# Patient Record
Sex: MALE | Race: WHITE | NOT HISPANIC OR LATINO | Employment: OTHER | URBAN - NONMETROPOLITAN AREA
[De-identification: names, ages, dates, MRNs, and addresses within clinical notes are randomized per-mention and may not be internally consistent; named-entity substitution may affect disease eponyms.]

---

## 2017-02-09 ENCOUNTER — OFFICE VISIT (OUTPATIENT)
Dept: MEDICAL GROUP | Facility: CLINIC | Age: 72
End: 2017-02-09
Payer: MEDICARE

## 2017-02-09 VITALS
WEIGHT: 232 LBS | TEMPERATURE: 98.1 F | RESPIRATION RATE: 16 BRPM | OXYGEN SATURATION: 95 % | SYSTOLIC BLOOD PRESSURE: 110 MMHG | HEIGHT: 72 IN | HEART RATE: 85 BPM | DIASTOLIC BLOOD PRESSURE: 70 MMHG | BODY MASS INDEX: 31.42 KG/M2

## 2017-02-09 DIAGNOSIS — G89.29 CHRONIC LOW BACK PAIN WITHOUT SCIATICA, UNSPECIFIED BACK PAIN LATERALITY: ICD-10-CM

## 2017-02-09 DIAGNOSIS — M54.50 CHRONIC LOW BACK PAIN WITHOUT SCIATICA, UNSPECIFIED BACK PAIN LATERALITY: ICD-10-CM

## 2017-02-09 DIAGNOSIS — G47.00 INSOMNIA, UNSPECIFIED TYPE: ICD-10-CM

## 2017-02-09 DIAGNOSIS — I10 ESSENTIAL HYPERTENSION: ICD-10-CM

## 2017-02-09 PROCEDURE — 1036F TOBACCO NON-USER: CPT | Performed by: NURSE PRACTITIONER

## 2017-02-09 PROCEDURE — 3017F COLORECTAL CA SCREEN DOC REV: CPT | Performed by: NURSE PRACTITIONER

## 2017-02-09 PROCEDURE — G8419 CALC BMI OUT NRM PARAM NOF/U: HCPCS | Performed by: NURSE PRACTITIONER

## 2017-02-09 PROCEDURE — 99214 OFFICE O/P EST MOD 30 MIN: CPT | Performed by: NURSE PRACTITIONER

## 2017-02-09 PROCEDURE — G8432 DEP SCR NOT DOC, RNG: HCPCS | Performed by: NURSE PRACTITIONER

## 2017-02-09 PROCEDURE — G8484 FLU IMMUNIZE NO ADMIN: HCPCS | Performed by: NURSE PRACTITIONER

## 2017-02-09 PROCEDURE — 4040F PNEUMOC VAC/ADMIN/RCVD: CPT | Mod: 8P | Performed by: NURSE PRACTITIONER

## 2017-02-09 PROCEDURE — 1101F PT FALLS ASSESS-DOCD LE1/YR: CPT | Performed by: NURSE PRACTITIONER

## 2017-02-09 RX ORDER — HYDROCODONE BITARTRATE AND ACETAMINOPHEN 7.5; 325 MG/1; MG/1
1 TABLET ORAL EVERY 8 HOURS PRN
Qty: 90 TAB | Refills: 0 | Status: SHIPPED | OUTPATIENT
Start: 2017-02-09 | End: 2017-03-31

## 2017-02-09 RX ORDER — LISINOPRIL 5 MG/1
5 TABLET ORAL
Qty: 30 TAB | Refills: 3 | Status: SHIPPED | OUTPATIENT
Start: 2017-02-09 | End: 2017-03-31 | Stop reason: SDUPTHER

## 2017-02-09 NOTE — ASSESSMENT & PLAN NOTE
This is a chronic problem that has been controlled with Xanax. Patient ran out of his medication a few weeks ago and started using trazodone. He states that does help alleviate his symptoms. He will continue using trazodone as needed.

## 2017-02-09 NOTE — ASSESSMENT & PLAN NOTE
Controlled with occasional use of hydrocodone. Patient's last fill was November, 2016 for 90 pills. He states he uses this medication as needed for his chronic back pain. He understands he cannot take benzodiazepines with this medication. He was using Xanax occasionally to help him sleep but has been out of that medication for the last few weeks. I will refill his hydrocodone and patient will not seek refill of his Xanax.

## 2017-02-09 NOTE — ASSESSMENT & PLAN NOTE
This is a chronic problem that is well controlled with lisinopril. Patient states he does not take his hydrochlorothiazide. His blood pressure today is 110/70. He denies any headache, dizziness, chest pain, shortness of breath, or lower extremity edema. He is due for labs which have been ordered. He will get those completed and follow-up for results.

## 2017-02-09 NOTE — MR AVS SNAPSHOT
Chidi ALAN Tatum   2017 1:20 PM   Office Visit   MRN: [id number]    Department:  [facility]   Dept Phone:  [phone]    Description:  Male : 1945   Provider:  SAM Ballard           Reason for Visit     Medication Refill           Allergies as of 2017     Allergen Noted Reactions    Celebrex [Celecoxib] 2011         You were diagnosed with     Insomnia, unspecified type   [7117978]       Other chronic pain   [338.29.ICD-9-CM]       Benign prostatic hyperplasia, presence of lower urinary tract symptoms unspecified, unspecified morphology   [7379204]   Uncontrolled. Continue current medications. Referral initiated to urology. Follow-up in 3 months.    Essential hypertension   [8583423]   Controlled. Continue current medication which was refilled today. Follow-up in 6 months.      Vital Signs     Blood Pressure Pulse Temperature Respirations Height Weight    110/70 mmHg 85 36.7 °C (98.1 °F) 16 1.829 m (6') 105.235 kg (232 lb)    Body Mass Index Oxygen Saturation Smoking Status             31.46 kg/m2 95% Never Smoker          Basic Information     Date Of Birth Sex Race Ethnicity Preferred Language    1945 Male White Non- English      Your appointments     May 04, 2017  9:20 AM   NEW TO YOU with SAM Moya   [facility] (--)    [address] [place] [id number] [phone]              Problem List              ICD-10-CM Priority Class Noted - Resolved    HTN (hypertension) I10   Unknown - Present    GERD (gastroesophageal reflux disease) K21.9   Unknown - Present    Hematuria R31.9   6/3/2014 - Present    BPH (benign prostatic hyperplasia) N40.0   2015 - Present    Back pain M54.9   2015 - Present    Insomnia G47.00   2016 - Present    Fatigue R53.83   2016 - Present    Myalgia M79.1   2016 - Present    Left foot pain M79.672   2016 - Present    Gastroparesis K31.84    9/6/2016 - Present      Health Maintenance        Date Due Completion Dates    IMM DTaP/Tdap/Td Vaccine (1 - Tdap) 10/19/1966 ---    IMM ZOSTER VACCINE 10/19/2007 ---    IMM PNEUMOCOCCAL 65+ (ADULT) LOW/MEDIUM RISK SERIES (1 of 2 - PCV13) 10/19/2012 ---    IMM INFLUENZA (1) 9/1/2016 ---    COLONOSCOPY 7/23/2018 7/23/2008            Current Immunizations     No immunizations on file.      Below and/or attached are the medications your provider expects you to take. Review all of your home medications and newly ordered medications with your provider and/or pharmacist. Follow medication instructions as directed by your provider and/or pharmacist. Please keep your medication list with you and share with your provider. Update the information when medications are discontinued, doses are changed, or new medications (including over-the-counter products) are added; and carry medication information at all times in the event of emergency situations     Allergies:  CELEBREX - (reactions not documented)               Medications  Valid as of: February 09, 2017 -  2:17 PM    Generic Name Brand Name Tablet Size Instructions for use    Finasteride (Tab) PROSCAR 5 MG Take 1 Tab by mouth every day.        HydroCHLOROthiazide (Tab) HYDRODIURIL 50 MG Take 1 Tab by mouth every day.        Hydrocodone-Acetaminophen (Tab) NORCO 7.5-325 MG Take 1 Tab by mouth every 8 hours as needed.        Lansoprazole (CAPSULE DELAYED RELEASE) PREVACID 30 MG Take 1 Cap by mouth every day.        Lisinopril (Tab) PRINIVIL 5 MG Take 1 Tab by mouth every day.        Tamsulosin HCl (Cap) FLOMAX 0.4 MG Take 1 Cap by mouth ONE-HALF HOUR AFTER BREAKFAST.        TraZODone HCl (Tab) DESYREL 50 MG Take 1-2 Tabs by mouth at bedtime as needed for Sleep.        .                 Medicines prescribed today were sent to:     [facility]  OPAL LAINEZ - [address]    [address] [place] [address]    Phone: [phone] Fax:  [phone]    Open 24 Hours?: No      Medication refill instructions:       If your prescription bottle indicates you have medication refills left, it is not necessary to call your provider’s office. Please contact your pharmacy and they will refill your medication.    If your prescription bottle indicates you do not have any refills left, you may request refills at any time through one of the following ways: The online [employer] system (except Urgent Care), by calling your provider’s office, or by asking your pharmacy to contact your provider’s office with a refill request. Medication refills are processed only during regular business hours and may not be available until the next business day. Your provider may request additional information or to have a follow-up visit with you prior to refilling your medication.   *Please Note: Medication refills are assigned a new Rx number when refilled electronically. Your pharmacy may indicate that no refills were authorized even though a new prescription for the same medication is available at the pharmacy. Please request the medicine by name with the pharmacy before contacting your provider for a refill.           [employer] Access Code: [email or web address]  Expires: 2/22/2017 10:59 AM    Your email address is not on file at [facility].  Email Addresses are required for you to sign up for [employer], please contact [phone] to verify your personal information and to provide your email address prior to attempting to register for [employer].    Chidi Tatum  [address] [place] [address]    [employer]  A secure, online tool to manage your health information     [facility]’s [employer]® is a secure, online tool that connects you to your personalized health information from the privacy of your home -- day or night - making it very easy for you to manage your healthcare. Once the activation process is completed, you can even access your medical information using the  Paperfold caro, which is available for free in the Apple Caro store or Google Play store.     To learn more about Paperfold, visit [email or web address]    There are two levels of access available (as shown below):   My Chart Features  [facility] Doctor [facility]  Specialists [facility]  Urgent  Care Non-[facility] Primary Care Doctor   Email your healthcare team securely and privately 24/7 X X X    Manage appointments: schedule your next appointment; view details of past/upcoming appointments X      Request prescription refills. X      View recent personal medical records, including lab and immunizations X X X X   View health record, including health history, allergies, medications X X X X   Read reports about your outpatient visits, procedures, consult and ER notes X X X X   See your discharge summary, which is a recap of your hospital and/or ER visit that includes your diagnosis, lab results, and care plan X X  X     How to register for Paperfold:  Once your e-mail address has been verified, follow the following steps to sign up for Paperfold.     1. Go to  [email or web address]  2. Click on the Sign Up Now box, which takes you to the New Member Sign Up page. You will need to provide the following information:  a. Enter your Paperfold Access Code exactly as it appears at the top of this page. (You will not need to use this code after you’ve completed the sign-up process. If you do not sign up before the expiration date, you must request a new code.)   b. Enter your date of birth.   c. Enter your home email address.   d. Click Submit, and follow the next screen’s instructions.  3. Create a Paperfold ID. This will be your Paperfold login ID and cannot be changed, so think of one that is secure and easy to remember.  4. Create a Paperfold password. You can change your password at any time.  5. Enter your Password Reset Question and Answer. This can be used at a later time if you forget your password.   6. Enter your e-mail  address. This allows you to receive e-mail notifications when new information is available in [employer].  7. Click Sign Up. You can now view your health information.    For assistance activating your [employer] account, call [phone]

## 2017-03-31 ENCOUNTER — OFFICE VISIT (OUTPATIENT)
Dept: MEDICAL GROUP | Facility: CLINIC | Age: 72
End: 2017-03-31
Payer: MEDICARE

## 2017-03-31 VITALS
TEMPERATURE: 98.1 F | HEART RATE: 86 BPM | RESPIRATION RATE: 16 BRPM | BODY MASS INDEX: 33.05 KG/M2 | OXYGEN SATURATION: 96 % | WEIGHT: 244 LBS | SYSTOLIC BLOOD PRESSURE: 140 MMHG | DIASTOLIC BLOOD PRESSURE: 80 MMHG | HEIGHT: 72 IN

## 2017-03-31 DIAGNOSIS — I10 ESSENTIAL HYPERTENSION: ICD-10-CM

## 2017-03-31 DIAGNOSIS — F43.21 GRIEF: ICD-10-CM

## 2017-03-31 DIAGNOSIS — M54.50 CHRONIC LEFT-SIDED LOW BACK PAIN WITHOUT SCIATICA: ICD-10-CM

## 2017-03-31 DIAGNOSIS — F51.01 PRIMARY INSOMNIA: ICD-10-CM

## 2017-03-31 DIAGNOSIS — N40.0 BENIGN PROSTATIC HYPERPLASIA WITHOUT LOWER URINARY TRACT SYMPTOMS, UNSPECIFIED MORPHOLOGY: ICD-10-CM

## 2017-03-31 DIAGNOSIS — K21.9 GASTROESOPHAGEAL REFLUX DISEASE, ESOPHAGITIS PRESENCE NOT SPECIFIED: ICD-10-CM

## 2017-03-31 DIAGNOSIS — G89.29 CHRONIC LEFT-SIDED LOW BACK PAIN WITHOUT SCIATICA: ICD-10-CM

## 2017-03-31 DIAGNOSIS — E66.9 OBESITY (BMI 30-39.9): ICD-10-CM

## 2017-03-31 DIAGNOSIS — Z76.89 ESTABLISHING CARE WITH NEW DOCTOR, ENCOUNTER FOR: ICD-10-CM

## 2017-03-31 PROBLEM — F43.9 STRESS AT HOME: Status: ACTIVE | Noted: 2017-03-31

## 2017-03-31 PROCEDURE — G8484 FLU IMMUNIZE NO ADMIN: HCPCS | Performed by: NURSE PRACTITIONER

## 2017-03-31 PROCEDURE — 99214 OFFICE O/P EST MOD 30 MIN: CPT | Performed by: NURSE PRACTITIONER

## 2017-03-31 PROCEDURE — G8432 DEP SCR NOT DOC, RNG: HCPCS | Performed by: NURSE PRACTITIONER

## 2017-03-31 PROCEDURE — G8419 CALC BMI OUT NRM PARAM NOF/U: HCPCS | Performed by: NURSE PRACTITIONER

## 2017-03-31 PROCEDURE — 1036F TOBACCO NON-USER: CPT | Performed by: NURSE PRACTITIONER

## 2017-03-31 PROCEDURE — 4040F PNEUMOC VAC/ADMIN/RCVD: CPT | Mod: 8P | Performed by: NURSE PRACTITIONER

## 2017-03-31 PROCEDURE — 1101F PT FALLS ASSESS-DOCD LE1/YR: CPT | Performed by: NURSE PRACTITIONER

## 2017-03-31 PROCEDURE — 3017F COLORECTAL CA SCREEN DOC REV: CPT | Performed by: NURSE PRACTITIONER

## 2017-03-31 RX ORDER — TAMSULOSIN HYDROCHLORIDE 0.4 MG/1
0.4 CAPSULE ORAL
Qty: 90 CAP | Refills: 2 | Status: SHIPPED | OUTPATIENT
Start: 2017-03-31 | End: 2018-02-07 | Stop reason: SDUPTHER

## 2017-03-31 RX ORDER — ESOMEPRAZOLE MAGNESIUM 40 MG/1
40 CAPSULE, DELAYED RELEASE ORAL
Qty: 90 CAP | Refills: 2 | Status: SHIPPED | OUTPATIENT
Start: 2017-03-31 | End: 2017-09-01 | Stop reason: SDUPTHER

## 2017-03-31 RX ORDER — TRAMADOL HYDROCHLORIDE 50 MG/1
50 TABLET ORAL EVERY 4 HOURS PRN
Qty: 30 TAB | Refills: 0 | Status: SHIPPED | OUTPATIENT
Start: 2017-03-31 | End: 2017-05-19

## 2017-03-31 RX ORDER — HYDROCHLOROTHIAZIDE 50 MG/1
50 TABLET ORAL DAILY
Qty: 90 TAB | Refills: 2 | Status: ON HOLD | OUTPATIENT
Start: 2017-03-31 | End: 2018-02-09

## 2017-03-31 RX ORDER — ZOLPIDEM TARTRATE 5 MG/1
5 TABLET ORAL NIGHTLY PRN
Qty: 30 TAB | Refills: 2 | Status: SHIPPED | OUTPATIENT
Start: 2017-03-31 | End: 2017-05-19

## 2017-03-31 RX ORDER — FINASTERIDE 5 MG/1
5 TABLET, FILM COATED ORAL DAILY
Qty: 90 TAB | Refills: 2 | Status: SHIPPED | OUTPATIENT
Start: 2017-03-31 | End: 2017-12-28 | Stop reason: SDUPTHER

## 2017-03-31 RX ORDER — LISINOPRIL 5 MG/1
5 TABLET ORAL
Qty: 90 TAB | Refills: 2 | Status: SHIPPED | OUTPATIENT
Start: 2017-03-31 | End: 2017-05-19 | Stop reason: SDUPTHER

## 2017-03-31 ASSESSMENT — PAIN SCALES - GENERAL: PAINLEVEL: NO PAIN

## 2017-03-31 NOTE — ASSESSMENT & PLAN NOTE
Patient is currently stable taking lisinopril 5 mg daily, hydrochlorothiazide 50 mg daily. Blood pressure today is 140/80. Patient denies any chest pain, shortness of breath, palpitations.

## 2017-03-31 NOTE — ASSESSMENT & PLAN NOTE
Patient recently lost his wife, she passed away in the hospital. She was in the hospital for lengthy periods of time and he is doing his best to cope. He has been taking Xanax to sleep. His children are living with him and his grandchild, who is 4. Reports they are keeping him happy.

## 2017-03-31 NOTE — ASSESSMENT & PLAN NOTE
Patient has been using xanax to sleep more often since his wife passed away. He did trial Trazodone, reports this did not make him feel good.

## 2017-03-31 NOTE — ASSESSMENT & PLAN NOTE
Patient reports good symptom control with Prevacid, however cost is an issue. He recently was signed up with Medicaid, has used Nexium in the past with good results.

## 2017-03-31 NOTE — ASSESSMENT & PLAN NOTE
Patient reports good symptom control with tamsulosin 0.4 mg daily and finasteride 5 mg daily. No active symptoms.

## 2017-03-31 NOTE — ASSESSMENT & PLAN NOTE
Patient has not filled a script for Norco since Nov 2016. Reports only using medication 1-3 times per month.   Never had back surgery.   Reports he had had some imaging, this is not on file.   Reports he occasionally 'throws my back out.'

## 2017-03-31 NOTE — MR AVS SNAPSHOT
"Chidi Tatum   3/31/2017 11:40 AM   Office Visit   MRN: [id number]    Department:  [facility] Phone:  [phone]    Description:  Male : 1945   Provider:  SAM Moya           Reason for Visit     Establish Care     Medication Refill finasteride / tamsulosin / nexium / norco    Medication Refill wants to talk abou thctz-lisin combined possibly as well as xanax      Allergies as of 3/31/2017     Allergen Noted Reactions    Celebrex [Celecoxib] 2011         You were diagnosed with     Establishing care with new doctor, encounter for   [823073]       Obesity (BMI 30-39.9)   [118733]       Chronic left-sided low back pain without sciatica   [4021536]       Benign prostatic hyperplasia without lower urinary tract symptoms, unspecified morphology   [7836686]       Gastroesophageal reflux disease, esophagitis presence not specified   [4411136]       Grief   [186322]       Primary insomnia   [708204]         Vital Signs     Blood Pressure Pulse Temperature Respirations Height Weight    140/80 mmHg 86 36.7 °C (98.1 °F) 16 1.829 m (6' 0.01\") 110.678 kg (244 lb)    Body Mass Index Oxygen Saturation Smoking Status             33.09 kg/m2 96% Never Smoker          Basic Information     Date Of Birth Sex Race Ethnicity Preferred Language    1945 Male White Non- English      Problem List              ICD-10-CM Priority Class Noted - Resolved    Essential hypertension I10   Unknown - Present    GERD (gastroesophageal reflux disease) K21.9   Unknown - Present    BPH (benign prostatic hyperplasia) N40.0   2015 - Present    Chronic left-sided low back pain without sciatica M54.5, G89.29   2015 - Present    Primary insomnia F51.01   2016 - Present    Gastroparesis K31.84   2016 - Present    Obesity (BMI 30-39.9) E66.9   3/31/2017 - Present    Grief F43.20   3/31/2017 - Present      Health Maintenance        Date Due Completion Dates    IMM " DTaP/Tdap/Td Vaccine (1 - Tdap) 10/19/1966 ---    IMM ZOSTER VACCINE 10/19/2007 ---    IMM PNEUMOCOCCAL 65+ (ADULT) LOW/MEDIUM RISK SERIES (1 of 2 - PCV13) 10/19/2012 ---    IMM INFLUENZA (1) 9/1/2016 ---    COLONOSCOPY 7/23/2018 7/23/2008            Current Immunizations     No immunizations on file.      Below and/or attached are the medications your provider expects you to take. Review all of your home medications and newly ordered medications with your provider and/or pharmacist. Follow medication instructions as directed by your provider and/or pharmacist. Please keep your medication list with you and share with your provider. Update the information when medications are discontinued, doses are changed, or new medications (including over-the-counter products) are added; and carry medication information at all times in the event of emergency situations     Allergies:  CELEBREX - (reactions not documented)               Medications  Valid as of: March 31, 2017 - 11:49 AM    Generic Name Brand Name Tablet Size Instructions for use    Esomeprazole Magnesium (CAPSULE DELAYED RELEASE) NEXIUM 40 MG Take 1 Cap by mouth every morning before breakfast.        Finasteride (Tab) PROSCAR 5 MG Take 1 Tab by mouth every day.        HydroCHLOROthiazide (Tab) HYDRODIURIL 50 MG Take 1 Tab by mouth every day.        Lisinopril (Tab) PRINIVIL 5 MG Take 1 Tab by mouth every day.        Tamsulosin HCl (Cap) FLOMAX 0.4 MG Take 1 Cap by mouth ONE-HALF HOUR AFTER BREAKFAST.        TraMADol HCl (Tab) ULTRAM 50 MG Take 1 Tab by mouth every four hours as needed.        Zolpidem Tartrate (Tab) AMBIEN 5 MG Take 1 Tab by mouth at bedtime as needed for Sleep.        .                 Medicines prescribed today were sent to:     [facility]  [place] - [address]    [address] [place] [address]    Phone: [phone] Fax: [phone]    Open 24 Hours?: No      Medication refill instructions:       If  your prescription bottle indicates you have medication refills left, it is not necessary to call your provider’s office. Please contact your pharmacy and they will refill your medication.    If your prescription bottle indicates you do not have any refills left, you may request refills at any time through one of the following ways: The online [employer] system (except Urgent Care), by calling your provider’s office, or by asking your pharmacy to contact your provider’s office with a refill request. Medication refills are processed only during regular business hours and may not be available until the next business day. Your provider may request additional information or to have a follow-up visit with you prior to refilling your medication.   *Please Note: Medication refills are assigned a new Rx number when refilled electronically. Your pharmacy may indicate that no refills were authorized even though a new prescription for the same medication is available at the pharmacy. Please request the medicine by name with the pharmacy before contacting your provider for a refill.        Instructions    Your medical care was provided today by: REBECCA Pike    Thank You for the opportunity to serve you.    You may receive a brief survey in the mail shortly regarding your visit today. Please take a few moments to complete the survey and return it; no postage is necessary. We are working to serve our patient population better, improve customer service and our patients overall experience and your input can help us to accomplish this. We thank you for your help and for the opportunity to serve you today and in the future.     Special Instructions:  Always call 9-1-1 immediately if you develop a life threatening emergency.    Unless told otherwise please take all medications as directed and complete prescription therapies.     Watch for the following signs that require additional evaluation: progressive lethargy or  unresponsiveness, localized pain (chest, abdomen), shortness of breath, painful breathing, progressive vomiting with weakness, bloody stools, or new rash.     If you are prescribed pain medication or any other medication that is sedating, do not take medication before or while operating a vehicle or heavy machinery or equipment due to potential side effects such as drowsiness and/or dizziness.           [email or web address] Access Code: [email or web address]  Expires: 4/21/2017 11:47 AM    Your email address is not on file at [facility].  Email Addresses are required for you to sign up for [email or web address], please contact [phone] to verify your personal information and to provide your email address prior to attempting to register for [email or web address].    Chidi Tatum  [address] [place] [address]    [email or web address]  A secure, online tool to manage your health information     [facility]’s Locondo.jp® is a secure, online tool that connects you to your personalized health information from the privacy of your home -- day or night - making it very easy for you to manage your healthcare. Once the activation process is completed, you can even access your medical information using the [email or web address] caro, which is available for free in the Apple Caro store or Google Play store.     To learn more about [email or web address], visit [email or web address]    There are two levels of access available (as shown below):   My Chart Features  [facility] Primary Care Doctor [facility]  Specialists [facility]  Urgent  Care Non-[facility] Primary Care Doctor   Email your healthcare team securely and privately 24/7 X X X    Manage appointments: schedule your next appointment; view details of past/upcoming appointments X      Request prescription refills. X      View recent personal medical records, including lab and immunizations X X X X   View health record, including health history, allergies, medications X X X X   Read reports about your outpatient visits, procedures, consult and ER notes  X X X X   See your discharge summary, which is a recap of your hospital and/or ER visit that includes your diagnosis, lab results, and care plan X X  X     How to register for [employer]:  Once your e-mail address has been verified, follow the following steps to sign up for [employer].     1. Go to  [email or web address]  2. Click on the Sign Up Now box, which takes you to the New Member Sign Up page. You will need to provide the following information:  a. Enter your [employer] Access Code exactly as it appears at the top of this page. (You will not need to use this code after you’ve completed the sign-up process. If you do not sign up before the expiration date, you must request a new code.)   b. Enter your date of birth.   c. Enter your home email address.   d. Click Submit, and follow the next screen’s instructions.  3. Create a [employer] ID. This will be your [employer] login ID and cannot be changed, so think of one that is secure and easy to remember.  4. Create a [employer] password. You can change your password at any time.  5. Enter your Password Reset Question and Answer. This can be used at a later time if you forget your password.   6. Enter your e-mail address. This allows you to receive e-mail notifications when new information is available in [employer].  7. Click Sign Up. You can now view your health information.    For assistance activating your [employer] account, call [phone]

## 2017-03-31 NOTE — PATIENT INSTRUCTIONS
Your medical care was provided today by: REBECCA Pike    Thank You for the opportunity to serve you.    You may receive a brief survey in the mail shortly regarding your visit today. Please take a few moments to complete the survey and return it; no postage is necessary. We are working to serve our patient population better, improve customer service and our patients overall experience and your input can help us to accomplish this. We thank you for your help and for the opportunity to serve you today and in the future.     Special Instructions:  Always call 9-1-1 immediately if you develop a life threatening emergency.    Unless told otherwise please take all medications as directed and complete prescription therapies.     Watch for the following signs that require additional evaluation: progressive lethargy or unresponsiveness, localized pain (chest, abdomen), shortness of breath, painful breathing, progressive vomiting with weakness, bloody stools, or new rash.     If you are prescribed pain medication or any other medication that is sedating, do not take medication before or while operating a vehicle or heavy machinery or equipment due to potential side effects such as drowsiness and/or dizziness.

## 2017-03-31 NOTE — PROGRESS NOTES
Chief Complaint   Patient presents with   • Establish Care   • Medication Refill     finasteride / tamsulosin / nexium / norco   • Medication Refill     wants to talk abou thctz-lisin combined possibly as well as xanax        Chidi Tatum is a 71 y.o. male here today to establish care and to discuss the evaluation and management of:    HPI:      BPH (benign prostatic hyperplasia)  Patient reports good symptom control with tamsulosin 0.4 mg daily and finasteride 5 mg daily. No active symptoms.    Grief  Patient recently lost his wife, she passed away in the hospital. She was in the hospital for lengthy periods of time and he is doing his best to cope. He has been taking Xanax to sleep. His children are living with him and his grandchild, who is 4. Reports they are keeping him happy.    GERD (gastroesophageal reflux disease)  Patient reports good symptom control with Prevacid, however cost is an issue. He recently was signed up with Medicaid, has used Nexium in the past with good results.    Essential hypertension  Patient is currently stable taking lisinopril 5 mg daily, hydrochlorothiazide 50 mg daily. Blood pressure today is 140/80. Patient denies any chest pain, shortness of breath, palpitations.    Primary insomnia  Patient has been using xanax to sleep more often since his wife passed away. He did trial Trazodone, reports this did not make him feel good.     Chronic left-sided low back pain without sciatica  Patient has not filled a script for Norco since Nov 2016. Reports only using medication 1-3 times per month.   Never had back surgery.   Reports he had had some imaging, this is not on file.   Reports he occasionally 'throws my back out.'       Current medicines (including changes today)  Current Outpatient Prescriptions   Medication Sig Dispense Refill   • esomeprazole (NEXIUM) 40 MG delayed-release capsule Take 1 Cap by mouth every morning before breakfast. 90 Cap 2   • hydrochlorothiazide (HYDRODIURIL)  50 MG Tab Take 1 Tab by mouth every day. 90 Tab 2   • lisinopril (PRINIVIL) 5 MG Tab Take 1 Tab by mouth every day. 90 Tab 2   • tamsulosin (FLOMAX) 0.4 MG capsule Take 1 Cap by mouth ONE-HALF HOUR AFTER BREAKFAST. 90 Cap 2   • finasteride (PROSCAR) 5 MG Tab Take 1 Tab by mouth every day. 90 Tab 2   • tramadol (ULTRAM) 50 MG Tab Take 1 Tab by mouth every four hours as needed. 30 Tab 0   • zolpidem (AMBIEN) 5 MG Tab Take 1 Tab by mouth at bedtime as needed for Sleep. 30 Tab 2     No current facility-administered medications for this visit.       He  has a past medical history of HTN (hypertension); GERD (gastroesophageal reflux disease); Insomnia; and BPH (benign prostatic hyperplasia).    He  has past surgical history that includes hernia repair.     Social History   Substance Use Topics   • Smoking status: Never Smoker    • Smokeless tobacco: Never Used   • Alcohol Use: No       Social History     Social History Narrative       Family History   Problem Relation Age of Onset   • Lung Disease Father    • Alcohol/Drug Brother    • Lung Disease Brother        Family Status   Relation Status Death Age   • Mother     • Father     • Sister Alive    • Brother     • Sister Alive    • Brother     • Brother     • Brother         ROS   Constitutional: Denies fever, chills, or sweats  Eyes: negative for visual blurring, double vision, eye pain, floaters and discharge from eyes  ENT: negative for tinnitus, vertigo, frequent URI's, sinus trouble, persistent sore throat  Respiratory: negative for persistent cough, hemoptysis, dyspnea, wheezing  Cardiovascular: negative for palpitations, chest pain, or peripheral edema.  Gastrointestinal: negative for poor appetite, dysphagia, nausea, abdominal pain, hemorrhoids, constipation or diarrhea. Positive for heartburn.   Genitourinary: negative for dysuria.   Musculoskeletal: negative for joint swelling. Positive for low back pain,  "intermittent. Denies fall or acute injury.   Skin: negative for rash, scaling, hair or nail changes.  Neurologic: negative for migraine headaches, involuntary movements or tremor  Psychiatric: negative for excessive alcohol consumption or illegal drug usage, positive for insomnia, grief. Denies SI/HI.   Hematologic/Lymphatic/Immunologic: negative for unusual bruising, swollen glands  Endocrine: negative for temperature intolerance, polydipsia, polyuria, unintentional weight changes.        Objective:     Blood pressure 140/80, pulse 86, temperature 36.7 °C (98.1 °F), resp. rate 16, height 1.829 m (6' 0.01\"), weight 110.678 kg (244 lb), SpO2 96 %. Body mass index is 33.09 kg/(m^2).    Physical Exam:   Constitutional: Alert, no distress. He does at times appear   Eye: Equal, round and reactive, conjunctiva clear, lids normal.  ENMT: Lips without lesions, good dentition, oropharynx clear.   Neck: Trachea midline, no masses, no thyromegaly. No cervical or supraclavicular lymphadenopathy.   Respiratory: Unlabored respiratory effort, lungs clear to auscultation, no wheezes, no ronchi.  Cardiovascular: Normal S1, S2, no murmur, no edema.   Abdomen: Soft, non-tender, no masses, no hepatosplenomegaly. Normal bowel sounds.   Skin: Warm, dry, good turgor, no rashes in visible areas.  Neuro:  normal sensation in extremities.   Psych: Alert and oriented x3, normal affect and mood.      Assessment and Plan:   The following treatment plan was discussed    1. Establishing care with new doctor, encounter for  Encouraged to make appointment for Medicare Annual Wellness.     2. Obesity (BMI 30-39.9)  - Patient identified as having weight management issue.  Appropriate orders and counseling given.    3. Chronic left-sided low back pain without sciatica  Given risks of and don't use with opiates, and patient using so infrequently, prescribed tramadol 50 mg for as needed use. Discussed possible side effects with patient, and he is " willing to take medication. Patient did complete a controlled substance agreement and UDS today considering his history of medications. He has not taken Norco in over 2 weeks. He did recently take Xanax.    4. Benign prostatic hyperplasia without lower urinary tract symptoms, unspecified morphology  Patient is stable on current medication regime.    5. Gastroesophageal reflux disease, esophagitis presence not specified  Started patient on Nexium 40 mg daily.    6. Grief  Provided reassurance. Patient is not interested in counseling. He appears to be coping well.    7. Primary insomnia  Discussed risks of long-term Xanax use. He is open to using Ambien 5 mg as needed at bedtime for sleep. Advised to return to clinic in 3 months, may consider weaning off medication once he is through his grief episode.  - Controlled Substance Treatment Agreement    8. Essential hypertension  Patient is stable on current medication regime.      Reviewed indication, dosage, usage and potential adverse effects of prescribed medications. Patient appears to understand, verbalizes understanding and is willing to try medications as prescribed.      Discussed at length with patient use of non-pharmacological treatments as adjuncts for current pain medicine. Advised prescription is a controlled substance which is potentially habit-forming. Its use is regulated by the [place]. It must be submitted to the pharmacy within 5 days of the date written and can not be called in or faxed to the pharmacy. Any refill requires a new prescription that must be obtained from this office during regular office hours. This medicine can cause nausea, significant constipation, sedation, confusion. I have advised patient to keep medication in a safe place and to not drive with medication.    I reviewed risks, side effects, and interactions of medications, including over-the-counter medications. I recommend avoid use of benzodiazepines due to risk of interaction with  opioid analgesics. I advise the patient to avoid alcohol and marijuana use or any illicits with prescribed medication. I reviewed the controlled substance prescribing program, including the risks of operating any vehicle or machinery with use of the prescribed medications, and adverse affects such as dependence, tolerance, addiction, and withdrawal with use of opioid analgesics and/or benzodiazepines.    I have reviewed the medical records, the [place] Prescription Monitoring Program and I have determined that controlled substance treatment is medically indicated.    Reviewed risks and benefits of treatment plan. Patient verbally agrees to plan of care.     Records requested.    Followup: Return in about 3 months (around 6/30/2017) for jared.    FRANCISCO Moya.     PLEASE NOTE: This dictation was created using voice recognition software. I have made every reasonable attempt to correct obvious errors, but I expect that there may be errors of grammar and possibly content that I did not discover prior finalizing this note.

## 2017-04-08 RX ORDER — ZOLPIDEM TARTRATE 5 MG/1
5 TABLET ORAL NIGHTLY PRN
Qty: 30 TAB | Refills: 2 | Status: CANCELLED | OUTPATIENT
Start: 2017-04-08

## 2017-04-11 NOTE — TELEPHONE ENCOUNTER
Looks like it was printed on 3/31/17?   It does not appear he filled it for .   Can you check with the pharmacy.   REBECCA Pike

## 2017-04-19 ENCOUNTER — TELEPHONE (OUTPATIENT)
Dept: MEDICAL GROUP | Facility: CLINIC | Age: 72
End: 2017-04-19

## 2017-04-19 NOTE — TELEPHONE ENCOUNTER
UDS 3/31/17  Patient takes Tramadol very infrequenrtly, OK that it was not detected in system.   UDS normal otherwise.   Ok to continue meds.   Bernardo Dasilva, APRN

## 2017-05-19 ENCOUNTER — OFFICE VISIT (OUTPATIENT)
Dept: MEDICAL GROUP | Facility: CLINIC | Age: 72
End: 2017-05-19
Payer: MEDICARE

## 2017-05-19 VITALS
TEMPERATURE: 98.8 F | BODY MASS INDEX: 33.21 KG/M2 | OXYGEN SATURATION: 92 % | WEIGHT: 232 LBS | HEART RATE: 86 BPM | SYSTOLIC BLOOD PRESSURE: 126 MMHG | HEIGHT: 70 IN | DIASTOLIC BLOOD PRESSURE: 70 MMHG | RESPIRATION RATE: 16 BRPM

## 2017-05-19 DIAGNOSIS — F33.1 MODERATE EPISODE OF RECURRENT MAJOR DEPRESSIVE DISORDER (HCC): ICD-10-CM

## 2017-05-19 DIAGNOSIS — I10 ESSENTIAL HYPERTENSION: ICD-10-CM

## 2017-05-19 DIAGNOSIS — Z28.20 VACCINE REFUSED BY PATIENT: ICD-10-CM

## 2017-05-19 DIAGNOSIS — M54.50 CHRONIC LEFT-SIDED LOW BACK PAIN WITHOUT SCIATICA: ICD-10-CM

## 2017-05-19 DIAGNOSIS — F43.21 GRIEF: ICD-10-CM

## 2017-05-19 DIAGNOSIS — F51.01 PRIMARY INSOMNIA: ICD-10-CM

## 2017-05-19 DIAGNOSIS — L40.9 PSORIASIS: ICD-10-CM

## 2017-05-19 DIAGNOSIS — G89.29 CHRONIC LEFT-SIDED LOW BACK PAIN WITHOUT SCIATICA: ICD-10-CM

## 2017-05-19 PROCEDURE — 1036F TOBACCO NON-USER: CPT | Performed by: NURSE PRACTITIONER

## 2017-05-19 PROCEDURE — 3017F COLORECTAL CA SCREEN DOC REV: CPT | Performed by: NURSE PRACTITIONER

## 2017-05-19 PROCEDURE — G8419 CALC BMI OUT NRM PARAM NOF/U: HCPCS | Performed by: NURSE PRACTITIONER

## 2017-05-19 PROCEDURE — G8432 DEP SCR NOT DOC, RNG: HCPCS | Performed by: NURSE PRACTITIONER

## 2017-05-19 PROCEDURE — 4040F PNEUMOC VAC/ADMIN/RCVD: CPT | Mod: 8P | Performed by: NURSE PRACTITIONER

## 2017-05-19 PROCEDURE — 1101F PT FALLS ASSESS-DOCD LE1/YR: CPT | Performed by: NURSE PRACTITIONER

## 2017-05-19 PROCEDURE — 99214 OFFICE O/P EST MOD 30 MIN: CPT | Performed by: NURSE PRACTITIONER

## 2017-05-19 RX ORDER — BENZOCAINE/MENTHOL 6 MG-10 MG
LOZENGE MUCOUS MEMBRANE
Qty: 1 TUBE | Refills: 0 | Status: ON HOLD | OUTPATIENT
Start: 2017-05-19 | End: 2018-02-09

## 2017-05-19 RX ORDER — SERTRALINE HYDROCHLORIDE 25 MG/1
25 TABLET, FILM COATED ORAL DAILY
Qty: 30 TAB | Refills: 2 | Status: SHIPPED | OUTPATIENT
Start: 2017-05-19 | End: 2018-02-02

## 2017-05-19 RX ORDER — HYDROCODONE BITARTRATE AND ACETAMINOPHEN 5; 325 MG/1; MG/1
1-2 TABLET ORAL EVERY 4 HOURS PRN
Qty: 30 TAB | Refills: 0 | Status: SHIPPED | OUTPATIENT
Start: 2017-05-19 | End: 2018-04-12

## 2017-05-19 RX ORDER — LISINOPRIL 10 MG/1
10 TABLET ORAL
Qty: 90 TAB | Refills: 1 | Status: SHIPPED | OUTPATIENT
Start: 2017-05-19 | End: 2017-09-14 | Stop reason: SDUPTHER

## 2017-05-19 NOTE — ASSESSMENT & PLAN NOTE
This is a chronic problem that has been well controlled in the past. He is now taking Hydrochlorothiazide, he also has been measuring his BP at home and reports it was elevated (150/90 consistently), so he increased his lisinopril to 5 mg two tablets based on his daughters recommendation. Denies dizziness. Reports his BP is now much improved.

## 2017-05-19 NOTE — PROGRESS NOTES
Subjective:     Chidi Tatum is a 71 y.o. male here today for evaluation and management of the following:     Chronic left-sided low back pain without sciatica  Patient has not filled a script for Norco since Nov 2016. Reports only using medication 1-3 times per month.   Never had back surgery.   Reports he had had some imaging, this is not on file.   Reports he occasionally 'throws my back out.'   He did trial Tramadol which did not seem to work and made him drowsy. He requests to have the Norco on hand instead.        Essential hypertension  This is a chronic problem that has been well controlled in the past. He is now taking Hydrochlorothiazide, he also has been measuring his BP at home and reports it was elevated (150/90 consistently), so he increased his lisinopril to 5 mg two tablets based on his daughters recommendation. Denies dizziness. Reports his BP is now much improved.       Grief  Patient recently lost his wife, she passed away in the hospital. She was in the hospital for lengthy periods of time and he is doing his best to cope.His children are living with him and his grandchild, who is 4. Reports they are keeping him happy. Reports Ambien makes him too tired the following day. Reports his grief appears to be worsening. Denies SI.         Moderate episode of recurrent major depressive disorder (CMS-HCC)  This is a new problem. Please see grief note.    Primary insomnia  Patient reports he often sleeps during they daytime. Ambien has been making him too drowsy following rest.         Psoriasis  This is a chronic problem. He has been using clobetasol cream as needed, requests a refill.        Current medicines (including changes today)  Current Outpatient Prescriptions   Medication Sig Dispense Refill   • lisinopril (PRINIVIL) 10 MG Tab Take 1 Tab by mouth every day. 90 Tab 1   • sertraline (ZOLOFT) 25 MG tablet Take 1 Tab by mouth every day. 30 Tab 2   • hydrocodone-acetaminophen (NORCO) 5-325 MG Tab  "per tablet Take 1-2 Tabs by mouth every four hours as needed. 30 Tab 0   • hydrocortisone 1 % Cream Apply to affected area twice daily for 10 days 1 Tube 0   • esomeprazole (NEXIUM) 40 MG delayed-release capsule Take 1 Cap by mouth every morning before breakfast. 90 Cap 2   • hydrochlorothiazide (HYDRODIURIL) 50 MG Tab Take 1 Tab by mouth every day. 90 Tab 2   • tamsulosin (FLOMAX) 0.4 MG capsule Take 1 Cap by mouth ONE-HALF HOUR AFTER BREAKFAST. 90 Cap 2   • finasteride (PROSCAR) 5 MG Tab Take 1 Tab by mouth every day. 90 Tab 2     No current facility-administered medications for this visit.       He  has a past medical history of HTN (hypertension); GERD (gastroesophageal reflux disease); Insomnia; BPH (benign prostatic hyperplasia); and Hypertension.    He  has past surgical history that includes hernia repair.     Social History     Social History   • Marital Status:      Spouse Name: N/A   • Number of Children: N/A   • Years of Education: N/A     Social History Main Topics   • Smoking status: Never Smoker    • Smokeless tobacco: Never Used   • Alcohol Use: No   • Drug Use: No   • Sexual Activity: Not Asked     Other Topics Concern   • None     Social History Narrative       Family History   Problem Relation Age of Onset   • Lung Disease Father    • Alcohol/Drug Brother    • Lung Disease Brother          ROS  Positive for depression, insomnia, grief. No SI/HI. Positive for intermittent skin itching and rash. Positive for intermittent low back pain.   No fever, no chest pain, no shortness of breath, no abdominal pain.     All other systems reviewed and are negative.        Objective:     Blood pressure 126/70, pulse 86, temperature 37.1 °C (98.8 °F), resp. rate 16, height 1.784 m (5' 10.24\"), weight 105.235 kg (232 lb), SpO2 92 %. Body mass index is 33.07 kg/(m^2).    Physical Exam:   Constitutional: Alert, no distress. He is tearful at times.   Eye: Equal, round and reactive, conjunctiva clear, lids " normal.   ENMT: Lips without lesions, good dentition, oropharynx clear.   Neck: Trachea midline, no masses, no thyromegaly. No cervical or supraclavicular lymphadenopathy  Respiratory: Unlabored respiratory effort, lungs clear to auscultation, no wheezes, no ronchi.  Cardiovascular: Normal S1, S2, no murmur, no edema.   Abdomen: Soft, non-tender, no masses, no hepatosplenomegaly. Normal bowel sounds.   Skin: Warm, dry, good turgor, no rashes in visible areas. Very dry in UE.   Psych: Alert and oriented x3, normal affect and mood.        Assessment and Plan:   The following treatment plan was discussed    1. Essential hypertension  Discussed with patient any concern that his blood pressure may be too low with increased medication. At this time he reports he feels better and has better blood pressure readings when his lisinopril is at 10 mg. Based on this information I will increase his medication, discussed at length signs and symptoms to seek emergent care. Monitor blood pressure regularly. We discussed any concern for dizziness or for low blood pressure reading to please contact clinic. Advised to obtain fasting labs. Next appointment.  - LIPID PROFILE; Future  - TSH WITH REFLEX TO FT4; Future  - COMP METABOLIC PANEL; Future    2. Chronic left-sided low back pain without sciatica  At this time tramadol did not appear to help. He uses medication so infrequently, we will change back to Norco 5/325 mg. Patient to use medication no more than 1-3 times monthly. He is amenable to this plan. We did discuss risks of oversedation. Patient verbalizes understanding.    3. Primary insomnia  Discussed with patient if he sleeps during the day he will have a hard time sleeping at night. At this time based on our discussion we will not have any medication for sleep.    4. Grief    5. Moderate episode of recurrent major depressive disorder (CMS-HCC)  Discussed with patient possible counseling, he is not interested. He has agreed to  trial Zoloft 25 mg daily. Discussed signs and symptoms seek emergent care. He does have good family support. Patient to return to clinic in one month to assess how he is doing.    6. Psoriasis  Hydrocortisone cream as needed. Advised to use sparingly.    7. Vaccine refused by patient    Reviewed indication, dosage, usage and potential adverse effects of prescribed medications. Patient appears to understand, verbalizes understanding and is willing to try medications as prescribed.      Reviewed risks and benefits of treatment plan. Patient verbally agrees to plan of care.       Followup: Return in about 2 months (around 7/19/2017) for keep upcoming appt already scheduled for depression follow up .    FRANCISCO Moya.     PLEASE NOTE: This dictation was created using voice recognition software. I have made every reasonable attempt to correct obvious errors, but I expect that there may be errors of grammar and possibly content that I did not discover prior finalizing this note.

## 2017-05-19 NOTE — MR AVS SNAPSHOT
"        Chidi Nortonfield   2017 3:40 PM   Office Visit   MRN: [id number]    Department:  [facility] Phone:  [phone]    Description:  Male : 1945   Provider:  SAM Moya           Reason for Visit     Establish Care     Medication Refill tramadol, change lisinopril pt felt like weren't working so started taking double, change sleeping meds.       Allergies as of 2017     Allergen Noted Reactions    Celebrex [Celecoxib] 2011         You were diagnosed with     Essential hypertension   [0651633]       Chronic left-sided low back pain without sciatica   [2101837]       Primary insomnia   [917951]       Grief   [867806]       Moderate episode of recurrent major depressive disorder (CMS-HCC)   [3454982]       Psoriasis   [980574]       Vaccine refused by patient   [484571]         Vital Signs     Blood Pressure Pulse Temperature Respirations Height Weight    126/70 mmHg 86 37.1 °C (98.8 °F) 16 1.784 m (5' 10.24\") 105.235 kg (232 lb)    Body Mass Index Oxygen Saturation Smoking Status             33.07 kg/m2 92% Never Smoker          Basic Information     Date Of Birth Sex Race Ethnicity Preferred Language    1945 Male White Non- English      Your appointments     2017 10:20 AM   Established Patient with SAM Moya   [facility] (--)    [address] [place] [id number]   [phone]           You will be receiving a confirmation call a few days before your appointment from our automated call confirmation system.              Problem List              ICD-10-CM Priority Class Noted - Resolved    Essential hypertension I10   Unknown - Present    GERD (gastroesophageal reflux disease) K21.9   Unknown - Present    BPH (benign prostatic hyperplasia) N40.0   2015 - Present    Chronic left-sided low back pain without sciatica M54.5, G89.29   2015 - Present    Primary insomnia F51.01   " 2/19/2016 - Present    Gastroparesis K31.84   9/6/2016 - Present    Obesity (BMI 30-39.9) E66.9   3/31/2017 - Present    Grief F43.20   3/31/2017 - Present    Moderate episode of recurrent major depressive disorder (CMS-HCC) F33.1   5/19/2017 - Present    Psoriasis L40.9   5/19/2017 - Present      Health Maintenance        Date Due Completion Dates    IMM DTaP/Tdap/Td Vaccine (1 - Tdap) 10/19/1966 ---    IMM ZOSTER VACCINE 10/19/2007 ---    IMM PNEUMOCOCCAL 65+ (ADULT) LOW/MEDIUM RISK SERIES (1 of 2 - PCV13) 10/19/2012 ---    COLONOSCOPY 7/23/2018 7/23/2008            Current Immunizations     No immunizations on file.      Below and/or attached are the medications your provider expects you to take. Review all of your home medications and newly ordered medications with your provider and/or pharmacist. Follow medication instructions as directed by your provider and/or pharmacist. Please keep your medication list with you and share with your provider. Update the information when medications are discontinued, doses are changed, or new medications (including over-the-counter products) are added; and carry medication information at all times in the event of emergency situations     Allergies:  CELEBREX - (reactions not documented)               Medications  Valid as of: May 19, 2017 -  4:17 PM    Generic Name Brand Name Tablet Size Instructions for use    Esomeprazole Magnesium (CAPSULE DELAYED RELEASE) NEXIUM 40 MG Take 1 Cap by mouth every morning before breakfast.        Finasteride (Tab) PROSCAR 5 MG Take 1 Tab by mouth every day.        HydroCHLOROthiazide (Tab) HYDRODIURIL 50 MG Take 1 Tab by mouth every day.        Hydrocodone-Acetaminophen (Tab) NORCO 5-325 MG Take 1-2 Tabs by mouth every four hours as needed.        Hydrocortisone (Cream) hydrocortisone 1 % Apply to affected area twice daily for 10 days        Lisinopril (Tab) PRINIVIL 10 MG Take 1 Tab by mouth every day.        Sertraline HCl (Tab) ZOLOFT 25 MG  Take 1 Tab by mouth every day.        Tamsulosin HCl (Cap) FLOMAX 0.4 MG Take 1 Cap by mouth ONE-HALF HOUR AFTER BREAKFAST.        .                 Medicines prescribed today were sent to:     [facility] [place], [place] - [address]    [address] [place] [address]    Phone: [phone] Fax: [phone]    Open 24 Hours?: No      Medication refill instructions:       If your prescription bottle indicates you have medication refills left, it is not necessary to call your provider’s office. Please contact your pharmacy and they will refill your medication.    If your prescription bottle indicates you do not have any refills left, you may request refills at any time through one of the following ways: The online Trusper system (except Urgent Care), by calling your provider’s office, or by asking your pharmacy to contact your provider’s office with a refill request. Medication refills are processed only during regular business hours and may not be available until the next business day. Your provider may request additional information or to have a follow-up visit with you prior to refilling your medication.   *Please Note: Medication refills are assigned a new Rx number when refilled electronically. Your pharmacy may indicate that no refills were authorized even though a new prescription for the same medication is available at the pharmacy. Please request the medicine by name with the pharmacy before contacting your provider for a refill.        Your To Do List     Future Labs/Procedures Complete By Expires    COMP METABOLIC PANEL  As directed 5/20/2018    LIPID PROFILE  As directed 5/20/2018    TSH WITH REFLEX TO FT4  As directed 5/19/2018      Instructions    Please keep monitoring your BP  If you get any low readings, lower than 110/70 and/or feel dizzy, please call our office     Please obtain fasting labs  prior to your next appointment. You may report to our clinic here in  [place] Monday-Friday from 7:00am - 9:00am.     Please arrive fasting. Please do not eat or drink anything other than water for 8 hours prior to your arrival for labs.      Hypertension  Hypertension, commonly called high blood pressure, is when the force of blood pumping through your arteries is too strong. Your arteries are the blood vessels that carry blood from your heart throughout your body. A blood pressure reading consists of a higher number over a lower number, such as 110/72. The higher number (systolic) is the pressure inside your arteries when your heart pumps. The lower number (diastolic) is the pressure inside your arteries when your heart relaxes. Ideally you want your blood pressure below 120/80.  Hypertension forces your heart to work harder to pump blood. Your arteries may become narrow or stiff. Having untreated or uncontrolled hypertension can cause heart attack, stroke, kidney disease, and other problems.  RISK FACTORS  Some risk factors for high blood pressure are controllable. Others are not.   Risk factors you cannot control include:   · Race. You may be at higher risk if you are .  · Age. Risk increases with age.  · Gender. Men are at higher risk than women before age 45 years. After age 65, women are at higher risk than men.  Risk factors you can control include:  · Not getting enough exercise or physical activity.  · Being overweight.  · Getting too much fat, sugar, calories, or salt in your diet.  · Drinking too much alcohol.  SIGNS AND SYMPTOMS  Hypertension does not usually cause signs or symptoms. Extremely high blood pressure (hypertensive crisis) may cause headache, anxiety, shortness of breath, and nosebleed.  DIAGNOSIS  To check if you have hypertension, your health care provider will measure your blood pressure while you are seated, with your arm held at the level of your heart. It should be measured at least twice using the same arm. Certain conditions can  cause a difference in blood pressure between your right and left arms. A blood pressure reading that is higher than normal on one occasion does not mean that you need treatment. If it is not clear whether you have high blood pressure, you may be asked to return on a different day to have your blood pressure checked again. Or, you may be asked to monitor your blood pressure at home for 1 or more weeks.  TREATMENT  Treating high blood pressure includes making lifestyle changes and possibly taking medicine. Living a healthy lifestyle can help lower high blood pressure. You may need to change some of your habits.  Lifestyle changes may include:  · Following the DASH diet. This diet is high in fruits, vegetables, and whole grains. It is low in salt, red meat, and added sugars.  · Keep your sodium intake below 2,300 mg per day.  · Getting at least 30-45 minutes of aerobic exercise at least 4 times per week.  · Losing weight if necessary.  · Not smoking.  · Limiting alcoholic beverages.  · Learning ways to reduce stress.  Your health care provider may prescribe medicine if lifestyle changes are not enough to get your blood pressure under control, and if one of the following is true:  · You are 18-59 years of age and your systolic blood pressure is above 140.  · You are 60 years of age or older, and your systolic blood pressure is above 150.  · Your diastolic blood pressure is above 90.  · You have diabetes, and your systolic blood pressure is over 140 or your diastolic blood pressure is over 90.  · You have kidney disease and your blood pressure is above 140/90.  · You have heart disease and your blood pressure is above 140/90.  Your personal target blood pressure may vary depending on your medical conditions, your age, and other factors.  HOME CARE INSTRUCTIONS  · Have your blood pressure rechecked as directed by your health care provider.    · Take medicines only as directed by your health care provider. Follow the  directions carefully. Blood pressure medicines must be taken as prescribed. The medicine does not work as well when you skip doses. Skipping doses also puts you at risk for problems.  · Do not smoke.    · Monitor your blood pressure at home as directed by your health care provider.   SEEK MEDICAL CARE IF:   · You think you are having a reaction to medicines taken.  · You have recurrent headaches or feel dizzy.  · You have swelling in your ankles.  · You have trouble with your vision.  SEEK IMMEDIATE MEDICAL CARE IF:  · You develop a severe headache or confusion.  · You have unusual weakness, numbness, or feel faint.  · You have severe chest or abdominal pain.  · You vomit repeatedly.  · You have trouble breathing.  MAKE SURE YOU:   · Understand these instructions.  · Will watch your condition.  · Will get help right away if you are not doing well or get worse.     This information is not intended to replace advice given to you by your health care provider. Make sure you discuss any questions you have with your health care provider.     Document Released: 12/18/2006 Document Revised: 05/03/2016 Document Reviewed: 10/10/2014  [employer] Interactive Patient Education ©2016 [employer]    Your medical care was provided today by: REBECCA Pike    Thank You for the opportunity to serve you.    You may receive a brief survey in the mail shortly regarding your visit today. Please take a few moments to complete the survey and return it; no postage is necessary. We are working to serve our patient population better, improve customer service and our patients overall experience and your input can help us to accomplish this. We thank you for your help and for the opportunity to serve you today and in the future.     Special Instructions:  Always call 9-1-1 immediately if you develop a life threatening emergency.    Unless told otherwise please take all medications as directed and complete prescription therapies.          Watch for the following signs that require additional evaluation: progressive lethargy or unresponsiveness, localized pain (chest, abdomen), shortness of breath, painful breathing, progressive vomiting with weakness, bloody stools, or new rash.     If you are prescribed pain medication or any other medication that is sedating, do not take medication before or while operating a vehicle or heavy machinery or equipment due to potential side effects such as drowsiness and/or dizziness.             Olocode Access Code: [email or web address]  Expires: 6/18/2017  4:16 AM    Your email address is not on file at [facility].  Email Addresses are required for you to sign up for Olocode, please contact [phone] to verify your personal information and to provide your email address prior to attempting to register for Olocode.    Chidi Tatum  [address] [place] [address]    Olocode  A secure, online tool to manage your health information     [facility]’s Olocode® is a secure, online tool that connects you to your personalized health information from the privacy of your home -- day or night - making it very easy for you to manage your healthcare. Once the activation process is completed, you can even access your medical information using the Olocode caro, which is available for free in the Apple Caro store or Google Play store.     To learn more about Olocode, visit [email or web address]    There are two levels of access available (as shown below):   My Chart Features  [facility] Primary Care Doctor [facility]  Specialists [facility]  Urgent  Care Non-[facility] Primary Care Doctor   Email your healthcare team securely and privately 24/7 X X X    Manage appointments: schedule your next appointment; view details of past/upcoming appointments X      Request prescription refills. X      View recent personal medical records, including lab and immunizations X X X X   View health record, including health history, allergies,  medications X X X X   Read reports about your outpatient visits, procedures, consult and ER notes X X X X   See your discharge summary, which is a recap of your hospital and/or ER visit that includes your diagnosis, lab results, and care plan X X  X     How to register for Pounce:  Once your e-mail address has been verified, follow the following steps to sign up for Pounce.     1. Go to  [email or web address]  2. Click on the Sign Up Now box, which takes you to the New Member Sign Up page. You will need to provide the following information:  a. Enter your Pounce Access Code exactly as it appears at the top of this page. (You will not need to use this code after you’ve completed the sign-up process. If you do not sign up before the expiration date, you must request a new code.)   b. Enter your date of birth.   c. Enter your home email address.   d. Click Submit, and follow the next screen’s instructions.  3. Create a ClaimReturnt ID. This will be your Pounce login ID and cannot be changed, so think of one that is secure and easy to remember.  4. Create a Pounce password. You can change your password at any time.  5. Enter your Password Reset Question and Answer. This can be used at a later time if you forget your password.   6. Enter your e-mail address. This allows you to receive e-mail notifications when new information is available in Pounce.  7. Click Sign Up. You can now view your health information.    For assistance activating your Pounce account, call [phone]

## 2017-05-19 NOTE — ASSESSMENT & PLAN NOTE
Patient reports he often sleeps during they daytime. Ambien has been making him too drowsy following rest.

## 2017-05-19 NOTE — PATIENT INSTRUCTIONS
Please keep monitoring your BP  If you get any low readings, lower than 110/70 and/or feel dizzy, please call our office     Please obtain fasting labs  prior to your next appointment. You may report to our clinic here in [place] Monday-Friday from 7:00am - 9:00am.     Please arrive fasting. Please do not eat or drink anything other than water for 8 hours prior to your arrival for labs.      Hypertension  Hypertension, commonly called high blood pressure, is when the force of blood pumping through your arteries is too strong. Your arteries are the blood vessels that carry blood from your heart throughout your body. A blood pressure reading consists of a higher number over a lower number, such as 110/72. The higher number (systolic) is the pressure inside your arteries when your heart pumps. The lower number (diastolic) is the pressure inside your arteries when your heart relaxes. Ideally you want your blood pressure below 120/80.  Hypertension forces your heart to work harder to pump blood. Your arteries may become narrow or stiff. Having untreated or uncontrolled hypertension can cause heart attack, stroke, kidney disease, and other problems.  RISK FACTORS  Some risk factors for high blood pressure are controllable. Others are not.   Risk factors you cannot control include:   · Race. You may be at higher risk if you are .  · Age. Risk increases with age.  · Gender. Men are at higher risk than women before age 45 years. After age 65, women are at higher risk than men.  Risk factors you can control include:  · Not getting enough exercise or physical activity.  · Being overweight.  · Getting too much fat, sugar, calories, or salt in your diet.  · Drinking too much alcohol.  SIGNS AND SYMPTOMS  Hypertension does not usually cause signs or symptoms. Extremely high blood pressure (hypertensive crisis) may cause headache, anxiety, shortness of breath, and nosebleed.  DIAGNOSIS  To check if you have  hypertension, your health care provider will measure your blood pressure while you are seated, with your arm held at the level of your heart. It should be measured at least twice using the same arm. Certain conditions can cause a difference in blood pressure between your right and left arms. A blood pressure reading that is higher than normal on one occasion does not mean that you need treatment. If it is not clear whether you have high blood pressure, you may be asked to return on a different day to have your blood pressure checked again. Or, you may be asked to monitor your blood pressure at home for 1 or more weeks.  TREATMENT  Treating high blood pressure includes making lifestyle changes and possibly taking medicine. Living a healthy lifestyle can help lower high blood pressure. You may need to change some of your habits.  Lifestyle changes may include:  · Following the DASH diet. This diet is high in fruits, vegetables, and whole grains. It is low in salt, red meat, and added sugars.  · Keep your sodium intake below 2,300 mg per day.  · Getting at least 30-45 minutes of aerobic exercise at least 4 times per week.  · Losing weight if necessary.  · Not smoking.  · Limiting alcoholic beverages.  · Learning ways to reduce stress.  Your health care provider may prescribe medicine if lifestyle changes are not enough to get your blood pressure under control, and if one of the following is true:  · You are 18-59 years of age and your systolic blood pressure is above 140.  · You are 60 years of age or older, and your systolic blood pressure is above 150.  · Your diastolic blood pressure is above 90.  · You have diabetes, and your systolic blood pressure is over 140 or your diastolic blood pressure is over 90.  · You have kidney disease and your blood pressure is above 140/90.  · You have heart disease and your blood pressure is above 140/90.  Your personal target blood pressure may vary depending on your medical  conditions, your age, and other factors.  HOME CARE INSTRUCTIONS  · Have your blood pressure rechecked as directed by your health care provider.    · Take medicines only as directed by your health care provider. Follow the directions carefully. Blood pressure medicines must be taken as prescribed. The medicine does not work as well when you skip doses. Skipping doses also puts you at risk for problems.  · Do not smoke.    · Monitor your blood pressure at home as directed by your health care provider.   SEEK MEDICAL CARE IF:   · You think you are having a reaction to medicines taken.  · You have recurrent headaches or feel dizzy.  · You have swelling in your ankles.  · You have trouble with your vision.  SEEK IMMEDIATE MEDICAL CARE IF:  · You develop a severe headache or confusion.  · You have unusual weakness, numbness, or feel faint.  · You have severe chest or abdominal pain.  · You vomit repeatedly.  · You have trouble breathing.  MAKE SURE YOU:   · Understand these instructions.  · Will watch your condition.  · Will get help right away if you are not doing well or get worse.     This information is not intended to replace advice given to you by your health care provider. Make sure you discuss any questions you have with your health care provider.     Document Released: 12/18/2006 Document Revised: 05/03/2016 Document Reviewed: 10/10/2014  [employer] Interactive Patient Education ©2016 [employer]    Your medical care was provided today by: REBECCA Pike    Thank You for the opportunity to serve you.    You may receive a brief survey in the mail shortly regarding your visit today. Please take a few moments to complete the survey and return it; no postage is necessary. We are working to serve our patient population better, improve customer service and our patients overall experience and your input can help us to accomplish this. We thank you for your help and for the opportunity to serve you today and in  the future.     Special Instructions:  Always call 9-1-1 immediately if you develop a life threatening emergency.    Unless told otherwise please take all medications as directed and complete prescription therapies.     Watch for the following signs that require additional evaluation: progressive lethargy or unresponsiveness, localized pain (chest, abdomen), shortness of breath, painful breathing, progressive vomiting with weakness, bloody stools, or new rash.     If you are prescribed pain medication or any other medication that is sedating, do not take medication before or while operating a vehicle or heavy machinery or equipment due to potential side effects such as drowsiness and/or dizziness.

## 2017-05-19 NOTE — ASSESSMENT & PLAN NOTE
Patient has not filled a script for Norco since Nov 2016. Reports only using medication 1-3 times per month.   Never had back surgery.   Reports he had had some imaging, this is not on file.   Reports he occasionally 'throws my back out.'   He did trial Tramadol which did not seem to work and made him drowsy. He requests to have the Norco on hand instead.

## 2017-05-19 NOTE — ASSESSMENT & PLAN NOTE
Patient recently lost his wife, she passed away in the hospital. She was in the hospital for lengthy periods of time and he is doing his best to cope.His children are living with him and his grandchild, who is 4. Reports they are keeping him happy. Reports Ambien makes him too tired the following day. Reports his grief appears to be worsening. Denies SI.

## 2017-08-30 ENCOUNTER — NON-PROVIDER VISIT (OUTPATIENT)
Dept: MEDICAL GROUP | Facility: CLINIC | Age: 72
End: 2017-08-30
Payer: MEDICARE

## 2017-08-30 ENCOUNTER — HOSPITAL ENCOUNTER (OUTPATIENT)
Facility: MEDICAL CENTER | Age: 72
End: 2017-08-30
Attending: NURSE PRACTITIONER
Payer: MEDICARE

## 2017-08-30 DIAGNOSIS — Z01.89 ROUTINE LAB DRAW: ICD-10-CM

## 2017-08-30 DIAGNOSIS — I10 ESSENTIAL HYPERTENSION: ICD-10-CM

## 2017-08-30 LAB
ALBUMIN SERPL BCP-MCNC: 4 G/DL (ref 3.2–4.9)
ALBUMIN/GLOB SERPL: 1.1 G/DL
ALP SERPL-CCNC: 87 U/L (ref 30–99)
ALT SERPL-CCNC: 11 U/L (ref 2–50)
ANION GAP SERPL CALC-SCNC: 9 MMOL/L (ref 0–11.9)
AST SERPL-CCNC: 15 U/L (ref 12–45)
BILIRUB SERPL-MCNC: 0.3 MG/DL (ref 0.1–1.5)
BUN SERPL-MCNC: 23 MG/DL (ref 8–22)
CALCIUM SERPL-MCNC: 9.7 MG/DL (ref 8.5–10.5)
CHLORIDE SERPL-SCNC: 108 MMOL/L (ref 96–112)
CHOLEST SERPL-MCNC: 129 MG/DL (ref 100–199)
CO2 SERPL-SCNC: 21 MMOL/L (ref 20–33)
CREAT SERPL-MCNC: 1.49 MG/DL (ref 0.5–1.4)
GFR SERPL CREATININE-BSD FRML MDRD: 47 ML/MIN/1.73 M 2
GLOBULIN SER CALC-MCNC: 3.8 G/DL (ref 1.9–3.5)
GLUCOSE SERPL-MCNC: 97 MG/DL (ref 65–99)
HDLC SERPL-MCNC: 26 MG/DL
LDLC SERPL CALC-MCNC: 67 MG/DL
POTASSIUM SERPL-SCNC: 4 MMOL/L (ref 3.6–5.5)
PROT SERPL-MCNC: 7.8 G/DL (ref 6–8.2)
SODIUM SERPL-SCNC: 138 MMOL/L (ref 135–145)
TRIGL SERPL-MCNC: 178 MG/DL (ref 0–149)
TSH SERPL DL<=0.005 MIU/L-ACNC: 2.58 UIU/ML (ref 0.3–3.7)

## 2017-08-30 PROCEDURE — 36415 COLL VENOUS BLD VENIPUNCTURE: CPT | Performed by: PHYSICIAN ASSISTANT

## 2017-08-30 PROCEDURE — 99000 SPECIMEN HANDLING OFFICE-LAB: CPT | Performed by: PHYSICIAN ASSISTANT

## 2017-08-30 PROCEDURE — 80061 LIPID PANEL: CPT

## 2017-08-30 PROCEDURE — 84443 ASSAY THYROID STIM HORMONE: CPT

## 2017-08-30 PROCEDURE — 80053 COMPREHEN METABOLIC PANEL: CPT

## 2017-09-01 ENCOUNTER — OFFICE VISIT (OUTPATIENT)
Dept: MEDICAL GROUP | Facility: CLINIC | Age: 72
End: 2017-09-01
Payer: MEDICARE

## 2017-09-01 VITALS
DIASTOLIC BLOOD PRESSURE: 80 MMHG | RESPIRATION RATE: 18 BRPM | OXYGEN SATURATION: 94 % | TEMPERATURE: 98 F | WEIGHT: 232 LBS | BODY MASS INDEX: 32.48 KG/M2 | HEIGHT: 71 IN | HEART RATE: 80 BPM | SYSTOLIC BLOOD PRESSURE: 136 MMHG

## 2017-09-01 DIAGNOSIS — E66.9 OBESITY (BMI 30-39.9): ICD-10-CM

## 2017-09-01 DIAGNOSIS — F33.1 MODERATE EPISODE OF RECURRENT MAJOR DEPRESSIVE DISORDER (HCC): ICD-10-CM

## 2017-09-01 DIAGNOSIS — N40.0 BENIGN PROSTATIC HYPERPLASIA WITHOUT LOWER URINARY TRACT SYMPTOMS, UNSPECIFIED MORPHOLOGY: ICD-10-CM

## 2017-09-01 DIAGNOSIS — F43.21 GRIEF: ICD-10-CM

## 2017-09-01 DIAGNOSIS — N18.30 STAGE 3 CHRONIC KIDNEY DISEASE (HCC): ICD-10-CM

## 2017-09-01 DIAGNOSIS — I10 ESSENTIAL HYPERTENSION: ICD-10-CM

## 2017-09-01 DIAGNOSIS — F51.01 PRIMARY INSOMNIA: ICD-10-CM

## 2017-09-01 DIAGNOSIS — Z28.20 VACCINE REFUSED BY PATIENT: ICD-10-CM

## 2017-09-01 DIAGNOSIS — R09.81 NASAL CONGESTION: ICD-10-CM

## 2017-09-01 PROCEDURE — 99214 OFFICE O/P EST MOD 30 MIN: CPT | Performed by: NURSE PRACTITIONER

## 2017-09-01 RX ORDER — ZOLPIDEM TARTRATE 5 MG/1
5 TABLET ORAL NIGHTLY PRN
Qty: 30 TAB | Refills: 2 | Status: SHIPPED | OUTPATIENT
Start: 2017-09-01 | End: 2018-02-02 | Stop reason: SDUPTHER

## 2017-09-01 RX ORDER — FLUTICASONE PROPIONATE 50 MCG
1 SPRAY, SUSPENSION (ML) NASAL DAILY
Qty: 16 G | Refills: 1 | Status: ON HOLD | OUTPATIENT
Start: 2017-09-01 | End: 2018-02-09

## 2017-09-01 RX ORDER — ESOMEPRAZOLE MAGNESIUM 40 MG/1
40 CAPSULE, DELAYED RELEASE ORAL
Qty: 90 CAP | Refills: 2 | Status: SHIPPED | OUTPATIENT
Start: 2017-09-01 | End: 2017-09-29 | Stop reason: SDUPTHER

## 2017-09-01 NOTE — ASSESSMENT & PLAN NOTE
This is a new problem. Per patient, he has been mildly congested the last few days, poor air quality right now. Denies cough, sore throat, ear pain, SOB.

## 2017-09-01 NOTE — PATIENT INSTRUCTIONS
[facility]    Your medical care was provided today by: REBECCA Pike    Thank You for the opportunity to serve you.    You may receive a brief survey in the mail shortly regarding your visit today. Please take a few moments to complete the survey and return it; no postage is necessary. We are working to serve our patient population better, improve customer service and our patients overall experience and your input can help us to accomplish this. We thank you for your help and for the opportunity to serve you today and in the future.     Special Instructions:  Always call 9-1-1 immediately if you develop a life threatening emergency.    Unless told otherwise please take all medications as directed and complete prescription therapies.     Watch for the following signs that require additional evaluation: progressive lethargy or unresponsiveness, localized pain (chest, abdomen), shortness of breath, painful breathing, progressive vomiting with weakness, bloody stools, or new rash.     If you are prescribed pain medication or any other medication that is sedating, do not take medication before or while operating a vehicle or heavy machinery or equipment due to potential side effects such as drowsiness and/or dizziness.

## 2017-09-01 NOTE — ASSESSMENT & PLAN NOTE
Patient recently lost his wife, she passed away in the hospital. She was in the hospital for lengthy periods of time and he is doing his best to cope.His children are living with him and his grandchild, who is 4. Reports they are keeping him happy.  Reports his grief appears to be getting better, however, he is not taking Zoloft consistently, reports he did not think it was helping much. Denies SI/HI. Reports his best medicine has been his grandson Barbra.

## 2017-09-01 NOTE — ASSESSMENT & PLAN NOTE
This is a chronic problem specifically since his wife passed away. Taking Ambien 5 mg at bedtime with decent symptom control per his report.

## 2017-09-29 NOTE — TELEPHONE ENCOUNTER
Nexium is not covered by insurance anymore, is there something else you can prescribe him for this?     Was the patient seen in the last year in this department? Yes     Does patient have an active prescription for medications requested? Yes     Received Request Via: Patient

## 2017-10-02 RX ORDER — ESOMEPRAZOLE MAGNESIUM 40 MG/1
40 CAPSULE, DELAYED RELEASE ORAL
Qty: 90 CAP | Refills: 2 | Status: SHIPPED | OUTPATIENT
Start: 2017-10-02 | End: 2018-03-16

## 2017-10-05 ENCOUNTER — TELEPHONE (OUTPATIENT)
Dept: MEDICAL GROUP | Facility: CLINIC | Age: 72
End: 2017-10-05

## 2017-10-05 DIAGNOSIS — K21.9 GASTROESOPHAGEAL REFLUX DISEASE WITHOUT ESOPHAGITIS: ICD-10-CM

## 2017-10-05 NOTE — TELEPHONE ENCOUNTER
DOCUMENTATION OF PAR STATUS:    1. Name of Medication & Dose: nexium 40mg     2. Name of Prescription Coverage Company & phone #: [employer]    3. Date Prior Auth Submitted: 10/05/17    4. What information was given to obtain insurance decision? Chart notes    5. Prior Auth Status? Pending    6. Patient Notified: yes

## 2017-10-17 ENCOUNTER — TELEPHONE (OUTPATIENT)
Dept: MEDICAL GROUP | Facility: CLINIC | Age: 72
End: 2017-10-17

## 2017-10-17 NOTE — TELEPHONE ENCOUNTER
1. Caller Name: Chidi                      Call Back Number: [phone] (home)     2. Message: Patients insurance is no longer covering the nexium - is there something else you can prescribe him?     3. Patient approves office to leave a detailed voicemail/MyChart message: N\A

## 2017-10-18 RX ORDER — OMEPRAZOLE 40 MG/1
40 CAPSULE, DELAYED RELEASE ORAL DAILY
Qty: 30 CAP | Refills: 6 | Status: SHIPPED | OUTPATIENT
Start: 2017-10-18 | End: 2018-03-16

## 2017-12-28 ENCOUNTER — OFFICE VISIT (OUTPATIENT)
Dept: URGENT CARE | Facility: PHYSICIAN GROUP | Age: 72
End: 2017-12-28
Payer: MEDICARE

## 2017-12-28 VITALS
OXYGEN SATURATION: 96 % | TEMPERATURE: 97.8 F | WEIGHT: 235 LBS | SYSTOLIC BLOOD PRESSURE: 124 MMHG | DIASTOLIC BLOOD PRESSURE: 78 MMHG | HEIGHT: 71 IN | BODY MASS INDEX: 32.9 KG/M2 | HEART RATE: 100 BPM | RESPIRATION RATE: 16 BRPM

## 2017-12-28 DIAGNOSIS — J01.40 ACUTE NON-RECURRENT PANSINUSITIS: ICD-10-CM

## 2017-12-28 PROCEDURE — 99214 OFFICE O/P EST MOD 30 MIN: CPT | Performed by: PHYSICIAN ASSISTANT

## 2017-12-28 RX ORDER — AMOXICILLIN AND CLAVULANATE POTASSIUM 875; 125 MG/1; MG/1
1 TABLET, FILM COATED ORAL 2 TIMES DAILY
Qty: 14 TAB | Refills: 0 | Status: SHIPPED | OUTPATIENT
Start: 2017-12-28 | End: 2018-01-04

## 2017-12-28 ASSESSMENT — ENCOUNTER SYMPTOMS
ABDOMINAL PAIN: 0
DIARRHEA: 0
DIZZINESS: 0
SORE THROAT: 0
SINUS PRESSURE: 1
VOMITING: 0
COUGH: 0
NAUSEA: 0
FEVER: 0
SINUS PAIN: 1
CHILLS: 0
HEADACHES: 1
SHORTNESS OF BREATH: 0
MUSCULOSKELETAL NEGATIVE: 1
SPUTUM PRODUCTION: 0

## 2017-12-28 NOTE — PROGRESS NOTES
"Subjective:      Chidi Tatum is a 72 y.o. male who presents with Nasal Congestion            Sinus Problem   This is a new problem. The current episode started in the past 7 days (5 days). The problem is unchanged. There has been no fever. His pain is at a severity of 2/10. The pain is mild. Associated symptoms include congestion, headaches and sinus pressure. Pertinent negatives include no chills, coughing, ear pain, shortness of breath or sore throat. Past treatments include acetaminophen. The treatment provided mild relief.     Patient reports a history of frequent sinus infections in the past. He had sinus surgery many years ago and hasn't had much difficulty since then, but presents to urgent care today reporting a 5 day history of worsening right sided sinus pain and pressure with thick, green nasal discharge. No fevers, chills, body aches, sore throat, cough, chest pain, or SOB.     Review of Systems   Constitutional: Negative for chills and fever.   HENT: Positive for congestion, sinus pain and sinus pressure. Negative for ear pain and sore throat.    Respiratory: Negative for cough, sputum production and shortness of breath.    Cardiovascular: Negative for chest pain.   Gastrointestinal: Negative for abdominal pain, diarrhea, nausea and vomiting.   Genitourinary: Negative.    Musculoskeletal: Negative.    Skin: Negative for rash.   Neurological: Positive for headaches. Negative for dizziness.        Objective:     /78   Pulse 100   Temp 36.6 °C (97.8 °F)   Resp 16   Ht 1.803 m (5' 11\")   Wt 106.6 kg (235 lb)   SpO2 96%   BMI 32.78 kg/m²      Physical Exam   Constitutional: He is oriented to person, place, and time. He appears well-developed and well-nourished. No distress.   HENT:   Head: Normocephalic and atraumatic.       Right Ear: Hearing, tympanic membrane, external ear and ear canal normal.   Left Ear: Hearing, tympanic membrane, external ear and ear canal normal.   Nose: Right sinus " exhibits maxillary sinus tenderness and frontal sinus tenderness. Left sinus exhibits no maxillary sinus tenderness and no frontal sinus tenderness.   Mouth/Throat: Oropharynx is clear and moist. No oropharyngeal exudate.   Right frontal and maxillary sinus tenderness with percussion   Eyes: Conjunctivae are normal. Pupils are equal, round, and reactive to light. Right eye exhibits no discharge. Left eye exhibits no discharge.   Neck: Normal range of motion.   Cardiovascular: Normal rate, regular rhythm and normal heart sounds.    No murmur heard.  Pulmonary/Chest: Effort normal and breath sounds normal. No respiratory distress. He has no wheezes. He has no rales.   Musculoskeletal: Normal range of motion.   Lymphadenopathy:     He has no cervical adenopathy.   Neurological: He is alert and oriented to person, place, and time.   Skin: Skin is warm and dry. He is not diaphoretic.   Psychiatric: He has a normal mood and affect. His behavior is normal.   Nursing note and vitals reviewed.         PMH:  has a past medical history of BPH (benign prostatic hyperplasia); GERD (gastroesophageal reflux disease); HTN (hypertension); Hypertension; and Insomnia.  MEDS:   Current Outpatient Prescriptions:   •  amoxicillin-clavulanate (AUGMENTIN) 875-125 MG Tab, Take 1 Tab by mouth 2 times a day for 7 days., Disp: 14 Tab, Rfl: 0  •  finasteride (PROSCAR) 5 MG Tab, TAKE ONE TABLET BY MOUTH ONCE DAILY, Disp: 90 Tab, Rfl: 2  •  omeprazole (PRILOSEC) 40 MG delayed-release capsule, Take 1 Cap by mouth every day., Disp: 30 Cap, Rfl: 6  •  esomeprazole (NEXIUM) 40 MG delayed-release capsule, Take 1 Cap by mouth every morning before breakfast., Disp: 90 Cap, Rfl: 2  •  lisinopril (PRINIVIL) 10 MG Tab, TAKE ONE TABLET BY MOUTH ONCE DAILY *REPLACES  5MG  TABLET*, Disp: 90 Tab, Rfl: 1  •  zolpidem (AMBIEN) 5 MG Tab, Take 1 Tab by mouth at bedtime as needed for Sleep., Disp: 30 Tab, Rfl: 2  •  fluticasone (FLONASE) 50 MCG/ACT nasal spray,  Spray 1 Spray in nose every day., Disp: 16 g, Rfl: 1  •  sertraline (ZOLOFT) 25 MG tablet, Take 1 Tab by mouth every day., Disp: 30 Tab, Rfl: 2  •  hydrocodone-acetaminophen (NORCO) 5-325 MG Tab per tablet, Take 1-2 Tabs by mouth every four hours as needed., Disp: 30 Tab, Rfl: 0  •  hydrocortisone 1 % Cream, Apply to affected area twice daily for 10 days, Disp: 1 Tube, Rfl: 0  •  hydrochlorothiazide (HYDRODIURIL) 50 MG Tab, Take 1 Tab by mouth every day., Disp: 90 Tab, Rfl: 2  •  tamsulosin (FLOMAX) 0.4 MG capsule, Take 1 Cap by mouth ONE-HALF HOUR AFTER BREAKFAST., Disp: 90 Cap, Rfl: 2  ALLERGIES:   Allergies   Allergen Reactions   • Celebrex [Celecoxib]      SURGHX:   Past Surgical History:   Procedure Laterality Date   • HERNIA REPAIR      umbilical, groin      SOCHX:  reports that he has never smoked. He has never used smokeless tobacco. He reports that he does not drink alcohol or use drugs.  FH: family history includes Alcohol/Drug in his brother; Lung Disease in his brother and father.       Assessment/Plan:     1. Acute non-recurrent pansinusitis  - amoxicillin-clavulanate (AUGMENTIN) 875-125 MG Tab; Take 1 Tab by mouth 2 times a day for 7 days.  Dispense: 14 Tab; Refill: 0   - Complete full course of antibiotics as prescribed     Discussed use of nedi-pot, humidifier, and Flonase nasal spray for symptomatic relief. Call or return to office if symptoms persist or worsen. The patient demonstrated a good understanding and agreed with the treatment plan.

## 2018-01-04 ENCOUNTER — OFFICE VISIT (OUTPATIENT)
Dept: URGENT CARE | Facility: PHYSICIAN GROUP | Age: 73
End: 2018-01-04
Payer: MEDICARE

## 2018-01-04 ENCOUNTER — HOSPITAL ENCOUNTER (OUTPATIENT)
Dept: LAB | Facility: MEDICAL CENTER | Age: 73
End: 2018-01-04
Attending: FAMILY MEDICINE
Payer: MEDICARE

## 2018-01-04 VITALS
WEIGHT: 238.9 LBS | HEIGHT: 72 IN | BODY MASS INDEX: 32.36 KG/M2 | TEMPERATURE: 98.4 F | DIASTOLIC BLOOD PRESSURE: 80 MMHG | SYSTOLIC BLOOD PRESSURE: 124 MMHG | RESPIRATION RATE: 16 BRPM | OXYGEN SATURATION: 96 % | HEART RATE: 84 BPM

## 2018-01-04 DIAGNOSIS — J01.11 ACUTE RECURRENT FRONTAL SINUSITIS: ICD-10-CM

## 2018-01-04 DIAGNOSIS — R51.9 RIGHT TEMPORAL HEADACHE: ICD-10-CM

## 2018-01-04 DIAGNOSIS — R70.0 ELEVATED SEDIMENTATION RATE: ICD-10-CM

## 2018-01-04 LAB
BASOPHILS # BLD AUTO: 0.4 % (ref 0–1.8)
BASOPHILS # BLD: 0.05 K/UL (ref 0–0.12)
EOSINOPHIL # BLD AUTO: 0.36 K/UL (ref 0–0.51)
EOSINOPHIL NFR BLD: 3.1 % (ref 0–6.9)
ERYTHROCYTE [DISTWIDTH] IN BLOOD BY AUTOMATED COUNT: 44.1 FL (ref 35.9–50)
ERYTHROCYTE [SEDIMENTATION RATE] IN BLOOD BY WESTERGREN METHOD: 37 MM/HOUR (ref 0–20)
HCT VFR BLD AUTO: 40.4 % (ref 42–52)
HGB BLD-MCNC: 13.5 G/DL (ref 14–18)
IMM GRANULOCYTES # BLD AUTO: 0.06 K/UL (ref 0–0.11)
IMM GRANULOCYTES NFR BLD AUTO: 0.5 % (ref 0–0.9)
LYMPHOCYTES # BLD AUTO: 2.88 K/UL (ref 1–4.8)
LYMPHOCYTES NFR BLD: 25.1 % (ref 22–41)
MCH RBC QN AUTO: 28 PG (ref 27–33)
MCHC RBC AUTO-ENTMCNC: 33.4 G/DL (ref 33.7–35.3)
MCV RBC AUTO: 83.8 FL (ref 81.4–97.8)
MONOCYTES # BLD AUTO: 0.75 K/UL (ref 0–0.85)
MONOCYTES NFR BLD AUTO: 6.5 % (ref 0–13.4)
NEUTROPHILS # BLD AUTO: 7.36 K/UL (ref 1.82–7.42)
NEUTROPHILS NFR BLD: 64.4 % (ref 44–72)
NRBC # BLD AUTO: 0 K/UL
NRBC BLD-RTO: 0 /100 WBC
PLATELET # BLD AUTO: 275 K/UL (ref 164–446)
PMV BLD AUTO: 9.1 FL (ref 9–12.9)
RBC # BLD AUTO: 4.82 M/UL (ref 4.7–6.1)
WBC # BLD AUTO: 11.5 K/UL (ref 4.8–10.8)

## 2018-01-04 PROCEDURE — 36415 COLL VENOUS BLD VENIPUNCTURE: CPT

## 2018-01-04 PROCEDURE — 85652 RBC SED RATE AUTOMATED: CPT

## 2018-01-04 PROCEDURE — 85025 COMPLETE CBC W/AUTO DIFF WBC: CPT

## 2018-01-04 PROCEDURE — 99214 OFFICE O/P EST MOD 30 MIN: CPT | Performed by: FAMILY MEDICINE

## 2018-01-04 RX ORDER — DOXYCYCLINE HYCLATE 100 MG
100 TABLET ORAL 2 TIMES DAILY
Qty: 20 TAB | Refills: 0 | Status: SHIPPED | OUTPATIENT
Start: 2018-01-04 | End: 2018-01-14

## 2018-01-04 ASSESSMENT — ENCOUNTER SYMPTOMS
ABDOMINAL PAIN: 0
PHOTOPHOBIA: 0
SORE THROAT: 0
SENSORY CHANGE: 0
COUGH: 1
VOMITING: 0
MYALGIAS: 0
FOCAL WEAKNESS: 0
DOUBLE VISION: 0
NAUSEA: 0

## 2018-01-04 NOTE — PROGRESS NOTES
Subjective:      Chidi Tatum is a 72 y.o. male who presents with Sinus Problem (was seen last week not get any better)            2 weeks right frontal and temporal HA. Pain waxes and wanes. He was dx with sinusitis and has not improved on amoxicillin and OTC decongestant. No drainage. Possible slight vision change/blurry. No unilateral weakness. No other aggravating or alleviating factors.          Review of Systems   HENT: Negative for sore throat.    Eyes: Negative for double vision and photophobia.   Respiratory: Positive for cough (mild).    Gastrointestinal: Negative for abdominal pain, nausea and vomiting.   Musculoskeletal: Negative for joint pain and myalgias.   Skin: Negative for rash.   Neurological: Negative for sensory change and focal weakness.     .  Medications, Allergies, and current problem list reviewed today in Epic       Objective:     /80   Pulse 84   Temp 36.9 °C (98.4 °F)   Resp 16   Ht 1.829 m (6')   Wt 108.4 kg (238 lb 14.4 oz)   SpO2 96%   BMI 32.40 kg/m²      Physical Exam   Constitutional: He appears well-developed and well-nourished. No distress.   HENT:   Head: Normocephalic and atraumatic.   Right Ear: External ear normal.   Left Ear: External ear normal.   Tender frontal sinus and right temporal region. No cords.     Minimal PND   Eyes: Conjunctivae and EOM are normal. Pupils are equal, round, and reactive to light.   Neurological:   Speech is clear. Patient is appropriate and cooperative.     Skin: Skin is dry. No rash noted.               Assessment/Plan:     1. Acute recurrent frontal sinusitis  doxycycline (VIBRAMYCIN) 100 MG Tab   2. Right temporal headache  CBC WITH DIFFERENTIAL    WESTERGREN SED RATE    predniSONE (DELTASONE) 20 MG Tab    REFERRAL TO VASCULAR SURGERY   3. Elevated sedimentation rate  predniSONE (DELTASONE) 20 MG Tab    REFERRAL TO VASCULAR SURGERY     Differential diagnosis, natural history, supportive care, and indications for immediate  follow-up discussed at length.   Elevated ESR and temporal HA concerning for temporal arteritis. Will initiate CS and refer to vascular surgery for bx. Case discussed with pcp Bernardo Dasilva who will f/u next week.

## 2018-01-05 ENCOUNTER — TELEPHONE (OUTPATIENT)
Dept: MEDICAL GROUP | Facility: CLINIC | Age: 73
End: 2018-01-05

## 2018-01-05 ENCOUNTER — TELEPHONE (OUTPATIENT)
Dept: URGENT CARE | Facility: PHYSICIAN GROUP | Age: 73
End: 2018-01-05

## 2018-01-05 RX ORDER — PREDNISONE 20 MG/1
60 TABLET ORAL DAILY
Qty: 21 TAB | Refills: 0 | Status: SHIPPED | OUTPATIENT
Start: 2018-01-05 | End: 2018-01-09 | Stop reason: SDUPTHER

## 2018-01-06 NOTE — TELEPHONE ENCOUNTER
Pts daughter came into urgent care [facility] today to see what happened at fathers visit yesterday. States that fathers memory Is not good and cant remember what was discussed at his urgent care visit and mentioned something to her about having surgery on his arteries. Daughter is concerned and would like to know what is going on.   Please advise

## 2018-01-06 NOTE — TELEPHONE ENCOUNTER
Spoke with Dr. Licona. Concern with patient for temporal arteritis. Dr. Licona did refer to gen. Surg. Urgently for biopsy and advised he would start on Prednisone 60 mg. Elevated Sed rate. I did call patient [facility], patient called right back. I did speak with him. He has an appt to see me on 1/9/18. He did report he did speak with Dr. Licona and his daughter is picking up his Prednisone prescription today. Discussed ER precautions. Discussed referral for biopsy, which he was a bit confused about, advised it will process urgently based on Dr. Licona's referral.  REBECCA Pike

## 2018-01-09 ENCOUNTER — OFFICE VISIT (OUTPATIENT)
Dept: MEDICAL GROUP | Facility: CLINIC | Age: 73
End: 2018-01-09
Payer: MEDICARE

## 2018-01-09 VITALS
OXYGEN SATURATION: 95 % | RESPIRATION RATE: 20 BRPM | HEART RATE: 80 BPM | TEMPERATURE: 98.4 F | HEIGHT: 72 IN | BODY MASS INDEX: 32.64 KG/M2 | WEIGHT: 241 LBS | DIASTOLIC BLOOD PRESSURE: 82 MMHG | SYSTOLIC BLOOD PRESSURE: 140 MMHG

## 2018-01-09 DIAGNOSIS — R70.0 ELEVATED SEDIMENTATION RATE: ICD-10-CM

## 2018-01-09 DIAGNOSIS — R51.9 RIGHT TEMPORAL HEADACHE: ICD-10-CM

## 2018-01-09 DIAGNOSIS — B37.41 YEAST CYSTITIS: ICD-10-CM

## 2018-01-09 PROCEDURE — 99214 OFFICE O/P EST MOD 30 MIN: CPT | Performed by: NURSE PRACTITIONER

## 2018-01-09 RX ORDER — FLUCONAZOLE 150 MG/1
150 TABLET ORAL DAILY
Qty: 1 TAB | Refills: 0 | Status: SHIPPED | OUTPATIENT
Start: 2018-01-09 | End: 2018-04-12

## 2018-01-09 RX ORDER — PREDNISONE 20 MG/1
60 TABLET ORAL DAILY
Qty: 21 TAB | Refills: 0 | Status: SHIPPED | OUTPATIENT
Start: 2018-01-09 | End: 2018-01-16

## 2018-01-09 ASSESSMENT — PATIENT HEALTH QUESTIONNAIRE - PHQ9: CLINICAL INTERPRETATION OF PHQ2 SCORE: 0

## 2018-01-09 ASSESSMENT — PAIN SCALES - GENERAL: PAINLEVEL: NO PAIN

## 2018-01-09 NOTE — PROGRESS NOTES
Subjective:     Chidi Tatum is a 72 y.o. male here today for follow up of [facility].     Right temporal headache  This is a new problem. Patient was reports 3 weeks right frontal and temporal HA. Pain waxes and wanes. He was dx with sinusitis and treated with abx in [facility] but symptoms did not improve with abx and OTC decongestant. No drainage. Possible slight vision change/blurry per his report. No unilateral weakness. No other aggravating or alleviating factors.He was then seen again by Dr. Licona who was concerned for temporal arteritis. Patient has been referred for biopsy, scheduled for Telemed consult on 1/17/18. He did start Prednisone 60 mg daily on 1/5/18, reports symptoms have mildly improved since that time. He is here with his daughter. They are both concerned re: biopsy procedure, reports he may not be willing to go through with procedure as they watched a YouTube video and he's now scared.     Elevated sedimentation rate  1/4/18 ESR 37    Yeast cystitis  This is a new problem. Patient reports he is completing antibiotic course, he has 2 days left of doxycycline. This is his second antibiotic. Reports 2 days ago he noticed some itching around his urethra. Denies any rash. Reports mild dysuria. Denies any back pain. No blood in urine.       Current medicines (including changes today)  Current Outpatient Prescriptions   Medication Sig Dispense Refill   • predniSONE (DELTASONE) 20 MG Tab Take 3 Tabs by mouth every day for 7 days. 21 Tab 0   • fluconazole (DIFLUCAN) 150 MG tablet Take 1 Tab by mouth every day. 1 Tab 0   • doxycycline (VIBRAMYCIN) 100 MG Tab Take 1 Tab by mouth 2 times a day for 10 days. 20 Tab 0   • finasteride (PROSCAR) 5 MG Tab TAKE ONE TABLET BY MOUTH ONCE DAILY 90 Tab 2   • omeprazole (PRILOSEC) 40 MG delayed-release capsule Take 1 Cap by mouth every day. 30 Cap 6   • esomeprazole (NEXIUM) 40 MG delayed-release capsule Take 1 Cap by mouth every morning before breakfast. 90 Cap 2   • lisinopril  (PRINIVIL) 10 MG Tab TAKE ONE TABLET BY MOUTH ONCE DAILY *REPLACES  5MG  TABLET* 90 Tab 1   • zolpidem (AMBIEN) 5 MG Tab Take 1 Tab by mouth at bedtime as needed for Sleep. 30 Tab 2   • fluticasone (FLONASE) 50 MCG/ACT nasal spray Spray 1 Spray in nose every day. 16 g 1   • sertraline (ZOLOFT) 25 MG tablet Take 1 Tab by mouth every day. 30 Tab 2   • hydrocodone-acetaminophen (NORCO) 5-325 MG Tab per tablet Take 1-2 Tabs by mouth every four hours as needed. 30 Tab 0   • hydrochlorothiazide (HYDRODIURIL) 50 MG Tab Take 1 Tab by mouth every day. 90 Tab 2   • tamsulosin (FLOMAX) 0.4 MG capsule Take 1 Cap by mouth ONE-HALF HOUR AFTER BREAKFAST. 90 Cap 2   • hydrocortisone 1 % Cream Apply to affected area twice daily for 10 days 1 Tube 0     No current facility-administered medications for this visit.        He  has a past medical history of BPH (benign prostatic hyperplasia); GERD (gastroesophageal reflux disease); HTN (hypertension); Hypertension; and Insomnia.    He  has a past surgical history that includes hernia repair.     Social History     Social History   • Marital status:      Spouse name: N/A   • Number of children: N/A   • Years of education: N/A     Social History Main Topics   • Smoking status: Never Smoker   • Smokeless tobacco: Never Used   • Alcohol use No   • Drug use: No   • Sexual activity: Not on file     Other Topics Concern   • Not on file     Social History Narrative   • No narrative on file       Family History   Problem Relation Age of Onset   • Lung Disease Father    • Alcohol/Drug Brother    • Lung Disease Brother          ROS  Positive for mild dysuria, itching. Positive for right temporal headache. Positive for some dizziness, blurriness of vision.  No fever, no chest pain, no shortness of breath, no abdominal pain, no rashes    All other systems reviewed and are negative.        Objective:     Blood pressure 140/82, pulse 80, temperature 36.9 °C (98.4 °F), resp. rate 20, height 1.829  m (6'), weight 109.3 kg (241 lb), SpO2 95 %. Body mass index is 32.69 kg/m².    Physical Exam   Constitutional: He appears well-developed and well-nourished. No distress.   HENT:   Head: Normocephalic and atraumatic.   Right Ear: External ear normal.   Left Ear: External ear normal.   Tender frontal sinus and right temporal region. No cords.     Minimal PND   Eyes: Conjunctivae and EOM are normal. Pupils are equal, round, and reactive to light.   Neurological:   Speech is clear. Patient is appropriate and cooperative.     Skin: Skin is dry. No rash noted.         Assessment and Plan:   The following treatment plan was discussed    1. Right temporal headache  At this time I did discuss importance of biopsy, advised to keep appointment on January 17 for consult. We'll continue with prednisone 60 mg daily. We do have a follow-up appointment on January 19 where we may consider decreasing prednisone on a very slow taper. We'll repeat ESR. Discussed importance of diagnosis so we may have clear follow-up over the next year. Patient and daughter both verbalized understanding and will attend consult next week. Discussed signs and symptoms to seek emergent care.  - predniSONE (DELTASONE) 20 MG Tab; Take 3 Tabs by mouth every day for 7 days.  Dispense: 21 Tab; Refill: 0    2. Elevated sedimentation rate  Will repeat lab next week.   - predniSONE (DELTASONE) 20 MG Tab; Take 3 Tabs by mouth every day for 7 days.  Dispense: 21 Tab; Refill: 0  - WESTERGREN SED RATE; Future    3. Yeast cystitis  Likely r/t abx use, discussed s/s to seek emergent care.   - fluconazole (DIFLUCAN) 150 MG tablet; Take 1 Tab by mouth every day.  Dispense: 1 Tab; Refill: 0     Reviewed indication, dosage, usage and potential adverse effects of prescribed medications. Patient appears to understand, verbalizes understanding and is willing to try medications as prescribed.      Reviewed risks and benefits of treatment plan. Patient verbally agrees to plan  of care.       Followup: Return in about 1 week (around 1/16/2018) for schedule for 1/19/18.    [email or web address].     PLEASE NOTE: This dictation was created using voice recognition software. I have made every reasonable attempt to correct obvious errors, but I expect that there may be errors of grammar and possibly content that I did not discover prior finalizing this note.

## 2018-01-09 NOTE — PATIENT INSTRUCTIONS
Continue with steroids   Keep appt on 1/17/18 with Vascular     Your medical care was provided today by: REBECCA Pike    Thank You for the opportunity to serve you.    You may receive a brief survey in the mail shortly regarding your visit today. Please take a few moments to complete the survey and return it; no postage is necessary. We are working to serve our patient population better, improve customer service and our patients overall experience and your input can help us to accomplish this. We thank you for your help and for the opportunity to serve you today and in the future.     Special Instructions:  Always call 9-1-1 immediately if you develop a life threatening emergency.    Unless told otherwise please take all medications as directed and complete prescription therapies.     Watch for the following signs that require additional evaluation: progressive lethargy or unresponsiveness, localized pain (chest, abdomen), shortness of breath, painful breathing, progressive vomiting with weakness, bloody stools, or new rash.     If you are prescribed pain medication or any other medication that is sedating, do not take medication before or while operating a vehicle or heavy machinery or equipment due to potential side effects such as drowsiness and/or dizziness.

## 2018-01-09 NOTE — ASSESSMENT & PLAN NOTE
This is a new problem. Patient was reports 3 weeks right frontal and temporal HA. Pain waxes and wanes. He was dx with sinusitis and treated with abx in [facility] but symptoms did not improve with abx and OTC decongestant. No drainage. Possible slight vision change/blurry per his report. No unilateral weakness. No other aggravating or alleviating factors.He was then seen again by Dr. Licona who was concerned for temporal arteritis. Patient has been referred for biopsy, scheduled for Telemed consult on 1/17/18. He did start Prednisone 60 mg daily on 1/5/18, reports symptoms have mildly improved since that time. He is here with his daughter. They are both concerned re: biopsy procedure, reports he may not be willing to go through with procedure as they watched a YouTube video and he's now scared.

## 2018-01-09 NOTE — ASSESSMENT & PLAN NOTE
This is a new problem. Patient reports he is completing antibiotic course, he has 2 days left of doxycycline. This is his second antibiotic. Reports 2 days ago he noticed some itching around his urethra. Denies any rash. Reports mild dysuria. Denies any back pain. No blood in urine.

## 2018-01-17 ENCOUNTER — TELEMEDICINE ORIGINATING SITE VISIT (OUTPATIENT)
Dept: MEDICAL GROUP | Facility: CLINIC | Age: 73
End: 2018-01-17
Payer: MEDICARE

## 2018-01-17 ENCOUNTER — APPOINTMENT (OUTPATIENT)
Dept: SCHEDULING | Facility: IMAGING CENTER | Age: 73
End: 2018-01-17

## 2018-01-17 DIAGNOSIS — R51.9 RIGHT TEMPORAL HEADACHE: ICD-10-CM

## 2018-01-19 ENCOUNTER — OFFICE VISIT (OUTPATIENT)
Dept: MEDICAL GROUP | Facility: CLINIC | Age: 73
End: 2018-01-19
Payer: MEDICARE

## 2018-01-19 ENCOUNTER — HOSPITAL ENCOUNTER (OUTPATIENT)
Facility: MEDICAL CENTER | Age: 73
End: 2018-01-19
Attending: NURSE PRACTITIONER
Payer: MEDICARE

## 2018-01-19 VITALS
SYSTOLIC BLOOD PRESSURE: 132 MMHG | BODY MASS INDEX: 32.64 KG/M2 | HEIGHT: 72 IN | WEIGHT: 241 LBS | HEART RATE: 98 BPM | RESPIRATION RATE: 18 BRPM | TEMPERATURE: 97.8 F | OXYGEN SATURATION: 95 % | DIASTOLIC BLOOD PRESSURE: 78 MMHG

## 2018-01-19 DIAGNOSIS — R51.9 RIGHT TEMPORAL HEADACHE: ICD-10-CM

## 2018-01-19 DIAGNOSIS — R70.0 ELEVATED SEDIMENTATION RATE: ICD-10-CM

## 2018-01-19 PROBLEM — B37.41 YEAST CYSTITIS: Status: RESOLVED | Noted: 2018-01-09 | Resolved: 2018-01-19

## 2018-01-19 LAB — AMBIGUOUS DTTM AMBI4: NORMAL

## 2018-01-19 PROCEDURE — 99213 OFFICE O/P EST LOW 20 MIN: CPT | Performed by: NURSE PRACTITIONER

## 2018-01-19 PROCEDURE — 85652 RBC SED RATE AUTOMATED: CPT

## 2018-01-19 RX ORDER — PREDNISONE 20 MG/1
20 TABLET ORAL 3 TIMES DAILY
Qty: 42 TAB | Refills: 0 | Status: SHIPPED | OUTPATIENT
Start: 2018-01-19 | End: 2018-02-10 | Stop reason: SDUPTHER

## 2018-01-19 ASSESSMENT — PAIN SCALES - GENERAL: PAINLEVEL: NO PAIN

## 2018-01-19 NOTE — PATIENT INSTRUCTIONS
Dr. Corey   [facility]   [phone]  Call to schedule procedure!     Your medical care was provided today by: REBECCA Pike    Thank You for the opportunity to serve you.    You may receive a brief survey in the mail shortly regarding your visit today. Please take a few moments to complete the survey and return it; no postage is necessary. We are working to serve our patient population better, improve customer service and our patients overall experience and your input can help us to accomplish this. We thank you for your help and for the opportunity to serve you today and in the future.     Special Instructions:  Always call 9-1-1 immediately if you develop a life threatening emergency.    Unless told otherwise please take all medications as directed and complete prescription therapies.     Watch for the following signs that require additional evaluation: progressive lethargy or unresponsiveness, localized pain (chest, abdomen), shortness of breath, painful breathing, progressive vomiting with weakness, bloody stools, or new rash.     If you are prescribed pain medication or any other medication that is sedating, do not take medication before or while operating a vehicle or heavy machinery or equipment due to potential side effects such as drowsiness and/or dizziness.

## 2018-01-19 NOTE — ASSESSMENT & PLAN NOTE
Patient was reports 3 weeks ago right frontal and temporal HA. Pain waxes and wanes. He was dx with sinusitis and treated with abx in [facility] but symptoms did not improve with abx and OTC decongestant. No drainage. Possible slight vision change/blurry per his report. No unilateral weakness. No other aggravating or alleviating factors.He was then seen again by Dr. Licona who was concerned for temporal arteritis. He did start Prednisone 60 mg daily on 1/5/18, reports symptoms have mildly improved since that time. He is here with his daughter. They are both concerned re: biopsy procedure, reports he may not be willing to go through with procedure as they watched a YouTube video and he's now scared. He did have consult with Dr. Corey who also recommended biopsy, while patient is hesitant, he will make appt for procedure based on our recommendations.

## 2018-01-19 NOTE — ASSESSMENT & PLAN NOTE
1/4/18 ESR 37  Patient did not obtain repeated result as planned at last visit, will complete in clinic today

## 2018-01-19 NOTE — PROGRESS NOTES
Subjective:     Chidi Tatum is a 72 y.o. male here today for follow up.     Elevated sedimentation rate  1/4/18 ESR 37  Patient did not obtain repeated result as planned at last visit, will complete in clinic today     Right temporal headache  Patient was reports 3 weeks ago right frontal and temporal HA. Pain waxes and wanes. He was dx with sinusitis and treated with abx in [facility] but symptoms did not improve with abx and OTC decongestant. No drainage. Possible slight vision change/blurry per his report. No unilateral weakness. No other aggravating or alleviating factors.He was then seen again by Dr. Licona who was concerned for temporal arteritis. He did start Prednisone 60 mg daily on 1/5/18, reports symptoms have mildly improved since that time. He is here with his daughter. They are both concerned re: biopsy procedure, reports he may not be willing to go through with procedure as they watched a YouTube video and he's now scared. He did have consult with Dr. Corey who also recommended biopsy, while patient is hesitant, he will make appt for procedure based on our recommendations.        Current medicines (including changes today)  Current Outpatient Prescriptions   Medication Sig Dispense Refill   • predniSONE (DELTASONE) 20 MG Tab Take 1 Tab by mouth 3 times a day. 42 Tab 0   • fluconazole (DIFLUCAN) 150 MG tablet Take 1 Tab by mouth every day. 1 Tab 0   • finasteride (PROSCAR) 5 MG Tab TAKE ONE TABLET BY MOUTH ONCE DAILY 90 Tab 2   • omeprazole (PRILOSEC) 40 MG delayed-release capsule Take 1 Cap by mouth every day. 30 Cap 6   • esomeprazole (NEXIUM) 40 MG delayed-release capsule Take 1 Cap by mouth every morning before breakfast. 90 Cap 2   • lisinopril (PRINIVIL) 10 MG Tab TAKE ONE TABLET BY MOUTH ONCE DAILY *REPLACES  5MG  TABLET* 90 Tab 1   • zolpidem (AMBIEN) 5 MG Tab Take 1 Tab by mouth at bedtime as needed for Sleep. 30 Tab 2   • fluticasone (FLONASE) 50 MCG/ACT nasal spray Spray 1 Spray in nose every  day. 16 g 1   • sertraline (ZOLOFT) 25 MG tablet Take 1 Tab by mouth every day. 30 Tab 2   • hydrocodone-acetaminophen (NORCO) 5-325 MG Tab per tablet Take 1-2 Tabs by mouth every four hours as needed. 30 Tab 0   • hydrocortisone 1 % Cream Apply to affected area twice daily for 10 days 1 Tube 0   • hydrochlorothiazide (HYDRODIURIL) 50 MG Tab Take 1 Tab by mouth every day. 90 Tab 2   • tamsulosin (FLOMAX) 0.4 MG capsule Take 1 Cap by mouth ONE-HALF HOUR AFTER BREAKFAST. 90 Cap 2     No current facility-administered medications for this visit.        He  has a past medical history of BPH (benign prostatic hyperplasia); GERD (gastroesophageal reflux disease); HTN (hypertension); Hypertension; and Insomnia.    He  has a past surgical history that includes hernia repair.     Social History     Social History   • Marital status:      Spouse name: N/A   • Number of children: N/A   • Years of education: N/A     Social History Main Topics   • Smoking status: Never Smoker   • Smokeless tobacco: Never Used   • Alcohol use No   • Drug use: No   • Sexual activity: Not on file     Other Topics Concern   • Not on file     Social History Narrative   • No narrative on file       Family History   Problem Relation Age of Onset   • Lung Disease Father    • Alcohol/Drug Brother    • Lung Disease Brother          ROS   Positive for right temporal headache, improved. Positive for some dizziness, blurriness of vision.  No fever, no chest pain, no shortness of breath, no abdominal pain, no rashes     All other systems reviewed and are negative.          Objective:     Blood pressure 132/78, pulse 98, temperature 36.6 °C (97.8 °F), resp. rate 18, height 1.829 m (6'), weight 109.3 kg (241 lb), SpO2 95 %. Body mass index is 32.69 kg/m².    Physical Exam   Constitutional: He appears well-developed and well-nourished. No distress.   HENT:   Head: Normocephalic and atraumatic.   Right Ear: External ear normal.   Left Ear: External  ear normal.   Tender frontal sinus and right temporal region. No cords.     Minimal PND   Eyes: Conjunctivae and EOM are normal. Pupils are equal, round, and reactive to light.   Neurological:   Speech is clear. Patient is appropriate and cooperative.     Skin: Skin is dry. No rash noted.         Assessment and Plan:   The following treatment plan was discussed    1. Right temporal headache  Discussed with patient at length recommendation for procedure, he does plan to have it scheduled for next week. Will continue with prednisone at this time.    - predniSONE (DELTASONE) 20 MG Tab; Take 1 Tab by mouth 3 times a day.  Dispense: 42 Tab; Refill: 0    2. Elevated sedimentation rate  Repeat labs today.   - predniSONE (DELTASONE) 20 MG Tab; Take 1 Tab by mouth 3 times a day.  Dispense: 42 Tab; Refill: 0       Reviewed indication, dosage, usage and potential adverse effects of prescribed medications. Patient appears to understand, verbalizes understanding and is willing to try medications as prescribed.      Reviewed risks and benefits of treatment plan. Patient verbally agrees to plan of care.       Followup: Return in about 2 weeks (around 2/2/2018).    FRANCISCO Moya.     PLEASE NOTE: This dictation was created using voice recognition software. I have made every reasonable attempt to correct obvious errors, but I expect that there may be errors of grammar and possibly content that I did not discover prior finalizing this note.

## 2018-01-20 LAB — ERYTHROCYTE [SEDIMENTATION RATE] IN BLOOD BY WESTERGREN METHOD: 7 MM/HOUR (ref 0–20)

## 2018-02-02 ENCOUNTER — TELEPHONE (OUTPATIENT)
Dept: MEDICAL GROUP | Facility: CLINIC | Age: 73
End: 2018-02-02

## 2018-02-02 ENCOUNTER — OFFICE VISIT (OUTPATIENT)
Dept: MEDICAL GROUP | Facility: CLINIC | Age: 73
End: 2018-02-02
Payer: MEDICARE

## 2018-02-02 VITALS
OXYGEN SATURATION: 95 % | DIASTOLIC BLOOD PRESSURE: 72 MMHG | HEIGHT: 72 IN | TEMPERATURE: 98.9 F | SYSTOLIC BLOOD PRESSURE: 138 MMHG | BODY MASS INDEX: 33.46 KG/M2 | RESPIRATION RATE: 18 BRPM | HEART RATE: 90 BPM | WEIGHT: 247 LBS

## 2018-02-02 DIAGNOSIS — E66.9 OBESITY (BMI 30-39.9): ICD-10-CM

## 2018-02-02 DIAGNOSIS — F51.01 PRIMARY INSOMNIA: ICD-10-CM

## 2018-02-02 DIAGNOSIS — F33.1 MODERATE EPISODE OF RECURRENT MAJOR DEPRESSIVE DISORDER (HCC): ICD-10-CM

## 2018-02-02 PROCEDURE — 99213 OFFICE O/P EST LOW 20 MIN: CPT | Performed by: NURSE PRACTITIONER

## 2018-02-02 RX ORDER — ZOLPIDEM TARTRATE 5 MG/1
5 TABLET ORAL NIGHTLY PRN
Qty: 30 TAB | Refills: 2 | Status: SHIPPED | OUTPATIENT
Start: 2018-02-02 | End: 2018-03-04

## 2018-02-02 ASSESSMENT — PAIN SCALES - GENERAL: PAINLEVEL: NO PAIN

## 2018-02-03 NOTE — TELEPHONE ENCOUNTER
1. Caller Name: Chidi's daughter                      Call Back Number: [phone] (home)     2. Message: Patients daughter called in and wanted to know if there is anything Chidi can do for the hand pain which has become consistent since he has left the office.     3. Patient approves office to leave a detailed voicemail/MyChart message: N\A

## 2018-02-05 ENCOUNTER — TELEPHONE (OUTPATIENT)
Dept: MEDICAL GROUP | Facility: CLINIC | Age: 73
End: 2018-02-05

## 2018-02-05 DIAGNOSIS — R51.9 RIGHT TEMPORAL HEADACHE: ICD-10-CM

## 2018-02-05 DIAGNOSIS — R70.0 ELEVATED SEDIMENTATION RATE: ICD-10-CM

## 2018-02-06 RX ORDER — PREDNISONE 20 MG/1
20 TABLET ORAL 3 TIMES DAILY
Qty: 42 TAB | Refills: 0 | Status: CANCELLED | OUTPATIENT
Start: 2018-02-06

## 2018-02-06 NOTE — TELEPHONE ENCOUNTER
Pt. Informed.     Was the patient seen in the last year in this department? Yes     Does patient have an active prescription for medications requested? Yes     Received Request Via: Patient

## 2018-02-06 NOTE — TELEPHONE ENCOUNTER
1. Caller Name: Chidi                                          Call Back Number: [phone] (home)         Patient approves a detailed voicemail message: yes    Patient called and is inquiring if he should be taking his prednisone any more. Please advise.

## 2018-02-08 RX ORDER — TAMSULOSIN HYDROCHLORIDE 0.4 MG/1
0.4 CAPSULE ORAL
Qty: 90 CAP | Refills: 2 | Status: SHIPPED | OUTPATIENT
Start: 2018-02-08 | End: 2018-10-30 | Stop reason: SDUPTHER

## 2018-02-08 NOTE — PROGRESS NOTES
Subjective:     Chidi Tatum is a 72 y.o. male here today for refill of Ambien.     Primary insomnia  This is a chronic problem specifically since his wife passed away. Taking Ambien 5 mg at bedtime with decent symptom control per his report. Patient reports he failed Trazodone therapy. He is well aware of risks of medication and would like to continue.         Moderate episode of recurrent major depressive disorder (CMS-HCC)  Patient recently lost his wife last year, she passed away in the hospital. She was in the hospital for lengthy periods of time and he is doing his best to cope. His children are living with him and his grandchild, who is 4. Reports they are keeping him happy. Ambien has helped him to sleep and he now denies any grogginess. He does have great support from his daughter. We did prescribe Zoloft, however, patient reports he never took the medication, reports he does not feel he needs it.  Denies SI.            Current medicines (including changes today)  Current Outpatient Prescriptions   Medication Sig Dispense Refill   • zolpidem (AMBIEN) 5 MG Tab Take 1 Tab by mouth at bedtime as needed for Sleep for up to 30 days. 30 Tab 2   • predniSONE (DELTASONE) 20 MG Tab Take 1 Tab by mouth 3 times a day. 42 Tab 0   • fluconazole (DIFLUCAN) 150 MG tablet Take 1 Tab by mouth every day. 1 Tab 0   • finasteride (PROSCAR) 5 MG Tab TAKE ONE TABLET BY MOUTH ONCE DAILY 90 Tab 2   • omeprazole (PRILOSEC) 40 MG delayed-release capsule Take 1 Cap by mouth every day. 30 Cap 6   • esomeprazole (NEXIUM) 40 MG delayed-release capsule Take 1 Cap by mouth every morning before breakfast. 90 Cap 2   • lisinopril (PRINIVIL) 10 MG Tab TAKE ONE TABLET BY MOUTH ONCE DAILY *REPLACES  5MG  TABLET* 90 Tab 1   • fluticasone (FLONASE) 50 MCG/ACT nasal spray Spray 1 Spray in nose every day. 16 g 1   • hydrocodone-acetaminophen (NORCO) 5-325 MG Tab per tablet Take 1-2 Tabs by mouth every four hours as needed. 30 Tab 0   •  hydrocortisone 1 % Cream Apply to affected area twice daily for 10 days 1 Tube 0   • hydrochlorothiazide (HYDRODIURIL) 50 MG Tab Take 1 Tab by mouth every day. 90 Tab 2   • tamsulosin (FLOMAX) 0.4 MG capsule Take 1 Cap by mouth ONE-HALF HOUR AFTER BREAKFAST. 90 Cap 2     No current facility-administered medications for this visit.        He  has a past medical history of BPH (benign prostatic hyperplasia); GERD (gastroesophageal reflux disease); HTN (hypertension); Hypertension; and Insomnia.    He  has a past surgical history that includes hernia repair.     Social History     Social History   • Marital status:      Spouse name: N/A   • Number of children: N/A   • Years of education: N/A     Social History Main Topics   • Smoking status: Never Smoker   • Smokeless tobacco: Never Used   • Alcohol use No   • Drug use: No   • Sexual activity: No     Other Topics Concern   • Not on file     Social History Narrative   • No narrative on file       Family History   Problem Relation Age of Onset   • Lung Disease Father    • Alcohol/Drug Brother    • Lung Disease Brother          ROS  Positive for insomnia, controlled with Ambien.   No fever, no chest pain, no shortness of breath, no abdominal pain, no rashes    All other systems reviewed and are negative.        Objective:     Blood pressure 138/72, pulse 90, temperature 37.2 °C (98.9 °F), resp. rate 18, height 1.829 m (6'), weight 112 kg (247 lb), SpO2 95 %. Body mass index is 33.5 kg/m².    Physical Exam:   Constitutional: Alert, no distress.  Eye: Equal, round and reactive, conjunctiva clear, lids normal.   Respiratory: Unlabored respiratory effort, lungs clear to auscultation, no wheezes, no ronchi.  Cardiovascular: Normal S1, S2, no murmur, no edema.   Skin: Warm, dry, good turgor, no rashes in visible areas.  Neuro:  normal sensation in extremities.   Psych: Alert and oriented x3, normal affect and mood.        Assessment and Plan:   The following treatment  plan was discussed    1. Primary insomnia  We did discuss the risks of this medication, especially at his age. Patient verbalizes understanding. Reviewed and signed consent.  ok, no concerns.   - Consent for Opiate Prescription  - zolpidem (AMBIEN) 5 MG Tab; Take 1 Tab by mouth at bedtime as needed for Sleep for up to 30 days.  Dispense: 30 Tab; Refill: 2    2. Moderate episode of recurrent major depressive disorder (CMS-HCC)  Appears stable with good support from family.     3. Obesity (BMI 30-39.9)  - Patient identified as having weight management issue.  Appropriate orders and counseling given.      Reviewed indication, dosage, usage and potential adverse effects of prescribed medications. Patient appears to understand, verbalizes understanding and is willing to try medications as prescribed.      Reviewed risks and benefits of treatment plan. Patient verbally agrees to plan of care.       Followup: Return for keep  upcoming appt .    FRANCISCO Moya.     PLEASE NOTE: This dictation was created using voice recognition software. I have made every reasonable attempt to correct obvious errors, but I expect that there may be errors of grammar and possibly content that I did not discover prior finalizing this note.

## 2018-02-08 NOTE — ASSESSMENT & PLAN NOTE
This is a chronic problem specifically since his wife passed away. Taking Ambien 5 mg at bedtime with decent symptom control per his report. Patient reports he failed Trazodone therapy. He is well aware of risks of medication and would like to continue.

## 2018-02-08 NOTE — ASSESSMENT & PLAN NOTE
Patient recently lost his wife last year, she passed away in the hospital. She was in the hospital for lengthy periods of time and he is doing his best to cope. His children are living with him and his grandchild, who is 4. Reports they are keeping him happy. Ambien has helped him to sleep and he now denies any grogginess. He does have great support from his daughter. We did prescribe Zoloft, however, patient reports he never took the medication, reports he does not feel he needs it.  Denies SI.

## 2018-02-09 ENCOUNTER — HOSPITAL ENCOUNTER (OUTPATIENT)
Facility: MEDICAL CENTER | Age: 73
End: 2018-02-09
Attending: SURGERY | Admitting: SURGERY
Payer: MEDICARE

## 2018-02-09 VITALS
RESPIRATION RATE: 16 BRPM | HEIGHT: 72 IN | TEMPERATURE: 98.9 F | WEIGHT: 241.18 LBS | HEART RATE: 94 BPM | OXYGEN SATURATION: 94 % | BODY MASS INDEX: 32.67 KG/M2

## 2018-02-09 DIAGNOSIS — R51.9 RIGHT TEMPORAL HEADACHE: Primary | ICD-10-CM

## 2018-02-09 DIAGNOSIS — G89.18 POSTOPERATIVE PAIN: ICD-10-CM

## 2018-02-09 LAB
ANION GAP SERPL CALC-SCNC: 11 MMOL/L (ref 0–11.9)
BUN SERPL-MCNC: 21 MG/DL (ref 8–22)
CALCIUM SERPL-MCNC: 9.1 MG/DL (ref 8.5–10.5)
CHLORIDE SERPL-SCNC: 107 MMOL/L (ref 96–112)
CO2 SERPL-SCNC: 19 MMOL/L (ref 20–33)
CREAT SERPL-MCNC: 1.48 MG/DL (ref 0.5–1.4)
EKG IMPRESSION: NORMAL
GLUCOSE SERPL-MCNC: 93 MG/DL (ref 65–99)
POTASSIUM SERPL-SCNC: 3.6 MMOL/L (ref 3.6–5.5)
SODIUM SERPL-SCNC: 137 MMOL/L (ref 135–145)

## 2018-02-09 PROCEDURE — 93010 ELECTROCARDIOGRAM REPORT: CPT | Performed by: INTERNAL MEDICINE

## 2018-02-09 PROCEDURE — 93005 ELECTROCARDIOGRAM TRACING: CPT | Performed by: SURGERY

## 2018-02-09 PROCEDURE — 501837 HCHG SUTURE CV: Performed by: SURGERY

## 2018-02-09 PROCEDURE — 700111 HCHG RX REV CODE 636 W/ 250 OVERRIDE (IP)

## 2018-02-09 PROCEDURE — 700101 HCHG RX REV CODE 250

## 2018-02-09 PROCEDURE — 500700 HCHG HEMOCLIP, SMALL (RED): Performed by: SURGERY

## 2018-02-09 PROCEDURE — 500002 HCHG ADHESIVE, DERMABOND: Performed by: SURGERY

## 2018-02-09 PROCEDURE — 500698 HCHG HEMOCLIP, MEDIUM: Performed by: SURGERY

## 2018-02-09 PROCEDURE — A9270 NON-COVERED ITEM OR SERVICE: HCPCS

## 2018-02-09 PROCEDURE — 501445 HCHG STAPLER, SKIN DISP: Performed by: SURGERY

## 2018-02-09 PROCEDURE — 88305 TISSUE EXAM BY PATHOLOGIST: CPT

## 2018-02-09 PROCEDURE — 501838 HCHG SUTURE GENERAL: Performed by: SURGERY

## 2018-02-09 PROCEDURE — 160046 HCHG PACU - 1ST 60 MINS PHASE II: Performed by: SURGERY

## 2018-02-09 PROCEDURE — 700102 HCHG RX REV CODE 250 W/ 637 OVERRIDE(OP)

## 2018-02-09 PROCEDURE — 500122 HCHG BOVIE, BLADE: Performed by: SURGERY

## 2018-02-09 PROCEDURE — 160009 HCHG ANES TIME/MIN: Performed by: SURGERY

## 2018-02-09 PROCEDURE — 160025 RECOVERY II MINUTES (STATS): Performed by: SURGERY

## 2018-02-09 PROCEDURE — 160048 HCHG OR STATISTICAL LEVEL 1-5: Performed by: SURGERY

## 2018-02-09 PROCEDURE — 160039 HCHG SURGERY MINUTES - EA ADDL 1 MIN LEVEL 3: Performed by: SURGERY

## 2018-02-09 PROCEDURE — 80048 BASIC METABOLIC PNL TOTAL CA: CPT

## 2018-02-09 PROCEDURE — 160028 HCHG SURGERY MINUTES - 1ST 30 MINS LEVEL 3: Performed by: SURGERY

## 2018-02-09 PROCEDURE — 160035 HCHG PACU - 1ST 60 MINS PHASE I: Performed by: SURGERY

## 2018-02-09 PROCEDURE — 160002 HCHG RECOVERY MINUTES (STAT): Performed by: SURGERY

## 2018-02-09 RX ORDER — HYDROCODONE BITARTRATE AND ACETAMINOPHEN 5; 325 MG/1; MG/1
1-2 TABLET ORAL EVERY 4 HOURS PRN
Qty: 10 TAB | Refills: 0 | Status: SHIPPED | OUTPATIENT
Start: 2018-02-09 | End: 2018-02-12

## 2018-02-09 RX ORDER — HYDROCHLOROTHIAZIDE 50 MG/1
50 TABLET ORAL
COMMUNITY
End: 2018-03-16 | Stop reason: SDUPTHER

## 2018-02-09 RX ORDER — BUPIVACAINE HYDROCHLORIDE AND EPINEPHRINE 5; 5 MG/ML; UG/ML
INJECTION, SOLUTION EPIDURAL; INTRACAUDAL; PERINEURAL
Status: DISCONTINUED | OUTPATIENT
Start: 2018-02-09 | End: 2018-02-09 | Stop reason: HOSPADM

## 2018-02-09 RX ORDER — LIDOCAINE HYDROCHLORIDE 10 MG/ML
INJECTION, SOLUTION INFILTRATION; PERINEURAL
Status: COMPLETED
Start: 2018-02-09 | End: 2018-02-09

## 2018-02-09 RX ORDER — SODIUM CHLORIDE, SODIUM LACTATE, POTASSIUM CHLORIDE, CALCIUM CHLORIDE 600; 310; 30; 20 MG/100ML; MG/100ML; MG/100ML; MG/100ML
INJECTION, SOLUTION INTRAVENOUS CONTINUOUS
Status: DISCONTINUED | OUTPATIENT
Start: 2018-02-09 | End: 2018-02-09 | Stop reason: HOSPADM

## 2018-02-09 RX ORDER — LIDOCAINE AND PRILOCAINE 25; 25 MG/G; MG/G
1 CREAM TOPICAL
Status: DISCONTINUED | OUTPATIENT
Start: 2018-02-09 | End: 2018-02-09 | Stop reason: HOSPADM

## 2018-02-09 RX ORDER — OXYCODONE HCL 5 MG/5 ML
SOLUTION, ORAL ORAL
Status: COMPLETED
Start: 2018-02-09 | End: 2018-02-09

## 2018-02-09 RX ORDER — LIDOCAINE HYDROCHLORIDE 10 MG/ML
0.5 INJECTION, SOLUTION INFILTRATION; PERINEURAL
Status: DISCONTINUED | OUTPATIENT
Start: 2018-02-09 | End: 2018-02-09 | Stop reason: HOSPADM

## 2018-02-09 RX ADMIN — LIDOCAINE HYDROCHLORIDE 0.5 ML: 10 INJECTION, SOLUTION INFILTRATION; PERINEURAL at 14:30

## 2018-02-09 RX ADMIN — OXYCODONE HYDROCHLORIDE 5 MG: 5 SOLUTION ORAL at 17:48

## 2018-02-09 RX ADMIN — SODIUM CHLORIDE, SODIUM LACTATE, POTASSIUM CHLORIDE, CALCIUM CHLORIDE: 600; 310; 30; 20 INJECTION, SOLUTION INTRAVENOUS at 14:30

## 2018-02-09 ASSESSMENT — PAIN SCALES - GENERAL
PAINLEVEL_OUTOF10: 0

## 2018-02-10 NOTE — H&P
General Surgery H&P    Date of Service: 2/9/2018    Admitting Physician: Bryant Corey M.D. [facility]    -------------------------------------------------------------------------------------------------    Chief complaint: hedaches    HPI: This is a 72 y.o. male who is presenting with headaches. Has been started on empiric steroids.  Temporal artery biopsy requested. No new complaints today.      Past Medical History:   Diagnosis Date   • BPH (benign prostatic hyperplasia)    • GERD (gastroesophageal reflux disease)    • HTN (hypertension)    • Hypertension    • Insomnia        Past Surgical History:   Procedure Laterality Date   • HERNIA REPAIR      umbilical, groin        Current Facility-Administered Medications   Medication Dose Route Frequency Provider Last Rate Last Dose   • lidocaine-prilocaine (EMLA) 2.5-2.5 % cream 1 Application  1 Application Topical Once PRN Bryant Corey M.D.        Or   • lidocaine (XYLOCAINE) 1%  injection  0.5 mL Intradermal Once PRN Bryant Corey M.D.   0.5 mL at 02/09/18 1430   • lactated ringers infusion   Intravenous Continuous Bryant Corey M.D. 10 mL/hr at 02/09/18 1430         Social History     Social History   • Marital status:      Spouse name: N/A   • Number of children: N/A   • Years of education: N/A     Occupational History   • Not on file.     Social History Main Topics   • Smoking status: Never Smoker   • Smokeless tobacco: Never Used   • Alcohol use No   • Drug use: No   • Sexual activity: No     Other Topics Concern   • Not on file     Social History Narrative   • No narrative on file       Family History   Problem Relation Age of Onset   • Lung Disease Father    • Alcohol/Drug Brother    • Lung Disease Brother        Allergies:  Celebrex [celecoxib]    Review of Systems:  Noncontributory except as per HPI    Physical Exam:  Pulse 100, temperature 37.3 °C (99.1 °F), height 1.829 m (6'), weight 109.4 kg (241 lb 2.9 oz), SpO2 94  %.    Constitutional: Alert, oriented, no acute distress  HEENT:  Normocephalic and atraumatic, EOMI  Neck:   Supple, no JVD,   Cardiovascular: Regular rate and rhythm,   Pulmonary:  Good air entry bilaterally,   Abdominal:  benign  Musculoskeletal: No edema, no tenderness  Neurological:  CN II-XII grossly intact, no focal deficits  Skin:   Skin is warm and dry. No rash noted.  Psychiatric:  Normal mood and affect.    Assessment:   This is a 72 y.o. male who is presenting with headaches. Has been started on empiric steroids.  Temporal artery biopsy requested. No new complaints today.    OR today for temporal artery biopsy    I explained the details of the operation, alternatives, and potential risks, including but not limited to bleeding, infection, and risks of anesthesia.  All questions were answered. Patient understands and agrees to proceed.    Bryant Corey M.D.  [facility]  [phone]  Cell: [phone]

## 2018-02-10 NOTE — DISCHARGE INSTRUCTIONS
ACTIVITY: Rest and take it easy for the first 24 hours.  A responsible adult is recommended to remain with you during that time.  It is normal to feel sleepy.  We encourage you to not do anything that requires balance, judgment or coordination.    MILD FLU-LIKE SYMPTOMS ARE NORMAL. YOU MAY EXPERIENCE GENERALIZED MUSCLE ACHES, THROAT IRRITATION, HEADACHE AND/OR SOME NAUSEA.    FOR 24 HOURS DO NOT:  Drive, operate machinery or run household appliances.  Drink beer or alcoholic beverages.   Make important decisions or sign legal documents.    SPECIAL INSTRUCTIONS:   Procedure: Right temporal artery biopsy     1. ACTIVITIES: Upon discharge from the hospital, the day of surgery it is requested that you do no significant physical activity and no driving as you have had sedation. The day after surgery, you may resume activities of daily living, but for one week, no strenuous activities or heavy lifting (greater than 15 pounds).     2. DRIVING: You may drive whenever you are off pain medications and are able to perform the activities needed to drive, i.e. turning, bending, twisting, etc.     3. WOUND: It is not unusual for patients to experience swelling and even bruising, as well as a small amount of drainage from the incision. This is normal and will resolve over the next few days.     4. ICE: please use ice on the wound to decrease the swelling for the first 24 hours and then discontinue. Apply ice for 20 minutes and then remove for 20 minutes, alternating while awake.     5. BATHING: Incision is covered with skin glue.  You do not need to cover it. You can shower starting 1 day after the operation.   Avoid submerging the incision in water (tub, bath, pool) for at least a week.     6. PAIN MEDICATION: You will be given a prescription for pain medication at discharge. Please take these as directed. It is important to remember not to take medications on an empty stomach as this may cause nausea.     7. BOWEL FUNCTION:  After surgery, it is not uncommon for patients to experience constipation. This is due to decreasing activity levels as well as pain medications. You may wish to use a stool softener beginning immediately after surgery, and you may or may not need to use a laxative (Milk of Magnesia, Ex-lax; Senokot, etc.) as well.     8 .CALL IF YOU HAVE: (1) Fevers to more than 101.5 F, (2) Unusual or excessive pain, (3) Drainage or fluid from incision that may be foul smelling, increased tenderness or soreness at the wound or the wound edges are no longer together, redness or swelling at the incision site. Please do not hesitate to call with any other questions.     9. APPOINTMENT: Contact our office at [phone] for a follow-up appointment if you are having concerns about the incision healing.     If you have any additional questions, please do not hesitate to call the office and speak to either myself or the physician on call.     Office addresses:   [address] [place] [id number]   [phone]     Bryant Corey MD   [facility]   [phone]    DIET: To avoid nausea, slowly advance diet as tolerated, avoiding spicy or greasy foods for the first day.  Add more substantial food to your diet according to your physician's instructions. INCREASE FLUIDS AND FIBER TO AVOID CONSTIPATION.    SURGICAL DRESSING/BATHING: see above    FOLLOW-UP APPOINTMENT:  A follow-up appointment should be arranged with your doctor in 1-2 weeks; call to schedule.    You should CALL YOUR PHYSICIAN if you develop:  Fever greater than 101 degrees F.  Pain not relieved by medication, or persistent nausea or vomiting.  Excessive bleeding (blood soaking through dressing) or unexpected drainage from the wound.  Extreme redness or swelling around the incision site, drainage of pus or foul smelling drainage.  Inability to urinate or empty your bladder within 8 hours.  Problems with breathing or chest pain.    You should call [phone] if  you develop problems with breathing or chest pain.  If you are unable to contact your doctor or surgical center, you should go to the nearest emergency room or urgent care center.  Physician's telephone #: [phone]    If any questions arise, call your doctor.  If your doctor is not available, please feel free to call the Surgical Center at [phone].  The Center is open Monday through Friday from 7AM to 7PM.  You can also call the HEALTH HOTLINE open 24 hours/day, 7 days/week and speak to a nurse at [phone], or toll free at [phone].    A registered nurse may call you a few days after your surgery to see how you are doing after your procedure.    MEDICATIONS: Resume taking daily medication.  Take prescribed pain medication with food.  If no medication is prescribed, you may take non-aspirin pain medication if needed.  PAIN MEDICATION CAN BE VERY CONSTIPATING.  Take a stool softener or laxative such as senokot, pericolace, or milk of magnesia if needed.      Last pain medication given at 5:45.    If your physician has prescribed pain medication that includes Acetaminophen (Tylenol), do not take additional Acetaminophen (Tylenol) while taking the prescribed medication.    Depression / Suicide Risk    As you are discharged from this [facility] facility, it is important to learn how to keep safe from harming yourself.    Recognize the warning signs:  · Abrupt changes in personality, positive or negative- including increase in energy   · Giving away possessions  · Change in eating patterns- significant weight changes-  positive or negative  · Change in sleeping patterns- unable to sleep or sleeping all the time   · Unwillingness or inability to communicate  · Depression  · Unusual sadness, discouragement and loneliness  · Talk of wanting to die  · Neglect of personal appearance   · Rebelliousness- reckless behavior  · Withdrawal from people/activities they love  · Confusion- inability to  concentrate     If you or a loved one observes any of these behaviors or has concerns about self-harm, here's what you can do:  · Talk about it- your feelings and reasons for harming yourself  · Remove any means that you might use to hurt yourself (examples: pills, rope, extension cords, firearm)  · Get professional help from the community (Mental Health, Substance Abuse, psychological counseling)  · Do not be alone:Call your Safe Contact- someone whom you trust who will be there for you.  · Call your local CRISIS HOTLINE 718-9594 or 400-095-2259  · Call your local Children's Mobile Crisis Response Team [place] (240) 874-3542 or www.[employer]  · Call the toll free National Suicide Prevention Hotlines   · National Suicide Prevention Lifeline 122-812-ARXZ (2311)  · National Hope Line Network 800-SUICIDE (490-0181)

## 2018-02-14 NOTE — OP REPORT
DATE OF SERVICE:  02/09/2018    PREOPERATIVE DIAGNOSES:  1.  Persistent recurrent headaches concerning for temporal arteritis.  2.  Hypertension.    POSTOPERATIVE DIAGNOSES:  1.  Persistent recurrent headaches concerning for temporal arteritis.  2.  Hypertension.    PROCEDURE:  Right temporal artery biopsy.    SURGEON:  Bryant Corey MD    ASSISTANT:  None.    ANESTHESIA:  General.    BLOOD LOSS:  Minimal.    SPECIMENS:  Right temporal artery biopsy segment sent to pathology.    DISPOSITION:  Patient tolerated the procedure well.  He was extubated and sent   to recovery in stable condition.    DESCRIPTION OF PROCEDURE:  Following informed consent, the patient was placed   supine on the operating table and general anesthesia was administered.  The   head was turned to the left, so that the right femoral artery to be accessed.    We then injected local anesthetic overlying the course of the temporal artery   and we made a 2 cm incision following the course of the temporal artery.  We   dissected out the temporal artery circumferentially, then we controlled   proximally and distally with clips and we transected the intervening segment   and removed it.  This allowed about 1.5 cm to 2 cm of tissue from the temporal   artery for the biopsy analysis.  The wound appeared hemostatic.  It was   closed with multiple layers of absorbable suture and then skin glue to protect   the incision.  Patient tolerated the procedure well.  All counts were   correct.  He was extubated and sent to recovery in stable condition.       ____________________________________     MD JAIR HectorJ / NTS    DD:  02/13/2018 18:59:17  DT:  02/13/2018 22:12:26    D#:  [id number]  Job#:  [id number]

## 2018-03-16 ENCOUNTER — OFFICE VISIT (OUTPATIENT)
Dept: MEDICAL GROUP | Facility: CLINIC | Age: 73
End: 2018-03-16
Payer: MEDICARE

## 2018-03-16 VITALS
HEART RATE: 88 BPM | DIASTOLIC BLOOD PRESSURE: 72 MMHG | OXYGEN SATURATION: 96 % | BODY MASS INDEX: 34 KG/M2 | SYSTOLIC BLOOD PRESSURE: 134 MMHG | RESPIRATION RATE: 18 BRPM | TEMPERATURE: 98.4 F | HEIGHT: 72 IN | WEIGHT: 251 LBS

## 2018-03-16 DIAGNOSIS — R60.0 BILATERAL LOWER EXTREMITY EDEMA: ICD-10-CM

## 2018-03-16 DIAGNOSIS — B37.0 THRUSH, ORAL: ICD-10-CM

## 2018-03-16 DIAGNOSIS — K21.9 GASTROESOPHAGEAL REFLUX DISEASE WITHOUT ESOPHAGITIS: ICD-10-CM

## 2018-03-16 DIAGNOSIS — I10 ESSENTIAL HYPERTENSION: ICD-10-CM

## 2018-03-16 PROBLEM — R51.9 RIGHT TEMPORAL HEADACHE: Status: RESOLVED | Noted: 2018-01-09 | Resolved: 2018-03-16

## 2018-03-16 PROBLEM — K13.79 PAIN IN MOUTH: Status: ACTIVE | Noted: 2018-03-16

## 2018-03-16 PROBLEM — R70.0 ELEVATED SEDIMENTATION RATE: Status: RESOLVED | Noted: 2018-01-09 | Resolved: 2018-03-16

## 2018-03-16 PROCEDURE — 99214 OFFICE O/P EST MOD 30 MIN: CPT | Performed by: NURSE PRACTITIONER

## 2018-03-16 RX ORDER — HYDROCHLOROTHIAZIDE 25 MG/1
25 TABLET ORAL
Qty: 45 TAB | Refills: 1 | Status: SHIPPED | OUTPATIENT
Start: 2018-03-16 | End: 2019-01-04 | Stop reason: CLARIF

## 2018-03-16 RX ORDER — CLOTRIMAZOLE 10 MG/1
10 LOZENGE ORAL; TOPICAL
Qty: 50 TROCHE | Refills: 0 | Status: SHIPPED | OUTPATIENT
Start: 2018-03-16 | End: 2018-03-26

## 2018-03-16 RX ORDER — ESOMEPRAZOLE MAGNESIUM 40 MG/1
40 CAPSULE, DELAYED RELEASE ORAL
Qty: 90 CAP | Refills: 2 | Status: SHIPPED | OUTPATIENT
Start: 2018-03-16 | End: 2018-05-18

## 2018-03-19 NOTE — ASSESSMENT & PLAN NOTE
This is a chronic problem which was well controlled with Nexium, however, he was started on omeprazole due to insurance, he reports today very poor symptom control with omeprazole and would like to change back to Nexium.

## 2018-03-19 NOTE — PROGRESS NOTES
Subjective:     Chidi Tatum is a 72 y.o. male here today for evaluation and management of the following:     Essential hypertension  This is a chronic problem that has been well controlled in the past. He is now taking Hydrochlorothiazide, lisinopril 5 mg two tablets (total 10 mg daily) Denies dizziness. Reports his BP appears stable at home. BP today 134/72. He does admit to intermittent LE edema, he was hoping to decrease his BP medication.        GERD (gastroesophageal reflux disease)  This is a chronic problem which was well controlled with Nexium, however, he was started on omeprazole due to insurance, he reports today very poor symptom control with omeprazole and would like to change back to Nexium.     Bilateral lower extremity edema  See HTN note.     Thrush, oral  This is a new problem, patient reports burning in mouth for last 1-2 weeks.          Current medicines (including changes today)  Current Outpatient Prescriptions   Medication Sig Dispense Refill   • esomeprazole (NEXIUM) 40 MG delayed-release capsule Take 1 Cap by mouth every morning before breakfast. 90 Cap 2   • hydroCHLOROthiazide (HYDRODIURIL) 25 MG Tab Take 1 Tab by mouth every 48 hours. 45 Tab 1   • clotrimazole (MYCELEX) 10 MG Cristofer Take 1 Cristofer by mouth 5 Times a Day for 10 days. 50 Cristofer 0   • tamsulosin (FLOMAX) 0.4 MG capsule Take 1 Cap by mouth ONE-HALF HOUR AFTER BREAKFAST. 90 Cap 2   • fluconazole (DIFLUCAN) 150 MG tablet Take 1 Tab by mouth every day. 1 Tab 0   • finasteride (PROSCAR) 5 MG Tab TAKE ONE TABLET BY MOUTH ONCE DAILY 90 Tab 2   • lisinopril (PRINIVIL) 10 MG Tab TAKE ONE TABLET BY MOUTH ONCE DAILY *REPLACES  5MG  TABLET* 90 Tab 1   • hydrocodone-acetaminophen (NORCO) 5-325 MG Tab per tablet Take 1-2 Tabs by mouth every four hours as needed. 30 Tab 0     No current facility-administered medications for this visit.        He  has a past medical history of BPH (benign prostatic hyperplasia); GERD (gastroesophageal  reflux disease); HTN (hypertension); Hypertension; and Insomnia.    He  has a past surgical history that includes hernia repair and temporal artery biopsy (Right, 2/9/2018).     Social History     Social History   • Marital status:      Spouse name: N/A   • Number of children: N/A   • Years of education: N/A     Social History Main Topics   • Smoking status: Never Smoker   • Smokeless tobacco: Never Used   • Alcohol use No   • Drug use: No   • Sexual activity: No     Other Topics Concern   • Not on file     Social History Narrative   • No narrative on file       Family History   Problem Relation Age of Onset   • Lung Disease Father    • Alcohol/Drug Brother    • Lung Disease Brother          ROS  Positive for LE edema, mouth pain.   No fever, no chest pain, no shortness of breath, no abdominal pain, no rashes    All other systems reviewed and are negative.        Objective:     Blood pressure 134/72, pulse 88, temperature 36.9 °C (98.4 °F), resp. rate 18, height 1.829 m (6'), weight 113.9 kg (251 lb), SpO2 96 %. Body mass index is 34.04 kg/m².    Physical Exam:   Constitutional: Alert, no distress.  Eye: Equal, round and reactive, conjunctiva clear, lids normal.   ENMT: Lips without lesions, white patches noted in mouth consistent with thrush.   Neck: Trachea midline, no masses, no thyromegaly. No cervical or supraclavicular lymphadenopathy  Respiratory: Unlabored respiratory effort, lungs clear to auscultation, no wheezes, no ronchi.  Cardiovascular: Normal S1, S2, no murmur, trace Bilateral LE edema.   Skin: Warm, dry, good turgor, no rashes in visible areas.  Neuro:  normal sensation in extremities.   Psych: Alert and oriented x3, normal affect and mood.        Assessment and Plan:   The following treatment plan was discussed    1. Gastroesophageal reflux disease without esophagitis  Poor symptom control with omeprazole, will change to Nexium.     2. Thrush, oral  - clotrimazole (MYCELEX) 10 MG Cristofer;  Take 1 Cristofer by mouth 5 Times a Day for 10 days.  Dispense: 50 Cristofer; Refill: 0    3. Bilateral lower extremity edema  Advised to complete ECHO, discussed based on results may determine how best to move forward with BP regime. At this time, will continue with current regime until we obtain results.   - ECHOCARDIOGRAM COMP W/O CONT; Future    4. Essential hypertension      Reviewed indication, dosage, usage and potential adverse effects of prescribed medications. Patient appears to understand, verbalizes understanding and is willing to try medications as prescribed.      Reviewed risks and benefits of treatment plan. Patient verbally agrees to plan of care.       Followup: Return in about 2 months (around 5/16/2018) for Medicare Annual Wellness.    CAROLINE MoyaRLIYAH.     PLEASE NOTE: This dictation was created using voice recognition software. I have made every reasonable attempt to correct obvious errors, but I expect that there may be errors of grammar and possibly content that I did not discover prior finalizing this note.

## 2018-03-19 NOTE — ASSESSMENT & PLAN NOTE
This is a chronic problem that has been well controlled in the past. He is now taking Hydrochlorothiazide, lisinopril 5 mg two tablets (total 10 mg daily) Denies dizziness. Reports his BP appears stable at home. BP today 134/72. He does admit to intermittent LE edema, he was hoping to decrease his BP medication.

## 2018-04-02 ENCOUNTER — OFFICE VISIT (OUTPATIENT)
Dept: URGENT CARE | Facility: PHYSICIAN GROUP | Age: 73
End: 2018-04-02
Payer: MEDICARE

## 2018-04-02 VITALS
HEART RATE: 90 BPM | OXYGEN SATURATION: 99 % | SYSTOLIC BLOOD PRESSURE: 154 MMHG | DIASTOLIC BLOOD PRESSURE: 74 MMHG | HEIGHT: 72 IN | RESPIRATION RATE: 20 BRPM | BODY MASS INDEX: 33.18 KG/M2 | WEIGHT: 245 LBS | TEMPERATURE: 97.5 F

## 2018-04-02 DIAGNOSIS — J04.0 LARYNGITIS: ICD-10-CM

## 2018-04-02 DIAGNOSIS — J01.00 ACUTE NON-RECURRENT MAXILLARY SINUSITIS: ICD-10-CM

## 2018-04-02 PROCEDURE — 99214 OFFICE O/P EST MOD 30 MIN: CPT | Performed by: PHYSICIAN ASSISTANT

## 2018-04-02 RX ORDER — PREDNISONE 20 MG/1
TABLET ORAL
Qty: 10 TAB | Refills: 0 | Status: SHIPPED | OUTPATIENT
Start: 2018-04-02 | End: 2018-04-12

## 2018-04-02 RX ORDER — DOXYCYCLINE HYCLATE 100 MG
100 TABLET ORAL 2 TIMES DAILY
Qty: 14 TAB | Refills: 0 | Status: SHIPPED | OUTPATIENT
Start: 2018-04-02 | End: 2018-04-12

## 2018-04-02 ASSESSMENT — ENCOUNTER SYMPTOMS
SHORTNESS OF BREATH: 1
GASTROINTESTINAL NEGATIVE: 1
DIZZINESS: 0
RHINORRHEA: 1
MUSCULOSKELETAL NEGATIVE: 1
SINUS PAIN: 0
SPUTUM PRODUCTION: 0
FEVER: 0
SORE THROAT: 1
COUGH: 1
CHILLS: 0
HEADACHES: 1
WHEEZING: 1

## 2018-04-02 ASSESSMENT — COPD QUESTIONNAIRES: COPD: 0

## 2018-04-02 NOTE — PROGRESS NOTES
Subjective:      Chidi Tatum is a 72 y.o. male who presents with Cough (x1 week; sore throat)            Cough   This is a new problem. The current episode started in the past 7 days. The problem has been unchanged. The problem occurs every few minutes. The cough is productive of sputum. Associated symptoms include headaches, nasal congestion, rhinorrhea, a sore throat, shortness of breath and wheezing. Pertinent negatives include no chills, ear pain or fever. Associated symptoms comments: Hoarse Voice. He has tried OTC cough suppressant for the symptoms. The treatment provided mild relief. There is no history of asthma, bronchitis, COPD or pneumonia.       PMH:  has a past medical history of BPH (benign prostatic hyperplasia); GERD (gastroesophageal reflux disease); HTN (hypertension); Hypertension; and Insomnia.  MEDS:   Current Outpatient Prescriptions:   •  esomeprazole (NEXIUM) 40 MG delayed-release capsule, Take 1 Cap by mouth every morning before breakfast., Disp: 90 Cap, Rfl: 2  •  hydroCHLOROthiazide (HYDRODIURIL) 25 MG Tab, Take 1 Tab by mouth every 48 hours., Disp: 45 Tab, Rfl: 1  •  tamsulosin (FLOMAX) 0.4 MG capsule, Take 1 Cap by mouth ONE-HALF HOUR AFTER BREAKFAST., Disp: 90 Cap, Rfl: 2  •  fluconazole (DIFLUCAN) 150 MG tablet, Take 1 Tab by mouth every day., Disp: 1 Tab, Rfl: 0  •  finasteride (PROSCAR) 5 MG Tab, TAKE ONE TABLET BY MOUTH ONCE DAILY, Disp: 90 Tab, Rfl: 2  •  lisinopril (PRINIVIL) 10 MG Tab, TAKE ONE TABLET BY MOUTH ONCE DAILY *REPLACES  5MG  TABLET*, Disp: 90 Tab, Rfl: 1  •  hydrocodone-acetaminophen (NORCO) 5-325 MG Tab per tablet, Take 1-2 Tabs by mouth every four hours as needed., Disp: 30 Tab, Rfl: 0  ALLERGIES:   Allergies   Allergen Reactions   • Celebrex [Celecoxib] Rash     .     SURGHX:   Past Surgical History:   Procedure Laterality Date   • TEMPORAL ARTERY BIOPSY Right 2/9/2018    Procedure: TEMPORAL ARTERY BIOPSY;  Surgeon: Bryant Corey M.D.;  Location: SURGERY  [facility];  Service: Vascular   • HERNIA REPAIR      umbilical, groin      SOCHX:  reports that he has never smoked. He has never used smokeless tobacco. He reports that he does not drink alcohol or use drugs.  FH: family history includes Alcohol/Drug in his brother; Lung Disease in his brother and father.    Review of Systems   Constitutional: Negative for chills and fever.   HENT: Positive for congestion, rhinorrhea and sore throat. Negative for ear pain and sinus pain.    Respiratory: Positive for cough, shortness of breath and wheezing. Negative for sputum production.    Gastrointestinal: Negative.    Musculoskeletal: Negative.    Neurological: Positive for headaches. Negative for dizziness.       Medications, Allergies, and current problem list reviewed today in Epic     Objective:     /74   Pulse 90   Temp 36.4 °C (97.5 °F)   Resp 20   Ht 1.829 m (6')   Wt 111.1 kg (245 lb)   SpO2 99%   BMI 33.23 kg/m²      Physical Exam   Constitutional: He is oriented to person, place, and time. He appears well-developed and well-nourished. No distress.   HENT:   Head: Normocephalic and atraumatic.   Right Ear: Hearing, tympanic membrane and external ear normal.   Left Ear: Hearing, tympanic membrane and external ear normal.   Nose: Right sinus exhibits maxillary sinus tenderness. Left sinus exhibits maxillary sinus tenderness.   Mouth/Throat: Normal dentition. No dental caries. Posterior oropharyngeal erythema present. No oropharyngeal exudate.   Hoarse voice   Eyes: Conjunctivae are normal. Right eye exhibits no discharge. Left eye exhibits no discharge.   Neck: Normal range of motion. Neck supple.   Cardiovascular: Normal rate, regular rhythm and normal heart sounds.    Pulmonary/Chest: Effort normal and breath sounds normal. No respiratory distress. He has no wheezes. He has no rales.   Lymphadenopathy:        Head (right side): Submandibular adenopathy present.        Head (left side): Submandibular  adenopathy present.     He has no cervical adenopathy.        Right cervical: No superficial cervical adenopathy present.       Left cervical: No superficial cervical adenopathy present.   Neurological: He is alert and oriented to person, place, and time.   Skin: Skin is warm and dry. He is not diaphoretic. No cyanosis. Nails show no clubbing.   Psychiatric: He has a normal mood and affect. His behavior is normal. Judgment and thought content normal.   Nursing note and vitals reviewed.              Assessment/Plan:     1. Acute non-recurrent maxillary sinusitis  doxycycline (VIBRAMYCIN) 100 MG Tab    predniSONE (DELTASONE) 20 MG Tab   2. Laryngitis  predniSONE (DELTASONE) 20 MG Tab     PO2 adequate, no lower respiratory involvement  Take full course of antibiotic  Tylenol and Motrin for fever and pain  OTC meds as discussed including oral decongestant if tolerated  Increase fluids and rest  Nasal spray, nasal wash, Sulma pot    Return to clinic or go to ED if symptoms worsen or persist. Indications for ED discussed at length. Patient voices understanding. Follow-up with your primary care provider in 3-5 days. Red flags discussed. All side effects of medication discussed including allergic response, GI upset, tendon injury, etc.    Please note that this dictation was created using voice recognition software. I have made every reasonable attempt to correct obvious errors, but I expect that there are errors of grammar and possibly content that I did not discover before finalizing the note.

## 2018-04-12 ENCOUNTER — HOSPITAL ENCOUNTER (OUTPATIENT)
Facility: MEDICAL CENTER | Age: 73
End: 2018-04-13
Attending: EMERGENCY MEDICINE | Admitting: INTERNAL MEDICINE
Payer: MEDICARE

## 2018-04-12 ENCOUNTER — APPOINTMENT (OUTPATIENT)
Dept: RADIOLOGY | Facility: MEDICAL CENTER | Age: 73
End: 2018-04-12
Attending: EMERGENCY MEDICINE
Payer: MEDICARE

## 2018-04-12 ENCOUNTER — RESOLUTE PROFESSIONAL BILLING HOSPITAL PROF FEE (OUTPATIENT)
Dept: HOSPITALIST | Facility: MEDICAL CENTER | Age: 73
End: 2018-04-12
Payer: MEDICARE

## 2018-04-12 DIAGNOSIS — R06.02 SHORTNESS OF BREATH: ICD-10-CM

## 2018-04-12 PROBLEM — D72.829 LEUKOCYTOSIS: Status: ACTIVE | Noted: 2018-04-12

## 2018-04-12 LAB
ALBUMIN SERPL BCP-MCNC: 3.9 G/DL (ref 3.2–4.9)
ALBUMIN/GLOB SERPL: 1.1 G/DL
ALP SERPL-CCNC: 87 U/L (ref 30–99)
ALT SERPL-CCNC: 13 U/L (ref 2–50)
ANION GAP SERPL CALC-SCNC: 11 MMOL/L (ref 0–11.9)
APTT PPP: 25 SEC (ref 24.7–36)
AST SERPL-CCNC: 14 U/L (ref 12–45)
BASOPHILS # BLD AUTO: 0.4 % (ref 0–1.8)
BASOPHILS # BLD: 0.05 K/UL (ref 0–0.12)
BILIRUB SERPL-MCNC: 0.4 MG/DL (ref 0.1–1.5)
BNP SERPL-MCNC: 8 PG/ML (ref 0–100)
BUN SERPL-MCNC: 17 MG/DL (ref 8–22)
CALCIUM SERPL-MCNC: 9.4 MG/DL (ref 8.5–10.5)
CHLORIDE SERPL-SCNC: 106 MMOL/L (ref 96–112)
CO2 SERPL-SCNC: 19 MMOL/L (ref 20–33)
CREAT SERPL-MCNC: 1.3 MG/DL (ref 0.5–1.4)
EKG IMPRESSION: NORMAL
EOSINOPHIL # BLD AUTO: 0.32 K/UL (ref 0–0.51)
EOSINOPHIL NFR BLD: 2.4 % (ref 0–6.9)
ERYTHROCYTE [DISTWIDTH] IN BLOOD BY AUTOMATED COUNT: 45 FL (ref 35.9–50)
EST. AVERAGE GLUCOSE BLD GHB EST-MCNC: 128 MG/DL
FLUAV RNA SPEC QL NAA+PROBE: NEGATIVE
FLUBV RNA SPEC QL NAA+PROBE: NEGATIVE
GLOBULIN SER CALC-MCNC: 3.4 G/DL (ref 1.9–3.5)
GLUCOSE SERPL-MCNC: 120 MG/DL (ref 65–99)
HBA1C MFR BLD: 6.1 % (ref 0–5.6)
HCT VFR BLD AUTO: 39.7 % (ref 42–52)
HGB BLD-MCNC: 13.2 G/DL (ref 14–18)
IMM GRANULOCYTES # BLD AUTO: 0.06 K/UL (ref 0–0.11)
IMM GRANULOCYTES NFR BLD AUTO: 0.5 % (ref 0–0.9)
INR PPP: 1.13 (ref 0.87–1.13)
LIPASE SERPL-CCNC: 27 U/L (ref 11–82)
LYMPHOCYTES # BLD AUTO: 2.84 K/UL (ref 1–4.8)
LYMPHOCYTES NFR BLD: 21.5 % (ref 22–41)
MCH RBC QN AUTO: 28 PG (ref 27–33)
MCHC RBC AUTO-ENTMCNC: 33.2 G/DL (ref 33.7–35.3)
MCV RBC AUTO: 84.1 FL (ref 81.4–97.8)
MONOCYTES # BLD AUTO: 0.9 K/UL (ref 0–0.85)
MONOCYTES NFR BLD AUTO: 6.8 % (ref 0–13.4)
NEUTROPHILS # BLD AUTO: 9.05 K/UL (ref 1.82–7.42)
NEUTROPHILS NFR BLD: 68.4 % (ref 44–72)
NRBC # BLD AUTO: 0 K/UL
NRBC BLD-RTO: 0 /100 WBC
PLATELET # BLD AUTO: 279 K/UL (ref 164–446)
PMV BLD AUTO: 9.5 FL (ref 9–12.9)
POTASSIUM SERPL-SCNC: 3.6 MMOL/L (ref 3.6–5.5)
PROCALCITONIN SERPL-MCNC: 0.14 NG/ML
PROT SERPL-MCNC: 7.3 G/DL (ref 6–8.2)
PROTHROMBIN TIME: 14.2 SEC (ref 12–14.6)
RBC # BLD AUTO: 4.72 M/UL (ref 4.7–6.1)
SODIUM SERPL-SCNC: 136 MMOL/L (ref 135–145)
TROPONIN I SERPL-MCNC: <0.01 NG/ML (ref 0–0.04)
TSH SERPL DL<=0.005 MIU/L-ACNC: 1.65 UIU/ML (ref 0.38–5.33)
WBC # BLD AUTO: 13.2 K/UL (ref 4.8–10.8)

## 2018-04-12 PROCEDURE — 700102 HCHG RX REV CODE 250 W/ 637 OVERRIDE(OP): Performed by: EMERGENCY MEDICINE

## 2018-04-12 PROCEDURE — 93005 ELECTROCARDIOGRAM TRACING: CPT

## 2018-04-12 PROCEDURE — 85610 PROTHROMBIN TIME: CPT

## 2018-04-12 PROCEDURE — 94760 N-INVAS EAR/PLS OXIMETRY 1: CPT

## 2018-04-12 PROCEDURE — 99220 PR INITIAL OBSERVATION CARE,LEVL III: CPT | Performed by: INTERNAL MEDICINE

## 2018-04-12 PROCEDURE — 93010 ELECTROCARDIOGRAM REPORT: CPT | Performed by: INTERNAL MEDICINE

## 2018-04-12 PROCEDURE — 700111 HCHG RX REV CODE 636 W/ 250 OVERRIDE (IP): Performed by: EMERGENCY MEDICINE

## 2018-04-12 PROCEDURE — G0378 HOSPITAL OBSERVATION PER HR: HCPCS

## 2018-04-12 PROCEDURE — 84145 PROCALCITONIN (PCT): CPT

## 2018-04-12 PROCEDURE — 700102 HCHG RX REV CODE 250 W/ 637 OVERRIDE(OP): Performed by: STUDENT IN AN ORGANIZED HEALTH CARE EDUCATION/TRAINING PROGRAM

## 2018-04-12 PROCEDURE — 96372 THER/PROPH/DIAG INJ SC/IM: CPT | Mod: XU

## 2018-04-12 PROCEDURE — 96374 THER/PROPH/DIAG INJ IV PUSH: CPT | Mod: XU

## 2018-04-12 PROCEDURE — A9270 NON-COVERED ITEM OR SERVICE: HCPCS | Performed by: STUDENT IN AN ORGANIZED HEALTH CARE EDUCATION/TRAINING PROGRAM

## 2018-04-12 PROCEDURE — 93005 ELECTROCARDIOGRAM TRACING: CPT | Performed by: EMERGENCY MEDICINE

## 2018-04-12 PROCEDURE — 99285 EMERGENCY DEPT VISIT HI MDM: CPT

## 2018-04-12 PROCEDURE — 84443 ASSAY THYROID STIM HORMONE: CPT

## 2018-04-12 PROCEDURE — 84484 ASSAY OF TROPONIN QUANT: CPT

## 2018-04-12 PROCEDURE — 36415 COLL VENOUS BLD VENIPUNCTURE: CPT

## 2018-04-12 PROCEDURE — 93005 ELECTROCARDIOGRAM TRACING: CPT | Performed by: INTERNAL MEDICINE

## 2018-04-12 PROCEDURE — 700117 HCHG RX CONTRAST REV CODE 255: Performed by: EMERGENCY MEDICINE

## 2018-04-12 PROCEDURE — 80053 COMPREHEN METABOLIC PANEL: CPT

## 2018-04-12 PROCEDURE — 83880 ASSAY OF NATRIURETIC PEPTIDE: CPT

## 2018-04-12 PROCEDURE — 71275 CT ANGIOGRAPHY CHEST: CPT

## 2018-04-12 PROCEDURE — 87502 INFLUENZA DNA AMP PROBE: CPT

## 2018-04-12 PROCEDURE — 85730 THROMBOPLASTIN TIME PARTIAL: CPT

## 2018-04-12 PROCEDURE — 85025 COMPLETE CBC W/AUTO DIFF WBC: CPT

## 2018-04-12 PROCEDURE — 83690 ASSAY OF LIPASE: CPT

## 2018-04-12 PROCEDURE — 83036 HEMOGLOBIN GLYCOSYLATED A1C: CPT

## 2018-04-12 PROCEDURE — 71045 X-RAY EXAM CHEST 1 VIEW: CPT

## 2018-04-12 PROCEDURE — 700111 HCHG RX REV CODE 636 W/ 250 OVERRIDE (IP): Performed by: INTERNAL MEDICINE

## 2018-04-12 PROCEDURE — A9270 NON-COVERED ITEM OR SERVICE: HCPCS | Performed by: EMERGENCY MEDICINE

## 2018-04-12 RX ORDER — FINASTERIDE 5 MG/1
5 TABLET, FILM COATED ORAL DAILY
Status: DISCONTINUED | OUTPATIENT
Start: 2018-04-13 | End: 2018-04-13 | Stop reason: HOSPADM

## 2018-04-12 RX ORDER — OMEPRAZOLE 20 MG/1
20 CAPSULE, DELAYED RELEASE ORAL DAILY
Status: DISCONTINUED | OUTPATIENT
Start: 2018-04-13 | End: 2018-04-13 | Stop reason: HOSPADM

## 2018-04-12 RX ORDER — PREDNISONE 20 MG/1
20 TABLET ORAL 2 TIMES DAILY
Status: ON HOLD | COMMUNITY
Start: 2018-04-02 | End: 2018-04-13

## 2018-04-12 RX ORDER — ESOMEPRAZOLE MAGNESIUM 40 MG/1
40 CAPSULE, DELAYED RELEASE ORAL
Status: DISCONTINUED | OUTPATIENT
Start: 2018-04-12 | End: 2018-04-12

## 2018-04-12 RX ORDER — BISACODYL 10 MG
10 SUPPOSITORY, RECTAL RECTAL
Status: DISCONTINUED | OUTPATIENT
Start: 2018-04-12 | End: 2018-04-13 | Stop reason: HOSPADM

## 2018-04-12 RX ORDER — ACETAMINOPHEN 325 MG/1
650 TABLET ORAL ONCE
Status: COMPLETED | OUTPATIENT
Start: 2018-04-12 | End: 2018-04-12

## 2018-04-12 RX ORDER — HYDROCODONE BITARTRATE AND ACETAMINOPHEN 5; 325 MG/1; MG/1
1 TABLET ORAL ONCE
Status: COMPLETED | OUTPATIENT
Start: 2018-04-13 | End: 2018-04-13

## 2018-04-12 RX ORDER — AMOXICILLIN 250 MG
2 CAPSULE ORAL 2 TIMES DAILY
Status: DISCONTINUED | OUTPATIENT
Start: 2018-04-12 | End: 2018-04-13 | Stop reason: HOSPADM

## 2018-04-12 RX ORDER — DOXYCYCLINE HYCLATE 100 MG
100 TABLET ORAL 2 TIMES DAILY
Status: ON HOLD | COMMUNITY
Start: 2018-04-02 | End: 2018-04-13

## 2018-04-12 RX ORDER — ONDANSETRON 4 MG/1
4 TABLET, ORALLY DISINTEGRATING ORAL EVERY 4 HOURS PRN
Status: DISCONTINUED | OUTPATIENT
Start: 2018-04-12 | End: 2018-04-13 | Stop reason: HOSPADM

## 2018-04-12 RX ORDER — TAMSULOSIN HYDROCHLORIDE 0.4 MG/1
0.4 CAPSULE ORAL
Status: DISCONTINUED | OUTPATIENT
Start: 2018-04-13 | End: 2018-04-13 | Stop reason: HOSPADM

## 2018-04-12 RX ORDER — POLYETHYLENE GLYCOL 3350 17 G/17G
1 POWDER, FOR SOLUTION ORAL
Status: DISCONTINUED | OUTPATIENT
Start: 2018-04-12 | End: 2018-04-13 | Stop reason: HOSPADM

## 2018-04-12 RX ORDER — ALPRAZOLAM 0.25 MG/1
0.25 TABLET ORAL ONCE
Status: COMPLETED | OUTPATIENT
Start: 2018-04-12 | End: 2018-04-12

## 2018-04-12 RX ORDER — FUROSEMIDE 10 MG/ML
40 INJECTION INTRAMUSCULAR; INTRAVENOUS ONCE
Status: COMPLETED | OUTPATIENT
Start: 2018-04-12 | End: 2018-04-12

## 2018-04-12 RX ORDER — HEPARIN SODIUM 5000 [USP'U]/ML
5000 INJECTION, SOLUTION INTRAVENOUS; SUBCUTANEOUS EVERY 8 HOURS
Status: DISCONTINUED | OUTPATIENT
Start: 2018-04-12 | End: 2018-04-13 | Stop reason: HOSPADM

## 2018-04-12 RX ORDER — FUROSEMIDE 10 MG/ML
20 INJECTION INTRAMUSCULAR; INTRAVENOUS
Status: DISCONTINUED | OUTPATIENT
Start: 2018-04-12 | End: 2018-04-13

## 2018-04-12 RX ORDER — ONDANSETRON 2 MG/ML
4 INJECTION INTRAMUSCULAR; INTRAVENOUS EVERY 4 HOURS PRN
Status: DISCONTINUED | OUTPATIENT
Start: 2018-04-12 | End: 2018-04-13 | Stop reason: HOSPADM

## 2018-04-12 RX ORDER — LISINOPRIL 10 MG/1
10 TABLET ORAL
Status: DISCONTINUED | OUTPATIENT
Start: 2018-04-13 | End: 2018-04-13 | Stop reason: HOSPADM

## 2018-04-12 RX ADMIN — IOHEXOL 100 ML: 350 INJECTION, SOLUTION INTRAVENOUS at 17:13

## 2018-04-12 RX ADMIN — FUROSEMIDE 40 MG: 10 INJECTION, SOLUTION INTRAMUSCULAR; INTRAVENOUS at 15:46

## 2018-04-12 RX ADMIN — HEPARIN SODIUM 5000 UNITS: 5000 INJECTION, SOLUTION INTRAVENOUS; SUBCUTANEOUS at 21:52

## 2018-04-12 RX ADMIN — ACETAMINOPHEN 650 MG: 325 TABLET, FILM COATED ORAL at 16:09

## 2018-04-12 RX ADMIN — ALPRAZOLAM 0.25 MG: 0.25 TABLET ORAL at 21:51

## 2018-04-12 ASSESSMENT — PATIENT HEALTH QUESTIONNAIRE - PHQ9
SUM OF ALL RESPONSES TO PHQ9 QUESTIONS 1 AND 2: 0
2. FEELING DOWN, DEPRESSED, IRRITABLE, OR HOPELESS: NOT AT ALL
1. LITTLE INTEREST OR PLEASURE IN DOING THINGS: NOT AT ALL

## 2018-04-12 ASSESSMENT — ENCOUNTER SYMPTOMS
SHORTNESS OF BREATH: 1
HEMOPTYSIS: 0
COUGH: 0
DIZZINESS: 0
NAUSEA: 0
FOCAL WEAKNESS: 0
BLURRED VISION: 0
SPUTUM PRODUCTION: 0
ABDOMINAL PAIN: 0
SORE THROAT: 1
VOMITING: 0
NECK PAIN: 0
CHILLS: 0
FEVER: 0
MYALGIAS: 0
PALPITATIONS: 0
DEPRESSION: 0
LOSS OF CONSCIOUSNESS: 0
WEAKNESS: 1

## 2018-04-12 ASSESSMENT — PAIN SCALES - GENERAL
PAINLEVEL_OUTOF10: 0

## 2018-04-12 ASSESSMENT — LIFESTYLE VARIABLES
ALCOHOL_USE: NO
EVER_SMOKED: NEVER
DO YOU DRINK ALCOHOL: NO

## 2018-04-12 NOTE — ED TRIAGE NOTES
Chief Complaint   Patient presents with   • Dizziness     and nausea   • Shortness of Breath     Since last night. Worse with exertion. Denies CP. VSS. Explained triage process, to waiting room. Asked to inform RN if questions or concerns arise.

## 2018-04-12 NOTE — ED NOTES
Pt located in main ED waiting area. Vital signs measured. Apologized for wait time. Pt offered warm blanket, pt denied offer.

## 2018-04-12 NOTE — ED NOTES
Rounding on pt.  Pt informed of room assignment and ambulated to green 28.  Primary RN at bedside.

## 2018-04-12 NOTE — ED PROVIDER NOTES
ED Provider Note    CHIEF COMPLAINT  Chief Complaint   Patient presents with   • Dizziness     and nausea   • Shortness of Breath       HPI  Cihdi Tatum is a 72 y.o. male who presents complaining of shortness of breath. The patient recently had a sinusitis was treated with steroids and doxycycline. The limbus is complicated by thrush. She is now starting to feel better with regards to that this does runny nose and so the cough. Yesterday however he started having quite a bit of shortness of breath. Felt like whenever he laid back he just could not breathe. He describes orthopnea. He is also essentially describes PND. He denies any chest pain. He has had some leg swelling. No leg pain. He measures his weight every day has not changed recently. He does like his belly has gotten bigger however. It attributes this to the steroids. No belly pain. No fever. No change in bowel or bladder is reported. He has never had this before. No recent trips or travel. There is no other complaint.    PAST MEDICAL HISTORY  Past Medical History:   Diagnosis Date   • BPH (benign prostatic hyperplasia)    • GERD (gastroesophageal reflux disease)    • HTN (hypertension)    • Hypertension    • Insomnia        FAMILY HISTORY  Family History   Problem Relation Age of Onset   • Lung Disease Father    • Alcohol/Drug Brother    • Lung Disease Brother        SOCIAL HISTORY  Social History   Substance Use Topics   • Smoking status: Never Smoker   • Smokeless tobacco: Never Used   • Alcohol use No         SURGICAL HISTORY  Past Surgical History:   Procedure Laterality Date   • TEMPORAL ARTERY BIOPSY Right 2/9/2018    Procedure: TEMPORAL ARTERY BIOPSY;  Surgeon: Bryant Corey M.D.;  Location: SURGERY [facility];  Service: Vascular   • HERNIA REPAIR      umbilical, groin        CURRENT MEDICATIONS  Home Medications     Reviewed by Dagmar Powell R.N. (Registered Nurse) on 04/12/18 at 1443  Med List Status: Partial   Medication Last  Dose Status   doxycycline (VIBRAMYCIN) 100 MG Tab  Active   esomeprazole (NEXIUM) 40 MG delayed-release capsule  Active   finasteride (PROSCAR) 5 MG Tab  Active   fluconazole (DIFLUCAN) 150 MG tablet  Active   hydroCHLOROthiazide (HYDRODIURIL) 25 MG Tab  Active   hydrocodone-acetaminophen (NORCO) 5-325 MG Tab per tablet  Active   lisinopril (PRINIVIL) 10 MG Tab  Active   predniSONE (DELTASONE) 20 MG Tab  Active   tamsulosin (FLOMAX) 0.4 MG capsule  Active                I have reviewed the nurses notes and/or the list brought with the patient.    ALLERGIES  Allergies   Allergen Reactions   • Celebrex [Celecoxib] Rash     .       REVIEW OF SYSTEMS  See HPI for further details. Review of systems as above, otherwise all other systems are negative.     PHYSICAL EXAM  VITAL SIGNS: /78   Pulse (!) 102   Temp 36.7 °C (98.1 °F)   Resp 20   Ht 1.829 m (6')   Wt 112.1 kg (247 lb 2.2 oz)   SpO2 95%   BMI 33.52 kg/m²   Constitutional: Mildly dyspneic otherwise well-appearing.  Not toxic, nor ill in appearance.  HENT: Mucus membranes moist.  Oropharynx is clear.  Eyes: Pupils equally round.  No scleral icterus.   Neck: Full nontender range of motion.  Lymphatic: No cervical lymphadenopathy noted.   Cardiovascular: Mildly tachycardic.  No murmurs, rubs, nor gallop appreciated. His neck vein is difficult to assess as he has age-related/sun damage thickening of the skin. It does appear there is some JVD, 4-5 cm  Thorax & Lungs: Chest is nontender.  Lungs are notable for basilar rails.  Abdomen: Soft, with no tenderness, rebound nor guarding.  No mass, pulsatile mass, nor hepatosplenomegaly appreciated.  Skin: No purpura nor petechia noted.  Extremities/Musculoskeletal: No sign of trauma.  Calves are nontender with no cords. He has bilateral pretibial pitting edema.  No Stephanie's sign.  Pulses are intact all around.   Neurologic: Alert & oriented.  Strength and sensation is intact all around.   Psychiatric: Normal affect  appropriate for the clinical situation.    EKG  I interpreted this EKG myself.  This is a 12-lead study.  The rhythm is sinus tachycardia with a rate of 1:15.  There are no ST segment nor T wave abnormalities.  Interpretation: No ST segment elevation myocardial infarction.    LABS  Labs Reviewed   CBC WITH DIFFERENTIAL - Abnormal; Notable for the following:        Result Value    WBC 13.2 (*)     Hemoglobin 13.2 (*)     Hematocrit 39.7 (*)     MCHC 33.2 (*)     Lymphocytes 21.50 (*)     Neutrophils (Absolute) 9.05 (*)     Monos (Absolute) 0.90 (*)     All other components within normal limits    Narrative:     Indicate which anticoagulants the patient is on:->UNKNOWN   COMP METABOLIC PANEL - Abnormal; Notable for the following:     Co2 19 (*)     Glucose 120 (*)     All other components within normal limits    Narrative:     Indicate which anticoagulants the patient is on:->UNKNOWN   ESTIMATED GFR - Abnormal; Notable for the following:     GFR If Non  54 (*)     All other components within normal limits    Narrative:     Indicate which anticoagulants the patient is on:->UNKNOWN   TROPONIN    Narrative:     Indicate which anticoagulants the patient is on:->UNKNOWN   BTYPE NATRIURETIC PEPTIDE    Narrative:     Indicate which anticoagulants the patient is on:->UNKNOWN   PROTHROMBIN TIME    Narrative:     Indicate which anticoagulants the patient is on:->UNKNOWN   APTT    Narrative:     Indicate which anticoagulants the patient is on:->UNKNOWN   LIPASE    Narrative:     Indicate which anticoagulants the patient is on:->UNKNOWN   INFLUENZA A/B BY PCR         RADIOLOGY/PROCEDURES  I have reviewed the patient's film interpretations myself, and they are read out by the radiologist as:   CT-CTA CHEST PULMONARY ARTERY W/ RECONS   Final Result         1. No CT evidence of central pulmonary embolism. Evaluation of the segmental and subsegmental branches is limited due to poor contrast enhancement      2.  Incompletely evaluated complicated cyst in the upper pole left ovary. [kidney]      3. Hepatic steatosis      DX-CHEST-LIMITED (1 VIEW)   Final Result         No acute cardiac or pulmonary abnormality is identified.        .    MEDICAL RECORD  I have reviewed patient's medical record and pertinent results are listed above.    COURSE & MEDICAL DECISION MAKING  I have reviewed any medical record information, laboratory studies and radiographic results as noted above.  Patient presents with dyspnea. His exam was suspicious for congestive failure. He has had a recent cough. He does have a leukocytosis. This could be related to his recent steroid use. Negative CT to evaluate for pulmonary embolism, at this point will also be able to evaluate for infiltrate that is not seen on his x-ray. CT does not show any evidence of a central PE nor does it show infiltrate.    Case was discussed with Dr. Cleaning from the hospitalist service. His team will be seeing the patient for admission. Family and the patient are updated with plan and verbalizes agreement.      FINAL IMPRESSION  1. Shortness of breath             This dictation was created using voice recognition software.    Electronically signed by: Suman Chapmagne, 4/12/2018 3:46 PM

## 2018-04-12 NOTE — ED NOTES
Pt ambulatory to bathroom with steady gait. Pending ERP to see -- updated on delay and apologized.

## 2018-04-12 NOTE — ED TRIAGE NOTES
Assumed care of pt from waiting room. Pt c/o SOB and dizziness. +tachypnic after walking back to room. Changed to gown. Hooked to monitor - VSS on Ra. Lungs clear. PIV placed -- labs and EKG completed at triage. Fall precautions in place. Call bell in reach. Family at bedside. Awaiting MD eval/ orders.

## 2018-04-12 NOTE — ED NOTES
Pt medicated per orders- see MAR for details. Urinal placed at bedside. Flu swab collected and sent. Pending CT scan - pt and family aware of plan of care.

## 2018-04-13 VITALS
SYSTOLIC BLOOD PRESSURE: 144 MMHG | OXYGEN SATURATION: 93 % | BODY MASS INDEX: 33.18 KG/M2 | TEMPERATURE: 98.4 F | RESPIRATION RATE: 18 BRPM | HEIGHT: 72 IN | WEIGHT: 244.93 LBS | HEART RATE: 101 BPM | DIASTOLIC BLOOD PRESSURE: 72 MMHG

## 2018-04-13 PROBLEM — D72.829 LEUKOCYTOSIS: Status: RESOLVED | Noted: 2018-04-12 | Resolved: 2018-04-13

## 2018-04-13 PROBLEM — R06.02 SHORTNESS OF BREATH: Status: RESOLVED | Noted: 2018-04-12 | Resolved: 2018-04-13

## 2018-04-13 LAB
ANION GAP SERPL CALC-SCNC: 11 MMOL/L (ref 0–11.9)
BASOPHILS # BLD AUTO: 0.5 % (ref 0–1.8)
BASOPHILS # BLD: 0.06 K/UL (ref 0–0.12)
BUN SERPL-MCNC: 22 MG/DL (ref 8–22)
CALCIUM SERPL-MCNC: 9.2 MG/DL (ref 8.5–10.5)
CHLORIDE SERPL-SCNC: 102 MMOL/L (ref 96–112)
CHOLEST SERPL-MCNC: 116 MG/DL (ref 100–199)
CO2 SERPL-SCNC: 22 MMOL/L (ref 20–33)
CREAT SERPL-MCNC: 1.48 MG/DL (ref 0.5–1.4)
EKG IMPRESSION: NORMAL
EOSINOPHIL # BLD AUTO: 0.3 K/UL (ref 0–0.51)
EOSINOPHIL NFR BLD: 2.5 % (ref 0–6.9)
ERYTHROCYTE [DISTWIDTH] IN BLOOD BY AUTOMATED COUNT: 46.5 FL (ref 35.9–50)
GLUCOSE SERPL-MCNC: 108 MG/DL (ref 65–99)
HCT VFR BLD AUTO: 37.7 % (ref 42–52)
HDLC SERPL-MCNC: 28 MG/DL
HGB BLD-MCNC: 12.5 G/DL (ref 14–18)
IMM GRANULOCYTES # BLD AUTO: 0.07 K/UL (ref 0–0.11)
IMM GRANULOCYTES NFR BLD AUTO: 0.6 % (ref 0–0.9)
LDLC SERPL CALC-MCNC: 48 MG/DL
LV EJECT FRACT  99904: 65
LV EJECT FRACT MOD 2C 99903: 62.45
LV EJECT FRACT MOD 4C 99902: 72.09
LV EJECT FRACT MOD BP 99901: 71.17
LYMPHOCYTES # BLD AUTO: 3.11 K/UL (ref 1–4.8)
LYMPHOCYTES NFR BLD: 26.3 % (ref 22–41)
MCH RBC QN AUTO: 28.4 PG (ref 27–33)
MCHC RBC AUTO-ENTMCNC: 33.2 G/DL (ref 33.7–35.3)
MCV RBC AUTO: 85.7 FL (ref 81.4–97.8)
MONOCYTES # BLD AUTO: 0.96 K/UL (ref 0–0.85)
MONOCYTES NFR BLD AUTO: 8.1 % (ref 0–13.4)
NEUTROPHILS # BLD AUTO: 7.33 K/UL (ref 1.82–7.42)
NEUTROPHILS NFR BLD: 62 % (ref 44–72)
NRBC # BLD AUTO: 0 K/UL
NRBC BLD-RTO: 0 /100 WBC
PLATELET # BLD AUTO: 261 K/UL (ref 164–446)
PMV BLD AUTO: 9.4 FL (ref 9–12.9)
POTASSIUM SERPL-SCNC: 3.7 MMOL/L (ref 3.6–5.5)
RBC # BLD AUTO: 4.4 M/UL (ref 4.7–6.1)
SODIUM SERPL-SCNC: 135 MMOL/L (ref 135–145)
TRIGL SERPL-MCNC: 200 MG/DL (ref 0–149)
WBC # BLD AUTO: 11.8 K/UL (ref 4.8–10.8)

## 2018-04-13 PROCEDURE — A9270 NON-COVERED ITEM OR SERVICE: HCPCS | Performed by: STUDENT IN AN ORGANIZED HEALTH CARE EDUCATION/TRAINING PROGRAM

## 2018-04-13 PROCEDURE — G8979 MOBILITY GOAL STATUS: HCPCS | Mod: CI

## 2018-04-13 PROCEDURE — 700102 HCHG RX REV CODE 250 W/ 637 OVERRIDE(OP): Performed by: NURSE PRACTITIONER

## 2018-04-13 PROCEDURE — A9270 NON-COVERED ITEM OR SERVICE: HCPCS | Performed by: NURSE PRACTITIONER

## 2018-04-13 PROCEDURE — G0378 HOSPITAL OBSERVATION PER HR: HCPCS

## 2018-04-13 PROCEDURE — G8980 MOBILITY D/C STATUS: HCPCS | Mod: CI

## 2018-04-13 PROCEDURE — 96372 THER/PROPH/DIAG INJ SC/IM: CPT

## 2018-04-13 PROCEDURE — G8988 SELF CARE GOAL STATUS: HCPCS | Mod: CI

## 2018-04-13 PROCEDURE — 97165 OT EVAL LOW COMPLEX 30 MIN: CPT

## 2018-04-13 PROCEDURE — G8987 SELF CARE CURRENT STATUS: HCPCS | Mod: CI

## 2018-04-13 PROCEDURE — A9270 NON-COVERED ITEM OR SERVICE: HCPCS | Performed by: INTERNAL MEDICINE

## 2018-04-13 PROCEDURE — 85025 COMPLETE CBC W/AUTO DIFF WBC: CPT

## 2018-04-13 PROCEDURE — 80048 BASIC METABOLIC PNL TOTAL CA: CPT

## 2018-04-13 PROCEDURE — 36415 COLL VENOUS BLD VENIPUNCTURE: CPT

## 2018-04-13 PROCEDURE — 96376 TX/PRO/DX INJ SAME DRUG ADON: CPT | Mod: XU

## 2018-04-13 PROCEDURE — 97161 PT EVAL LOW COMPLEX 20 MIN: CPT

## 2018-04-13 PROCEDURE — G8989 SELF CARE D/C STATUS: HCPCS | Mod: CI

## 2018-04-13 PROCEDURE — 93306 TTE W/DOPPLER COMPLETE: CPT | Mod: 26 | Performed by: INTERNAL MEDICINE

## 2018-04-13 PROCEDURE — 700102 HCHG RX REV CODE 250 W/ 637 OVERRIDE(OP): Performed by: INTERNAL MEDICINE

## 2018-04-13 PROCEDURE — 99217 PR OBSERVATION CARE DISCHARGE: CPT | Performed by: INTERNAL MEDICINE

## 2018-04-13 PROCEDURE — 93306 TTE W/DOPPLER COMPLETE: CPT

## 2018-04-13 PROCEDURE — 700111 HCHG RX REV CODE 636 W/ 250 OVERRIDE (IP): Performed by: INTERNAL MEDICINE

## 2018-04-13 PROCEDURE — 80061 LIPID PANEL: CPT

## 2018-04-13 PROCEDURE — G8978 MOBILITY CURRENT STATUS: HCPCS | Mod: CI

## 2018-04-13 PROCEDURE — 700102 HCHG RX REV CODE 250 W/ 637 OVERRIDE(OP): Performed by: STUDENT IN AN ORGANIZED HEALTH CARE EDUCATION/TRAINING PROGRAM

## 2018-04-13 RX ORDER — SERTRALINE HYDROCHLORIDE 25 MG/1
25 TABLET, FILM COATED ORAL DAILY
Qty: 30 TAB | Refills: 1 | Status: SHIPPED | OUTPATIENT
Start: 2018-04-13 | End: 2019-01-04 | Stop reason: CLARIF

## 2018-04-13 RX ORDER — ACETAMINOPHEN 325 MG/1
650 TABLET ORAL EVERY 4 HOURS PRN
Status: DISCONTINUED | OUTPATIENT
Start: 2018-04-13 | End: 2018-04-13 | Stop reason: HOSPADM

## 2018-04-13 RX ORDER — AZITHROMYCIN 250 MG/1
TABLET, FILM COATED ORAL
Qty: 6 TAB | Refills: 0 | Status: SHIPPED | OUTPATIENT
Start: 2018-04-13 | End: 2018-05-01

## 2018-04-13 RX ADMIN — TAMSULOSIN HYDROCHLORIDE 0.4 MG: 0.4 CAPSULE ORAL at 08:30

## 2018-04-13 RX ADMIN — FUROSEMIDE 20 MG: 10 INJECTION, SOLUTION INTRAMUSCULAR; INTRAVENOUS at 06:28

## 2018-04-13 RX ADMIN — FINASTERIDE 5 MG: 5 TABLET, FILM COATED ORAL at 08:30

## 2018-04-13 RX ADMIN — HEPARIN SODIUM 5000 UNITS: 5000 INJECTION, SOLUTION INTRAVENOUS; SUBCUTANEOUS at 06:28

## 2018-04-13 RX ADMIN — LISINOPRIL 10 MG: 10 TABLET ORAL at 08:30

## 2018-04-13 RX ADMIN — HYDROCODONE BITARTRATE AND ACETAMINOPHEN 1 TABLET: 5; 325 TABLET ORAL at 02:03

## 2018-04-13 RX ADMIN — ACETAMINOPHEN 650 MG: 325 TABLET, FILM COATED ORAL at 07:43

## 2018-04-13 RX ADMIN — OMEPRAZOLE 20 MG: 20 CAPSULE, DELAYED RELEASE ORAL at 08:30

## 2018-04-13 ASSESSMENT — COGNITIVE AND FUNCTIONAL STATUS - GENERAL
DAILY ACTIVITIY SCORE: 24
SUGGESTED CMS G CODE MODIFIER MOBILITY: CI
MOBILITY SCORE: 23
CLIMB 3 TO 5 STEPS WITH RAILING: A LITTLE
SUGGESTED CMS G CODE MODIFIER DAILY ACTIVITY: CH

## 2018-04-13 ASSESSMENT — GAIT ASSESSMENTS
GAIT LEVEL OF ASSIST: SUPERVISED
DEVIATION: INCREASED BASE OF SUPPORT
DISTANCE (FEET): 100

## 2018-04-13 ASSESSMENT — PAIN SCALES - GENERAL
PAINLEVEL_OUTOF10: 5
PAINLEVEL_OUTOF10: 0
PAINLEVEL_OUTOF10: 0

## 2018-04-13 ASSESSMENT — ACTIVITIES OF DAILY LIVING (ADL): TOILETING: INDEPENDENT

## 2018-04-13 NOTE — THERAPY
"Occupational Therapy Evaluation completed.   Functional Status:  Supervised/independent with ADLs and txfs  Plan of Care: Patient with no further skilled OT needs in the acute care setting at this time  Discharge Recommendations:   Post-acute therapy Currently anticipate no further skilled therapy needs once patient is discharged from the inpatient setting.    See \"Rehab Therapy-Acute\" Patient Summary Report for complete documentation.    "

## 2018-04-13 NOTE — THERAPY
"Physical Therapy Evaluation completed.   Bed Mobility:  Supine to Sit: Supervised  Transfers: Sit to Stand: Supervised  Gait: Level Of Assist: Supervised with No Equipment Needed       Plan of Care: Patient with no further skilled PT needs in the acute care setting at this time and Patient demonstrates safety with mobility in this environment at this time.   Discharge Recommendations: Equipment: No Equipment Needed. Post-acute therapy Currently anticipate no further skilled therapy needs once patient is discharged from the inpatient setting.    Pt is a 72 year old male admitted to the hospital for dizziness and SOB. Pt reports that prior to admit, he was independent with mobility and ADL's. At time of initial evaluation, pt was able to perform all mobility at SPV level. Pt's balance, strength and mobility are all WNL's. At this time, pt does not demonstrate need for skilled PT intervention while in the acute care setting. Pt is clear for DC home by PT. No post acute therapy or equipment needs identified at this time    See \"Rehab Therapy-Acute\" Patient Summary Report for complete documentation.     "

## 2018-04-13 NOTE — PROGRESS NOTES
Assessment done, Pt educated on plan of care. Family at bedside waiting on dr rounds  Patient resting in bed, no signs of distress,  no complains of pain at this time. Call light within reach,  side rails up, will monitor. Condition stable

## 2018-04-13 NOTE — ED NOTES
Med Rec complete per PT at bedside   Allergies Reviewed    Finished 5 day course of PREDNISONE on 4/6    Finished 7 day course of DOXYCYCLINE on 4/8

## 2018-04-13 NOTE — PROGRESS NOTES
A/o,assessment completed,poc discussed,daughter at bedside, pt very anxious requesting for something to help with anxiety,on call paged,Dr. Sevilla notified and new orders received,ST on tele hr=99,call button within reach,will continue to monitor.

## 2018-04-13 NOTE — PROGRESS NOTES
Pt still c/o right leg cramps,no relief with ambulation,on call paged,Dr. Sevilla notified and updated ,new order received,

## 2018-04-13 NOTE — ASSESSMENT & PLAN NOTE
-Likely secondary to recent steroid use at home with no clear evidence of active bacterial infection, check a pro-calcitonin and defer antibiotic that this time  -Trend white blood cell count fever curve closely

## 2018-04-13 NOTE — ASSESSMENT & PLAN NOTE
-Unclear etiology but my suspicion is somewhat high for CHF  -Initiate empiric IV diuretics  -Echocardiogram  -Monitor on telemetry, monitor urine output closely  -EKG unchanged

## 2018-04-13 NOTE — H&P
HOSPITAL MEDICINE HISTORY/ PHYSICAL    Date of Service:  2018   10:52 PM       Patient ID:   Name: Chidi Tatum YOB: 1945. Age: 72 y.o. male. MRN: [id number]    Admitting Attending:  Alden Cleaning     PCP : SAM Moya          Chief Complaint:       Dizziness and shortness of breath    History of Present Illness:    Rc is a 72 y.o. male w/h/o hypertension who presents with above chief complaint. Patient states that he has not been feeling well for the last 24 hours and said he went to bed last night feeling much more short of breath in and got worse this morning. The shortness of breath is worse with lying down and also with minimal exertion. He also says he has an associated headache which is at baseline and patient underwent a temporal artery biopsy in 2018 that was negative for giant cell arteritis. Patient states that he's been quite sad since his wife unexpectedly  last year and has been living a more sedentary lifestyle than usual for him. Patient states that he's had worsening leg swelling as well as approximately 20 pound weight gain in the last 3 months but no associated chest pain. He says he's been struggling from possible laryngitis as well as a sore throat and a mild sinus infection and has been feeling general malaise and fatigue. His overall energy level is low and he claims that he is suffering from depression as well. Recently saw his outpatient primary care doctor where he was placed on doxycycline and a Medrol Dosepak with no improvement in symptoms and therefore that's why he came to the emergency room for further evaluation of above-mentioned symptoms.    Review of Systems:    Has Review of Systems   Constitutional: Positive for malaise/fatigue. Negative for chills and fever.   HENT: Positive for congestion and sore throat. Negative for hearing loss.    Eyes: Negative for blurred vision.   Respiratory: Positive for shortness of  breath. Negative for cough, hemoptysis and sputum production.    Cardiovascular: Positive for leg swelling. Negative for chest pain and palpitations.   Gastrointestinal: Negative for abdominal pain, nausea and vomiting.   Genitourinary: Negative for dysuria and urgency.   Musculoskeletal: Negative for myalgias and neck pain.   Skin: Negative for itching.   Neurological: Positive for weakness. Negative for dizziness, focal weakness and loss of consciousness.   Psychiatric/Behavioral: Negative for depression.   All other systems reviewed and are negative.    Please see HPI, all other systems were reviewed and are negative (AMA/CMS criteria)              Past Medical/ Family / Social history (PFSH):   Past Medical History:   Diagnosis Date   • BPH (benign prostatic hyperplasia)    • GERD (gastroesophageal reflux disease)    • HTN (hypertension)    • Hypertension    • Insomnia        Past Surgical History:   Procedure Laterality Date   • TEMPORAL ARTERY BIOPSY Right 2/9/2018    Procedure: TEMPORAL ARTERY BIOPSY;  Surgeon: Bryant Corey M.D.;  Location: SURGERY [facility];  Service: Vascular   • HERNIA REPAIR      umbilical, groin        Current Outpatient Medications:  No current facility-administered medications on file prior to encounter.      Current Outpatient Prescriptions on File Prior to Encounter   Medication Sig Dispense Refill   • esomeprazole (NEXIUM) 40 MG delayed-release capsule Take 1 Cap by mouth every morning before breakfast. 90 Cap 2   • hydroCHLOROthiazide (HYDRODIURIL) 25 MG Tab Take 1 Tab by mouth every 48 hours. 45 Tab 1   • tamsulosin (FLOMAX) 0.4 MG capsule Take 1 Cap by mouth ONE-HALF HOUR AFTER BREAKFAST. 90 Cap 2   • finasteride (PROSCAR) 5 MG Tab TAKE ONE TABLET BY MOUTH ONCE DAILY 90 Tab 2   • lisinopril (PRINIVIL) 10 MG Tab TAKE ONE TABLET BY MOUTH ONCE DAILY *REPLACES  5MG  TABLET* 90 Tab 1       Medication Allergy/Sensitivities:  Allergies   Allergen Reactions   • Celebrex  [Celecoxib] Rash     .       Family History:  Family History   Problem Relation Age of Onset   • Lung Disease Father    • Alcohol/Drug Brother    • Lung Disease Brother       Family History   Problem Relation Age of Onset   • Lung Disease Father    • Alcohol/Drug Brother    • Lung Disease Brother        Social History:  Social History   Substance Use Topics   • Smoking status: Never Smoker   • Smokeless tobacco: Never Used   • Alcohol use No     #################################################################  Physical Exam:   Vitals/ General Appearance:   Weight/BMI: Body mass index is 33.22 kg/m².  Blood pressure 131/76, pulse (!) 109, temperature 37.1 °C (98.7 °F), resp. rate 16, height 1.829 m (6'), weight 111.1 kg (244 lb 14.9 oz), SpO2 93 %.   Vitals:    04/12/18 1500 04/12/18 1548 04/12/18 1826 04/12/18 2006   BP:  134/82 135/72 131/76   Pulse: (!) 102 99 (!) 111 (!) 109   Resp:  18 17 16   Temp:   37.3 °C (99.1 °F) 37.1 °C (98.7 °F)   SpO2: 95% 96% 93% 93%   Weight:   111.1 kg (244 lb 14.9 oz)    Height:        Oxygen Therapy:  Pulse Oximetry: 93 %, O2 (LPM): 0, O2 Delivery: None (Room Air)    Constitutional:  Appears depressed and tearful, but speaking in full sentences and cooperative  HENMT: Normocephalic, atraumatic, b/l ears normal, nose normal  Eyes:  EOMI, conjunctiva normal, no discharge  Neck: no tracheal deviation, supple, JVD elevated at 12 cm of water  Cardiovascular: normal heart rate, normal rhythm, no murmurs, no rubs or gallops; no cyanosis, clubbing, 2+ pitting edema of the pretibial region equal and symmetric as warm peripherally  Lungs: Respiratory effort is normal, normal breath sounds, breath sounds clear to auscultation b/l, no rales, rhonchi or wheezing  Abdomen: soft, non-tender, no guarding or rebound  Skin: warm, dry, no erythema, no rash  Neurologic: Alert and oriented, strength 5 out of 5 throughout, no focal deficits, CN II-XII normal  Psychiatric: No anxiety or  depression    #################################################################  Lab Data Review:    Objective   Recent Results (from the past 24 hour(s))   Troponin    Collection Time: 04/12/18 12:37 PM   Result Value Ref Range    Troponin I <0.01 0.00 - 0.04 ng/mL   Btype Natriuretic Peptide    Collection Time: 04/12/18 12:37 PM   Result Value Ref Range    B Natriuretic Peptide 8 0 - 100 pg/mL   CBC with Differential    Collection Time: 04/12/18 12:37 PM   Result Value Ref Range    WBC 13.2 (H) 4.8 - 10.8 K/uL    RBC 4.72 4.70 - 6.10 M/uL    Hemoglobin 13.2 (L) 14.0 - 18.0 g/dL    Hematocrit 39.7 (L) 42.0 - 52.0 %    MCV 84.1 81.4 - 97.8 fL    MCH 28.0 27.0 - 33.0 pg    MCHC 33.2 (L) 33.7 - 35.3 g/dL    RDW 45.0 35.9 - 50.0 fL    Platelet Count 279 164 - 446 K/uL    MPV 9.5 9.0 - 12.9 fL    Neutrophils-Polys 68.40 44.00 - 72.00 %    Lymphocytes 21.50 (L) 22.00 - 41.00 %    Monocytes 6.80 0.00 - 13.40 %    Eosinophils 2.40 0.00 - 6.90 %    Basophils 0.40 0.00 - 1.80 %    Immature Granulocytes 0.50 0.00 - 0.90 %    Nucleated RBC 0.00 /100 WBC    Neutrophils (Absolute) 9.05 (H) 1.82 - 7.42 K/uL    Lymphs (Absolute) 2.84 1.00 - 4.80 K/uL    Monos (Absolute) 0.90 (H) 0.00 - 0.85 K/uL    Eos (Absolute) 0.32 0.00 - 0.51 K/uL    Baso (Absolute) 0.05 0.00 - 0.12 K/uL    Immature Granulocytes (abs) 0.06 0.00 - 0.11 K/uL    NRBC (Absolute) 0.00 K/uL   Complete Metabolic Panel (CMP)    Collection Time: 04/12/18 12:37 PM   Result Value Ref Range    Sodium 136 135 - 145 mmol/L    Potassium 3.6 3.6 - 5.5 mmol/L    Chloride 106 96 - 112 mmol/L    Co2 19 (L) 20 - 33 mmol/L    Anion Gap 11.0 0.0 - 11.9    Glucose 120 (H) 65 - 99 mg/dL    Bun 17 8 - 22 mg/dL    Creatinine 1.30 0.50 - 1.40 mg/dL    Calcium 9.4 8.5 - 10.5 mg/dL    AST(SGOT) 14 12 - 45 U/L    ALT(SGPT) 13 2 - 50 U/L    Alkaline Phosphatase 87 30 - 99 U/L    Total Bilirubin 0.4 0.1 - 1.5 mg/dL    Albumin 3.9 3.2 - 4.9 g/dL    Total Protein 7.3 6.0 - 8.2 g/dL    Globulin  3.4 1.9 - 3.5 g/dL    A-G Ratio 1.1 g/dL   Prothrombin Time    Collection Time: 18 12:37 PM   Result Value Ref Range    PT 14.2 12.0 - 14.6 sec    INR 1.13 0.87 - 1.13   APTT    Collection Time: 18 12:37 PM   Result Value Ref Range    APTT 25.0 24.7 - 36.0 sec   Lipase    Collection Time: 18 12:37 PM   Result Value Ref Range    Lipase 27 11 - 82 U/L   ESTIMATED GFR    Collection Time: 18 12:37 PM   Result Value Ref Range    GFR If African American >60 >60 mL/min/1.73 m 2    GFR If Non African American 54 (A) >60 mL/min/1.73 m 2   TSH (Thyroid Stimulating Hormone)    Collection Time: 18 12:37 PM   Result Value Ref Range    TSH 1.650 0.380 - 5.330 uIU/mL   PROCALCITONIN    Collection Time: 18 12:37 PM   Result Value Ref Range    Procalcitonin 0.14 <0.25 ng/mL   INFLUENZA A/B BY PCR    Collection Time: 18  3:50 PM   Result Value Ref Range    Influenza virus A RNA Negative Negative    Influenza virus B, PCR Negative Negative   EKG (ER)    Collection Time: 18  7:03 PM   Result Value Ref Range    Report       [facility]    Test Date:  2018  Pt Name:    RENATA CLEMENT               Department: ER  MRN:        [id number]                      Room:       [facility]  Gender:     Male                         Technician: [facility]  :        1945                   Requested By:ER TRIAGE PROTOCOL  Order #:    [id number]                    Reading MD:    Measurements  Intervals                                Axis  Rate:       113                          P:          51  UT:         152                          QRS:        -17  QRSD:       76                           T:          61  QT:         344  QTc:        472    Interpretive Statements  SINUS TACHYCARDIA  BORDERLINE LEFT AXIS DEVIATION  Compared to ECG 2018 11:39:54  No significant changes     EKG    Collection Time: 18  7:03 PM   Result Value Ref Range    Report       [facility]    Test Date:   2018  Pt Name:    RENATA CLEMENT               Department: ER  MRN:        [id number]                      Room:       T204  Gender:     Male                         Technician: WILL  :        1945                   Requested By:DAWSON JENNINGS  Order #:    [id number]                    Reading MD:    Measurements  Intervals                                Axis  Rate:       113                          P:          51  OH:         152                          QRS:        -17  QRSD:       76                           T:          61  QT:         344  QTc:        472    Interpretive Statements  SINUS TACHYCARDIA  BORDERLINE LEFT AXIS DEVIATION  Compared to ECG 2018 11:39:54  No significant changes         (click the triangle to expand results)    My interpretation of lab results:     Imaging/Procedures Review:    CT-CTA CHEST PULMONARY ARTERY W/ RECONS   Final Result   Addendum 1 of 1   There is voice recognition error in the finding and   impression: Incompletely evaluated complicated cyst in the upper pole left    KIDNEY.      Final         1. No CT evidence of central pulmonary embolism. Evaluation of the segmental and subsegmental branches is limited due to poor contrast enhancement      2. Incompletely evaluated complicated cyst in the upper pole left ovary.      3. Hepatic steatosis      DX-CHEST-LIMITED (1 VIEW)   Final Result         No acute cardiac or pulmonary abnormality is identified.          EKG:   per my independent read:  Normal sinus rhythm, heart rate is 113, borderline left axis deviation with no evidence of acute ST segment changes appreciated    Assessment and Plan:      * Shortness of breath- (present on admission)   Assessment & Plan    -Unclear etiology but my suspicion is somewhat high for CHF  -Initiate empiric IV diuretics  -Echocardiogram  -Monitor on telemetry, monitor urine output closely  -EKG unchanged        Leukocytosis- (present on admission)   Assessment & Plan     -Likely secondary to recent steroid use at home with no clear evidence of active bacterial infection, check a pro-calcitonin and defer antibiotic that this time  -Trend white blood cell count fever curve closely        Stage 3 chronic kidney disease- (present on admission)   Assessment & Plan    -Near his baseline, monitor urine output and creatinine level very closely        Obesity (BMI 30-39.9)- (present on admission)   Assessment & Plan    -Encourage weight loss        Primary insomnia- (present on admission)   Assessment & Plan    -= Consider a sleep aid        BPH (benign prostatic hyperplasia)- (present on admission)   Assessment & Plan    -Continue Flomax and Proscar        GERD (gastroesophageal reflux disease)- (present on admission)   Assessment & Plan    -Omeprazole 20 mg daily        Essential hypertension- (present on admission)   Assessment & Plan    -Decently well-controlled, continue outpatient lisinopril              1. Prophylaxis: sc heparin  2. Code: Full code per patient with himself present

## 2018-04-13 NOTE — PROGRESS NOTES
PT ADMITTED TO UNIT ORIENTED TO ROOM FAMILY AT BEDSIDE  CALL LIGHT WITHIN REACH  DISCUSSED PLAN OF CARE NURSING HISTORY AND ADMIT PROFILE DONE   CONDITION STABLE

## 2018-04-13 NOTE — DISCHARGE SUMMARY
Hospital Medicine Discharge Note     Patient ID:  Chidi Tatum  [id number]  72 y.o.male  1945    Admit Date:  4/12/2018       Discharge Date:   4/13/2018    Primary Care Provider: SAM Moya    Admitting Physician: Alden Cleaning M.D.  Discharging Physician: Mami Umana M.D.    Chief Complaint: Short of breath    Discharge Diagnoses:   Active Problems:    Essential hypertension    GERD (gastroesophageal reflux disease)    BPH (benign prostatic hyperplasia)    Primary insomnia    Nasal congestion    Stage 3 chronic kidney disease    Depression    Chronic Medical Problems:  Past Medical History:   Diagnosis Date   • BPH (benign prostatic hyperplasia)    • GERD (gastroesophageal reflux disease)    • HTN (hypertension)    • Hypertension    • Insomnia        Code Status: Full Code    Hospital Summary:       Please refer to H&P by Dr. Alden Cleaning M.D. on 4/12/2018 for full details.      In brief, Chidi Tatum is a 72 y.o. male who was admitted 4/12/2018 for shortness of breath. The patient was recently treated for sinusitis with doxycycline and prednisone. However his symptoms progressed to include shortness of breath and fatigue orthopnea with persistent postnasal drip. He therefore presented to the emergency room and was evaluated for pneumonia and pulmonary embolus and congestive heart failure. During the course of his stay his chest x-ray CT angiogram of the chest, and echocardiogram were without acute etiologiccontribution. The patient did however improve with medical management and brief course of IV furosemide. He was also found during his observation to be depressed stemming from the death of his wife over the last year. The patient denied suicidal ideation but didn't walk him assistance with his depression. Ultimately the patient was deemed safe for discharge and will be given a brief course of the alternate antibiotic low dose antidepressant with follow-up in primary  care.    Therefore, he is discharged in good and stable condition with close outpatient follow-up.    Consultants:      None    Studies:    Imaging/ Testing:      ECHOCARDIOGRAM COMP W/O CONT   Final Result      CT-CTA CHEST PULMONARY ARTERY W/ RECONS   Final Result   Addendum 1 of 1   There is voice recognition error in the finding and   impression: Incompletely evaluated complicated cyst in the upper pole left    KIDNEY.      Final         1. No CT evidence of central pulmonary embolism. Evaluation of the segmental and subsegmental branches is limited due to poor contrast enhancement      2. Incompletely evaluated complicated cyst in the upper pole left ovary.      3. Hepatic steatosis      DX-CHEST-LIMITED (1 VIEW)   Final Result         No acute cardiac or pulmonary abnormality is identified.            Laboratory:   Recent Labs      04/12/18   1237  04/13/18   0435   WBC  13.2*  11.8*   RBC  4.72  4.40*   HEMOGLOBIN  13.2*  12.5*   HEMATOCRIT  39.7*  37.7*   MCV  84.1  85.7   MCH  28.0  28.4   MCHC  33.2*  33.2*   RDW  45.0  46.5   PLATELETCT  279  261   MPV  9.5  9.4       Recent Labs      04/12/18   1237  04/13/18   0435   SODIUM  136  135   POTASSIUM  3.6  3.7   CHLORIDE  106  102   CO2  19*  22   GLUCOSE  120*  108*   BUN  17  22   CREATININE  1.30  1.48*   CALCIUM  9.4  9.2       Recent Labs      04/12/18   1237  04/13/18   0435   ALTSGPT  13   --    ASTSGOT  14   --    ALKPHOSPHAT  87   --    TBILIRUBIN  0.4   --    LIPASE  27   --    GLUCOSE  120*  108*        Recent Labs      04/12/18   1237   BNPBTYPENAT  8       Recent Labs      04/13/18   0435   TRIGLYCERIDE  200*   HDL  28*   LDL  48       Recent Labs      04/12/18   1237   TROPONINI  <0.01       Recent Labs      04/12/18   1237   TSHULTRASEN  1.650       Results     Procedure Component Value Units Date/Time    INFLUENZA A/B BY PCR [[id number]] Collected:  04/12/18 1550    Order Status:  Completed Specimen:  Nasal from Nasopharyngeal Updated:   04/12/18 1716     Influenza virus A RNA Negative     Influenza virus B, PCR Negative          No results found for: BLOODCULTU, BLDCULT, BCHOLD     Procedures/Surgeries:        None    Disposition:    Discharge home    Condition:  Stable    Instructions:   Activity: As tolerated.  Diet: Regular  Followup: With primary care  Instructions:    -Given instructions to return to the ER if any new or worsening symptoms, worsening condition, or failure to improve  -Call PCP for followup  -No smoking, no alcohol, no caffeine  -Encourage risk factor reduction with tobacco and alcohol abstinence, diet changes, weight loss, and exercise.     Follow-Up  No follow-up provider specified.  Future Appointments  Date Time Provider Department Center   5/18/2018 1:20 PM SAM Moya [facility] None       Discharge Medications:        (     Medication List      START taking these medications      Instructions   azithromycin 250 MG Tabs  Commonly known as:  ZITHROMAX   2 tabs PO on day 1, then 1 po daily x 4d     sertraline 25 MG tablet  Commonly known as:  ZOLOFT   Take 1 Tab by mouth every day.  Dose:  25 mg        CONTINUE taking these medications      Instructions   esomeprazole 40 MG delayed-release capsule  Commonly known as:  NEXIUM   Take 1 Cap by mouth every morning before breakfast.  Dose:  40 mg     finasteride 5 MG Tabs  Commonly known as:  PROSCAR   TAKE ONE TABLET BY MOUTH ONCE DAILY     hydroCHLOROthiazide 25 MG Tabs  Commonly known as:  HYDRODIURIL   Take 1 Tab by mouth every 48 hours.  Dose:  25 mg     lisinopril 10 MG Tabs  Commonly known as:  PRINIVIL   TAKE ONE TABLET BY MOUTH ONCE DAILY *REPLACES  5MG  TABLET*     tamsulosin 0.4 MG capsule  Commonly known as:  FLOMAX   Take 1 Cap by mouth ONE-HALF HOUR AFTER BREAKFAST.  Dose:  0.4 mg        STOP taking these medications    doxycycline 100 MG Tabs  Commonly known as:  VIBRAMYCIN     predniSONE 20 MG Tabs  Commonly known as:  DELTASONE            Total time of  the discharge process exceeds 32 minutes. This included face to face with the patient, medication reconciliation, care co ordination involved in patient care and discussion and co ordination with case management.     Please CC the above physicians    SALLY Troncoso  4/13/2018  2:06 PM

## 2018-04-13 NOTE — DISCHARGE PLANNING
Care Transition Team Assessment    Information Source  Orientation : Oriented x 4  Information Given By: Patient  Who is responsible for making decisions for patient? : Patient    Readmission Evaluation  Is this a readmission?: No    Elopement Risk  Legal Hold: No  Ambulatory or Self Mobile in Wheelchair: No-Not an Elopement Risk    Interdisciplinary Discharge Planning  Primary Care Physician: referral to   Lives with - Patient's Self Care Capacity: Spouse, Adult Children  Support Systems: Family Member(s)  Able to Return to Previous ADL's: Yes  Mobility Issues: No  Prior Services: None  Patient Expects to be Discharged to:: home  Assistance Needed: No  Durable Medical Equipment: Not Applicable              Finances  Financial Barriers to Discharge: No  Prescription Coverage: Yes                   Domestic Abuse  Have you ever been the victim of abuse or violence?: No         Discharge Risks or Barriers  Discharge risks or barriers?: No ( to see pt for new PCP)    Anticipated Discharge Information  Anticipated discharge disposition: Home  Discharge Contact Phone Number: pt's wife at bedside

## 2018-05-01 ENCOUNTER — OFFICE VISIT (OUTPATIENT)
Dept: MEDICAL GROUP | Facility: CLINIC | Age: 73
End: 2018-05-01
Payer: MEDICARE

## 2018-05-01 ENCOUNTER — TELEPHONE (OUTPATIENT)
Dept: URGENT CARE | Facility: PHYSICIAN GROUP | Age: 73
End: 2018-05-01

## 2018-05-01 VITALS
WEIGHT: 244 LBS | DIASTOLIC BLOOD PRESSURE: 78 MMHG | SYSTOLIC BLOOD PRESSURE: 140 MMHG | BODY MASS INDEX: 33.05 KG/M2 | TEMPERATURE: 99.4 F | RESPIRATION RATE: 18 BRPM | HEIGHT: 72 IN | HEART RATE: 92 BPM | OXYGEN SATURATION: 95 %

## 2018-05-01 DIAGNOSIS — G89.29 CHRONIC LEFT-SIDED LOW BACK PAIN WITHOUT SCIATICA: ICD-10-CM

## 2018-05-01 DIAGNOSIS — B37.0 THRUSH, ORAL: ICD-10-CM

## 2018-05-01 DIAGNOSIS — Z28.20 VACCINE REFUSED BY PATIENT: ICD-10-CM

## 2018-05-01 DIAGNOSIS — R60.0 BILATERAL LOWER EXTREMITY EDEMA: ICD-10-CM

## 2018-05-01 DIAGNOSIS — L40.9 PSORIASIS: ICD-10-CM

## 2018-05-01 DIAGNOSIS — M54.50 CHRONIC LEFT-SIDED LOW BACK PAIN WITHOUT SCIATICA: ICD-10-CM

## 2018-05-01 PROCEDURE — 99213 OFFICE O/P EST LOW 20 MIN: CPT | Performed by: NURSE PRACTITIONER

## 2018-05-01 RX ORDER — CLOTRIMAZOLE 10 MG/1
10 LOZENGE ORAL; TOPICAL
Qty: 35 TROCHE | Refills: 0 | Status: SHIPPED | OUTPATIENT
Start: 2018-05-01 | End: 2018-05-08

## 2018-05-01 NOTE — TELEPHONE ENCOUNTER
Daughter called regarding her Dad.  Left message for her to call back.  She did not state what she needed or wanted.

## 2018-05-02 NOTE — ASSESSMENT & PLAN NOTE
This is a chronic problem of which patient has use PRN Norco in the past.   Reports only using medication 1-3 times per month.   Never had back surgery.   Reports he had had some imaging, this is not on file and old.   Reports he occasionally 'throws my back out.'   He did trial Tramadol which did not seem to work and made him drowsy.  Denies any acute injury or fall. Reports sometimes he just has low back pain. Able to complete ADLs. He has not done any PT, he watches his grandson and reports they do not have other childcare. No changes in bowel/bladder.

## 2018-05-02 NOTE — ASSESSMENT & PLAN NOTE
Patient reports since last visit, has improved, however, he still at times has burning in his mouth. He does not have any more lozenges. Denies any other recent abx use.

## 2018-05-02 NOTE — ASSESSMENT & PLAN NOTE
Patient reports that he still sometimes has lower leg edema, none in clinic today. He was recently seen in hospital. ECHO was normal. He does not feel SOB today.

## 2018-05-02 NOTE — PROGRESS NOTES
Subjective:     Chidi Tatum is a 72 y.o. male here today for evaluation and management of the following:    Bilateral lower extremity edema  Patient reports that he still sometimes has lower leg edema, none in clinic today. He was recently seen in hospital. ECHO was normal. He does not feel SOB today.     Chronic left-sided low back pain without sciatica  This is a chronic problem of which patient has use PRN Norco in the past.   Reports only using medication 1-3 times per month.   Never had back surgery.   Reports he had had some imaging, this is not on file and old.   Reports he occasionally 'throws my back out.'   He did trial Tramadol which did not seem to work and made him drowsy.  Denies any acute injury or fall. Reports sometimes he just has low back pain. Able to complete ADLs. He has not done any PT, he watches his grandson and reports they do not have other childcare. No changes in bowel/bladder.         Psoriasis  This is a chronic problem. He has been using clobetasol cream as needed.     Thrush, oral  Patient reports since last visit, has improved, however, he still at times has burning in his mouth. He does not have any more lozenges. Denies any other recent abx use.        Current medicines (including changes today)  Current Outpatient Prescriptions   Medication Sig Dispense Refill   • clotrimazole (MYCELEX) 10 MG Cristofer Take 1 Cristofer by mouth 5 Times a Day for 7 days. 35 Cristofer 0   • sertraline (ZOLOFT) 25 MG tablet Take 1 Tab by mouth every day. 30 Tab 1   • esomeprazole (NEXIUM) 40 MG delayed-release capsule Take 1 Cap by mouth every morning before breakfast. 90 Cap 2   • hydroCHLOROthiazide (HYDRODIURIL) 25 MG Tab Take 1 Tab by mouth every 48 hours. 45 Tab 1   • tamsulosin (FLOMAX) 0.4 MG capsule Take 1 Cap by mouth ONE-HALF HOUR AFTER BREAKFAST. 90 Cap 2   • finasteride (PROSCAR) 5 MG Tab TAKE ONE TABLET BY MOUTH ONCE DAILY 90 Tab 2   • lisinopril (PRINIVIL) 10 MG Tab TAKE ONE TABLET BY MOUTH  ONCE DAILY *REPLACES  5MG  TABLET* 90 Tab 1     No current facility-administered medications for this visit.        He  has a past medical history of BPH (benign prostatic hyperplasia); GERD (gastroesophageal reflux disease); HTN (hypertension); Hypertension; and Insomnia.    He  has a past surgical history that includes hernia repair and temporal artery biopsy (Right, 2/9/2018).     Social History     Social History   • Marital status:      Spouse name: N/A   • Number of children: N/A   • Years of education: N/A     Social History Main Topics   • Smoking status: Never Smoker   • Smokeless tobacco: Never Used   • Alcohol use No   • Drug use: No   • Sexual activity: No     Other Topics Concern   • Not on file     Social History Narrative   • No narrative on file       Family History   Problem Relation Age of Onset   • Lung Disease Father    • Alcohol/Drug Brother    • Lung Disease Brother          ROS  Positive for intermittent edema. Positive for mouth burning, improving but not resolved. Positive for low back pain.   No fever, no chest pain, no shortness of breath, no abdominal pain, no rashes    All other systems reviewed and are negative.        Objective:     Blood pressure 140/78, pulse 92, temperature 37.4 °C (99.4 °F), resp. rate 18, height 1.829 m (6'), weight 110.7 kg (244 lb), SpO2 95 %. Body mass index is 33.09 kg/m².    Physical Exam:   Constitutional: Alert, no distress.  Eye: Equal, round and reactive, conjunctiva clear, lids normal.   ENMT: Lips without lesions, oropharynx clear, no white patches noted.   Neck: Trachea midline, no masses, no thyromegaly. No cervical or supraclavicular lymphadenopathy  Respiratory: Unlabored respiratory effort, lungs clear to auscultation, no wheezes, no ronchi.  Cardiovascular: Normal S1, S2, no murmur, no edema.   Abdomen: Soft, non-tender, no masses, no hepatosplenomegaly. Normal bowel sounds.   Skin: Warm, dry, good turgor, no rashes in visible  areas.  Neuro:  normal sensation in extremities.   Psych: Alert and oriented x3, normal affect and mood.    Spine/Back Exam:   Denies changes in bowel/bladder. No numbness to perineal area.   Straight Leg Test negative   DTR 2+ patella  Strength 5/5 prox and distal LE.     Mild tenderness in paraspinous muscles lumbar spine without current spasm. No spinous process tenderness.     No pain with stress of SI.           Assessment and Plan:   The following treatment plan was discussed    1. Bilateral lower extremity edema  Likely related to salt intake. Advised to decrease salt. Discussed his recent ECHO results, which were normal. Discussed s/s to seek emergent care, he does not have edema today in clinic.     2. Thrush, oral  - clotrimazole (MYCELEX) 10 MG Cristofer; Take 1 Cristofer by mouth 5 Times a Day for 7 days.  Dispense: 35 Cristofer; Refill: 0    3. Chronic left-sided low back pain without sciatica  Advised to complete imaging and start PT. May consider further imaging or referral in the future. Discussed risks of Norco use, even PRN. Would prefer to avoid, pending his progress and other imaging, may consider referral.   - REFERRAL TO PHYSICAL THERAPY Reason for Therapy: Eval/Treat/Report  - DX-LUMBAR SPINE-2 OR 3 VIEWS; Future  - POCT Urinalysis    4. Vaccine refused by patient    5. Psoriasis  Advised to also use emollient BID.       Reviewed indication, dosage, usage and potential adverse effects of prescribed medications. Patient appears to understand, verbalizes understanding and is willing to try medications as prescribed.      Reviewed risks and benefits of treatment plan. Patient verbally agrees to plan of care.       Followup: Return in about 1 month (around 6/1/2018) for Medicare Annual Wellness.    FRANCISCO Moya.     PLEASE NOTE: This dictation was created using voice recognition software. I have made every reasonable attempt to correct obvious errors, but I expect that there may be errors of  grammar and possibly content that I did not discover prior finalizing this note.

## 2018-05-11 ENCOUNTER — TELEPHONE (OUTPATIENT)
Dept: MEDICAL GROUP | Facility: CLINIC | Age: 73
End: 2018-05-11

## 2018-05-11 NOTE — TELEPHONE ENCOUNTER
1. Caller Name: [facility]                      Call Back Number: [phone]    2. Message: Patients pharmacy called in - states nexium is not covered under patient insurance plan.  They will cover pantoprazole though, can you please send an rx over for this.  Patient has been trying to pick this up for weeks apparently. Bernardo will be out for the next week.     3. Patient approves office to leave a detailed voicemail/MyChart message: N\A

## 2018-05-12 DIAGNOSIS — K21.9 GASTROESOPHAGEAL REFLUX DISEASE WITHOUT ESOPHAGITIS: ICD-10-CM

## 2018-05-12 RX ORDER — PANTOPRAZOLE SODIUM 40 MG/1
40 TABLET, DELAYED RELEASE ORAL DAILY
Qty: 30 TAB | Refills: 5 | Status: SHIPPED | OUTPATIENT
Start: 2018-05-12 | End: 2018-11-18

## 2018-05-18 ENCOUNTER — OFFICE VISIT (OUTPATIENT)
Dept: MEDICAL GROUP | Facility: CLINIC | Age: 73
End: 2018-05-18
Payer: MEDICARE

## 2018-05-18 VITALS
DIASTOLIC BLOOD PRESSURE: 70 MMHG | HEIGHT: 72 IN | WEIGHT: 245 LBS | BODY MASS INDEX: 33.18 KG/M2 | HEART RATE: 100 BPM | OXYGEN SATURATION: 100 % | RESPIRATION RATE: 18 BRPM | SYSTOLIC BLOOD PRESSURE: 124 MMHG | TEMPERATURE: 98.1 F

## 2018-05-18 DIAGNOSIS — K21.9 GASTROESOPHAGEAL REFLUX DISEASE WITHOUT ESOPHAGITIS: ICD-10-CM

## 2018-05-18 DIAGNOSIS — G89.29 CHRONIC LEFT-SIDED LOW BACK PAIN WITHOUT SCIATICA: ICD-10-CM

## 2018-05-18 DIAGNOSIS — Z28.20 VACCINE REFUSED BY PATIENT: ICD-10-CM

## 2018-05-18 DIAGNOSIS — L40.9 PSORIASIS: ICD-10-CM

## 2018-05-18 DIAGNOSIS — S68.119S AMPUTATION FINGER, SEQUELA (HCC): ICD-10-CM

## 2018-05-18 DIAGNOSIS — R31.0 GROSS HEMATURIA: ICD-10-CM

## 2018-05-18 DIAGNOSIS — R73.03 PRE-DIABETES: ICD-10-CM

## 2018-05-18 DIAGNOSIS — M54.50 CHRONIC LEFT-SIDED LOW BACK PAIN WITHOUT SCIATICA: ICD-10-CM

## 2018-05-18 DIAGNOSIS — F51.01 PRIMARY INSOMNIA: ICD-10-CM

## 2018-05-18 DIAGNOSIS — Z00.00 MEDICARE ANNUAL WELLNESS VISIT, SUBSEQUENT: Primary | ICD-10-CM

## 2018-05-18 DIAGNOSIS — N40.0 BENIGN PROSTATIC HYPERPLASIA WITHOUT LOWER URINARY TRACT SYMPTOMS: ICD-10-CM

## 2018-05-18 DIAGNOSIS — E66.9 OBESITY (BMI 30-39.9): ICD-10-CM

## 2018-05-18 DIAGNOSIS — I10 ESSENTIAL HYPERTENSION: ICD-10-CM

## 2018-05-18 DIAGNOSIS — F33.1 MODERATE EPISODE OF RECURRENT MAJOR DEPRESSIVE DISORDER (HCC): ICD-10-CM

## 2018-05-18 DIAGNOSIS — N18.30 STAGE 3 CHRONIC KIDNEY DISEASE (HCC): ICD-10-CM

## 2018-05-18 DIAGNOSIS — R60.0 BILATERAL LOWER EXTREMITY EDEMA: ICD-10-CM

## 2018-05-18 DIAGNOSIS — F43.21 GRIEF: ICD-10-CM

## 2018-05-18 PROBLEM — G89.18 POSTOPERATIVE PAIN: Status: RESOLVED | Noted: 2018-02-09 | Resolved: 2018-05-18

## 2018-05-18 PROBLEM — B37.0 THRUSH, ORAL: Status: RESOLVED | Noted: 2018-03-16 | Resolved: 2018-05-18

## 2018-05-18 PROBLEM — R09.81 NASAL CONGESTION: Status: RESOLVED | Noted: 2017-09-01 | Resolved: 2018-05-18

## 2018-05-18 PROCEDURE — G0439 PPPS, SUBSEQ VISIT: HCPCS | Performed by: NURSE PRACTITIONER

## 2018-05-18 ASSESSMENT — PATIENT HEALTH QUESTIONNAIRE - PHQ9
CLINICAL INTERPRETATION OF PHQ2 SCORE: 3
5. POOR APPETITE OR OVEREATING: 1 - SEVERAL DAYS
SUM OF ALL RESPONSES TO PHQ QUESTIONS 1-9: 9

## 2018-05-18 ASSESSMENT — ENCOUNTER SYMPTOMS: GENERAL WELL-BEING: GOOD

## 2018-05-18 ASSESSMENT — ACTIVITIES OF DAILY LIVING (ADL): BATHING_REQUIRES_ASSISTANCE: 0

## 2018-05-18 NOTE — ASSESSMENT & PLAN NOTE
Patient lost his wife, she passed away in the hospital. She was in the hospital for lengthy periods of time and he is doing his best to cope.His children are living with him and his grandchild, who is 4. Reports they are keeping him happy.  Reports his grief appears to be getting better, however, he is not taking Zoloft consistently, reports he did not think it was helping much. Denies SI/HI. Reports his best medicine has been his grandson Barbra.

## 2018-05-18 NOTE — PROGRESS NOTES
Chief Complaint   Patient presents with   • Annual Wellness Visit         HPI:  Chidi PHILLIPS is a 72 y.o. here for Medicare Annual Wellness Visit     Amputation finger, shahab (HCC)  1987  Jumped off the back of a truck, caught his ring finger, stable     Bilateral lower extremity edema  Patient reports that he still sometimes has lower leg edema, none in clinic today. He was recently seen in hospital. ECHO was normal. He does not feel SOB today.     BPH (benign prostatic hyperplasia)  Patient reports good symptom control with tamsulosin 0.4 mg daily and finasteride 5 mg daily. He does report at times he has blood in his urine when he does not drink enough water. He does report he has been seen by urology and found to have no problems. He has not seen Urology in over 1 year.     Chronic left-sided low back pain without sciatica  This is a chronic problem of which patient has use PRN Norco in the past.   Reports only using medication 1-3 times per month.   Never had back surgery.   Reports he had had some imaging, this is not on file and old.   Reports he occasionally 'throws my back out.'   He did trial Tramadol which did not seem to work and made him drowsy.  Denies any acute injury or fall. Reports sometimes he just has low back pain. Able to complete ADLs. He has not done any PT, he watches his grandson and reports they do not have other childcare. No changes in bowel/bladder.         Essential hypertension  This is a chronic problem that has been well controlled in the past. He is now taking Hydrochlorothiazide, lisinopril 5 mg two tablets (total 10 mg daily) Denies dizziness. Reports his BP appears stable at home. BP today 124/70. He does admit to intermittent LE edema, none today. He was advised by Cardiology, Dr. Umanzor to continue with BP med control.     GERD (gastroesophageal reflux disease)  This is a chronic problem which was well controlled with Nexium, however, he was started on protonix due to insurance,  he reports today very poor symptom control with omeprazole or protonix and would like to change back to Nexium, however, he cannot afford it.     Grief  Patient lost his wife, she passed away in the hospital. She was in the hospital for lengthy periods of time and he is doing his best to cope.His children are living with him and his grandchild, who is 4. Reports they are keeping him happy.  Reports his grief appears to be getting better, however, he is not taking Zoloft consistently, reports he did not think it was helping much. Denies SI/HI. Reports his best medicine has been his grandson Barbra.          Gross hematuria  See BPH note    Moderate episode of recurrent major depressive disorder (CMS-[facility])  Patient recently lost his wife last year, she passed away in the hospital. She was in the hospital for lengthy periods of time and he is doing his best to cope. His children are living with him and his grandchild, who is 4. Reports they are keeping him happy. Ambien has helped him to sleep and he now denies any grogginess. He does have great support from his daughter. We did prescribe Zoloft, however, patient reports he never took the medication, reports he does not feel he needs it.  Denies SI.         Pre-diabetes  Most recent A1c mildly elevated, continue to monitor.     Primary insomnia  This is a chronic problem specifically since his wife passed away. Taking Ambien 5 mg at bedtime with decent symptom control per his report. Patient reports he failed Trazodone therapy. He is well aware of risks of medication and would like to continue.         Psoriasis  This is a chronic problem. He has been using clobetasol cream as needed.     Stage 3 chronic kidney disease  Most recent labs show mildly worsening GFR. He does not see Neph. He was recently in hospital, reports normal urine output.       Patient Active Problem List    Diagnosis Date Noted   • Amputation finger, sequela ([facility]) 05/18/2018   • Pre-diabetes  05/18/2018   • Gross hematuria 05/18/2018   • Bilateral lower extremity edema 03/16/2018   • Stage 3 chronic kidney disease 09/01/2017   • Moderate episode of recurrent major depressive disorder (HCC) 05/19/2017   • Psoriasis 05/19/2017   • Obesity (BMI 30-39.9) 03/31/2017   • Grief 03/31/2017   • Primary insomnia 02/19/2016   • BPH (benign prostatic hyperplasia) 01/20/2015   • Chronic left-sided low back pain without sciatica 01/20/2015   • Essential hypertension    • GERD (gastroesophageal reflux disease)        Current Outpatient Prescriptions   Medication Sig Dispense Refill   • pantoprazole (PROTONIX) 40 MG Tablet Delayed Response Take 1 Tab by mouth every day. 30 Tab 5   • sertraline (ZOLOFT) 25 MG tablet Take 1 Tab by mouth every day. 30 Tab 1   • hydroCHLOROthiazide (HYDRODIURIL) 25 MG Tab Take 1 Tab by mouth every 48 hours. 45 Tab 1   • tamsulosin (FLOMAX) 0.4 MG capsule Take 1 Cap by mouth ONE-HALF HOUR AFTER BREAKFAST. 90 Cap 2   • finasteride (PROSCAR) 5 MG Tab TAKE ONE TABLET BY MOUTH ONCE DAILY 90 Tab 2   • lisinopril (PRINIVIL) 10 MG Tab TAKE ONE TABLET BY MOUTH ONCE DAILY *REPLACES  5MG  TABLET* 90 Tab 1     No current facility-administered medications for this visit.             Current supplements as per medication list.       Allergies: Celebrex [celecoxib]    Current social contact/activities: He does spend a lot of time with Grandson, Arlene, he also likes to shoot guns at targets      He    reports that he has never smoked. He has never used smokeless tobacco. He reports that he does not drink alcohol or use drugs.  Counseling given: Yes        DPA/Advanced directive: Patient does not have an advanced directive. If not on file, instructed to bring in a copy to scan into their chart. If no advanced directive exists, a packet and workshop information was given on advanced directives.     ROS:    Gait: Uses no assistive device   Ostomy: no   Other tubes: no   Amputations: yes   Chronic oxygen use no    Last eye exam Feb 2018, wears glasses   : Denies incontinence.       Screening:    Depression Screening    Little interest or pleasure in doing things?  1 - several days  Feeling down, depressed , or hopeless? 2 - more than half the days  Trouble falling or staying asleep, or sleeping too much?  3 - nearly every day  Feeling tired or having little energy?  2 - more than half the days  Poor appetite or overeating?  1 - several days  Feeling bad about yourself - or that you are a failure or have let yourself or your family down? 0 - not at all  Trouble concentrating on things, such as reading the newspaper or watching television? 0 - not at all  Moving or speaking so slowly that other people could have noticed.  Or the opposite - being so fidgety or restless that you have been moving around a lot more than usual?  0 - not at all  Thoughts that you would be better off dead, or of hurting yourself?  0 - not at all  Patient Health Questionnaire Score: 9    If depressive symptoms identified deferred to follow up visit unless specifically addressed in assessment and plan.    Interpretation of PHQ-9 Total Score   Score Severity   1-4 No Depression   5-9 Mild Depression   10-14 Moderate Depression   15-19 Moderately Severe Depression   20-27 Severe Depression      Screening for Cognitive Impairment    Three Minute Recall (leader, season, table) 3/3    Obdulio clock face with all 12 numbers and set the hands to show 10 past 11.  Yes    Cognitive concerns identified deferred for follow up unless specifically addressed in assessment and plan.    Fall Risk Assessment    Has the patient had two or more falls in the last year or any fall with injury in the last year?  No    Safety Assessment    Throw rugs on floor.  No  Handrails on all stairs.  No  Good lighting in all hallways.  Yes  Difficulty hearing.  No  Patient counseled about all safety risks that were identified.    Functional Assessment ADLs    Are there any barriers  preventing you from cooking for yourself or meeting nutritional needs?  No.    Are there any barriers preventing you from driving safely or obtaining transportation?  No.    Are there any barriers preventing you from using a telephone or calling for help?  No.    Are there any barriers preventing you from shopping?  No.    Are there any barriers preventing you from taking care of your own finances?  No.    Are there any barriers preventing you from managing your medications?  No.    Are there any barriers preventing you from showering, bathing or dressing yourself? No.    Are you currently engaging in any exercise or physical activity?  Yes.     What is your perception of your health?  Good.      Health Maintenance Summary                Annual Wellness Visit Overdue 1/21/2016      Not specified 1/20/2015      Patient has more history with this topic...    IMM PNEUMOCOCCAL 65+ (ADULT) LOW/MEDIUM RISK SERIES Postponed 9/1/2018 Originally 10/19/2012. Patient Refused    IMM DTaP/Tdap/Td Vaccine Postponed 9/1/2018 Originally 10/19/1966. Patient Refused    COLONOSCOPY Next Due 7/23/2018      Done 7/23/2008 AMB REFERRAL TO GI FOR COLONOSCOPY    IMM INFLUENZA Next Due 9/1/2018           Patient Care Team:  SAM Moya as PCP - General (Family Medicine)      Social History   Substance Use Topics   • Smoking status: Never Smoker   • Smokeless tobacco: Never Used   • Alcohol use No     Family History   Problem Relation Age of Onset   • Lung Disease Father    • Alcohol/Drug Brother    • Lung Disease Brother      He  has a past medical history of BPH (benign prostatic hyperplasia); GERD (gastroesophageal reflux disease); HTN (hypertension); Hypertension; and Insomnia.   Past Surgical History:   Procedure Laterality Date   • TEMPORAL ARTERY BIOPSY Right 2/9/2018    Procedure: TEMPORAL ARTERY BIOPSY;  Surgeon: Bryant Corey M.D.;  Location: SURGERY [facility];  Service: Vascular   • HERNIA REPAIR       umbilical, groin        Exam:     Blood pressure 124/70, pulse 100, temperature 36.7 °C (98.1 °F), resp. rate 18, height 1.829 m (6'), weight 111.1 kg (245 lb), SpO2 100 %. Body mass index is 33.23 kg/m².    Hearing fair.    Dentition poor  Alert, oriented in no acute distress.  Eye contact is good, speech goal directed, affect calm      Assessment and Plan. The following treatment and monitoring plan is recommended:    1. Medicare annual wellness visit, subsequent  Annual Wellness Visit - Includes PPPS Subsequent ()   2. Amputation finger, sequela (HCC)  Annual Wellness Visit - Includes PPPS Subsequent ()   3. Psoriasis  Annual Wellness Visit - Includes PPPS Subsequent ()   4. Primary insomnia  Annual Wellness Visit - Includes PPPS Subsequent ()   5. Bilateral lower extremity edema  Annual Wellness Visit - Includes PPPS Subsequent ()   6. Benign prostatic hyperplasia without lower urinary tract symptoms  Annual Wellness Visit - Includes PPPS Subsequent ()   7. Essential hypertension  Annual Wellness Visit - Includes PPPS Subsequent ()   8. Chronic left-sided low back pain without sciatica  Annual Wellness Visit - Includes PPPS Subsequent ()   9. Gastroesophageal reflux disease without esophagitis  Annual Wellness Visit - Includes PPPS Subsequent ()   10. Moderate episode of recurrent major depressive disorder (HCC)  Annual Wellness Visit - Includes PPPS Subsequent ()   11. Pre-diabetes  HEMOGLOBIN A1C    COMP METABOLIC PANEL    Annual Wellness Visit - Includes PPPS Subsequent ()   12. Stage 3 chronic kidney disease  ESTIMATED GFR    Annual Wellness Visit - Includes PPPS Subsequent ()   13. Obesity (BMI 30-39.9)  LIPID PROFILE    Annual Wellness Visit - Includes PPPS Subsequent ()   14. Gross hematuria  POCT Urinalysis    PSA TOTAL + %FREE    Annual Wellness Visit - Includes PPPS Subsequent ()   15. Vaccine refused by patient  Annual Wellness  Visit - Includes PPPS Subsequent ()   16. Grief         1. Medicare annual wellness visit, subsequent  - Annual Wellness Visit - Includes PPPS Subsequent ()    2. Amputation finger, sequela (HCC)  Stable   - Annual Wellness Visit - Includes PPPS Subsequent ()    3. Psoriasis  Stable   - Annual Wellness Visit - Includes PPPS Subsequent ()    4. Primary insomnia  Stable   - Annual Wellness Visit - Includes PPPS Subsequent ()    5. Bilateral lower extremity edema  Stable   - Annual Wellness Visit - Includes PPPS Subsequent ()    6. Benign prostatic hyperplasia without lower urinary tract symptoms  Stable, advised to complete UA and lab work, may consider going back to Urology   - Annual Wellness Visit - Includes PPPS Subsequent ()    7. Essential hypertension  Stable   - Annual Wellness Visit - Includes PPPS Subsequent ()    8. Chronic left-sided low back pain without sciatica  Stable   - Annual Wellness Visit - Includes PPPS Subsequent ()    9. Gastroesophageal reflux disease without esophagitis  Worsening, not able to afford Nexium   - Annual Wellness Visit - Includes PPPS Subsequent ()    10. Moderate episode of recurrent major depressive disorder (HCC)  Stable   - Annual Wellness Visit - Includes PPPS Subsequent ()    11. Pre-diabetes  Stable   - HEMOGLOBIN A1C; Future  - COMP METABOLIC PANEL; Future  - Annual Wellness Visit - Includes PPPS Subsequent ()    12. Stage 3 chronic kidney disease  Stable   - ESTIMATED GFR; Future  - Annual Wellness Visit - Includes PPPS Subsequent ()    13. Obesity (BMI 30-39.9)  Stable   - LIPID PROFILE; Future  - Annual Wellness Visit - Includes PPPS Subsequent ()    14. Gross hematuria  - POCT Urinalysis  - PSA TOTAL + %FREE; Future  - Annual Wellness Visit - Includes PPPS Subsequent ()    15. Vaccine refused by patient  - Annual Wellness Visit - Includes PPPS Subsequent ()    16. Grief  Stable          Services needed: as per orders if indicated.  Health Care Screening: Age-appropriate preventive services Medicare covers discussed today and ordered if indicated.    Referrals offered: Community-based lifestyle interventions to reduce health risks and promote self-management and wellness, fall prevention, nutrition, physical activity, tobacco-use cessation, weight loss, and mental health services as per orders if indicated.    Discussion today about general wellness and lifestyle habits:    · Prevent falls and reduce trip hazards; Cautioned about securing or removing rugs.  · Have a working fire alarm and carbon monoxide detector;   · Engage in regular physical activity and social activities       Follow-up: Return in about 1 month (around 6/18/2018) for Lab follow up, blood in urine .    Chief Complaint   Patient presents with   • Annual Wellness Visit

## 2018-05-18 NOTE — ASSESSMENT & PLAN NOTE
Patient reports good symptom control with tamsulosin 0.4 mg daily and finasteride 5 mg daily. He does report at times he has blood in his urine when he does not drink enough water. He does report he has been seen by urology and found to have no problems. He has not seen Urology in over 1 year.

## 2018-05-18 NOTE — ASSESSMENT & PLAN NOTE
This is a chronic problem which was well controlled with Nexium, however, he was started on protonix due to insurance, he reports today very poor symptom control with omeprazole or protonix and would like to change back to Nexium, however, he cannot afford it.

## 2018-05-18 NOTE — ASSESSMENT & PLAN NOTE
Most recent labs show mildly worsening GFR. He does not see Neph. He was recently in hospital, reports normal urine output.

## 2018-05-18 NOTE — ASSESSMENT & PLAN NOTE
This is a chronic problem that has been well controlled in the past. He is now taking Hydrochlorothiazide, lisinopril 5 mg two tablets (total 10 mg daily) Denies dizziness. Reports his BP appears stable at home. BP today 124/70. He does admit to intermittent LE edema, none today. He was advised by Cardiology, Dr. Umanzor to continue with BP med control.

## 2018-06-19 ENCOUNTER — OFFICE VISIT (OUTPATIENT)
Dept: URGENT CARE | Facility: PHYSICIAN GROUP | Age: 73
End: 2018-06-19
Payer: MEDICARE

## 2018-06-19 VITALS
TEMPERATURE: 98.3 F | SYSTOLIC BLOOD PRESSURE: 130 MMHG | HEIGHT: 72 IN | HEART RATE: 96 BPM | OXYGEN SATURATION: 96 % | BODY MASS INDEX: 33.13 KG/M2 | WEIGHT: 244.6 LBS | RESPIRATION RATE: 14 BRPM | DIASTOLIC BLOOD PRESSURE: 72 MMHG

## 2018-06-19 DIAGNOSIS — K04.7 TOOTH INFECTION: ICD-10-CM

## 2018-06-19 PROCEDURE — 99214 OFFICE O/P EST MOD 30 MIN: CPT | Performed by: FAMILY MEDICINE

## 2018-06-19 RX ORDER — HYDROCODONE BITARTRATE AND ACETAMINOPHEN 5; 325 MG/1; MG/1
TABLET ORAL
Qty: 12 TAB | Refills: 0 | Status: SHIPPED | OUTPATIENT
Start: 2018-06-19 | End: 2018-06-23

## 2018-06-19 RX ORDER — AMOXICILLIN 875 MG/1
TABLET, COATED ORAL
Qty: 20 TAB | Refills: 0 | Status: SHIPPED | OUTPATIENT
Start: 2018-06-19 | End: 2018-07-03

## 2018-06-19 NOTE — PROGRESS NOTES
Chief Complaint:    Chief Complaint   Patient presents with   • Dental Pain     lower r incisisor x1wk       History of Present Illness:    This is a new problem. For 1 week, he has pain in right lower incisor. The top part of this is broken off and decayed. He reports he has not made appointment with Dentist because he can't find one to take Medicare. Also requesting pain med as OTC meds not helping. Hydrocodone-APAP has worked/tolerated in past.      Review of Systems:    Constitutional: Negative for fever, chills, and diaphoresis.   Eyes: Negative for change in vision, photophobia, pain, redness, and discharge.  ENT: See HPI.  Respiratory: Negative for cough, hemoptysis, sputum production, shortness of breath, wheezing, and stridor.    Cardiovascular: Negative for chest pain, palpitations, orthopnea, claudication, leg swelling, and PND.   Gastrointestinal: Negative for abdominal pain, nausea, vomiting, diarrhea, constipation, blood in stool, and melena.   Genitourinary: No new symptoms.   Musculoskeletal: Negative for myalgias, joint pain, neck pain, and back pain.   Skin: Negative for rash and itching.   Neurological: Negative for dizziness, tingling, tremors, sensory change, speech change, focal weakness, seizures, loss of consciousness, and headaches.   Endo: Negative for polydipsia.   Heme: Does not bruise/bleed easily.   Psychiatric/Behavioral: No new symptoms.      Past Medical History:    Past Medical History:   Diagnosis Date   • BPH (benign prostatic hyperplasia)    • GERD (gastroesophageal reflux disease)    • HTN (hypertension)    • Hypertension    • Insomnia      Past Surgical History:    Past Surgical History:   Procedure Laterality Date   • TEMPORAL ARTERY BIOPSY Right 2/9/2018    Procedure: TEMPORAL ARTERY BIOPSY;  Surgeon: Bryant Corey M.D.;  Location: SURGERY [facility];  Service: Vascular   • HERNIA REPAIR      umbilical, groin      Social History:    Social History     Social  History   • Marital status:      Spouse name: N/A   • Number of children: N/A   • Years of education: N/A     Occupational History   • Not on file.     Social History Main Topics   • Smoking status: Never Smoker   • Smokeless tobacco: Never Used   • Alcohol use No   • Drug use: No   • Sexual activity: No     Other Topics Concern   • Not on file     Social History Narrative   • No narrative on file     Family History:    Family History   Problem Relation Age of Onset   • Lung Disease Father    • Alcohol/Drug Brother    • Lung Disease Brother      Medications:    Current Outpatient Prescriptions on File Prior to Visit   Medication Sig Dispense Refill   • zolpidem (AMBIEN) 5 MG Tab TAKE ONE TABLET BY MOUTH ONCE DAILY AT BEDTIME AS NEEDED FOR SLEEP FOR 30 DAYS 30 Tab 2   • pantoprazole (PROTONIX) 40 MG Tablet Delayed Response Take 1 Tab by mouth every day. 30 Tab 5   • sertraline (ZOLOFT) 25 MG tablet Take 1 Tab by mouth every day. 30 Tab 1   • hydroCHLOROthiazide (HYDRODIURIL) 25 MG Tab Take 1 Tab by mouth every 48 hours. 45 Tab 1   • finasteride (PROSCAR) 5 MG Tab TAKE ONE TABLET BY MOUTH ONCE DAILY 90 Tab 2   • lisinopril (PRINIVIL) 10 MG Tab TAKE ONE TABLET BY MOUTH ONCE DAILY *REPLACES  5MG  TABLET* 90 Tab 1   • tamsulosin (FLOMAX) 0.4 MG capsule Take 1 Cap by mouth ONE-HALF HOUR AFTER BREAKFAST. 90 Cap 2     No current facility-administered medications on file prior to visit.      Allergies:    Allergies   Allergen Reactions   • Celebrex [Celecoxib] Rash     .       Vitals:    Vitals:    06/19/18 1021   BP: 130/72   Pulse: 96   Resp: 14   Temp: 36.8 °C (98.3 °F)   SpO2: 96%   Weight: 110.9 kg (244 lb 9.6 oz)   Height: 1.829 m (6')       Physical Exam:    Constitutional: Vital signs reviewed. Appears well-developed and well-nourished. No acute distress.   Eyes: Sclera white, conjunctivae clear.   ENT: Multiple decayed lower teeth. Right lower incisor top is broken off and obviously decayed. External ears  normal. Hearing normal. Lips are normal.   Neck: Neck supple.   Pulmonary/Chest: Respirations non-labored.   Musculoskeletal: Normal gait. No muscular atrophy or weakness.  Neurological: Alert and oriented to person, place, and time. Muscle tone normal. Coordination normal.   Skin: No rashes or lesions. Warm, dry, normal turgor.  Psychiatric: Normal mood and affect. Behavior is normal. Judgment and thought content normal.       Assessment / Plan:    1. Tooth infection  - amoxicillin (AMOXIL) 875 MG tablet; 1 TAB TWICE A DAY X 10 DAYS.  Dispense: 20 Tab; Refill: 0  - HYDROcodone-acetaminophen (NORCO) 5-325 MG Tab per tablet; 1 TAB EVERY 6 HOURS ONLY IF NEEDED FOR PAIN FOR UP TO 3 DAYS. MAY CAUSE DROWSINESS.  Dispense: 12 Tab; Refill: 0  - CONSENT FOR OPIATE PRESCRIPTION      Discussed with him DDX and management options.     Agreeable to medications prescribed.     report checked - last Rx was Zolpidem 5 mg #30 filled on 5/10/18. Last narcotic Rx was Hydrocodone-APAP 5-325 mg #30 on 5/19/17.    In my professional judgment, after considering each of the factors set forth in , the CS is medically necessary and appropriate.    Advised to see Dentist for definitive treatment. He will check with more dentists.    May return to urgent care if needed prior to seeing Dentist.

## 2018-06-22 ENCOUNTER — APPOINTMENT (OUTPATIENT)
Dept: MEDICAL GROUP | Facility: CLINIC | Age: 73
End: 2018-06-22
Payer: MEDICARE

## 2018-06-26 ENCOUNTER — HOSPITAL ENCOUNTER (OUTPATIENT)
Facility: MEDICAL CENTER | Age: 73
End: 2018-06-26
Attending: NURSE PRACTITIONER
Payer: MEDICARE

## 2018-06-26 ENCOUNTER — NON-PROVIDER VISIT (OUTPATIENT)
Dept: MEDICAL GROUP | Facility: CLINIC | Age: 73
End: 2018-06-26
Payer: MEDICARE

## 2018-06-26 DIAGNOSIS — R73.03 PRE-DIABETES: ICD-10-CM

## 2018-06-26 DIAGNOSIS — R31.0 GROSS HEMATURIA: ICD-10-CM

## 2018-06-26 DIAGNOSIS — N18.30 STAGE 3 CHRONIC KIDNEY DISEASE (HCC): ICD-10-CM

## 2018-06-26 DIAGNOSIS — E66.9 OBESITY (BMI 30-39.9): ICD-10-CM

## 2018-06-26 LAB
ALBUMIN SERPL BCP-MCNC: 3.9 G/DL (ref 3.2–4.9)
ALBUMIN/GLOB SERPL: 1 G/DL
ALP SERPL-CCNC: 87 U/L (ref 30–99)
ALT SERPL-CCNC: 20 U/L (ref 2–50)
ANION GAP SERPL CALC-SCNC: 10 MMOL/L (ref 0–11.9)
AST SERPL-CCNC: 23 U/L (ref 12–45)
BILIRUB SERPL-MCNC: 0.3 MG/DL (ref 0.1–1.5)
BUN SERPL-MCNC: 29 MG/DL (ref 8–22)
CALCIUM SERPL-MCNC: 9.1 MG/DL (ref 8.5–10.5)
CHLORIDE SERPL-SCNC: 110 MMOL/L (ref 96–112)
CHOLEST SERPL-MCNC: 117 MG/DL (ref 100–199)
CO2 SERPL-SCNC: 16 MMOL/L (ref 20–33)
CREAT SERPL-MCNC: 1.48 MG/DL (ref 0.5–1.4)
EST. AVERAGE GLUCOSE BLD GHB EST-MCNC: 123 MG/DL
GLOBULIN SER CALC-MCNC: 3.8 G/DL (ref 1.9–3.5)
GLUCOSE SERPL-MCNC: 109 MG/DL (ref 65–99)
HBA1C MFR BLD: 5.9 % (ref 0–5.6)
HDLC SERPL-MCNC: 26 MG/DL
LDLC SERPL CALC-MCNC: 55 MG/DL
POTASSIUM SERPL-SCNC: 4.2 MMOL/L (ref 3.6–5.5)
PROT SERPL-MCNC: 7.7 G/DL (ref 6–8.2)
SODIUM SERPL-SCNC: 136 MMOL/L (ref 135–145)
TRIGL SERPL-MCNC: 179 MG/DL (ref 0–149)

## 2018-06-26 PROCEDURE — 84154 ASSAY OF PSA FREE: CPT

## 2018-06-26 PROCEDURE — 36415 COLL VENOUS BLD VENIPUNCTURE: CPT | Performed by: NURSE PRACTITIONER

## 2018-06-26 PROCEDURE — 84153 ASSAY OF PSA TOTAL: CPT

## 2018-06-26 PROCEDURE — 80053 COMPREHEN METABOLIC PANEL: CPT

## 2018-06-26 PROCEDURE — 83036 HEMOGLOBIN GLYCOSYLATED A1C: CPT

## 2018-06-26 PROCEDURE — 80061 LIPID PANEL: CPT

## 2018-06-26 PROCEDURE — 99000 SPECIMEN HANDLING OFFICE-LAB: CPT | Performed by: NURSE PRACTITIONER

## 2018-06-28 LAB
PSA FREE MFR SERPL: 17 %
PSA FREE SERPL-MCNC: 0.4 NG/ML
PSA SERPL-MCNC: 2.3 NG/ML (ref 0–4)

## 2018-07-03 ENCOUNTER — OFFICE VISIT (OUTPATIENT)
Dept: MEDICAL GROUP | Facility: CLINIC | Age: 73
End: 2018-07-03
Payer: MEDICARE

## 2018-07-03 VITALS
SYSTOLIC BLOOD PRESSURE: 128 MMHG | BODY MASS INDEX: 33.18 KG/M2 | HEART RATE: 106 BPM | OXYGEN SATURATION: 96 % | WEIGHT: 245 LBS | HEIGHT: 72 IN | TEMPERATURE: 98.2 F | DIASTOLIC BLOOD PRESSURE: 66 MMHG | RESPIRATION RATE: 18 BRPM

## 2018-07-03 DIAGNOSIS — K08.89 TOOTH PAIN: ICD-10-CM

## 2018-07-03 DIAGNOSIS — B34.9 VIRAL ILLNESS: ICD-10-CM

## 2018-07-03 DIAGNOSIS — R73.03 PRE-DIABETES: ICD-10-CM

## 2018-07-03 DIAGNOSIS — E66.9 OBESITY (BMI 30-39.9): ICD-10-CM

## 2018-07-03 DIAGNOSIS — Z12.11 SCREENING FOR COLON CANCER: ICD-10-CM

## 2018-07-03 DIAGNOSIS — N18.30 STAGE 3 CHRONIC KIDNEY DISEASE (HCC): ICD-10-CM

## 2018-07-03 PROCEDURE — 99214 OFFICE O/P EST MOD 30 MIN: CPT | Performed by: NURSE PRACTITIONER

## 2018-07-03 RX ORDER — AMOXICILLIN AND CLAVULANATE POTASSIUM 875; 125 MG/1; MG/1
1 TABLET, FILM COATED ORAL 2 TIMES DAILY
Qty: 20 TAB | Refills: 0 | Status: ON HOLD | OUTPATIENT
Start: 2018-07-03 | End: 2018-07-09

## 2018-07-03 RX ORDER — HYDROCODONE BITARTRATE AND ACETAMINOPHEN 5; 325 MG/1; MG/1
1-2 TABLET ORAL EVERY 4 HOURS PRN
Qty: 20 TAB | Refills: 0 | Status: SHIPPED | OUTPATIENT
Start: 2018-07-03 | End: 2018-07-10

## 2018-07-03 ASSESSMENT — PAIN SCALES - GENERAL: PAINLEVEL: 9=SEVERE PAIN

## 2018-07-03 NOTE — ASSESSMENT & PLAN NOTE
Most recent labs show mildly worsening creatinine and GFR.  He does not see nephrology.  He reports currently normal urine output and no hematuria.

## 2018-07-03 NOTE — PROGRESS NOTES
Subjective:     Chidi Tatum is a 72 y.o. male here today for new onset upper respiratory symptoms, tooth pain and to review recent labs.     This is a new problem.   Symptoms include   Cough   Headache  Tooth pain      Patient denies the following symptoms:  SOB   Nausea/Vomiting   Diarrhea   Chills/body aches   Congestion   Sore throat   Sinus pressure   Ear pain      Onset 3 days, 1 day tooth pain    Progression since onset: worsening    Fever: No   Treatments tried: none   Family members/coworker sick with similar symptoms: no    Past Respiratory hx: No significant past medical history   Smoker status:   History   Smoking Status   • Never Smoker   Smokeless Tobacco   • Never Used       Pre-diabetes  Most recent A1c 5.9.  Stable.    Stage 3 chronic kidney disease  Most recent labs show mildly worsening creatinine and GFR.  He does not see nephrology.  He reports currently normal urine output and no hematuria.    Tooth pain  This is a new problem.  Patient reports that a few days ago he ended up with a cold.  Complained of cough and headache.  Today he woke up this morning with pain in 1 of his teeth on the lower right side.  He has been trying to see a dentist, unfortunately, having trouble finding a dentist will take Medicare.  He denies any chills or fever.         Current medicines (including changes today)  Current Outpatient Prescriptions   Medication Sig Dispense Refill   • amoxicillin-clavulanate (AUGMENTIN) 875-125 MG Tab Take 1 Tab by mouth 2 times a day for 10 days. 20 Tab 0   • HYDROcodone-acetaminophen (NORCO) 5-325 MG Tab per tablet Take 1-2 Tabs by mouth every four hours as needed for up to 7 days. 20 Tab 0   • zolpidem (AMBIEN) 5 MG Tab TAKE ONE TABLET BY MOUTH ONCE DAILY AT BEDTIME AS NEEDED FOR SLEEP FOR 30 DAYS 30 Tab 2   • pantoprazole (PROTONIX) 40 MG Tablet Delayed Response Take 1 Tab by mouth every day. 30 Tab 5   • sertraline (ZOLOFT) 25 MG tablet Take 1 Tab by mouth every day. 30 Tab 1    • hydroCHLOROthiazide (HYDRODIURIL) 25 MG Tab Take 1 Tab by mouth every 48 hours. 45 Tab 1   • tamsulosin (FLOMAX) 0.4 MG capsule Take 1 Cap by mouth ONE-HALF HOUR AFTER BREAKFAST. 90 Cap 2   • finasteride (PROSCAR) 5 MG Tab TAKE ONE TABLET BY MOUTH ONCE DAILY 90 Tab 2   • lisinopril (PRINIVIL) 10 MG Tab TAKE ONE TABLET BY MOUTH ONCE DAILY *REPLACES  5MG  TABLET* 90 Tab 1     No current facility-administered medications for this visit.        He  has a past medical history of BPH (benign prostatic hyperplasia); GERD (gastroesophageal reflux disease); HTN (hypertension); Hypertension; and Insomnia.    He  has a past surgical history that includes hernia repair and temporal artery biopsy (Right, 2/9/2018).     Social History     Social History   • Marital status:      Spouse name: N/A   • Number of children: N/A   • Years of education: N/A     Social History Main Topics   • Smoking status: Never Smoker   • Smokeless tobacco: Never Used   • Alcohol use No   • Drug use: No   • Sexual activity: No     Other Topics Concern   • Not on file     Social History Narrative   • No narrative on file       Family History   Problem Relation Age of Onset   • Lung Disease Father    • Alcohol/Drug Brother    • Lung Disease Brother          ROS  Positive for   Cough   Headache  Tooth pain      No fever, chills, weight loss.   No rash.   No eye redness, eye pain.   Negative for SOB, wheezing.    No chest pain, palpitations, dizziness.   No abdominal pain.     All other systems reviewed and are negative.        Objective:     Blood pressure 128/66, pulse (!) 106, temperature 36.8 °C (98.2 °F), resp. rate 18, height 1.829 m (6'), weight 111.1 kg (245 lb), SpO2 96 %. Body mass index is 33.23 kg/m².    Physical Exam:   Constitutional: Alert, no distress. Patient not toxic or lethargic.   Eye: Equal, round and reactive, conjunctiva clear, lids normal.   ENMT: Lips without lesions, oropharynx clear.   TMs normal, without erythema.    No nasal drainage, normal appearance of external nose.   No pain with palpation of sinuses   Poor dentition, noted minimal pus on a decaying tooth, R lower, black in color   Neck: Trachea midline, no masses. No cervical or supraclavicular lymphadenopathy  Respiratory: Unlabored respiratory effort, lungs clear to auscultation, no wheezes, no ronchi.  Cardiovascular: Normal S1, S2, no murmur, no edema.   Abdomen: Soft, non-tender, no masses, no hepatosplenomegaly. Normal bowel sounds.   Skin: Warm, dry, good turgor, no rashes in visible areas.  Psych: Alert and oriented x3, normal affect and mood.        Assessment and Plan:   The following treatment plan was discussed    1. Viral illness  I suspect patient came down with a viral illness and tooth infection may be secondary.    2. Tooth pain  He did have recent amoxicillin use for unrelated illness, will treat with Augmentin.  Discussed at length with patient signs and symptoms to seek emergent care.  He is in a lot of pain, will provide short course of Norco.  Discussed risks of this medication, consent reviewed and obtained.  - amoxicillin-clavulanate (AUGMENTIN) 875-125 MG Tab; Take 1 Tab by mouth 2 times a day for 10 days.  Dispense: 20 Tab; Refill: 0  - HYDROcodone-acetaminophen (NORCO) 5-325 MG Tab per tablet; Take 1-2 Tabs by mouth every four hours as needed for up to 7 days.  Dispense: 20 Tab; Refill: 0  - Consent for Opiate Prescription    3. Stage 3 chronic kidney disease  Discussed with patient signs and symptoms to obtain emergent care.  Discussed medications to avoid.  At this time we will continue to monitor and plan to check in 3 months.  - COMP METABOLIC PANEL; Future  - ESTIMATED GFR; Future    4. Pre-diabetes  Stable.    5. Obesity (BMI 30-39.9)  - Patient identified as having weight management issue.  Appropriate orders and counseling given.    6. Screening for colon cancer  - OCCULT BLOOD FECES IMMUNOASSAY; Future       Advised patient to rest,  increase fluids   Sinus rinse as needed for congestion   Salt water gargle for sore throat as needed   Discussed s/s to seek emergent care.  RTC if symptoms worsen or do not resolve.     Reviewed indication, dosage, usage and potential adverse effects of prescribed medications. Patient appears to understand, verbalizes understanding and is willing to try medications as prescribed.      Reviewed risks and benefits of treatment plan. Patient verbally agrees to plan of care.       Followup: Return in about 3 months (around 10/3/2018), or if symptoms worsen or fail to improve.    [email or web address].     PLEASE NOTE: This dictation was created using voice recognition software. I have made every reasonable attempt to correct obvious errors, but I expect that there may be errors of grammar and possibly content that I did not discover prior finalizing this note.

## 2018-07-03 NOTE — ASSESSMENT & PLAN NOTE
This is a new problem.  Patient reports that a few days ago he ended up with a cold.  Complained of cough and headache.  Today he woke up this morning with pain in 1 of his teeth on the lower right side.  He has been trying to see a dentist, unfortunately, having trouble finding a dentist will take Medicare.  He denies any chills or fever.

## 2018-07-06 ENCOUNTER — APPOINTMENT (OUTPATIENT)
Dept: RADIOLOGY | Facility: MEDICAL CENTER | Age: 73
DRG: 389 | End: 2018-07-06
Attending: EMERGENCY MEDICINE
Payer: MEDICARE

## 2018-07-06 ENCOUNTER — HOSPITAL ENCOUNTER (INPATIENT)
Facility: MEDICAL CENTER | Age: 73
LOS: 2 days | DRG: 389 | End: 2018-07-09
Attending: EMERGENCY MEDICINE | Admitting: HOSPITALIST
Payer: MEDICARE

## 2018-07-06 DIAGNOSIS — K56.609 SMALL BOWEL OBSTRUCTION (HCC): ICD-10-CM

## 2018-07-06 DIAGNOSIS — N39.0 URINARY TRACT INFECTION WITH HEMATURIA, SITE UNSPECIFIED: ICD-10-CM

## 2018-07-06 DIAGNOSIS — R31.9 URINARY TRACT INFECTION WITH HEMATURIA, SITE UNSPECIFIED: ICD-10-CM

## 2018-07-06 DIAGNOSIS — N30.01 ACUTE CYSTITIS WITH HEMATURIA: ICD-10-CM

## 2018-07-06 DIAGNOSIS — R31.9 HEMATURIA, UNSPECIFIED TYPE: ICD-10-CM

## 2018-07-06 LAB
ALBUMIN SERPL BCP-MCNC: 4 G/DL (ref 3.2–4.9)
ALBUMIN/GLOB SERPL: 1.1 G/DL
ALP SERPL-CCNC: 80 U/L (ref 30–99)
ALT SERPL-CCNC: 19 U/L (ref 2–50)
ANION GAP SERPL CALC-SCNC: 10 MMOL/L (ref 0–11.9)
APPEARANCE UR: ABNORMAL
AST SERPL-CCNC: 32 U/L (ref 12–45)
BACTERIA #/AREA URNS HPF: ABNORMAL /HPF
BASOPHILS # BLD AUTO: 0.3 % (ref 0–1.8)
BASOPHILS # BLD: 0.03 K/UL (ref 0–0.12)
BILIRUB SERPL-MCNC: 0.4 MG/DL (ref 0.1–1.5)
BILIRUB UR QL STRIP.AUTO: NEGATIVE
BUN SERPL-MCNC: 22 MG/DL (ref 8–22)
CALCIUM SERPL-MCNC: 9.3 MG/DL (ref 8.5–10.5)
CHLORIDE SERPL-SCNC: 108 MMOL/L (ref 96–112)
CO2 SERPL-SCNC: 19 MMOL/L (ref 20–33)
COLOR UR: YELLOW
CREAT SERPL-MCNC: 1.36 MG/DL (ref 0.5–1.4)
EKG IMPRESSION: NORMAL
EOSINOPHIL # BLD AUTO: 0.32 K/UL (ref 0–0.51)
EOSINOPHIL NFR BLD: 2.7 % (ref 0–6.9)
EPI CELLS #/AREA URNS HPF: NEGATIVE /HPF
ERYTHROCYTE [DISTWIDTH] IN BLOOD BY AUTOMATED COUNT: 42.7 FL (ref 35.9–50)
GLOBULIN SER CALC-MCNC: 3.7 G/DL (ref 1.9–3.5)
GLUCOSE SERPL-MCNC: 143 MG/DL (ref 65–99)
GLUCOSE UR STRIP.AUTO-MCNC: NEGATIVE MG/DL
HCT VFR BLD AUTO: 38.1 % (ref 42–52)
HGB BLD-MCNC: 12.6 G/DL (ref 14–18)
HYALINE CASTS #/AREA URNS LPF: ABNORMAL /LPF
IMM GRANULOCYTES # BLD AUTO: 0.07 K/UL (ref 0–0.11)
IMM GRANULOCYTES NFR BLD AUTO: 0.6 % (ref 0–0.9)
KETONES UR STRIP.AUTO-MCNC: NEGATIVE MG/DL
LACTATE BLD-SCNC: 2.3 MMOL/L (ref 0.5–2)
LEUKOCYTE ESTERASE UR QL STRIP.AUTO: ABNORMAL
LIPASE SERPL-CCNC: 34 U/L (ref 11–82)
LYMPHOCYTES # BLD AUTO: 1.45 K/UL (ref 1–4.8)
LYMPHOCYTES NFR BLD: 12.3 % (ref 22–41)
MCH RBC QN AUTO: 27.1 PG (ref 27–33)
MCHC RBC AUTO-ENTMCNC: 33.1 G/DL (ref 33.7–35.3)
MCV RBC AUTO: 81.9 FL (ref 81.4–97.8)
MICRO URNS: ABNORMAL
MONOCYTES # BLD AUTO: 0.53 K/UL (ref 0–0.85)
MONOCYTES NFR BLD AUTO: 4.5 % (ref 0–13.4)
NEUTROPHILS # BLD AUTO: 9.39 K/UL (ref 1.82–7.42)
NEUTROPHILS NFR BLD: 79.6 % (ref 44–72)
NITRITE UR QL STRIP.AUTO: NEGATIVE
NRBC # BLD AUTO: 0 K/UL
NRBC BLD-RTO: 0 /100 WBC
PH UR STRIP.AUTO: 5 [PH]
PLATELET # BLD AUTO: 280 K/UL (ref 164–446)
PMV BLD AUTO: 9.4 FL (ref 9–12.9)
POTASSIUM SERPL-SCNC: 4.9 MMOL/L (ref 3.6–5.5)
PROT SERPL-MCNC: 7.7 G/DL (ref 6–8.2)
PROT UR QL STRIP: 100 MG/DL
RBC # BLD AUTO: 4.65 M/UL (ref 4.7–6.1)
RBC # URNS HPF: >150 /HPF
RBC UR QL AUTO: ABNORMAL
SODIUM SERPL-SCNC: 137 MMOL/L (ref 135–145)
SP GR UR STRIP.AUTO: 1.02
TROPONIN I SERPL-MCNC: <0.01 NG/ML (ref 0–0.04)
UROBILINOGEN UR STRIP.AUTO-MCNC: 0.2 MG/DL
WBC # BLD AUTO: 11.8 K/UL (ref 4.8–10.8)
WBC #/AREA URNS HPF: ABNORMAL /HPF

## 2018-07-06 PROCEDURE — 83690 ASSAY OF LIPASE: CPT

## 2018-07-06 PROCEDURE — 87086 URINE CULTURE/COLONY COUNT: CPT

## 2018-07-06 PROCEDURE — 96375 TX/PRO/DX INJ NEW DRUG ADDON: CPT

## 2018-07-06 PROCEDURE — 94760 N-INVAS EAR/PLS OXIMETRY 1: CPT

## 2018-07-06 PROCEDURE — 96374 THER/PROPH/DIAG INJ IV PUSH: CPT

## 2018-07-06 PROCEDURE — 99285 EMERGENCY DEPT VISIT HI MDM: CPT

## 2018-07-06 PROCEDURE — 700105 HCHG RX REV CODE 258: Performed by: EMERGENCY MEDICINE

## 2018-07-06 PROCEDURE — 81001 URINALYSIS AUTO W/SCOPE: CPT

## 2018-07-06 PROCEDURE — 83605 ASSAY OF LACTIC ACID: CPT

## 2018-07-06 PROCEDURE — 700111 HCHG RX REV CODE 636 W/ 250 OVERRIDE (IP): Performed by: EMERGENCY MEDICINE

## 2018-07-06 PROCEDURE — 84484 ASSAY OF TROPONIN QUANT: CPT

## 2018-07-06 PROCEDURE — 36415 COLL VENOUS BLD VENIPUNCTURE: CPT

## 2018-07-06 PROCEDURE — 80053 COMPREHEN METABOLIC PANEL: CPT

## 2018-07-06 PROCEDURE — 85025 COMPLETE CBC W/AUTO DIFF WBC: CPT

## 2018-07-06 PROCEDURE — 93005 ELECTROCARDIOGRAM TRACING: CPT

## 2018-07-06 PROCEDURE — 93005 ELECTROCARDIOGRAM TRACING: CPT | Performed by: EMERGENCY MEDICINE

## 2018-07-06 PROCEDURE — 304538 HCHG NG TUBE

## 2018-07-06 RX ORDER — MORPHINE SULFATE 4 MG/ML
4 INJECTION, SOLUTION INTRAMUSCULAR; INTRAVENOUS ONCE
Status: COMPLETED | OUTPATIENT
Start: 2018-07-06 | End: 2018-07-06

## 2018-07-06 RX ORDER — SODIUM CHLORIDE 9 MG/ML
1000 INJECTION, SOLUTION INTRAVENOUS ONCE
Status: COMPLETED | OUTPATIENT
Start: 2018-07-06 | End: 2018-07-06

## 2018-07-06 RX ORDER — ONDANSETRON 2 MG/ML
4 INJECTION INTRAMUSCULAR; INTRAVENOUS ONCE
Status: COMPLETED | OUTPATIENT
Start: 2018-07-06 | End: 2018-07-06

## 2018-07-06 RX ADMIN — SODIUM CHLORIDE 1000 ML: 9 INJECTION, SOLUTION INTRAVENOUS at 22:21

## 2018-07-06 RX ADMIN — MORPHINE SULFATE 4 MG: 4 INJECTION INTRAVENOUS at 22:24

## 2018-07-06 RX ADMIN — ONDANSETRON 4 MG: 2 INJECTION, SOLUTION INTRAMUSCULAR; INTRAVENOUS at 22:24

## 2018-07-06 ASSESSMENT — PAIN SCALES - GENERAL
PAINLEVEL_OUTOF10: 7
PAINLEVEL_OUTOF10: 9

## 2018-07-06 ASSESSMENT — LIFESTYLE VARIABLES: DO YOU DRINK ALCOHOL: NO

## 2018-07-07 ENCOUNTER — APPOINTMENT (OUTPATIENT)
Dept: RADIOLOGY | Facility: MEDICAL CENTER | Age: 73
DRG: 389 | End: 2018-07-07
Attending: EMERGENCY MEDICINE
Payer: MEDICARE

## 2018-07-07 PROBLEM — D64.9 ANEMIA: Status: ACTIVE | Noted: 2018-07-07

## 2018-07-07 PROBLEM — K56.609 SMALL BOWEL OBSTRUCTION (HCC): Status: ACTIVE | Noted: 2018-07-07

## 2018-07-07 PROBLEM — N39.0 UTI (URINARY TRACT INFECTION): Status: ACTIVE | Noted: 2018-07-07

## 2018-07-07 PROBLEM — E87.20 LACTIC ACIDOSIS: Status: ACTIVE | Noted: 2018-07-07

## 2018-07-07 PROCEDURE — 94760 N-INVAS EAR/PLS OXIMETRY 1: CPT

## 2018-07-07 PROCEDURE — 99223 1ST HOSP IP/OBS HIGH 75: CPT | Mod: AI | Performed by: HOSPITALIST

## 2018-07-07 PROCEDURE — 700111 HCHG RX REV CODE 636 W/ 250 OVERRIDE (IP): Performed by: HOSPITALIST

## 2018-07-07 PROCEDURE — 96376 TX/PRO/DX INJ SAME DRUG ADON: CPT

## 2018-07-07 PROCEDURE — 700102 HCHG RX REV CODE 250 W/ 637 OVERRIDE(OP): Performed by: HOSPITALIST

## 2018-07-07 PROCEDURE — 770006 HCHG ROOM/CARE - MED/SURG/GYN SEMI*

## 2018-07-07 PROCEDURE — 74177 CT ABD & PELVIS W/CONTRAST: CPT

## 2018-07-07 PROCEDURE — 700111 HCHG RX REV CODE 636 W/ 250 OVERRIDE (IP): Performed by: INTERNAL MEDICINE

## 2018-07-07 PROCEDURE — 700105 HCHG RX REV CODE 258: Performed by: EMERGENCY MEDICINE

## 2018-07-07 PROCEDURE — A9270 NON-COVERED ITEM OR SERVICE: HCPCS | Performed by: HOSPITALIST

## 2018-07-07 PROCEDURE — 700111 HCHG RX REV CODE 636 W/ 250 OVERRIDE (IP): Performed by: EMERGENCY MEDICINE

## 2018-07-07 PROCEDURE — 304538 HCHG NG TUBE

## 2018-07-07 PROCEDURE — 700105 HCHG RX REV CODE 258: Performed by: HOSPITALIST

## 2018-07-07 RX ORDER — OMEPRAZOLE 20 MG/1
20 CAPSULE, DELAYED RELEASE ORAL DAILY
Status: DISCONTINUED | OUTPATIENT
Start: 2018-07-07 | End: 2018-07-09 | Stop reason: HOSPADM

## 2018-07-07 RX ORDER — CEFTRIAXONE SODIUM 2 G/50ML
2 INJECTION, SOLUTION INTRAVENOUS ONCE
Status: COMPLETED | OUTPATIENT
Start: 2018-07-07 | End: 2018-07-07

## 2018-07-07 RX ORDER — ONDANSETRON 4 MG/1
4 TABLET, ORALLY DISINTEGRATING ORAL EVERY 4 HOURS PRN
Status: DISCONTINUED | OUTPATIENT
Start: 2018-07-07 | End: 2018-07-09 | Stop reason: HOSPADM

## 2018-07-07 RX ORDER — AMOXICILLIN 250 MG
2 CAPSULE ORAL 2 TIMES DAILY
Status: DISCONTINUED | OUTPATIENT
Start: 2018-07-07 | End: 2018-07-09 | Stop reason: HOSPADM

## 2018-07-07 RX ORDER — MORPHINE SULFATE 4 MG/ML
4 INJECTION, SOLUTION INTRAMUSCULAR; INTRAVENOUS EVERY 4 HOURS PRN
Status: DISCONTINUED | OUTPATIENT
Start: 2018-07-07 | End: 2018-07-09 | Stop reason: HOSPADM

## 2018-07-07 RX ORDER — HEPARIN SODIUM 5000 [USP'U]/ML
5000 INJECTION, SOLUTION INTRAVENOUS; SUBCUTANEOUS EVERY 8 HOURS
Status: DISCONTINUED | OUTPATIENT
Start: 2018-07-07 | End: 2018-07-09 | Stop reason: HOSPADM

## 2018-07-07 RX ORDER — ZOLPIDEM TARTRATE 5 MG/1
5 TABLET ORAL NIGHTLY PRN
Status: DISCONTINUED | OUTPATIENT
Start: 2018-07-07 | End: 2018-07-09 | Stop reason: HOSPADM

## 2018-07-07 RX ORDER — ACETAMINOPHEN 325 MG/1
650 TABLET ORAL EVERY 6 HOURS PRN
Status: DISCONTINUED | OUTPATIENT
Start: 2018-07-07 | End: 2018-07-09 | Stop reason: HOSPADM

## 2018-07-07 RX ORDER — HYDROCHLOROTHIAZIDE 25 MG/1
25 TABLET ORAL
Status: DISCONTINUED | OUTPATIENT
Start: 2018-07-07 | End: 2018-07-09 | Stop reason: HOSPADM

## 2018-07-07 RX ORDER — POLYETHYLENE GLYCOL 3350 17 G/17G
1 POWDER, FOR SOLUTION ORAL
Status: DISCONTINUED | OUTPATIENT
Start: 2018-07-07 | End: 2018-07-09 | Stop reason: HOSPADM

## 2018-07-07 RX ORDER — PANTOPRAZOLE SODIUM 40 MG/1
40 TABLET, DELAYED RELEASE ORAL DAILY
Status: DISCONTINUED | OUTPATIENT
Start: 2018-07-07 | End: 2018-07-07

## 2018-07-07 RX ORDER — FINASTERIDE 5 MG/1
5 TABLET, FILM COATED ORAL DAILY
Status: DISCONTINUED | OUTPATIENT
Start: 2018-07-07 | End: 2018-07-09 | Stop reason: HOSPADM

## 2018-07-07 RX ORDER — SODIUM CHLORIDE 9 MG/ML
1000 INJECTION, SOLUTION INTRAVENOUS ONCE
Status: COMPLETED | OUTPATIENT
Start: 2018-07-07 | End: 2018-07-07

## 2018-07-07 RX ORDER — BISACODYL 10 MG
10 SUPPOSITORY, RECTAL RECTAL
Status: DISCONTINUED | OUTPATIENT
Start: 2018-07-07 | End: 2018-07-09 | Stop reason: HOSPADM

## 2018-07-07 RX ORDER — SODIUM CHLORIDE 9 MG/ML
INJECTION, SOLUTION INTRAVENOUS CONTINUOUS
Status: DISCONTINUED | OUTPATIENT
Start: 2018-07-07 | End: 2018-07-09

## 2018-07-07 RX ORDER — CEFTRIAXONE SODIUM 2 G/50ML
2 INJECTION, SOLUTION INTRAVENOUS EVERY 24 HOURS
Status: DISCONTINUED | OUTPATIENT
Start: 2018-07-08 | End: 2018-07-09 | Stop reason: HOSPADM

## 2018-07-07 RX ORDER — LISINOPRIL 10 MG/1
10 TABLET ORAL
Status: DISCONTINUED | OUTPATIENT
Start: 2018-07-07 | End: 2018-07-09 | Stop reason: HOSPADM

## 2018-07-07 RX ORDER — ONDANSETRON 2 MG/ML
4 INJECTION INTRAMUSCULAR; INTRAVENOUS EVERY 4 HOURS PRN
Status: DISCONTINUED | OUTPATIENT
Start: 2018-07-07 | End: 2018-07-09 | Stop reason: HOSPADM

## 2018-07-07 RX ORDER — TAMSULOSIN HYDROCHLORIDE 0.4 MG/1
0.4 CAPSULE ORAL
Status: DISCONTINUED | OUTPATIENT
Start: 2018-07-07 | End: 2018-07-09 | Stop reason: HOSPADM

## 2018-07-07 RX ORDER — HYDRALAZINE HYDROCHLORIDE 20 MG/ML
10 INJECTION INTRAMUSCULAR; INTRAVENOUS EVERY 6 HOURS PRN
Status: DISCONTINUED | OUTPATIENT
Start: 2018-07-07 | End: 2018-07-07

## 2018-07-07 RX ORDER — LABETALOL HYDROCHLORIDE 5 MG/ML
10 INJECTION, SOLUTION INTRAVENOUS EVERY 6 HOURS PRN
Status: DISCONTINUED | OUTPATIENT
Start: 2018-07-07 | End: 2018-07-09 | Stop reason: HOSPADM

## 2018-07-07 RX ORDER — MORPHINE SULFATE 4 MG/ML
4 INJECTION, SOLUTION INTRAMUSCULAR; INTRAVENOUS ONCE
Status: COMPLETED | OUTPATIENT
Start: 2018-07-07 | End: 2018-07-07

## 2018-07-07 RX ADMIN — CEFTRIAXONE SODIUM 2 G: 2 INJECTION, SOLUTION INTRAVENOUS at 05:26

## 2018-07-07 RX ADMIN — HEPARIN SODIUM 5000 UNITS: 5000 INJECTION, SOLUTION INTRAVENOUS; SUBCUTANEOUS at 07:20

## 2018-07-07 RX ADMIN — MORPHINE SULFATE 4 MG: 4 INJECTION INTRAVENOUS at 16:51

## 2018-07-07 RX ADMIN — SODIUM CHLORIDE 1000 ML: 9 INJECTION, SOLUTION INTRAVENOUS at 04:42

## 2018-07-07 RX ADMIN — SODIUM CHLORIDE: 9 INJECTION, SOLUTION INTRAVENOUS at 22:38

## 2018-07-07 RX ADMIN — ONDANSETRON 4 MG: 2 INJECTION INTRAMUSCULAR; INTRAVENOUS at 10:11

## 2018-07-07 RX ADMIN — HEPARIN SODIUM 5000 UNITS: 5000 INJECTION, SOLUTION INTRAVENOUS; SUBCUTANEOUS at 15:03

## 2018-07-07 RX ADMIN — SODIUM CHLORIDE: 9 INJECTION, SOLUTION INTRAVENOUS at 09:56

## 2018-07-07 RX ADMIN — MORPHINE SULFATE 4 MG: 4 INJECTION INTRAVENOUS at 09:55

## 2018-07-07 RX ADMIN — MORPHINE SULFATE 4 MG: 4 INJECTION INTRAVENOUS at 00:03

## 2018-07-07 RX ADMIN — HEPARIN SODIUM 5000 UNITS: 5000 INJECTION, SOLUTION INTRAVENOUS; SUBCUTANEOUS at 22:38

## 2018-07-07 ASSESSMENT — COPD QUESTIONNAIRES
COPD SCREENING SCORE: 2
HAVE YOU SMOKED AT LEAST 100 CIGARETTES IN YOUR ENTIRE LIFE: NO/DON'T KNOW
DO YOU EVER COUGH UP ANY MUCUS OR PHLEGM?: NO/ONLY WITH OCCASIONAL COLDS OR INFECTIONS
DURING THE PAST 4 WEEKS HOW MUCH DID YOU FEEL SHORT OF BREATH: NONE/LITTLE OF THE TIME

## 2018-07-07 ASSESSMENT — COGNITIVE AND FUNCTIONAL STATUS - GENERAL
MOBILITY SCORE: 24
HELP NEEDED FOR BATHING: A LITTLE
SUGGESTED CMS G CODE MODIFIER DAILY ACTIVITY: CI
DAILY ACTIVITIY SCORE: 23
SUGGESTED CMS G CODE MODIFIER MOBILITY: CH

## 2018-07-07 ASSESSMENT — LIFESTYLE VARIABLES
EVER_SMOKED: NEVER
EVER_SMOKED: NEVER
SUBSTANCE_ABUSE: 0

## 2018-07-07 ASSESSMENT — ENCOUNTER SYMPTOMS
BRUISES/BLEEDS EASILY: 0
WEAKNESS: 0
DOUBLE VISION: 0
NAUSEA: 1
FLANK PAIN: 0
FEVER: 0
SHORTNESS OF BREATH: 0
PALPITATIONS: 0
HEARTBURN: 0
SPEECH CHANGE: 0
HALLUCINATIONS: 0
DIZZINESS: 0
ABDOMINAL PAIN: 1
EYE DISCHARGE: 0
MYALGIAS: 0
CHILLS: 0
HEMOPTYSIS: 0
BLURRED VISION: 0
SENSORY CHANGE: 0
VOMITING: 0
DEPRESSION: 0
FOCAL WEAKNESS: 0
COUGH: 0

## 2018-07-07 ASSESSMENT — PAIN SCALES - GENERAL
PAINLEVEL_OUTOF10: 5
PAINLEVEL_OUTOF10: 5
PAINLEVEL_OUTOF10: 6
PAINLEVEL_OUTOF10: 3
PAINLEVEL_OUTOF10: 5
PAINLEVEL_OUTOF10: 1
PAINLEVEL_OUTOF10: 5

## 2018-07-07 NOTE — CARE PLAN
Problem: Bowel/Gastric:  Goal: Normal bowel function is maintained or improved  Outcome: PROGRESSING AS EXPECTED  NGT to low continuous suction and pt is passing gas

## 2018-07-07 NOTE — ED PROVIDER NOTES
"ED Provider Note    Scribed for Jack Encarnacion M.D. by Kal Peters. 7/6/2018, 10:14 PM.    Primary care provider: SAM Moya  Means of arrival: EMS  History obtained from: Patient  History limited by: None    CHIEF COMPLAINT  Chief Complaint   Patient presents with   • Abdominal Pain     mid upper abd pains 9/10 started this am getting more painful throughout the day       HPI  Chidi Tatum is a 72 y.o. male who presents to the Emergency Department via EMS for constant, diffuse abdominal pain onset this morning with associated diarrhea and nausea. He describes the pain as sharp with radiation to the left side of his back. Patient reports similar pains in the past which he describes as \"going to the bathroom when it hurts real bad.\" He experienced a fever 2 days ago and a bowel movement 5 hours ago but he has been unable to pass gas.   Patient confirms a surgical history of a hernia repair.   He is not experiencing chills.    REVIEW OF SYSTEMS  Pertinent positives include abdominal pain, diarrhea, left back pain, nausea, inability to pass gas. Pertinent negatives include chills. All other systems negative. C.    PAST MEDICAL HISTORY   has a past medical history of BPH (benign prostatic hyperplasia); GERD (gastroesophageal reflux disease); HTN (hypertension); Hypertension; and Insomnia.    SURGICAL HISTORY   has a past surgical history that includes hernia repair and temporal artery biopsy (Right, 2/9/2018).    SOCIAL HISTORY  Social History   Substance Use Topics   • Smoking status: Never Smoker   • Smokeless tobacco: Never Used   • Alcohol use No      History   Drug Use No       FAMILY HISTORY  Family History   Problem Relation Age of Onset   • Lung Disease Father    • Alcohol/Drug Brother    • Lung Disease Brother        CURRENT MEDICATIONS    Current Outpatient Prescriptions on File Prior to Encounter   Medication Sig Dispense Refill   • amoxicillin-clavulanate (AUGMENTIN) 875-125 MG " Tab Take 1 Tab by mouth 2 times a day for 10 days. 20 Tab 0   • HYDROcodone-acetaminophen (NORCO) 5-325 MG Tab per tablet Take 1-2 Tabs by mouth every four hours as needed for up to 7 days. 20 Tab 0   • zolpidem (AMBIEN) 5 MG Tab TAKE ONE TABLET BY MOUTH ONCE DAILY AT BEDTIME AS NEEDED FOR SLEEP FOR 30 DAYS 30 Tab 2   • pantoprazole (PROTONIX) 40 MG Tablet Delayed Response Take 1 Tab by mouth every day. 30 Tab 5   • sertraline (ZOLOFT) 25 MG tablet Take 1 Tab by mouth every day. 30 Tab 1   • hydroCHLOROthiazide (HYDRODIURIL) 25 MG Tab Take 1 Tab by mouth every 48 hours. 45 Tab 1   • tamsulosin (FLOMAX) 0.4 MG capsule Take 1 Cap by mouth ONE-HALF HOUR AFTER BREAKFAST. 90 Cap 2   • finasteride (PROSCAR) 5 MG Tab TAKE ONE TABLET BY MOUTH ONCE DAILY 90 Tab 2   • lisinopril (PRINIVIL) 10 MG Tab TAKE ONE TABLET BY MOUTH ONCE DAILY *REPLACES  5MG  TABLET* 90 Tab 1      ALLERGIES  Allergies   Allergen Reactions   • Celebrex [Celecoxib] Rash     .       PHYSICAL EXAM  VITAL SIGNS: /76   Pulse 85   Temp 37 °C (98.6 °F)   Resp 16   Ht 1.829 m (6')   Wt 111.1 kg (245 lb)   SpO2 97%   BMI 33.23 kg/m²     Constitutional: Well developed, Well nourished, mild distress.   HENT: Normocephalic, Atraumatic  Eyes: Conjunctiva normal, No discharge.   Cardiovascular: Normal heart rate, Normal rhythm, No murmurs, equal pulses.   Pulmonary: Normal breath sounds, No respiratory distress, No wheezing, No rales, No rhonchi.  Chest: No chest wall tenderness or deformity.   Abdomen: No masses, no rebound, no guarding. Somewhat decreased bowel sounds, slightly distended. Diffusely tender throughout, no tenderness at McBurney's point, negative Allen's sign  Back: No CVA tenderness.   Musculoskeletal: No major deformities noted, No tenderness.   Skin: Warm, Dry, No erythema, No rash.   Neurologic: Alert & oriented x 3, Normal motor function,  No focal deficits noted.   Psychiatric: Affect normal, Judgment normal, Mood normal.      LABS  Results for orders placed or performed during the hospital encounter of 07/06/18   CBC WITH DIFFERENTIAL   Result Value Ref Range    WBC 11.8 (H) 4.8 - 10.8 K/uL    RBC 4.65 (L) 4.70 - 6.10 M/uL    Hemoglobin 12.6 (L) 14.0 - 18.0 g/dL    Hematocrit 38.1 (L) 42.0 - 52.0 %    MCV 81.9 81.4 - 97.8 fL    MCH 27.1 27.0 - 33.0 pg    MCHC 33.1 (L) 33.7 - 35.3 g/dL    RDW 42.7 35.9 - 50.0 fL    Platelet Count 280 164 - 446 K/uL    MPV 9.4 9.0 - 12.9 fL    Neutrophils-Polys 79.60 (H) 44.00 - 72.00 %    Lymphocytes 12.30 (L) 22.00 - 41.00 %    Monocytes 4.50 0.00 - 13.40 %    Eosinophils 2.70 0.00 - 6.90 %    Basophils 0.30 0.00 - 1.80 %    Immature Granulocytes 0.60 0.00 - 0.90 %    Nucleated RBC 0.00 /100 WBC    Neutrophils (Absolute) 9.39 (H) 1.82 - 7.42 K/uL    Lymphs (Absolute) 1.45 1.00 - 4.80 K/uL    Monos (Absolute) 0.53 0.00 - 0.85 K/uL    Eos (Absolute) 0.32 0.00 - 0.51 K/uL    Baso (Absolute) 0.03 0.00 - 0.12 K/uL    Immature Granulocytes (abs) 0.07 0.00 - 0.11 K/uL    NRBC (Absolute) 0.00 K/uL   COMP METABOLIC PANEL   Result Value Ref Range    Sodium 137 135 - 145 mmol/L    Potassium 4.9 3.6 - 5.5 mmol/L    Chloride 108 96 - 112 mmol/L    Co2 19 (L) 20 - 33 mmol/L    Anion Gap 10.0 0.0 - 11.9    Glucose 143 (H) 65 - 99 mg/dL    Bun 22 8 - 22 mg/dL    Creatinine 1.36 0.50 - 1.40 mg/dL    Calcium 9.3 8.5 - 10.5 mg/dL    AST(SGOT) 32 12 - 45 U/L    ALT(SGPT) 19 2 - 50 U/L    Alkaline Phosphatase 80 30 - 99 U/L    Total Bilirubin 0.4 0.1 - 1.5 mg/dL    Albumin 4.0 3.2 - 4.9 g/dL    Total Protein 7.7 6.0 - 8.2 g/dL    Globulin 3.7 (H) 1.9 - 3.5 g/dL    A-G Ratio 1.1 g/dL   LIPASE   Result Value Ref Range    Lipase 34 11 - 82 U/L   TROPONIN   Result Value Ref Range    Troponin I <0.01 0.00 - 0.04 ng/mL   LACTIC ACID   Result Value Ref Range    Lactic Acid 2.3 (H) 0.5 - 2.0 mmol/L   URINALYSIS (UA)   Result Value Ref Range    Color Yellow     Character Turbid (A)     Specific Gravity 1.023 <1.035    Ph 5.0 5.0  - 8.0    Glucose Negative Negative mg/dL    Ketones Negative Negative mg/dL    Protein 100 (A) Negative mg/dL    Bilirubin Negative Negative    Urobilinogen, Urine 0.2 Negative    Nitrite Negative Negative    Leukocyte Esterase Moderate (A) Negative    Occult Blood Large (A) Negative    Micro Urine Req Microscopic    ESTIMATED GFR   Result Value Ref Range    GFR If African American >60 >60 mL/min/1.73 m 2    GFR If Non African American 52 (A) >60 mL/min/1.73 m 2   URINE MICROSCOPIC (W/UA)   Result Value Ref Range    WBC 20-50 (A) /hpf    RBC >150 (A) /hpf    Bacteria Few (A) None /hpf    Epithelial Cells Negative /hpf    Hyaline Cast 0-2 /lpf   EKG (ER)   Result Value Ref Range    Report       [facility] Emergency Dept.    Test Date:  2018  Pt Name:    RENATA CLEMENT               Department: ER  MRN:        [id number]                      Room:       BL 15  Gender:     Male                         Technician: [id number]  :        1945                   Requested By:ER TRIAGE PROTOCOL  Order #:    [id number]                    Reading MD:    Measurements  Intervals                                Axis  Rate:       79                           P:  ME:                                      QRS:        -13  QRSD:       78                           T:          31  QT:         372  QTc:        427    Interpretive Statements  ACCELERATED JUNCTIONAL RHYTHM  Compared to ECG 2018 19:03:44  Accelerated junctional rhythm now present  Sinus tachycardia no longer present        All labs reviewed by me.    EKG  12 Lead EKG interpreted by me shows a normal sinus rhythm at a rate of 79. Axis normal. No ST elevations. No T wave inversions. Old EKG from 2018 shows no significant changes. Final impression: Sinus rhythm no longer tachycardic.    RADIOLOGY  CT-ABDOMEN-PELVIS WITH   Final Result      Findings compatible with small bowel obstruction with the transition point in the anterior abdomen  slightly to the right of midline.      Bilateral renal cysts including septated cysts in the upper and lower pole of the left kidney for which follow-up is recommended.      Mild bilateral hydronephrosis. Partial UPJ obstruction is not excluded. Further evaluation can be performed with nuclear medicine renogram.      Renal cortical scarring is seen bilaterally.      Nonobstructing left renal calculus.      Atherosclerotic plaque.      Hepatic steatosis.      Splenomegaly.      DX-ABDOMEN FOR TUBE PLACEMENT    (Results Pending)     The radiologist's interpretation of all radiological studies have been reviewed by me.    COURSE & MEDICAL DECISION MAKING  Pertinent Labs & Imaging studies reviewed. (See chart for details)    10:14 PM - Patient seen and examined at bedside. Patient will be treated with Morphine 4 mg IV and Zofran 4 mg IV. The patient will receive IV fluids for renal protection. Ordered CT abdomen-pelvis, CBC, CMP, lipase, troponin, lactate, urinalysis, and EKG to evaluate his symptoms. The differential diagnoses include but are not limited to: bowel obstruction, diverticulitis     Patient was given IV fluids secondary to CT with contrast as well as being kept n.p.o.  He feels better after fluids.    2 AM discussed the case with Dr. Wong general surgery.  He is asked that the hospitalist admit the patient and he will follow along.    2:05 AM discussed the case with Dr. Orona hospitalist he is agreed to admit the patient.    Medical Decision Making: Patient presents with diffuse abdominal pain nausea and abdominal distention at this point time it appears he has a small bowel obstruction.  An NG tube will be placed and we will admit him for observation.  Patient also appears to have a UTI with possible ureteral obstruction.  Patient will be given IV fluids for possible UTI and kept n.p.o.    DISPOSITION:  Patient will be admitted to Dr. Orona in guarded condition.      FINAL IMPRESSION  1. Small bowel  obstruction (HCC)    2. Hematuria, unspecified type    3. Acute cystitis with hematuria          Kal DYE (Scribe), am scribing for, and in the presence of, Jack Encarnacion M.D.    Electronically signed by: Kal Peters ([employer]), 7/6/2018    Jack DYE M.D. personally performed the services described in this documentation, as scribed by Kal Peters in my presence, and it is both accurate and complete.    The note accurately reflects work and decisions made by me.  Jack Encarnacion  7/7/2018  2:23 AM

## 2018-07-07 NOTE — CARE PLAN
Problem: Communication  Goal: The ability to communicate needs accurately and effectively will improve    Intervention: [place] patient and significant other/support system to call light to alert staff of needs  Patient alerted to use of call light to alert staff to needs.       Problem: Mobility  Goal: Risk for activity intolerance will decrease    Intervention: Assess and monitor signs of activity intolerance  Patient ambulates steady with no assistive devices. Walking to bathroom and back to bed does not tire patient out.

## 2018-07-07 NOTE — ED TRIAGE NOTES
Chidi Tatum  72 y.o.  Blood Pressure : 138/76, Pulse: 85, Respiration: 16, Temperature: 37 °C (98.6 °F), Height: 182.9 cm (6'), Weight: 111.1 kg (245 lb), BMI (Calculated): 33.23, BSA (Calculated): 2.4, Pulse Oximetry: 97 %  Pt BIB [facility] for severe 10/10 sharp mid abd pains that radiates to back, with onset this morning, progressively getting worse. Pt daughter called 911 on way to hospital due to severity. PT is A&Ox4, VSS, denies fever or recent sickness. Daughters at bedside   ERP at bedside

## 2018-07-07 NOTE — ED NOTES
NG placed 14 in Rt nare with out trauma no bleeding, auscultated 30mL air bolus with 2nd RN  Xray ordered per protocol, pending suction

## 2018-07-07 NOTE — PROGRESS NOTES
Admitted after midnight.    73 yo man with BPH, HTN, GERD who presented with abdominal pain and found with SBO.  Gen surgery consulted, has NGT in place, small bowel series tomorrow. Also has UTI on CTX.  Patient seen at bedside and chart was reviewed. Please see Dr. Lechuga's note for further details.

## 2018-07-07 NOTE — PROGRESS NOTES
Pat arrived to unit with ER tech approximately 0345. Patient alert and oriented x4, and walked to bed with no assistance required. NG tube hooked up to low wall suction. PIV assessed and patent, running normal saline at 125ml/hr. Two RN skin check complete. No wounds or pressure related injuries noted. Patient is missing fourth digit on left hand, patient reports it having happened over 20 years ago, and it is well healed.

## 2018-07-07 NOTE — H&P
Hospital Medicine History and Physical    Date of Service  7/7/2018    Chief Complaint  Chief Complaint   Patient presents with   • Abdominal Pain     mid upper abd pains 9/10 started this am getting more painful throughout the day       History of Presenting Illness  72 y.o. male who presented 7/6/2018 with diffuse abdominal pain.  Painful and getting worse throughout the day today.  Mid upper abdominal pain is 9 out of 10.  Had some diarrhea this morning now has pain radiating to his back.  Now with severe constipation unable to pass any gas had a bowel movement 5 hours ago.  Had surgical history of hernia repair.  No fevers no chills no sweats.  Very painful bowel movements over the last several days.  He has been dealing with constipation. Recently started vikodin  No alleviating or exacerbating factors that he knows of.   Primary Care Physician  FRANCISCO Moya.    Consultants  Surgery    Code Status  full    Review of Systems  Review of Systems   Constitutional: Negative for chills and fever.   HENT: Negative for congestion, hearing loss and tinnitus.    Eyes: Negative for blurred vision, double vision and discharge.   Respiratory: Negative for cough, hemoptysis and shortness of breath.    Cardiovascular: Negative for chest pain, palpitations and leg swelling.   Gastrointestinal: Positive for abdominal pain and nausea. Negative for heartburn and vomiting.   Genitourinary: Negative for dysuria and flank pain.   Musculoskeletal: Negative for joint pain and myalgias.   Skin: Negative for rash.   Neurological: Negative for dizziness, sensory change, speech change, focal weakness and weakness.   Endo/Heme/Allergies: Negative for environmental allergies. Does not bruise/bleed easily.   Psychiatric/Behavioral: Negative for depression, hallucinations and substance abuse.        Past Medical History  Past Medical History:   Diagnosis Date   • BPH (benign prostatic hyperplasia)    • GERD (gastroesophageal  reflux disease)    • HTN (hypertension)    • Hypertension    • Insomnia        Surgical History  Past Surgical History:   Procedure Laterality Date   • TEMPORAL ARTERY BIOPSY Right 2018    Procedure: TEMPORAL ARTERY BIOPSY;  Surgeon: Bryant Corey M.D.;  Location: SURGERY [facility];  Service: Vascular   • HERNIA REPAIR      umbilical, groin        Medications  No current facility-administered medications on file prior to encounter.      Current Outpatient Prescriptions on File Prior to Encounter   Medication Sig Dispense Refill   • amoxicillin-clavulanate (AUGMENTIN) 875-125 MG Tab Take 1 Tab by mouth 2 times a day for 10 days. 20 Tab 0   • HYDROcodone-acetaminophen (NORCO) 5-325 MG Tab per tablet Take 1-2 Tabs by mouth every four hours as needed for up to 7 days. 20 Tab 0   • zolpidem (AMBIEN) 5 MG Tab TAKE ONE TABLET BY MOUTH ONCE DAILY AT BEDTIME AS NEEDED FOR SLEEP FOR 30 DAYS 30 Tab 2   • pantoprazole (PROTONIX) 40 MG Tablet Delayed Response Take 1 Tab by mouth every day. 30 Tab 5   • sertraline (ZOLOFT) 25 MG tablet Take 1 Tab by mouth every day. 30 Tab 1   • hydroCHLOROthiazide (HYDRODIURIL) 25 MG Tab Take 1 Tab by mouth every 48 hours. 45 Tab 1   • tamsulosin (FLOMAX) 0.4 MG capsule Take 1 Cap by mouth ONE-HALF HOUR AFTER BREAKFAST. 90 Cap 2   • finasteride (PROSCAR) 5 MG Tab TAKE ONE TABLET BY MOUTH ONCE DAILY 90 Tab 2   • lisinopril (PRINIVIL) 10 MG Tab TAKE ONE TABLET BY MOUTH ONCE DAILY *REPLACES  5MG  TABLET* 90 Tab 1       Family History  Family History   Problem Relation Age of Onset   • Lung Disease Father    • Alcohol/Drug Brother    • Lung Disease Brother        Social History  Social History   Substance Use Topics   • Smoking status: Never Smoker   • Smokeless tobacco: Never Used   • Alcohol use No       Allergies  Allergies   Allergen Reactions   • Celebrex [Celecoxib] Rash     .        Physical Exam  Laboratory   Hemodynamics  Temp (24hrs), Av °C (98.6 °F), Min:37 °C (98.6  °F), Max:37 °C (98.6 °F)   Temperature: 37 °C (98.6 °F)  Pulse  Av.3  Min: 76  Max: 88 Heart Rate (Monitored): 75  Blood Pressure : 138/76, NIBP: 120/62      Respiratory      Respiration: (!) 23, Pulse Oximetry: 93 %             Physical Exam   Constitutional: He is oriented to person, place, and time. He appears well-developed and well-nourished.   HENT:   Head: Normocephalic and atraumatic.   Ng tube   Eyes: Conjunctivae and EOM are normal. Pupils are equal, round, and reactive to light.   Neck: Normal range of motion. Neck supple. No JVD present.   Cardiovascular: Normal rate, regular rhythm, normal heart sounds and intact distal pulses.    No murmur heard.  Pulmonary/Chest: Effort normal and breath sounds normal. No respiratory distress. He exhibits no tenderness.   Abdominal: Soft. Bowel sounds are normal. He exhibits distension. There is tenderness.   Musculoskeletal: Normal range of motion. He exhibits no edema.   Neurological: He is alert and oriented to person, place, and time. No cranial nerve deficit. He exhibits normal muscle tone.   Skin: Skin is warm and dry. No erythema.   Psychiatric: He has a normal mood and affect. His behavior is normal. Judgment and thought content normal.   Nursing note and vitals reviewed.      Recent Labs      18   WBC  11.8*   RBC  4.65*   HEMOGLOBIN  12.6*   HEMATOCRIT  38.1*   MCV  81.9   MCH  27.1   MCHC  33.1*   RDW  42.7   PLATELETCT  280   MPV  9.4     Recent Labs      18   2210   SODIUM  137   POTASSIUM  4.9   CHLORIDE  108   CO2  19*   GLUCOSE  143*   BUN  22   CREATININE  1.36   CALCIUM  9.3     Recent Labs      18   2210   ALTSGPT  19   ASTSGOT  32   ALKPHOSPHAT  80   TBILIRUBIN  0.4   LIPASE  34   GLUCOSE  143*                 Lab Results   Component Value Date    TROPONINI <0.01 2018     Urinalysis:    Lab Results  Component Value Date/Time   SPECGRAVITY 1.023 2018   GLUCOSEUR Negative 2018   KETONES  Negative 07/06/2018 2300   NITRITE Negative 07/06/2018 2300   WBCURINE 20-50 (A) 07/06/2018 2300   RBCURINE >150 (A) 07/06/2018 2300   BACTERIA Few (A) 07/06/2018 2300   EPITHELCELL Negative 07/06/2018 2300        Imaging  CT-ABDOMEN-PELVIS WITH [[id number]] Resulted: 07/07/18 0120   Order Status: Completed Updated: 07/07/18 0122   Narrative:       7/7/2018 12:42 AM    HISTORY/REASON FOR EXAM:  Pain.  Severe pain    TECHNIQUE/EXAM DESCRIPTION:  CT scan of the abdomen and pelvis with contrast.    Contrast-enhanced helical scanning was obtained from the diaphragmatic domes through the pubic symphysis following the bolus administration of nonionic contrast without complication.    100 mL of Omnipaque 350 nonionic contrast was administered without complication.    Low dose optimization technique was utilized for this CT exam including automated exposure control and adjustment of the mA and/or kV according to patient size.    COMPARISON: No prior studies available.    FINDINGS:  Lung bases: Bibasilar opacities likely represent atelectasis. There is a fat-containing left Bochdalek hernia.    Abdomen:  No free air is seen in the abdomen or pelvis. The liver is hypodense compatible with hepatic steatosis. Portal vein is patent. Gallbladder is unremarkable. No biliary ductal dilatation is seen. Pancreas is partially atrophic. Spleen measures 14.8 cm in   length. No adrenal mass is identified. There are bilateral cysts including parapelvic cysts. There is a cyst in the upper pole of the left kidney which is septated measuring 2.8 cm. A second septated cyst is seen in the lower pole measuring 2.2 cm. There   is a nonobstructing left renal calculus measures measuring 3.4 mm. There is mild hydronephrosis. The largest cyst on the right measures 4.7 cm. There is mild dilatation of the distal ureters bilaterally. There is renal cortical scarring bilaterally.    Atherosclerotic plaque is seen in the aorta and its branches. There are  small inguinal, retroperitoneal and mesenteric lymph nodes    Visualized appendix is unremarkable.    There are dilated loops of small bowel measuring up to 3.9 cm. There is fecalization of a loop of small bowel in the anterior lower abdomen. The distal small bowel is decompressed. The transition is in the anterior abdomen slightly to the right of   midline.    Bladder appears trabeculated. Degenerative changes are seen in the spine. Degenerative changes are seen at the hips..   Impression:       Findings compatible with small bowel obstruction with the transition point in the anterior abdomen slightly to the right of midline.    Bilateral renal cysts including septated cysts in the upper and lower pole of the left kidney for which follow-up is recommended.    Mild bilateral hydronephrosis. Partial UPJ obstruction is not excluded. Further evaluation can be performed with nuclear medicine renogram.    Renal cortical scarring is seen bilaterally.    Nonobstructing left renal calculus.    Atherosclerotic plaque.    Hepatic steatosis.    Splenomegaly.        Assessment/Plan     I anticipate this patient will require at least two midnights for appropriate medical management, necessitating inpatient admission.    * Small bowel obstruction (HCC)   Assessment & Plan    Small bowel obstruction   NPO  NG tube LIS   Pain control avoid narcotics  Anti emetics  gen surg consult        Lactic acidosis   Assessment & Plan    IVF and repeat lab this am        UTI (urinary tract infection)   Assessment & Plan    IV abx   Follow cultures  Hematuria would recommend outpatient uro follow up        Anemia   Assessment & Plan    Mild monitor no signs of acute bleeding        Stage 3 chronic kidney disease- (present on admission)   Assessment & Plan    Hx of monitor renal function        Moderate episode of recurrent major depressive disorder (HCC)- (present on admission)   Assessment & Plan    Resume zoloft when able        Obesity (BMI  30-39.9)- (present on admission)   Assessment & Plan    Encouraged weight loss        Primary insomnia- (present on admission)   Assessment & Plan    Resume ambien when able        BPH (benign prostatic hyperplasia)- (present on admission)   Assessment & Plan    Resume flomax and proscar when able        GERD (gastroesophageal reflux disease)- (present on admission)   Assessment & Plan    Resume prilosec when able        Essential hypertension- (present on admission)   Assessment & Plan    Resume anti hypertensives when able            VTE prophylaxis: SCD

## 2018-07-07 NOTE — CONSULTS
General Surgery Consult    CHIEF COMPLAINT: Abdominal pain nausea     HISTORY OF PRESENT ILLNESS: The patient is a 72 y.o. male, who presents with abdominal pain and nausea.  It became so severe that he presented to the emergency department.  In the emergency department he underwent a workup to include a CT scan revealing a possible partial small bowel obstruction with  transition point near the anterior abdominal wall.  He does have a history of umbilical hernia repair performed in [place].  He was also noted to have a mild leukocytosis and a urinary tract infection.  The patient has been admitted to the hospitalist service and general surgery was consulted for evaluation and management.  The patient describes diffuse abdominal pain without exacerbating or relieving factors at this point.  It is associated with nausea.    PAST MEDICAL HISTORY:  has a past medical history of BPH (benign prostatic hyperplasia); GERD (gastroesophageal reflux disease); HTN (hypertension); Hypertension; and Insomnia.     PAST SURGICAL HISTORY:  has a past surgical history that includes hernia repair and temporal artery biopsy (Right, 2/9/2018).  Open umbilical hernia repair in [place]     ALLERGIES:   Allergies   Allergen Reactions   • Celebrex [Celecoxib] Rash     .        CURRENT MEDICATIONS:   Home Medications    **Home medications have not yet been reviewed for this encounter**         FAMILY HISTORY:   Family History   Problem Relation Age of Onset   • Lung Disease Father    • Alcohol/Drug Brother    • Lung Disease Brother         SOCIAL HISTORY:   Social History     Social History Main Topics   • Smoking status: Never Smoker   • Smokeless tobacco: Never Used   • Alcohol use No   • Drug use: No   • Sexual activity: No       REVIEW OF SYSTEMS: Comprehensive review of systems was negative aside from the above HPI.     PHYSICAL EXAMINATION:     GENERAL: The patient is in no acute distress.   VITAL SIGNS: Blood pressure 138/76,  pulse 81, temperature 37 °C (98.6 °F), resp. rate (!) 22, height 1.829 m (6'), weight 111.1 kg (245 lb), SpO2 93 %.  HEAD AND NECK: Demonstrates symmetric, reactive pupils. Extraocular muscles.  NG tube in place with minimal output    are intact. Nares and oropharynx are clear.   NECK: Supple. No adenopathy.  CHEST:No respiratory distress.    CARDIOVASCULAR: Regular rate. The extremities are well perfused.   ABDOMEN: No guarding or rebound.  Mildly distended.  Obese abdomen.  Upper midline scar he says is from a lipoma excision.  He has a supraumbilical scar consistent with his umbilical hernia repair..   EXTREMITIES: Examination of the upper and lower extremities demonstrates no cyanosis edema or clubbing.  NEUROLOGIC: Alert & oriented x 3, Normal motor function, Normal sensory function, No focal deficits noted.    LABORATORY VALUES:   Recent Labs      07/06/18   2210   WBC  11.8*   RBC  4.65*   HEMOGLOBIN  12.6*   HEMATOCRIT  38.1*   MCV  81.9   MCH  27.1   MCHC  33.1*   RDW  42.7   PLATELETCT  280   MPV  9.4     Recent Labs      07/06/18   2210   SODIUM  137   POTASSIUM  4.9   CHLORIDE  108   CO2  19*   GLUCOSE  143*   BUN  22   CREATININE  1.36   CALCIUM  9.3     Recent Labs      07/06/18   2210   ASTSGOT  32   ALTSGPT  19   TBILIRUBIN  0.4   ALKPHOSPHAT  80   GLOBULIN  3.7*            IMAGING:   CT-ABDOMEN-PELVIS WITH   Final Result      Findings compatible with small bowel obstruction with the transition point in the anterior abdomen slightly to the right of midline.      Bilateral renal cysts including septated cysts in the upper and lower pole of the left kidney for which follow-up is recommended.      Mild bilateral hydronephrosis. Partial UPJ obstruction is not excluded. Further evaluation can be performed with nuclear medicine renogram.      Renal cortical scarring is seen bilaterally.      Nonobstructing left renal calculus.      Atherosclerotic plaque.      Hepatic steatosis.      Splenomegaly.       DX-CHEST-PORTABLE (1 VIEW)    (Results Pending)       IMPRESSION AND PLAN:     1.  Partial small bowel obstruction  2.  Urinary tract infection    Plan:    1.  Agree with conservative management with NG tube decompression and observation.  We will leave his NG tube in today and order a small bowel series with Omnipaque tomorrow.  Should this transition to the colon we will remove the NG tube and begin clears.  Should it not reach the colon we will discuss surgery.  2.  Should he fail conservative management or decompensate we will discuss surgery sooner.   3.  He appeared to understand this plan and agreed to proceed fully informed.  The patient has been admitted to the hospitalist service.          ___________________________________   Raleigh Wong M.D.    DD: 7/7/2018 DT: 2:08 AM

## 2018-07-07 NOTE — ED NOTES
NG tube pulled, xray showed coil in esophagus, new NG tube placed, 16, xray ordered. Stomach residual with low suction. Air bolus auscultated.

## 2018-07-07 NOTE — PROGRESS NOTES
Surgery  HD#1  Passing gas  NGT in place  SBS in am with omnipaque  UTI per primary team  Plan pending sbs

## 2018-07-07 NOTE — ASSESSMENT & PLAN NOTE
Small bowel obstruction   NPO  NG tube to gravity  Pain control avoid narcotics  Anti emetics  gen surg consult  Small bowel series done, results pending

## 2018-07-07 NOTE — PROGRESS NOTES
Med rec complete per pt at bedside  Allergies reviewed  Pt started a 10 day course of Augmentin on 7/3/18

## 2018-07-08 ENCOUNTER — APPOINTMENT (OUTPATIENT)
Dept: RADIOLOGY | Facility: MEDICAL CENTER | Age: 73
DRG: 389 | End: 2018-07-08
Attending: SURGERY
Payer: MEDICARE

## 2018-07-08 LAB
ALBUMIN SERPL BCP-MCNC: 3.4 G/DL (ref 3.2–4.9)
ALBUMIN SERPL BCP-MCNC: 3.6 G/DL (ref 3.2–4.9)
ALBUMIN/GLOB SERPL: 1 G/DL
ALBUMIN/GLOB SERPL: 1.1 G/DL
ALP SERPL-CCNC: 59 U/L (ref 30–99)
ALP SERPL-CCNC: 71 U/L (ref 30–99)
ALT SERPL-CCNC: 10 U/L (ref 2–50)
ALT SERPL-CCNC: 14 U/L (ref 2–50)
ANION GAP SERPL CALC-SCNC: 8 MMOL/L (ref 0–11.9)
ANION GAP SERPL CALC-SCNC: 9 MMOL/L (ref 0–11.9)
AST SERPL-CCNC: 15 U/L (ref 12–45)
AST SERPL-CCNC: 16 U/L (ref 12–45)
BASOPHILS # BLD AUTO: 0.3 % (ref 0–1.8)
BASOPHILS # BLD AUTO: 0.3 % (ref 0–1.8)
BASOPHILS # BLD: 0.03 K/UL (ref 0–0.12)
BASOPHILS # BLD: 0.03 K/UL (ref 0–0.12)
BILIRUB SERPL-MCNC: 0.3 MG/DL (ref 0.1–1.5)
BILIRUB SERPL-MCNC: 0.4 MG/DL (ref 0.1–1.5)
BUN SERPL-MCNC: 22 MG/DL (ref 8–22)
BUN SERPL-MCNC: 24 MG/DL (ref 8–22)
CALCIUM SERPL-MCNC: 8.3 MG/DL (ref 8.5–10.5)
CALCIUM SERPL-MCNC: 8.8 MG/DL (ref 8.5–10.5)
CHLORIDE SERPL-SCNC: 109 MMOL/L (ref 96–112)
CHLORIDE SERPL-SCNC: 111 MMOL/L (ref 96–112)
CHOLEST SERPL-MCNC: 85 MG/DL (ref 100–199)
CHOLEST SERPL-MCNC: 88 MG/DL (ref 100–199)
CO2 SERPL-SCNC: 20 MMOL/L (ref 20–33)
CO2 SERPL-SCNC: 22 MMOL/L (ref 20–33)
CREAT SERPL-MCNC: 0.99 MG/DL (ref 0.5–1.4)
CREAT SERPL-MCNC: 1.32 MG/DL (ref 0.5–1.4)
EOSINOPHIL # BLD AUTO: 0.29 K/UL (ref 0–0.51)
EOSINOPHIL # BLD AUTO: 0.29 K/UL (ref 0–0.51)
EOSINOPHIL NFR BLD: 2.9 % (ref 0–6.9)
EOSINOPHIL NFR BLD: 3.3 % (ref 0–6.9)
ERYTHROCYTE [DISTWIDTH] IN BLOOD BY AUTOMATED COUNT: 44.1 FL (ref 35.9–50)
ERYTHROCYTE [DISTWIDTH] IN BLOOD BY AUTOMATED COUNT: 44.4 FL (ref 35.9–50)
GLOBULIN SER CALC-MCNC: 3.2 G/DL (ref 1.9–3.5)
GLOBULIN SER CALC-MCNC: 3.3 G/DL (ref 1.9–3.5)
GLUCOSE SERPL-MCNC: 100 MG/DL (ref 65–99)
GLUCOSE SERPL-MCNC: 101 MG/DL (ref 65–99)
HCT VFR BLD AUTO: 33.8 % (ref 42–52)
HCT VFR BLD AUTO: 34.8 % (ref 42–52)
HDLC SERPL-MCNC: 25 MG/DL
HDLC SERPL-MCNC: 26 MG/DL
HGB BLD-MCNC: 11.1 G/DL (ref 14–18)
HGB BLD-MCNC: 11.3 G/DL (ref 14–18)
IMM GRANULOCYTES # BLD AUTO: 0.04 K/UL (ref 0–0.11)
IMM GRANULOCYTES # BLD AUTO: 0.04 K/UL (ref 0–0.11)
IMM GRANULOCYTES NFR BLD AUTO: 0.4 % (ref 0–0.9)
IMM GRANULOCYTES NFR BLD AUTO: 0.4 % (ref 0–0.9)
LACTATE BLD-SCNC: 1 MMOL/L (ref 0.5–2)
LDLC SERPL CALC-MCNC: 38 MG/DL
LDLC SERPL CALC-MCNC: 38 MG/DL
LYMPHOCYTES # BLD AUTO: 1.76 K/UL (ref 1–4.8)
LYMPHOCYTES # BLD AUTO: 2.22 K/UL (ref 1–4.8)
LYMPHOCYTES NFR BLD: 19.7 % (ref 22–41)
LYMPHOCYTES NFR BLD: 22.4 % (ref 22–41)
MCH RBC QN AUTO: 27.3 PG (ref 27–33)
MCH RBC QN AUTO: 27.4 PG (ref 27–33)
MCHC RBC AUTO-ENTMCNC: 32.5 G/DL (ref 33.7–35.3)
MCHC RBC AUTO-ENTMCNC: 32.8 G/DL (ref 33.7–35.3)
MCV RBC AUTO: 83.3 FL (ref 81.4–97.8)
MCV RBC AUTO: 84.5 FL (ref 81.4–97.8)
MONOCYTES # BLD AUTO: 0.49 K/UL (ref 0–0.85)
MONOCYTES # BLD AUTO: 0.57 K/UL (ref 0–0.85)
MONOCYTES NFR BLD AUTO: 5.5 % (ref 0–13.4)
MONOCYTES NFR BLD AUTO: 5.7 % (ref 0–13.4)
NEUTROPHILS # BLD AUTO: 6.31 K/UL (ref 1.82–7.42)
NEUTROPHILS # BLD AUTO: 6.77 K/UL (ref 1.82–7.42)
NEUTROPHILS NFR BLD: 68.3 % (ref 44–72)
NEUTROPHILS NFR BLD: 70.8 % (ref 44–72)
NRBC # BLD AUTO: 0 K/UL
NRBC # BLD AUTO: 0 K/UL
NRBC BLD-RTO: 0 /100 WBC
NRBC BLD-RTO: 0 /100 WBC
PLATELET # BLD AUTO: 240 K/UL (ref 164–446)
PLATELET # BLD AUTO: 256 K/UL (ref 164–446)
PMV BLD AUTO: 9.3 FL (ref 9–12.9)
PMV BLD AUTO: 9.8 FL (ref 9–12.9)
POTASSIUM SERPL-SCNC: 3.7 MMOL/L (ref 3.6–5.5)
POTASSIUM SERPL-SCNC: 3.8 MMOL/L (ref 3.6–5.5)
PROT SERPL-MCNC: 6.7 G/DL (ref 6–8.2)
PROT SERPL-MCNC: 6.8 G/DL (ref 6–8.2)
RBC # BLD AUTO: 4.06 M/UL (ref 4.7–6.1)
RBC # BLD AUTO: 4.12 M/UL (ref 4.7–6.1)
SODIUM SERPL-SCNC: 138 MMOL/L (ref 135–145)
SODIUM SERPL-SCNC: 141 MMOL/L (ref 135–145)
TRIGL SERPL-MCNC: 110 MG/DL (ref 0–149)
TRIGL SERPL-MCNC: 122 MG/DL (ref 0–149)
TSH SERPL DL<=0.005 MIU/L-ACNC: 0.96 UIU/ML (ref 0.38–5.33)
WBC # BLD AUTO: 8.9 K/UL (ref 4.8–10.8)
WBC # BLD AUTO: 9.9 K/UL (ref 4.8–10.8)

## 2018-07-08 PROCEDURE — 36415 COLL VENOUS BLD VENIPUNCTURE: CPT

## 2018-07-08 PROCEDURE — 74250 X-RAY XM SM INT 1CNTRST STD: CPT

## 2018-07-08 PROCEDURE — 99232 SBSQ HOSP IP/OBS MODERATE 35: CPT | Performed by: INTERNAL MEDICINE

## 2018-07-08 PROCEDURE — 700105 HCHG RX REV CODE 258: Performed by: HOSPITALIST

## 2018-07-08 PROCEDURE — 84443 ASSAY THYROID STIM HORMONE: CPT

## 2018-07-08 PROCEDURE — 700111 HCHG RX REV CODE 636 W/ 250 OVERRIDE (IP): Performed by: HOSPITALIST

## 2018-07-08 PROCEDURE — 770006 HCHG ROOM/CARE - MED/SURG/GYN SEMI*

## 2018-07-08 PROCEDURE — 85025 COMPLETE CBC W/AUTO DIFF WBC: CPT

## 2018-07-08 PROCEDURE — 80053 COMPREHEN METABOLIC PANEL: CPT

## 2018-07-08 PROCEDURE — 83605 ASSAY OF LACTIC ACID: CPT

## 2018-07-08 PROCEDURE — 700111 HCHG RX REV CODE 636 W/ 250 OVERRIDE (IP): Performed by: INTERNAL MEDICINE

## 2018-07-08 PROCEDURE — 700117 HCHG RX CONTRAST REV CODE 255: Performed by: SURGERY

## 2018-07-08 PROCEDURE — 83036 HEMOGLOBIN GLYCOSYLATED A1C: CPT

## 2018-07-08 PROCEDURE — 80061 LIPID PANEL: CPT

## 2018-07-08 RX ORDER — KETOROLAC TROMETHAMINE 30 MG/ML
30 INJECTION, SOLUTION INTRAMUSCULAR; INTRAVENOUS EVERY 6 HOURS PRN
Status: COMPLETED | OUTPATIENT
Start: 2018-07-08 | End: 2018-07-08

## 2018-07-08 RX ORDER — ACETAMINOPHEN 650 MG/1
325 SUPPOSITORY RECTAL EVERY 6 HOURS PRN
Status: DISCONTINUED | OUTPATIENT
Start: 2018-07-08 | End: 2018-07-09 | Stop reason: HOSPADM

## 2018-07-08 RX ADMIN — MORPHINE SULFATE 4 MG: 4 INJECTION INTRAVENOUS at 05:19

## 2018-07-08 RX ADMIN — HEPARIN SODIUM 5000 UNITS: 5000 INJECTION, SOLUTION INTRAVENOUS; SUBCUTANEOUS at 05:13

## 2018-07-08 RX ADMIN — IOHEXOL 100 ML: 300 INJECTION, SOLUTION INTRAVENOUS at 09:55

## 2018-07-08 RX ADMIN — CEFTRIAXONE SODIUM 2 G: 2 INJECTION, SOLUTION INTRAVENOUS at 05:04

## 2018-07-08 RX ADMIN — HEPARIN SODIUM 5000 UNITS: 5000 INJECTION, SOLUTION INTRAVENOUS; SUBCUTANEOUS at 20:49

## 2018-07-08 RX ADMIN — SODIUM CHLORIDE: 9 INJECTION, SOLUTION INTRAVENOUS at 17:57

## 2018-07-08 RX ADMIN — ONDANSETRON 4 MG: 2 INJECTION INTRAMUSCULAR; INTRAVENOUS at 05:19

## 2018-07-08 RX ADMIN — HEPARIN SODIUM 5000 UNITS: 5000 INJECTION, SOLUTION INTRAVENOUS; SUBCUTANEOUS at 15:15

## 2018-07-08 RX ADMIN — KETOROLAC TROMETHAMINE 30 MG: 30 INJECTION, SOLUTION INTRAMUSCULAR at 12:20

## 2018-07-08 RX ADMIN — ONDANSETRON 4 MG: 2 INJECTION INTRAMUSCULAR; INTRAVENOUS at 09:21

## 2018-07-08 RX ADMIN — MORPHINE SULFATE 4 MG: 4 INJECTION INTRAVENOUS at 09:21

## 2018-07-08 ASSESSMENT — ENCOUNTER SYMPTOMS
WEAKNESS: 0
SHORTNESS OF BREATH: 0
NAUSEA: 0
VOMITING: 0
HEADACHES: 1
ABDOMINAL PAIN: 1
DIARRHEA: 1
ABDOMINAL PAIN: 0
BACK PAIN: 0

## 2018-07-08 ASSESSMENT — PAIN SCALES - GENERAL
PAINLEVEL_OUTOF10: 5
PAINLEVEL_OUTOF10: 3
PAINLEVEL_OUTOF10: 7
PAINLEVEL_OUTOF10: 3

## 2018-07-08 NOTE — CARE PLAN
Problem: Safety  Goal: Will remain free from injury  Outcome: PROGRESSING AS EXPECTED  Bed low and locked position,  socks, hourly rounding, call light and belongings within reach.

## 2018-07-08 NOTE — PROGRESS NOTES
[facility] Hospitalist Progress Note    Date of Service: 2018    Chief Complaint  72 y.o. male admitted 2018 with BPH, HTN, GERD who presented with abdominal pain and found with SBO.    Interval Problem Update  Small bowel series done. Having diarrhea, bloating and pain improved    Consultants/Specialty  Gen surgery    Disposition  Pending surgery recs        Review of Systems   HENT: Negative for congestion.    Respiratory: Negative for shortness of breath.    Cardiovascular: Negative for chest pain.   Gastrointestinal: Positive for abdominal pain and diarrhea. Negative for vomiting.   Musculoskeletal: Negative for back pain.   Skin: Negative for itching.   Neurological: Positive for headaches. Negative for weakness.      Physical Exam  Laboratory/Imaging   Hemodynamics  Temp (24hrs), Av.1 °C (98.8 °F), Min:36.4 °C (97.5 °F), Max:37.4 °C (99.4 °F)   Temperature: 37.4 °C (99.4 °F)  Pulse  Av.4  Min: 76  Max: 88    Blood Pressure : (P) 158/87      Respiratory      Respiration: 18, Pulse Oximetry: 91 %        RUL Breath Sounds: Diminished, RML Breath Sounds: Diminished, RLL Breath Sounds: Diminished, ANASTASIYA Breath Sounds: Diminished, LLL Breath Sounds: Diminished    Fluids    Intake/Output Summary (Last 24 hours) at 18 1457  Last data filed at 18 1800   Gross per 24 hour   Intake             1500 ml   Output              400 ml   Net             1100 ml       Nutrition  Orders Placed This Encounter   Procedures   • Diet NPO     Standing Status:   Standing     Number of Occurrences:   1     Order Specific Question:   Restrict to:     Answer:   Strict [1]     Physical Exam   Constitutional: He is oriented to person, place, and time. He appears well-developed and well-nourished. He is cooperative.   HENT:   Head: Normocephalic and atraumatic.   NGT in place with yellow-green fluid output   Eyes: Conjunctivae are normal. Right eye exhibits no discharge. Left eye exhibits no discharge.    Cardiovascular: Normal rate and regular rhythm.    Pulmonary/Chest: Effort normal and breath sounds normal. He has no wheezes.   Abdominal: Soft. He exhibits distension. There is no rebound and no guarding.   Hyperactive bowel sounds, mild diffuse tenderness   Musculoskeletal: He exhibits no edema.   Neurological: He is alert and oriented to person, place, and time.   Skin: Skin is warm and dry.   Nursing note and vitals reviewed.      Recent Labs      07/06/18 2210 07/08/18 0344  07/08/18   0555   WBC  11.8*  9.9  8.9   RBC  4.65*  4.12*  4.06*   HEMOGLOBIN  12.6*  11.3*  11.1*   HEMATOCRIT  38.1*  34.8*  33.8*   MCV  81.9  84.5  83.3   MCH  27.1  27.4  27.3   MCHC  33.1*  32.5*  32.8*   RDW  42.7  44.4  44.1   PLATELETCT  280  256  240   MPV  9.4  9.8  9.3     Recent Labs      07/06/18 2210 07/08/18 0344  07/08/18   0555   SODIUM  137  141  138   POTASSIUM  4.9  3.8  3.7   CHLORIDE  108  111  109   CO2  19*  22  20   GLUCOSE  143*  100*  101*   BUN  22  24*  22   CREATININE  1.36  1.32  0.99   CALCIUM  9.3  8.8  8.3*             Recent Labs      07/08/18   0344  07/08/18   0555   TRIGLYCERIDE  122  110   HDL  26*  25*   LDL  38  38          Assessment/Plan     * Small bowel obstruction (HCC)   Assessment & Plan    Small bowel obstruction   NPO  NG tube to gravity  Pain control avoid narcotics  Anti emetics  gen surg consult  Small bowel series done, results pending        Lactic acidosis   Assessment & Plan    Resolved with IVF        UTI (urinary tract infection)   Assessment & Plan    IV abx   Follow cultures  Hematuria would recommend outpatient uro follow up        Anemia   Assessment & Plan    Mild monitor no signs of acute bleeding        Stage 3 chronic kidney disease- (present on admission)   Assessment & Plan    Hx of monitor renal function        Moderate episode of recurrent major depressive disorder (HCC)- (present on admission)   Assessment & Plan    Resume zoloft when able        Obesity  (BMI 30-39.9)- (present on admission)   Assessment & Plan    Encouraged weight loss        Primary insomnia- (present on admission)   Assessment & Plan    Resume ambien when able        BPH (benign prostatic hyperplasia)- (present on admission)   Assessment & Plan    Resume flomax and proscar when able        GERD (gastroesophageal reflux disease)- (present on admission)   Assessment & Plan    Resume prilosec when able        Essential hypertension- (present on admission)   Assessment & Plan    Resume anti hypertensives when able          Quality-Core Measures   Reviewed items::  EKG reviewed, Labs reviewed, Medications reviewed and Radiology images reviewed  Barrios catheter::  No Barrios  DVT prophylaxis - mechanical:  SCDs

## 2018-07-08 NOTE — PROGRESS NOTES
Surgery  Pt undergoing small bowel series now  C/O headache.  Abdomen distended without significant tenderness.  WBC normal  A/P  1.  Plan pending sbs  2.  Discussed with patient and family

## 2018-07-09 VITALS
HEART RATE: 84 BPM | WEIGHT: 244.93 LBS | SYSTOLIC BLOOD PRESSURE: 137 MMHG | BODY MASS INDEX: 33.18 KG/M2 | DIASTOLIC BLOOD PRESSURE: 64 MMHG | TEMPERATURE: 99.3 F | RESPIRATION RATE: 17 BRPM | HEIGHT: 72 IN | OXYGEN SATURATION: 93 %

## 2018-07-09 PROBLEM — E87.20 LACTIC ACIDOSIS: Status: RESOLVED | Noted: 2018-07-07 | Resolved: 2018-07-09

## 2018-07-09 PROBLEM — K56.609 SMALL BOWEL OBSTRUCTION (HCC): Status: RESOLVED | Noted: 2018-07-07 | Resolved: 2018-07-09

## 2018-07-09 LAB
ANION GAP SERPL CALC-SCNC: 9 MMOL/L (ref 0–11.9)
BASOPHILS # BLD AUTO: 0.3 % (ref 0–1.8)
BASOPHILS # BLD: 0.03 K/UL (ref 0–0.12)
BUN SERPL-MCNC: 29 MG/DL (ref 8–22)
CALCIUM SERPL-MCNC: 8.9 MG/DL (ref 8.5–10.5)
CHLORIDE SERPL-SCNC: 114 MMOL/L (ref 96–112)
CO2 SERPL-SCNC: 19 MMOL/L (ref 20–33)
CREAT SERPL-MCNC: 1.36 MG/DL (ref 0.5–1.4)
EOSINOPHIL # BLD AUTO: 0.42 K/UL (ref 0–0.51)
EOSINOPHIL NFR BLD: 4.5 % (ref 0–6.9)
ERYTHROCYTE [DISTWIDTH] IN BLOOD BY AUTOMATED COUNT: 44.2 FL (ref 35.9–50)
EST. AVERAGE GLUCOSE BLD GHB EST-MCNC: 128 MG/DL
GLUCOSE SERPL-MCNC: 89 MG/DL (ref 65–99)
HBA1C MFR BLD: 6.1 % (ref 0–5.6)
HCT VFR BLD AUTO: 35.2 % (ref 42–52)
HGB BLD-MCNC: 11.2 G/DL (ref 14–18)
IMM GRANULOCYTES # BLD AUTO: 0.04 K/UL (ref 0–0.11)
IMM GRANULOCYTES NFR BLD AUTO: 0.4 % (ref 0–0.9)
LYMPHOCYTES # BLD AUTO: 1.95 K/UL (ref 1–4.8)
LYMPHOCYTES NFR BLD: 21.1 % (ref 22–41)
MCH RBC QN AUTO: 26.7 PG (ref 27–33)
MCHC RBC AUTO-ENTMCNC: 31.8 G/DL (ref 33.7–35.3)
MCV RBC AUTO: 83.8 FL (ref 81.4–97.8)
MONOCYTES # BLD AUTO: 0.54 K/UL (ref 0–0.85)
MONOCYTES NFR BLD AUTO: 5.8 % (ref 0–13.4)
NEUTROPHILS # BLD AUTO: 6.28 K/UL (ref 1.82–7.42)
NEUTROPHILS NFR BLD: 67.9 % (ref 44–72)
NRBC # BLD AUTO: 0 K/UL
NRBC BLD-RTO: 0 /100 WBC
PLATELET # BLD AUTO: 262 K/UL (ref 164–446)
PMV BLD AUTO: 9.3 FL (ref 9–12.9)
POTASSIUM SERPL-SCNC: 3.7 MMOL/L (ref 3.6–5.5)
RBC # BLD AUTO: 4.2 M/UL (ref 4.7–6.1)
SODIUM SERPL-SCNC: 142 MMOL/L (ref 135–145)
WBC # BLD AUTO: 9.3 K/UL (ref 4.8–10.8)

## 2018-07-09 PROCEDURE — 85025 COMPLETE CBC W/AUTO DIFF WBC: CPT

## 2018-07-09 PROCEDURE — 99239 HOSP IP/OBS DSCHRG MGMT >30: CPT | Performed by: INTERNAL MEDICINE

## 2018-07-09 PROCEDURE — 700105 HCHG RX REV CODE 258: Performed by: HOSPITALIST

## 2018-07-09 PROCEDURE — 80048 BASIC METABOLIC PNL TOTAL CA: CPT

## 2018-07-09 PROCEDURE — 36415 COLL VENOUS BLD VENIPUNCTURE: CPT

## 2018-07-09 PROCEDURE — A9270 NON-COVERED ITEM OR SERVICE: HCPCS | Performed by: HOSPITALIST

## 2018-07-09 PROCEDURE — 700102 HCHG RX REV CODE 250 W/ 637 OVERRIDE(OP): Performed by: HOSPITALIST

## 2018-07-09 PROCEDURE — 700111 HCHG RX REV CODE 636 W/ 250 OVERRIDE (IP): Performed by: HOSPITALIST

## 2018-07-09 RX ORDER — CIPROFLOXACIN 250 MG/1
250 TABLET, FILM COATED ORAL 2 TIMES DAILY
Qty: 6 TAB | Refills: 0 | Status: SHIPPED | OUTPATIENT
Start: 2018-07-09 | End: 2018-07-12

## 2018-07-09 RX ADMIN — TAMSULOSIN HYDROCHLORIDE 0.4 MG: 0.4 CAPSULE ORAL at 08:05

## 2018-07-09 RX ADMIN — SERTRALINE 25 MG: 50 TABLET, FILM COATED ORAL at 08:04

## 2018-07-09 RX ADMIN — FINASTERIDE 5 MG: 5 TABLET, FILM COATED ORAL at 08:04

## 2018-07-09 RX ADMIN — LISINOPRIL 10 MG: 10 TABLET ORAL at 08:04

## 2018-07-09 RX ADMIN — CEFTRIAXONE SODIUM 2 G: 2 INJECTION, SOLUTION INTRAVENOUS at 04:26

## 2018-07-09 RX ADMIN — OMEPRAZOLE 20 MG: 20 CAPSULE, DELAYED RELEASE ORAL at 08:04

## 2018-07-09 RX ADMIN — HEPARIN SODIUM 5000 UNITS: 5000 INJECTION, SOLUTION INTRAVENOUS; SUBCUTANEOUS at 06:16

## 2018-07-09 RX ADMIN — SODIUM CHLORIDE: 9 INJECTION, SOLUTION INTRAVENOUS at 04:26

## 2018-07-09 ASSESSMENT — PAIN SCALES - GENERAL: PAINLEVEL_OUTOF10: 0

## 2018-07-09 ASSESSMENT — ENCOUNTER SYMPTOMS
VOMITING: 0
ABDOMINAL PAIN: 0
BRUISES/BLEEDS EASILY: 0
NAUSEA: 0

## 2018-07-09 NOTE — CARE PLAN
Problem: Safety  Goal: Will remain free from injury  Outcome: PROGRESSING AS EXPECTED  Patient is careful when getting up and has remained free from injury.     Problem: Mobility  Goal: Risk for activity intolerance will decrease    Intervention: Provide rest periods  Patient comes back to bed after getting up to the bathroom and takes a rest to avoid getting too tired.

## 2018-07-09 NOTE — DISCHARGE SUMMARY
Discharge Summary    CHIEF COMPLAINT ON ADMISSION  Chief Complaint   Patient presents with   • Abdominal Pain     mid upper abd pains 9/10 started this am getting more painful throughout the day       Reason for Admission  Abd pain     Admission Date  7/6/2018    CODE STATUS  Full Code    HPI & HOSPITAL COURSE  This is a 72 y.o. male here with BPH, HTN, GERD who presented with abdominal pain and found with SBO. Patient was having constipation, abdominal pain, and nausea. CT A/P showed small bowel obstruction with transition point in ant abdomen. He had NGT placed to suction and Dr. Wong with general surgery was consulted. Patient underwent small bowel series that showed contrast to the colon. Patient also began having watery bowel movements. His NGT was removed and he tolerated a soft GI diet with any nausea or pain. He continued to have bowel movements.   During admission he also had a positive UA for infection. He was started on CTX and discharge on cipro. Urine culture is in process.        Therefore, he is discharged in good and stable condition to home with close outpatient follow-up.    The patient met 2-midnight criteria for an inpatient stay at the time of discharge.    Discharge Date  7/9/18    FOLLOW UP ITEMS POST DISCHARGE  Urine culture    DISCHARGE DIAGNOSES  Principal Problem (Resolved):    Small bowel obstruction (HCC) POA: Unknown  Active Problems:    Essential hypertension POA: Yes    GERD (gastroesophageal reflux disease) POA: Yes    BPH (benign prostatic hyperplasia) POA: Yes    Primary insomnia POA: Yes    Obesity (BMI 30-39.9) POA: Yes    Moderate episode of recurrent major depressive disorder (HCC) POA: Yes    Stage 3 chronic kidney disease POA: Yes    Anemia POA: Yes    UTI (urinary tract infection) POA: Yes  Resolved Problems:    Lactic acidosis POA: Unknown      FOLLOW UP  CAROLINE MoyaRKetanNKetan  [address]  [facility] [address]  [phone]    Schedule an  appointment as soon as possible for a visit in 1 week        MEDICATIONS ON DISCHARGE     Medication List      START taking these medications      Instructions   ciprofloxacin 250 MG Tabs  Commonly known as:  CIPRO   Take 1 Tab by mouth 2 times a day for 3 days.  Dose:  250 mg        CONTINUE taking these medications      Instructions   finasteride 5 MG Tabs  Commonly known as:  PROSCAR   TAKE ONE TABLET BY MOUTH ONCE DAILY     hydroCHLOROthiazide 25 MG Tabs  Commonly known as:  HYDRODIURIL  Notes to patient:  Every other day   Take 1 Tab by mouth every 48 hours.  Dose:  25 mg     HYDROcodone-acetaminophen 5-325 MG Tabs per tablet  Commonly known as:  NORCO   Take 1-2 Tabs by mouth every four hours as needed for up to 7 days.  Dose:  1-2 Tab     lisinopril 10 MG Tabs  Commonly known as:  PRINIVIL   TAKE ONE TABLET BY MOUTH ONCE DAILY *REPLACES  5MG  TABLET*     pantoprazole 40 MG Tbec  Commonly known as:  PROTONIX   Take 1 Tab by mouth every day.  Dose:  40 mg     sertraline 25 MG tablet  Commonly known as:  ZOLOFT   Take 1 Tab by mouth every day.  Dose:  25 mg     tamsulosin 0.4 MG capsule  Commonly known as:  FLOMAX   Take 1 Cap by mouth ONE-HALF HOUR AFTER BREAKFAST.  Dose:  0.4 mg     zolpidem 5 MG Tabs  Commonly known as:  AMBIEN   TAKE ONE TABLET BY MOUTH ONCE DAILY AT BEDTIME AS NEEDED FOR SLEEP FOR 30 DAYS        STOP taking these medications    amoxicillin-clavulanate 875-125 MG Tabs  Commonly known as:  AUGMENTIN            Allergies  Allergies   Allergen Reactions   • Celebrex [Celecoxib] Rash     .       DIET  Orders Placed This Encounter   Procedures   • Diet Order Low Fiber(GI Soft)     Standing Status:   Standing     Number of Occurrences:   1     Order Specific Question:   Diet:     Answer:   Low Fiber(GI Soft) [2]   • DISCONTINUE DIET TRAY     Standing Status:   Standing     Number of Occurrences:   1       ACTIVITY  As tolerated.  Weight bearing as tolerated    CONSULTATIONS  General  surgery    PROCEDURES  None    LABORATORY  Lab Results   Component Value Date    SODIUM 142 07/09/2018    POTASSIUM 3.7 07/09/2018    CHLORIDE 114 (H) 07/09/2018    CO2 19 (L) 07/09/2018    GLUCOSE 89 07/09/2018    BUN 29 (H) 07/09/2018    CREATININE 1.36 07/09/2018    CREATININE 0.97 03/06/2013        Lab Results   Component Value Date    WBC 9.3 07/09/2018    WBC 8.9 03/06/2013    HEMOGLOBIN 11.2 (L) 07/09/2018    HEMATOCRIT 35.2 (L) 07/09/2018    PLATELETCT 262 07/09/2018        Total time of the discharge process exceeds 35 minutes.

## 2018-07-09 NOTE — PROGRESS NOTES
Progress Note               Author: Raleigh Wong Date & Time created: 7/9/2018  10:00 AM     Interval History:  Pt doing well.   Many bowel movements since water soluble contrast study.  No pain except from heparin shots.   Tolerated a clear liquid breakfast without nausea or vomiting.     Review of Systems:  Review of Systems   Gastrointestinal: Negative for abdominal pain, nausea and vomiting.   Endo/Heme/Allergies: Does not bruise/bleed easily.       Physical Exam:  Physical Exam   Constitutional: He appears well-developed and well-nourished. No distress.   HENT:   Head: Normocephalic.   Cardiovascular: Normal rate.    Pulmonary/Chest: No respiratory distress.   Abdominal: Soft.   No pain.   Reports abdomen is back to baseline.     Skin: He is not diaphoretic.       Labs:        Invalid input(s): [id number]  Recent Labs      07/06/18 2210   TROPONINI  <0.01     Recent Labs      07/08/18 0344 07/08/18   0555  07/09/18   0550   SODIUM  141  138  142   POTASSIUM  3.8  3.7  3.7   CHLORIDE  111  109  114*   CO2  22  20  19*   BUN  24*  22  29*   CREATININE  1.32  0.99  1.36   CALCIUM  8.8  8.3*  8.9     Recent Labs      07/06/18 2210 07/08/18 0344 07/08/18   0555  07/09/18   0550   ALTSGPT  19  14  10   --    ASTSGOT  32  15  16   --    ALKPHOSPHAT  80  71  59   --    TBILIRUBIN  0.4  0.4  0.3   --    LIPASE  34   --    --    --    GLUCOSE  143*  100*  101*  89     Recent Labs      07/08/18 0344 07/08/18   0555  07/09/18   0550   RBC  4.12*  4.06*  4.20*   HEMOGLOBIN  11.3*  11.1*  11.2*   HEMATOCRIT  34.8*  33.8*  35.2*   PLATELETCT  256  240  262     Recent Labs      07/06/18 2210 07/08/18 0344 07/08/18   0555  07/09/18   0550   WBC  11.8*  9.9  8.9  9.3   NEUTSPOLYS  79.60*  68.30  70.80  67.90   LYMPHOCYTES  12.30*  22.40  19.70*  21.10*   MONOCYTES  4.50  5.70  5.50  5.80   EOSINOPHILS  2.70  2.90  3.30  4.50   BASOPHILS  0.30  0.30  0.30  0.30   ASTSGOT  32  15  16   --    ALTSGPT  19   14  10   --    ALKPHOSPHAT  80  71  59   --    TBILIRUBIN  0.4  0.4  0.3   --      Hemodynamics:  Temp (24hrs), Av.3 °C (99.2 °F), Min:36.7 °C (98.1 °F), Max:38 °C (100.4 °F)  Temperature: 36.7 °C (98.1 °F)  Pulse  Av.4  Min: 56  Max: 88   Blood Pressure : 119/85     Respiratory:    Respiration: 17, Pulse Oximetry: 95 %     Work Of Breathing / Effort: Mild  RUL Breath Sounds: Diminished, RML Breath Sounds: Diminished, RLL Breath Sounds: Diminished, ANASTASIYA Breath Sounds: Diminished, LLL Breath Sounds: Diminished  Fluids:    Intake/Output Summary (Last 24 hours) at 18 1000  Last data filed at 18 0900   Gross per 24 hour   Intake             1860 ml   Output                0 ml   Net             1860 ml     Weight: 111.1 kg (244 lb 14.9 oz)  GI/Nutrition:  Orders Placed This Encounter   Procedures   • Diet Order Low Fiber(GI Soft)     Standing Status:   Standing     Number of Occurrences:   1     Order Specific Question:   Diet:     Answer:   Low Fiber(GI Soft) [2]     Medical Decision Making, by Problem:  Active Hospital Problems    Diagnosis   • *Small bowel obstruction (HCC) [K56.609]   • Anemia [D64.9]   • UTI (urinary tract infection) [N39.0]   • Lactic acidosis [E87.2]   • Stage 3 chronic kidney disease [N18.3]   • Moderate episode of recurrent major depressive disorder (HCC) [F33.1]   • Obesity (BMI 30-39.9) [E66.9]   • Primary insomnia [F51.01]   • BPH (benign prostatic hyperplasia) [N40.0]   • GERD (gastroesophageal reflux disease) [K21.9]   • Essential hypertension [I10]       Plan:  1.   Soft diet  2.   Ok per surgery to d/c if tolerates lunch.  3.   UTI per primary team.      Quality-Core Measures

## 2018-07-09 NOTE — DISCHARGE INSTRUCTIONS
Discharge Instructions    Discharged to home by car with relative. Discharged via wheelchair, hospital escort: Yes.  Special equipment needed: Not Applicable    Be sure to schedule a follow-up appointment with your primary care doctor or any specialists as instructed.     Discharge Plan:   Influenza Vaccine Indication: Patient Refuses    I understand that a diet low in cholesterol, fat, and sodium is recommended for good health. Unless I have been given specific instructions below for another diet, I accept this instruction as my diet prescription.   Other diet: GI SOFT    Special Instructions: None    · Is patient discharged on Warfarin / Coumadin?   No     Depression / Suicide Risk    As you are discharged from this [facility] facility, it is important to learn how to keep safe from harming yourself.    Recognize the warning signs:  · Abrupt changes in personality, positive or negative- including increase in energy   · Giving away possessions  · Change in eating patterns- significant weight changes-  positive or negative  · Change in sleeping patterns- unable to sleep or sleeping all the time   Unwillingness or inability to communicate  Small Bowel Obstruction  A small bowel obstruction means that something is blocking the small bowel. The small bowel is also called the small intestine. It is the long tube that connects the stomach to the colon. An obstruction will stop food and fluids from passing through the small bowel. Treatment depends on what is causing the problem and how bad the problem is.  Follow these instructions at home:  · Get a lot of rest.  · Follow your diet as told by your doctor. You may need to:  ¨ Only drink clear liquids until you start to get better.  ¨ Avoid solid foods as told by your doctor.  · Take over-the-counter and prescription medicines only as told by your doctor.  · Keep all follow-up visits as told by your doctor. This is important.  Contact a doctor if:  · You have a  fever.  · You have chills.  Get help right away if:  · You have pain or cramps that get worse.  · You throw up (vomit) blood.  · You have a feeling of being sick to your stomach (nausea) that does not go away.  · You cannot stop throwing up.  · You cannot drink fluids.  · You feel confused.  · You feel dry or thirsty (dehydrated).  · Your belly gets more bloated.  · You feel weak or you pass out (faint).  This information is not intended to replace advice given to you by your health care provider. Make sure you discuss any questions you have with your health care provider.  Document Released: 01/25/2006 Document Revised: 08/14/2017 Document Reviewed: 02/11/2016  [employer] Patient Education © 2017 [employer]  ·   · Depression  · Unusual sadness, discouragement and loneliness  · Talk of wanting to die  · Neglect of personal appearance   · Rebelliousness- reckless behavior  · Withdrawal from people/activities they love  · Confusion- inability to concentrate     If you or a loved one observes any of these behaviors or has concerns about self-harm, here's what you can do:  · Talk about it- your feelings and reasons for harming yourself  · Remove any means that you might use to hurt yourself (examples: pills, rope, extension cords, firearm)  · Get professional help from the community (Mental Health, Substance Abuse, psychological counseling)  · Do not be alone:Call your Safe Contact- someone whom you trust who will be there for you.  · Call your local CRISIS HOTLINE 496-6567 or 382-641-4359  · Call your local Children's Mobile Crisis Response Team [place] (343) 326-5250 or www.[employer]  · Call the toll free National Suicide Prevention Hotlines   · National Suicide Prevention Lifeline 173-952-HMPX (5000)  · National Hope Line Network 800-SUICIDE (856-7797)

## 2018-07-09 NOTE — CARE PLAN
Problem: Bowel/Gastric:  Goal: Normal bowel function is maintained or improved  Outcome: PROGRESSING AS EXPECTED  Patient has his NG tube out and is eating clear liquids with no complaints at this time.

## 2018-07-09 NOTE — PROGRESS NOTES
Progress Note               Author: Raleigh Wong Date & Time created: 2018  5:26 PM     Interval History:  SBS reveals contrast to the colon by 6 hours.  Mildly dilated loops of small bowel.   Pt has had 5 large watery bowel movements since the study.    Review of Systems:  Review of Systems   Gastrointestinal: Negative for abdominal pain, nausea and vomiting.       Physical Exam:  Physical Exam   Constitutional: He appears well-developed and well-nourished.   Cardiovascular: Normal rate.    Pulmonary/Chest: No respiratory distress.   Abdominal: He exhibits distension. There is no tenderness. There is no rebound and no guarding.       Labs:        Invalid input(s): [id number]  Recent Labs      18   TROPONINI  <0.01     Recent Labs      18   0555   SODIUM  137  141  138   POTASSIUM  4.9  3.8  3.7   CHLORIDE  108  111  109   CO2  19*  22  20   BUN  22  24*  22   CREATININE  1.36  1.32  0.99   CALCIUM  9.3  8.8  8.3*     Recent Labs      18   0555   ALTSGPT  19  14  10   ASTSGOT  32  15  16   ALKPHOSPHAT  80  71  59   TBILIRUBIN  0.4  0.4  0.3   LIPASE  34   --    --    GLUCOSE  143*  100*  101*     Recent Labs      184  18   0555   RBC  4.65*  4.12*  4.06*   HEMOGLOBIN  12.6*  11.3*  11.1*   HEMATOCRIT  38.1*  34.8*  33.8*   PLATELETCT  280  256  240     Recent Labs      184  18   0555   WBC  11.8*  9.9  8.9   NEUTSPOLYS  79.60*  68.30  70.80   LYMPHOCYTES  12.30*  22.40  19.70*   MONOCYTES  4.50  5.70  5.50   EOSINOPHILS  2.70  2.90  3.30   BASOPHILS  0.30  0.30  0.30   ASTSGOT  32  15  16   ALTSGPT  19  14  10   ALKPHOSPHAT  80  71  59   TBILIRUBIN  0.4  0.4  0.3     Hemodynamics:  Temp (24hrs), Av.5 °C (99.5 °F), Min:37.3 °C (99.1 °F), Max:38 °C (100.4 °F)  Temperature: 38 °C (100.4 °F)  Pulse  Av.5  Min: 76  Max: 88   Blood Pressure :  138/85     Respiratory:    Respiration: 16, Pulse Oximetry: 90 %     Work Of Breathing / Effort: Mild  RUL Breath Sounds: Diminished, RML Breath Sounds: Diminished, RLL Breath Sounds: Diminished, ANASTASIYA Breath Sounds: Diminished, LLL Breath Sounds: Diminished  Fluids:    Intake/Output Summary (Last 24 hours) at 07/08/18 1726  Last data filed at 07/07/18 1800   Gross per 24 hour   Intake             1500 ml   Output              400 ml   Net             1100 ml        GI/Nutrition:  Orders Placed This Encounter   Procedures   • Diet NPO     Standing Status:   Standing     Number of Occurrences:   1     Order Specific Question:   Restrict to:     Answer:   Strict [1]     Medical Decision Making, by Problem:  Active Hospital Problems    Diagnosis   • *Small bowel obstruction (HCC) [K56.609]   • Anemia [D64.9]   • UTI (urinary tract infection) [N39.0]   • Lactic acidosis [E87.2]   • Stage 3 chronic kidney disease [N18.3]   • Moderate episode of recurrent major depressive disorder (HCC) [F33.1]   • Obesity (BMI 30-39.9) [E66.9]   • Primary insomnia [F51.01]   • BPH (benign prostatic hyperplasia) [N40.0]   • GERD (gastroesophageal reflux disease) [K21.9]   • Essential hypertension [I10]       Plan:  1.   Likely ileus from UTI versus psbo that is resolving  2.   NGT removed, will start clears in am  3.   Mobilize.      Quality-Core Measures

## 2018-07-10 LAB
BACTERIA UR CULT: NORMAL
SIGNIFICANT IND 70042: NORMAL
SITE SITE: NORMAL
SOURCE SOURCE: NORMAL

## 2018-07-12 RX ORDER — SERTRALINE HYDROCHLORIDE 25 MG/1
TABLET, FILM COATED ORAL
Qty: 90 TAB | Refills: 2 | Status: SHIPPED | OUTPATIENT
Start: 2018-07-12 | End: 2018-08-22

## 2018-07-13 ENCOUNTER — OFFICE VISIT (OUTPATIENT)
Dept: MEDICAL GROUP | Facility: CLINIC | Age: 73
End: 2018-07-13
Payer: MEDICARE

## 2018-07-13 VITALS
RESPIRATION RATE: 16 BRPM | WEIGHT: 242 LBS | TEMPERATURE: 97.9 F | HEART RATE: 97 BPM | OXYGEN SATURATION: 95 % | HEIGHT: 72 IN | DIASTOLIC BLOOD PRESSURE: 74 MMHG | SYSTOLIC BLOOD PRESSURE: 126 MMHG | BODY MASS INDEX: 32.78 KG/M2

## 2018-07-13 DIAGNOSIS — R05.9 COUGH: ICD-10-CM

## 2018-07-13 DIAGNOSIS — R21 PENILE RASH: ICD-10-CM

## 2018-07-13 DIAGNOSIS — K56.609 SMALL BOWEL OBSTRUCTION (HCC): ICD-10-CM

## 2018-07-13 DIAGNOSIS — R31.0 GROSS HEMATURIA: ICD-10-CM

## 2018-07-13 PROBLEM — N39.0 UTI (URINARY TRACT INFECTION): Status: RESOLVED | Noted: 2018-07-07 | Resolved: 2018-07-13

## 2018-07-13 PROBLEM — K08.89 TOOTH PAIN: Status: RESOLVED | Noted: 2018-07-03 | Resolved: 2018-07-13

## 2018-07-13 LAB
APPEARANCE UR: NORMAL
BILIRUB UR STRIP-MCNC: NORMAL MG/DL
COLOR UR AUTO: YELLOW
GLUCOSE UR STRIP.AUTO-MCNC: NORMAL MG/DL
KETONES UR STRIP.AUTO-MCNC: 5 MG/DL
LEUKOCYTE ESTERASE UR QL STRIP.AUTO: NORMAL
NITRITE UR QL STRIP.AUTO: NORMAL
PH UR STRIP.AUTO: 6 [PH] (ref 5–8)
PROT UR QL STRIP: 300 MG/DL
RBC UR QL AUTO: NORMAL
SP GR UR STRIP.AUTO: 1.03
UROBILINOGEN UR STRIP-MCNC: NORMAL MG/DL

## 2018-07-13 PROCEDURE — 81002 URINALYSIS NONAUTO W/O SCOPE: CPT | Performed by: NURSE PRACTITIONER

## 2018-07-13 PROCEDURE — 99214 OFFICE O/P EST MOD 30 MIN: CPT | Performed by: NURSE PRACTITIONER

## 2018-07-13 RX ORDER — OMEPRAZOLE 40 MG/1
CAPSULE, DELAYED RELEASE ORAL
COMMUNITY
Start: 2018-04-17 | End: 2018-08-22

## 2018-07-13 RX ORDER — PREDNISONE 20 MG/1
TABLET ORAL
COMMUNITY
Start: 2018-04-23 | End: 2018-08-22

## 2018-07-13 RX ORDER — CLOTRIMAZOLE 1 %
CREAM (GRAM) TOPICAL
Qty: 1 TUBE | Refills: 1 | Status: SHIPPED | OUTPATIENT
Start: 2018-07-13 | End: 2018-07-13 | Stop reason: SDUPTHER

## 2018-07-13 RX ORDER — CLOTRIMAZOLE 1 %
CREAM (GRAM) TOPICAL
Qty: 1 TUBE | Refills: 1 | Status: SHIPPED | OUTPATIENT
Start: 2018-07-13 | End: 2018-11-18

## 2018-07-13 RX ORDER — BENZONATATE 100 MG/1
100 CAPSULE ORAL 3 TIMES DAILY PRN
Qty: 60 CAP | Refills: 0 | Status: SHIPPED | OUTPATIENT
Start: 2018-07-13 | End: 2018-08-22

## 2018-07-13 NOTE — ASSESSMENT & PLAN NOTE
"This is a new problem.  Patient reports he cannot seem to get rid of a \"cottonpickin cough \" denies sore throat or ear pain.  Patient reports just cough.  "

## 2018-07-13 NOTE — PROGRESS NOTES
"Subjective:     Chidi Tatum is a 72 y.o. male here today for hospital follow-up    Small bowel obstruction (HCC)  Patient was seen inpatient for small bowel obstruction 7/6/18.  Patient reports since that time he has had some diarrhea.  No blood in stool.  Patient reports he does feel fatigued at times.    Gross hematuria  Patient reports that since hospitalization he has not seen blood in his urine.  He was treated for urinary tract infection and patient, his culture was actually negative.  He was taking Augmentin prior to hospitalization due to a tooth infection?  He was then treated with outpatient Cipro course which he did complete.  Patient denies any complaints of urinary symptoms.  His main complaint today is still some abdominal pain although less than previously.  Denies any fever or chills.  His vitals are stable.    Penile rash  This is a new problem.  Patient has been treated in the past for irritation at his urethra, and treated with cream and also Diflucan.  Patient requesting refill of medication today.    Cough  This is a new problem.  Patient reports he cannot seem to get rid of a \"cottonpickin cough \" denies sore throat or ear pain.  Patient reports just cough.       Current medicines (including changes today)  Current Outpatient Prescriptions   Medication Sig Dispense Refill   • omeprazole (PRILOSEC) 40 MG delayed-release capsule      • predniSONE (DELTASONE) 20 MG Tab      • benzonatate (TESSALON) 100 MG Cap Take 1 Cap by mouth 3 times a day as needed for Cough. 60 Cap 0   • clotrimazole (LOTRIMIN) 1 % Cream 1 application twice daily as needed for 10 days 1 Tube 1   • sertraline (ZOLOFT) 25 MG tablet TAKE ONE TABLET BY MOUTH ONCE DAILY 90 Tab 2   • pantoprazole (PROTONIX) 40 MG Tablet Delayed Response Take 1 Tab by mouth every day. 30 Tab 5   • sertraline (ZOLOFT) 25 MG tablet Take 1 Tab by mouth every day. 30 Tab 1   • hydroCHLOROthiazide (HYDRODIURIL) 25 MG Tab Take 1 Tab by mouth every 48 " hours. 45 Tab 1   • tamsulosin (FLOMAX) 0.4 MG capsule Take 1 Cap by mouth ONE-HALF HOUR AFTER BREAKFAST. 90 Cap 2   • finasteride (PROSCAR) 5 MG Tab TAKE ONE TABLET BY MOUTH ONCE DAILY 90 Tab 2   • lisinopril (PRINIVIL) 10 MG Tab TAKE ONE TABLET BY MOUTH ONCE DAILY *REPLACES  5MG  TABLET* 90 Tab 1   • zolpidem (AMBIEN) 5 MG Tab TAKE ONE TABLET BY MOUTH ONCE DAILY AT BEDTIME AS NEEDED FOR SLEEP FOR 30 DAYS 30 Tab 2     No current facility-administered medications for this visit.        He  has a past medical history of BPH (benign prostatic hyperplasia); GERD (gastroesophageal reflux disease); HTN (hypertension); Hypertension; and Insomnia.    He  has a past surgical history that includes hernia repair and temporal artery biopsy (Right, 2/9/2018).     Social History     Social History   • Marital status:      Spouse name: N/A   • Number of children: N/A   • Years of education: N/A     Social History Main Topics   • Smoking status: Never Smoker   • Smokeless tobacco: Never Used   • Alcohol use No   • Drug use: No   • Sexual activity: No     Other Topics Concern   • Not on file     Social History Narrative   • No narrative on file       Family History   Problem Relation Age of Onset   • Lung Disease Father    • Alcohol/Drug Brother    • Lung Disease Brother          ROS  Positive for mild abdominal pain, diarrhea.  No urinary symptoms.  Positive for fatigue.  No fever, no chills. Positive for dry cough.   No chest pain, no shortness of breath, no abdominal pain, no rashes    All other systems reviewed and are negative.        Objective:     Blood pressure 126/74, pulse 97, temperature 36.6 °C (97.9 °F), resp. rate 16, height 1.829 m (6'), weight 109.8 kg (242 lb), SpO2 95 %. Body mass index is 32.82 kg/m².    Physical Exam:   Constitutional: Alert, no distress. He does appear tired.   Eye: Equal, round and reactive, conjunctiva clear, lids normal.   ENMT: Lips without lesions, oropharynx clear. TMs normal.    Respiratory: Unlabored respiratory effort, lungs clear to auscultation, no wheezes, no ronchi.  Cardiovascular: Normal S1, S2, no murmur, no edema.   Abdomen: Soft, non-tender, no masses, no hepatosplenomegaly. Bowel sounds are present in all quadrants, however, appear diminished on the RUQ compared to LUQ.   Skin: Warm, dry, good turgor, no rashes in visible areas.  Psych: Alert and oriented x3, normal affect and mood.        Assessment and Plan:   The following treatment plan was discussed    1. Small bowel obstruction (HCC)  UA still shows high amounts of blood.  His CT scan in hospital did show a nonobstructing stone.  Also showed renal cysts. Could not r/o UPJ obstruction? He denies any urinary complaints at all today. I have placed a referral for him to follow-up with GI and also with nephrology considering these incidental abnormal findings. I suspect both specialists will likely discuss recommendations for testing, at this time, since patient is adverse to unnecessary testing and return to hospital, will defer to their plan to discuss with patient.  Based on physical exam I have discussed with patient strict ER precautions.  Discussed signs and symptoms to seek emergent care.  Advised he will come repeat labs on Tuesday.  His daughter is here with him today who is tearful at times.  We have had a lengthy conversation regarding patient's physical state.  Patient has been seen several times in hospital over the last few months.  His health really started to deteriorate after the death of his wife 2 years ago.  Patient is at times adamant not to undergo further testing, so we have discussed home health referral.  We have also briefly discussed possibility of use of hospice care in the future.  His daughter appears to be very overwhelmed with being his full-time caregiver.  I have encouraged her to make a separate appointment with me so we may also discuss, she is a patient of mine.  At this time he will  follow-up with both specialist and hopefully home health will contact him shortly so we can have a plan moving forward.  I will see the patient briefly while he is in office on Tuesday to complete his labs to check in and we may determine at that time how soon he needs to come back to see me.  - REFERRAL TO GASTROENTEROLOGY  - CBC WITH DIFFERENTIAL; Future  - BASIC METABOLIC PANEL; Future  - REFERRAL TO HOME HEALTH  - REFERRAL TO COMPLEX CARE MANAGEMENT Services Requested: Care Manager to Evaluate and Recommend    2. Gross hematuria  - POCT Urinalysis  - REFERRAL TO NEPHROLOGY    3. Cough  May be related to lisinopril?  At this time will trial Tessalon and will continue to monitor.    4. Penile rash  Patient declining any exam today, he is here with his daughter and grandson.  Will refill same cream that he had previously.  Advised to make a separate appointment if he has further concerns.    Total 40 minutes face-to-face time spent with patient, with greater than 50% of the total time discussing patient's issues and symptoms as listed above in assessment and plan, as well as managing coordination of care for future evaluation and treatment.      Reviewed risks and benefits of treatment plan. Patient verbally agrees to plan of care.       Followup: Return for next week for labs on tuesday .    [email or web address] [email or web address].     PLEASE NOTE: This dictation was created using voice recognition software. I have made every reasonable attempt to correct obvious errors, but I expect that there may be errors of grammar and possibly content that I did not discover prior finalizing this note.

## 2018-07-13 NOTE — ASSESSMENT & PLAN NOTE
This is a new problem.  Patient has been treated in the past for irritation at his urethra, and treated with cream and also Diflucan.  Patient requesting refill of medication today.

## 2018-07-13 NOTE — ASSESSMENT & PLAN NOTE
Patient reports that since hospitalization he has not seen blood in his urine.  He was treated for urinary tract infection and patient, his culture was actually negative.  He was taking Augmentin prior to hospitalization due to a tooth infection?  He was then treated with outpatient Cipro course which he did complete.  Patient denies any complaints of urinary symptoms.  His main complaint today is still some abdominal pain although less than previously.  Denies any fever or chills.  His vitals are stable.

## 2018-07-13 NOTE — ASSESSMENT & PLAN NOTE
Patient was seen inpatient for small bowel obstruction 7/6/18.  Patient reports since that time he has had some diarrhea.  No blood in stool.  Patient reports he does feel fatigued at times.

## 2018-07-15 ENCOUNTER — HOME HEALTH ADMISSION (OUTPATIENT)
Dept: HOME HEALTH SERVICES | Facility: HOME HEALTHCARE | Age: 73
End: 2018-07-15
Payer: MEDICARE

## 2018-07-16 ENCOUNTER — PATIENT OUTREACH (OUTPATIENT)
Dept: HEALTH INFORMATION MANAGEMENT | Facility: OTHER | Age: 73
End: 2018-07-16

## 2018-07-16 ENCOUNTER — HOSPITAL ENCOUNTER (EMERGENCY)
Facility: MEDICAL CENTER | Age: 73
End: 2018-07-16
Attending: EMERGENCY MEDICINE
Payer: MEDICARE

## 2018-07-16 VITALS
TEMPERATURE: 96.9 F | RESPIRATION RATE: 18 BRPM | HEIGHT: 72 IN | HEART RATE: 91 BPM | DIASTOLIC BLOOD PRESSURE: 75 MMHG | OXYGEN SATURATION: 95 % | WEIGHT: 242.73 LBS | SYSTOLIC BLOOD PRESSURE: 139 MMHG | BODY MASS INDEX: 32.88 KG/M2

## 2018-07-16 DIAGNOSIS — I10 ESSENTIAL HYPERTENSION: ICD-10-CM

## 2018-07-16 PROCEDURE — A9270 NON-COVERED ITEM OR SERVICE: HCPCS | Performed by: EMERGENCY MEDICINE

## 2018-07-16 PROCEDURE — 700102 HCHG RX REV CODE 250 W/ 637 OVERRIDE(OP): Performed by: EMERGENCY MEDICINE

## 2018-07-16 PROCEDURE — 99284 EMERGENCY DEPT VISIT MOD MDM: CPT

## 2018-07-16 RX ADMIN — METOPROLOL TARTRATE 12.5 MG: 25 TABLET, FILM COATED ORAL at 22:07

## 2018-07-16 ASSESSMENT — PAIN SCALES - GENERAL: PAINLEVEL_OUTOF10: 3

## 2018-07-17 ENCOUNTER — APPOINTMENT (OUTPATIENT)
Dept: MEDICAL GROUP | Facility: CLINIC | Age: 73
End: 2018-07-17
Payer: MEDICARE

## 2018-07-17 NOTE — ED TRIAGE NOTES
Chidi Tatum  72 y.o.  Chief Complaint   Patient presents with   • Cold Symptoms     sudden onset this evening, waxing and waning, facial flushing, headache, dry mouth     Ambulatory to triage with steady gait for above accompanied by daughter.    Patient recently hospitalized here for SBO, resolved. Seen by PCP yesterday.    Patient and daughter both appear anxious in triage.    Triage process explained to patient, apologized for wait time, and returned to [facility].

## 2018-07-17 NOTE — ED PROVIDER NOTES
ED Provider Note    HPI: Patient is a 72-year-old male who presented to the emergency department care of his family July 16, 2018 at 7:21 PM with a chief complaint of elevated blood pressure.    Patient checked his blood pressure this evening and his systolic was 160.  This is apparently high for him.  The patient states he has been compliant with his lisinopril regimen.  He denied headache visual disturbance chest pain or shortness of breath.  He did feel a little bit of flushing and nauseated but did not vomit.  He now feels pretty much back to normal.    Review of Systems: Positive for flushing and nausea which is resolved negative or vomiting chest pain visual disturbance shortness of breath.    Past medical/surgical history: Hypertension small bowel obstruction reflux disease BPH anxiety    Medications: Lisinopril Prilosec prednisone Tessalon Zoloft Ambien Protonix Zoloft    Allergies: Celebrex    Social History: No history of smoking no alcohol use      Physical exam: Constitutional: Pleasant male awake alert  Vital signs: Temperature 96.9 pulse 95 respirations 18 blood pressure 152/79 pulse oximetry 98  Neck: Trachea midline. No cervical masses seen or palpated. Normal range of motion, supple. No meningeal signs elicited.  Cardiac: Regular rate and rhythm. S1-S2 present. No S3 or S4 present. No murmurs, rubs, or gallops heard. No edema or varicosities were seen.   Lungs: Clear to auscultation with good aeration throughout. No wheezes, rales, or rhonchi heard. Patient's chest wall moved symmetrically with each respiratory effort. Patient was not making use of accessory muscles of respiration in breathing.  Abdomen: Soft nontender to palpation. No rebound or guarding elicited. No organomegaly identified. No pulsatile abdominal masses identified.   Neurologic: alert and awake answers questions appropriately. Moves all four extremities independently, no gross focal abnormalities identified. Normal strength and  motor.  Psychiatric: Patient appeared to be slightly anxious.  He was not delusional or hallucinating    Medical decision making: Patient given 12.5 mg of Lopressor p.o.    Patient feels pretty much back to normal now.  His blood pressure still little bit elevated.  He has not had a headache visual disturbance chest pain shortness of breath or abdominal pain.  I do not think this represents a hypertensive urgency.  The patient states he has an appointment with his primary care provider tomorrow and he will get his blood pressure rechecked at that time.  This seems reasonable.    Patient discharged with instructions for hypertension.  The patient was carefully counseled to return to ED immediately for chest pain shortness of breath visual disturbance or any other problems    Patient verbalized understanding of the above instructions and states she will comply    Impression hypertension

## 2018-07-17 NOTE — ED NOTES
Pt states he is coming in w htn, HA, nausea. Pt reports BP was 160 systolic at home. Pt taking lisinopril  At home. + nausea at home. Denies vomiting.

## 2018-07-17 NOTE — ED NOTES
Pt given d/c papers and verbalized understanding. Al questions answered. Pt left ER in stable condition.

## 2018-07-17 NOTE — DISCHARGE INSTRUCTIONS
Hypertension  Hypertension, commonly called high blood pressure, is when the force of blood pumping through your arteries is too strong. Your arteries are the blood vessels that carry blood from your heart throughout your body. A blood pressure reading consists of a higher number over a lower number, such as 110/72. The higher number (systolic) is the pressure inside your arteries when your heart pumps. The lower number (diastolic) is the pressure inside your arteries when your heart relaxes. Ideally you want your blood pressure below 120/80.  Hypertension forces your heart to work harder to pump blood. Your arteries may become narrow or stiff. Having untreated or uncontrolled hypertension can cause heart attack, stroke, kidney disease, and other problems.  What increases the risk?  Some risk factors for high blood pressure are controllable. Others are not.  Risk factors you cannot control include:  · Race. You may be at higher risk if you are .  · Age. Risk increases with age.  · Gender. Men are at higher risk than women before age 45 years. After age 65, women are at higher risk than men.  Risk factors you can control include:  · Not getting enough exercise or physical activity.  · Being overweight.  · Getting too much fat, sugar, calories, or salt in your diet.  · Drinking too much alcohol.  What are the signs or symptoms?  Hypertension does not usually cause signs or symptoms. Extremely high blood pressure (hypertensive crisis) may cause headache, anxiety, shortness of breath, and nosebleed.  How is this diagnosed?  To check if you have hypertension, your health care provider will measure your blood pressure while you are seated, with your arm held at the level of your heart. It should be measured at least twice using the same arm. Certain conditions can cause a difference in blood pressure between your right and left arms. A blood pressure reading that is higher than normal on one occasion does  not mean that you need treatment. If it is not clear whether you have high blood pressure, you may be asked to return on a different day to have your blood pressure checked again. Or, you may be asked to monitor your blood pressure at home for 1 or more weeks.  How is this treated?  Treating high blood pressure includes making lifestyle changes and possibly taking medicine. Living a healthy lifestyle can help lower high blood pressure. You may need to change some of your habits.  Lifestyle changes may include:  · Following the DASH diet. This diet is high in fruits, vegetables, and whole grains. It is low in salt, red meat, and added sugars.  · Keep your sodium intake below 2,300 mg per day.  · Getting at least 30-45 minutes of aerobic exercise at least 4 times per week.  · Losing weight if necessary.  · Not smoking.  · Limiting alcoholic beverages.  · Learning ways to reduce stress.  Your health care provider may prescribe medicine if lifestyle changes are not enough to get your blood pressure under control, and if one of the following is true:  · You are 18-59 years of age and your systolic blood pressure is above 140.  · You are 60 years of age or older, and your systolic blood pressure is above 150.  · Your diastolic blood pressure is above 90.  · You have diabetes, and your systolic blood pressure is over 140 or your diastolic blood pressure is over 90.  · You have kidney disease and your blood pressure is above 140/90.  · You have heart disease and your blood pressure is above 140/90.  Your personal target blood pressure may vary depending on your medical conditions, your age, and other factors.  Follow these instructions at home:  · Have your blood pressure rechecked as directed by your health care provider.  · Take medicines only as directed by your health care provider. Follow the directions carefully. Blood pressure medicines must be taken as prescribed. The medicine does not work as well when you skip  doses. Skipping doses also puts you at risk for problems.  · Do not smoke.  · Monitor your blood pressure at home as directed by your health care provider.  Contact a health care provider if:  · You think you are having a reaction to medicines taken.  · You have recurrent headaches or feel dizzy.  · You have swelling in your ankles.  · You have trouble with your vision.  Get help right away if:  · You develop a severe headache or confusion.  · You have unusual weakness, numbness, or feel faint.  · You have severe chest or abdominal pain.  · You vomit repeatedly.  · You have trouble breathing.  This information is not intended to replace advice given to you by your health care provider. Make sure you discuss any questions you have with your health care provider.  Document Released: 12/18/2006 Document Revised: 05/25/2017 Document Reviewed: 10/10/2014  [employer] Interactive Patient Education © 2017 [employer]

## 2018-07-18 ENCOUNTER — NON-PROVIDER VISIT (OUTPATIENT)
Dept: MEDICAL GROUP | Facility: CLINIC | Age: 73
End: 2018-07-18
Payer: MEDICARE

## 2018-07-18 ENCOUNTER — HOSPITAL ENCOUNTER (OUTPATIENT)
Facility: MEDICAL CENTER | Age: 73
End: 2018-07-18
Attending: NURSE PRACTITIONER
Payer: MEDICARE

## 2018-07-18 DIAGNOSIS — K56.609 SMALL BOWEL OBSTRUCTION (HCC): ICD-10-CM

## 2018-07-18 PROCEDURE — 36415 COLL VENOUS BLD VENIPUNCTURE: CPT | Performed by: PHYSICIAN ASSISTANT

## 2018-07-18 PROCEDURE — 99000 SPECIMEN HANDLING OFFICE-LAB: CPT | Performed by: PHYSICIAN ASSISTANT

## 2018-07-18 PROCEDURE — 80048 BASIC METABOLIC PNL TOTAL CA: CPT

## 2018-07-18 PROCEDURE — 85025 COMPLETE CBC W/AUTO DIFF WBC: CPT

## 2018-07-19 LAB
ANION GAP SERPL CALC-SCNC: 9 MMOL/L (ref 0–11.9)
BASOPHILS # BLD AUTO: 0.6 % (ref 0–1.8)
BASOPHILS # BLD: 0.06 K/UL (ref 0–0.12)
BUN SERPL-MCNC: 30 MG/DL (ref 8–22)
CALCIUM SERPL-MCNC: 9.5 MG/DL (ref 8.5–10.5)
CHLORIDE SERPL-SCNC: 107 MMOL/L (ref 96–112)
CO2 SERPL-SCNC: 20 MMOL/L (ref 20–33)
CREAT SERPL-MCNC: 1.31 MG/DL (ref 0.5–1.4)
EOSINOPHIL # BLD AUTO: 0.53 K/UL (ref 0–0.51)
EOSINOPHIL NFR BLD: 5.7 % (ref 0–6.9)
ERYTHROCYTE [DISTWIDTH] IN BLOOD BY AUTOMATED COUNT: 46.8 FL (ref 35.9–50)
GLUCOSE SERPL-MCNC: 106 MG/DL (ref 65–99)
HCT VFR BLD AUTO: 39.6 % (ref 42–52)
HGB BLD-MCNC: 12.6 G/DL (ref 14–18)
IMM GRANULOCYTES # BLD AUTO: 0.04 K/UL (ref 0–0.11)
IMM GRANULOCYTES NFR BLD AUTO: 0.4 % (ref 0–0.9)
LYMPHOCYTES # BLD AUTO: 2.19 K/UL (ref 1–4.8)
LYMPHOCYTES NFR BLD: 23.5 % (ref 22–41)
MCH RBC QN AUTO: 27.2 PG (ref 27–33)
MCHC RBC AUTO-ENTMCNC: 31.8 G/DL (ref 33.7–35.3)
MCV RBC AUTO: 85.3 FL (ref 81.4–97.8)
MONOCYTES # BLD AUTO: 0.71 K/UL (ref 0–0.85)
MONOCYTES NFR BLD AUTO: 7.6 % (ref 0–13.4)
NEUTROPHILS # BLD AUTO: 5.8 K/UL (ref 1.82–7.42)
NEUTROPHILS NFR BLD: 62.2 % (ref 44–72)
NRBC # BLD AUTO: 0 K/UL
NRBC BLD-RTO: 0 /100 WBC
PLATELET # BLD AUTO: 322 K/UL (ref 164–446)
PMV BLD AUTO: 10.3 FL (ref 9–12.9)
POTASSIUM SERPL-SCNC: 4.2 MMOL/L (ref 3.6–5.5)
RBC # BLD AUTO: 4.64 M/UL (ref 4.7–6.1)
SODIUM SERPL-SCNC: 136 MMOL/L (ref 135–145)
WBC # BLD AUTO: 9.3 K/UL (ref 4.8–10.8)

## 2018-07-23 ENCOUNTER — TELEPHONE (OUTPATIENT)
Dept: MEDICAL GROUP | Facility: PHYSICIAN GROUP | Age: 73
End: 2018-07-23

## 2018-07-24 NOTE — TELEPHONE ENCOUNTER
Please call patient to discuss why he is refusing services, we did discuss this at his appt?   REBECCA Pike

## 2018-08-21 ENCOUNTER — OFFICE VISIT (OUTPATIENT)
Dept: URGENT CARE | Facility: PHYSICIAN GROUP | Age: 73
End: 2018-08-21
Payer: MEDICARE

## 2018-08-21 VITALS
OXYGEN SATURATION: 96 % | BODY MASS INDEX: 32.96 KG/M2 | RESPIRATION RATE: 14 BRPM | TEMPERATURE: 98.1 F | DIASTOLIC BLOOD PRESSURE: 80 MMHG | SYSTOLIC BLOOD PRESSURE: 130 MMHG | HEART RATE: 98 BPM | WEIGHT: 243 LBS

## 2018-08-21 DIAGNOSIS — K04.7 TOOTH INFECTION: ICD-10-CM

## 2018-08-21 PROCEDURE — 99214 OFFICE O/P EST MOD 30 MIN: CPT | Performed by: NURSE PRACTITIONER

## 2018-08-21 RX ORDER — AMOXICILLIN 875 MG/1
875 TABLET, COATED ORAL 2 TIMES DAILY
Qty: 14 TAB | Refills: 0 | Status: SHIPPED | OUTPATIENT
Start: 2018-08-21 | End: 2018-09-25

## 2018-08-21 RX ORDER — HYDROCODONE BITARTRATE AND ACETAMINOPHEN 5; 325 MG/1; MG/1
1-2 TABLET ORAL EVERY 6 HOURS PRN
Qty: 12 TAB | Refills: 0 | Status: SHIPPED | OUTPATIENT
Start: 2018-08-21 | End: 2018-08-21

## 2018-08-21 RX ORDER — HYDROCODONE BITARTRATE AND ACETAMINOPHEN 5; 325 MG/1; MG/1
1 TABLET ORAL EVERY 6 HOURS PRN
Qty: 8 TAB | Refills: 0 | Status: SHIPPED | OUTPATIENT
Start: 2018-08-21 | End: 2018-08-24

## 2018-08-21 ASSESSMENT — ENCOUNTER SYMPTOMS
CHILLS: 0
HEADACHES: 0
NAUSEA: 0
MYALGIAS: 0
FEVER: 0

## 2018-08-21 NOTE — PROGRESS NOTES
Subjective:      Chidi Tatum is a 72 y.o. male who presents with Dental Pain (R lower x1mos)            HPI New problem. 72 year old male with right lower tooth pain/infection. He has a broken tooth with decay in this area. Denies fever, chills, body aches or myalgia. Describes 8/10 pain at times. Cannot secure a dentist due to being medicare. Last visit for similar issue was in June.  Celebrex [celecoxib]  Current Outpatient Prescriptions on File Prior to Visit   Medication Sig Dispense Refill   • lisinopril (PRINIVIL) 10 MG Tab TAKE ONE TABLET BY MOUTH ONCE DAILY *REPLACES  5MG 90 Tab 1   • omeprazole (PRILOSEC) 40 MG delayed-release capsule      • predniSONE (DELTASONE) 20 MG Tab      • benzonatate (TESSALON) 100 MG Cap Take 1 Cap by mouth 3 times a day as needed for Cough. 60 Cap 0   • clotrimazole (LOTRIMIN) 1 % Cream 1 application twice daily as needed for 10 days 1 Tube 1   • sertraline (ZOLOFT) 25 MG tablet TAKE ONE TABLET BY MOUTH ONCE DAILY 90 Tab 2   • pantoprazole (PROTONIX) 40 MG Tablet Delayed Response Take 1 Tab by mouth every day. 30 Tab 5   • sertraline (ZOLOFT) 25 MG tablet Take 1 Tab by mouth every day. 30 Tab 1   • hydroCHLOROthiazide (HYDRODIURIL) 25 MG Tab Take 1 Tab by mouth every 48 hours. 45 Tab 1   • tamsulosin (FLOMAX) 0.4 MG capsule Take 1 Cap by mouth ONE-HALF HOUR AFTER BREAKFAST. 90 Cap 2   • finasteride (PROSCAR) 5 MG Tab TAKE ONE TABLET BY MOUTH ONCE DAILY 90 Tab 2     No current facility-administered medications on file prior to visit.      Social History     Social History   • Marital status:      Spouse name: N/A   • Number of children: N/A   • Years of education: N/A     Occupational History   • Not on file.     Social History Main Topics   • Smoking status: Never Smoker   • Smokeless tobacco: Never Used   • Alcohol use No   • Drug use: No   • Sexual activity: No     Other Topics Concern   • Not on file     Social History Narrative   • No narrative on file     family  history includes Alcohol/Drug in his brother; Lung Disease in his brother and father.      Review of Systems   Constitutional: Negative for chills and fever.   HENT:        +tooth infection   Gastrointestinal: Negative for nausea.   Musculoskeletal: Negative for myalgias.   Skin: Negative for itching and rash.   Neurological: Negative for headaches.          Objective:     /80   Pulse 98   Temp 36.7 °C (98.1 °F)   Resp 14   Wt 110.2 kg (243 lb)   SpO2 96%   BMI 32.96 kg/m²      Physical Exam   Constitutional: He is oriented to person, place, and time. He appears well-developed and well-nourished. No distress.   HENT:   Head: Normocephalic and atraumatic.   Right Ear: External ear and ear canal normal. Tympanic membrane is not injected and not perforated. No middle ear effusion.   Left Ear: External ear and ear canal normal. Tympanic membrane is not injected and not perforated.  No middle ear effusion.   Nose: No mucosal edema.   Mouth/Throat: Abnormal dentition. Dental caries present. No oropharyngeal exudate or posterior oropharyngeal erythema.       Eyes: Conjunctivae are normal. Right eye exhibits no discharge. Left eye exhibits no discharge.   Neck: Normal range of motion. Neck supple.   Cardiovascular: Normal rate, regular rhythm and normal heart sounds.    No murmur heard.  Pulmonary/Chest: Effort normal and breath sounds normal. No respiratory distress.   Musculoskeletal: Normal range of motion.   Normal movement of all 4 extremities.   Lymphadenopathy:     He has no cervical adenopathy.        Right: No supraclavicular adenopathy present.        Left: No supraclavicular adenopathy present.   Neurological: He is alert and oriented to person, place, and time. Gait normal.   Skin: Skin is warm and dry.   Psychiatric: He has a normal mood and affect. His behavior is normal. Thought content normal.   Nursing note and vitals reviewed.              Assessment/Plan:     1. Tooth infection  amoxicillin  (AMOXIL) 875 MG tablet    CONSENT FOR OPIATE PRESCRIPTION    HYDROcodone-acetaminophen (NORCO) 5-325 MG Tab per tablet    CONSENT FOR OPIATE PRESCRIPTION    DISCONTINUED: HYDROcodone-acetaminophen (NORCO) 5-325 MG Tab per tablet     [place]  reviewed, no concern other than ambien rx. I have counseled patient not to mix these 2 medications.  Opioid risk reviewed.  Consent signed.   May use salt water gargles.  Differential diagnosis, natural history, supportive care, and indications for immediate follow-up discussed at length.

## 2018-08-22 ENCOUNTER — OFFICE VISIT (OUTPATIENT)
Dept: MEDICAL GROUP | Facility: CLINIC | Age: 73
End: 2018-08-22
Payer: MEDICARE

## 2018-08-22 VITALS
DIASTOLIC BLOOD PRESSURE: 78 MMHG | TEMPERATURE: 98.1 F | HEIGHT: 72 IN | WEIGHT: 242 LBS | OXYGEN SATURATION: 97 % | HEART RATE: 96 BPM | BODY MASS INDEX: 32.78 KG/M2 | RESPIRATION RATE: 18 BRPM | SYSTOLIC BLOOD PRESSURE: 138 MMHG

## 2018-08-22 DIAGNOSIS — K08.89 TOOTHACHE: ICD-10-CM

## 2018-08-22 PROCEDURE — 99213 OFFICE O/P EST LOW 20 MIN: CPT | Performed by: FAMILY MEDICINE

## 2018-08-22 RX ORDER — TRAMADOL HYDROCHLORIDE 50 MG/1
50 TABLET ORAL EVERY 8 HOURS PRN
Qty: 30 TAB | Refills: 0 | Status: SHIPPED | OUTPATIENT
Start: 2018-08-22 | End: 2018-09-06

## 2018-08-22 NOTE — PROGRESS NOTES
Complaint: UC f/u     Subjective:     Chidi Tatum is a 72 y.o. male here today for an  follow-up visit.    Toothache  Seen  [facility] yesterday for pain in 2 teeth right lower jaw. Was rx'd amoxicillin and Norco, told to f/u here. Can't afford to see dentist at [facility] clinic. Recurring problem.      No other concerns or complaints today.    Current medicines (including changes today)  Current Outpatient Prescriptions   Medication Sig Dispense Refill   • tramadol (ULTRAM) 50 MG Tab Take 1 Tab by mouth every 8 hours as needed for up to 15 days. 30 Tab 0   • amoxicillin (AMOXIL) 875 MG tablet Take 1 Tab by mouth 2 times a day. 14 Tab 0   • HYDROcodone-acetaminophen (NORCO) 5-325 MG Tab per tablet Take 1 Tab by mouth every 6 hours as needed for up to 3 days. 8 Tab 0   • lisinopril (PRINIVIL) 10 MG Tab TAKE ONE TABLET BY MOUTH ONCE DAILY *REPLACES  5MG 90 Tab 1   • clotrimazole (LOTRIMIN) 1 % Cream 1 application twice daily as needed for 10 days 1 Tube 1   • pantoprazole (PROTONIX) 40 MG Tablet Delayed Response Take 1 Tab by mouth every day. 30 Tab 5   • sertraline (ZOLOFT) 25 MG tablet Take 1 Tab by mouth every day. 30 Tab 1   • hydroCHLOROthiazide (HYDRODIURIL) 25 MG Tab Take 1 Tab by mouth every 48 hours. 45 Tab 1   • tamsulosin (FLOMAX) 0.4 MG capsule Take 1 Cap by mouth ONE-HALF HOUR AFTER BREAKFAST. 90 Cap 2   • finasteride (PROSCAR) 5 MG Tab TAKE ONE TABLET BY MOUTH ONCE DAILY 90 Tab 2     No current facility-administered medications for this visit.      He  has a past medical history of BPH (benign prostatic hyperplasia); GERD (gastroesophageal reflux disease); HTN (hypertension); Hypertension; Insomnia; and SBO (small bowel obstruction) ([facility]).    Health Maintenance: needs colon cancer screening      Allergies: Celebrex [celecoxib]    Current Outpatient Prescriptions Ordered in [facility]   Medication Sig Dispense Refill   • tramadol (ULTRAM) 50 MG Tab Take 1 Tab by mouth every 8 hours as needed for up to 15 days.  30 Tab 0   • amoxicillin (AMOXIL) 875 MG tablet Take 1 Tab by mouth 2 times a day. 14 Tab 0   • HYDROcodone-acetaminophen (NORCO) 5-325 MG Tab per tablet Take 1 Tab by mouth every 6 hours as needed for up to 3 days. 8 Tab 0   • lisinopril (PRINIVIL) 10 MG Tab TAKE ONE TABLET BY MOUTH ONCE DAILY *REPLACES  5MG 90 Tab 1   • clotrimazole (LOTRIMIN) 1 % Cream 1 application twice daily as needed for 10 days 1 Tube 1   • pantoprazole (PROTONIX) 40 MG Tablet Delayed Response Take 1 Tab by mouth every day. 30 Tab 5   • sertraline (ZOLOFT) 25 MG tablet Take 1 Tab by mouth every day. 30 Tab 1   • hydroCHLOROthiazide (HYDRODIURIL) 25 MG Tab Take 1 Tab by mouth every 48 hours. 45 Tab 1   • tamsulosin (FLOMAX) 0.4 MG capsule Take 1 Cap by mouth ONE-HALF HOUR AFTER BREAKFAST. 90 Cap 2   • finasteride (PROSCAR) 5 MG Tab TAKE ONE TABLET BY MOUTH ONCE DAILY 90 Tab 2     No current Epic-ordered facility-administered medications on file.        Past Medical History:   Diagnosis Date   • BPH (benign prostatic hyperplasia)    • GERD (gastroesophageal reflux disease)    • HTN (hypertension)    • Hypertension    • Insomnia    • SBO (small bowel obstruction) (HCC)        Past Surgical History:   Procedure Laterality Date   • TEMPORAL ARTERY BIOPSY Right 2/9/2018    Procedure: TEMPORAL ARTERY BIOPSY;  Surgeon: Bryant Corey M.D.;  Location: [facility];  Service: Vascular   • HERNIA REPAIR      umbilical, groin        Social History   Substance Use Topics   • Smoking status: Never Smoker   • Smokeless tobacco: Never Used   • Alcohol use No       Social History     Social History Narrative   • No narrative on file       Family History   Problem Relation Age of Onset   • Lung Disease Father    • Alcohol/Drug Brother    • Lung Disease Brother          ROS Positive for pain in teeth right mandible as well as painful node under right mandible.  Patient denies any fever, chills, unintentional weight gain/loss, fatigue, stroke  symptoms, dizziness, headache, nasal congestion, sore-throat, cough, heartburn, chest pain, difficulty breathing, abdominal discomfort, diarrhea/constipation, burning with urination or frequency, joint or back pain, skin rashes, depression or anxiety.       Objective:     Blood pressure 138/78, pulse 96, temperature 36.7 °C (98.1 °F), resp. rate 18, height 1.829 m (6'), weight 109.8 kg (242 lb), SpO2 97 %. Body mass index is 32.82 kg/m².   Physical Exam:  Constitutional: Alert, no distress.  .ENMT: Lips without lesions, average dentition with bridges, fillings. Teeth #28 & 29 tender upon percussion., oropharynx clear.  Neck: Trachea midline, no masses, no thyromegaly. Tender right submandibular nodes.  Respiratory: Unlabored respiratory effort, lungs clear to auscultation, no wheezes, no ronchi.  Cardiovascular: Normal S1, S2, no murmur, no extremity edema.  Psych: Alert and oriented x3, appropriate affect and mood.        Assessment and Plan:   The following treatment plan was discussed    1. Toothache  Chronic problem. Finish amoxicillin script, [place]. Tramadol in reserve, but ibuprofen 1st line for pain. Needs to see dentist.  - tramadol (ULTRAM) 50 MG Tab; Take 1 Tab by mouth every 8 hours as needed for up to 15 days.  Dispense: 30 Tab; Refill: 0  - Controlled Substance Treatment Agreement      Followup: Return if symptoms worsen or fail to improve.    Please note that this dictation was created using voice recognition software. I have made every reasonable attempt to correct obvious errors, but I expect that there are errors of grammar and possibly content that I did not discover before finalizing the note.

## 2018-08-22 NOTE — ASSESSMENT & PLAN NOTE
Seen TON Boone yesterday for pain in 2 teeth right lower jaw. Was rx'd amoxicillin and Norco, told to f/u here. Can't afford to see dentist at [facility] clinic. Recurring problem.

## 2018-09-25 ENCOUNTER — OFFICE VISIT (OUTPATIENT)
Dept: MEDICAL GROUP | Facility: CLINIC | Age: 73
End: 2018-09-25
Payer: MEDICARE

## 2018-09-25 ENCOUNTER — PATIENT OUTREACH (OUTPATIENT)
Dept: HEALTH INFORMATION MANAGEMENT | Facility: OTHER | Age: 73
End: 2018-09-25

## 2018-09-25 VITALS
OXYGEN SATURATION: 97 % | WEIGHT: 242 LBS | SYSTOLIC BLOOD PRESSURE: 146 MMHG | DIASTOLIC BLOOD PRESSURE: 80 MMHG | TEMPERATURE: 98.4 F | BODY MASS INDEX: 32.78 KG/M2 | HEIGHT: 72 IN | HEART RATE: 86 BPM | RESPIRATION RATE: 18 BRPM

## 2018-09-25 DIAGNOSIS — K04.7 TOOTH INFECTION: ICD-10-CM

## 2018-09-25 PROCEDURE — 99214 OFFICE O/P EST MOD 30 MIN: CPT | Performed by: NURSE PRACTITIONER

## 2018-09-25 RX ORDER — AMOXICILLIN AND CLAVULANATE POTASSIUM 875; 125 MG/1; MG/1
1 TABLET, FILM COATED ORAL 2 TIMES DAILY
Qty: 20 TAB | Refills: 0 | Status: SHIPPED | OUTPATIENT
Start: 2018-09-25 | End: 2018-10-05

## 2018-09-25 RX ORDER — HYDROCODONE BITARTRATE AND ACETAMINOPHEN 5; 325 MG/1; MG/1
1-2 TABLET ORAL EVERY 4 HOURS PRN
Qty: 16 TAB | Refills: 0 | Status: SHIPPED | OUTPATIENT
Start: 2018-09-25 | End: 2018-09-25 | Stop reason: SDUPTHER

## 2018-09-25 RX ORDER — HYDROCODONE BITARTRATE AND ACETAMINOPHEN 5; 325 MG/1; MG/1
1-2 TABLET ORAL EVERY 4 HOURS PRN
Qty: 16 TAB | Refills: 0 | Status: SHIPPED | OUTPATIENT
Start: 2018-09-25 | End: 2018-10-02

## 2018-09-25 NOTE — ASSESSMENT & PLAN NOTE
This is a new problem. Patient has been seen a few times in the last 6 months for same complaint. He has continued pain in the 2 teeth right lower jaw. Unfortunately he still has not been able to see dental due to cost. He reports he is having increased pain today and drainage from tooth. He was last treated with amoxicillin about 1 month ago. He reports pain is 'unbearable' especially at night.

## 2018-09-25 NOTE — PROGRESS NOTES
Subjective:     Chidi Tatum is a 72 y.o. male here today for evaluation and management of the following:     He is here with her daughter today.     Tooth infection  This is a new problem. Patient has been seen a few times in the last 6 months for same complaint. He has continued pain in the 2 teeth right lower jaw. Unfortunately he still has not been able to see dental due to cost. He reports he is having increased pain today and drainage from tooth. He was last treated with amoxicillin about 1 month ago. He reports pain is 'unbearable' especially at night.        Current medicines (including changes today)  Current Outpatient Prescriptions   Medication Sig Dispense Refill   • amoxicillin-clavulanate (AUGMENTIN) 875-125 MG Tab Take 1 Tab by mouth 2 times a day for 10 days. 20 Tab 0   • HYDROcodone-acetaminophen (NORCO) 5-325 MG Tab per tablet Take 1-2 Tabs by mouth every four hours as needed for up to 7 days. 16 Tab 0   • lisinopril (PRINIVIL) 10 MG Tab TAKE ONE TABLET BY MOUTH ONCE DAILY *REPLACES  5MG 90 Tab 1   • clotrimazole (LOTRIMIN) 1 % Cream 1 application twice daily as needed for 10 days 1 Tube 1   • pantoprazole (PROTONIX) 40 MG Tablet Delayed Response Take 1 Tab by mouth every day. 30 Tab 5   • sertraline (ZOLOFT) 25 MG tablet Take 1 Tab by mouth every day. 30 Tab 1   • hydroCHLOROthiazide (HYDRODIURIL) 25 MG Tab Take 1 Tab by mouth every 48 hours. 45 Tab 1   • tamsulosin (FLOMAX) 0.4 MG capsule Take 1 Cap by mouth ONE-HALF HOUR AFTER BREAKFAST. 90 Cap 2   • finasteride (PROSCAR) 5 MG Tab TAKE ONE TABLET BY MOUTH ONCE DAILY 90 Tab 2     No current facility-administered medications for this visit.        He  has a past medical history of BPH (benign prostatic hyperplasia); GERD (gastroesophageal reflux disease); HTN (hypertension); Hypertension; Insomnia; and SBO (small bowel obstruction) (HCC).    He  has a past surgical history that includes hernia repair and temporal artery biopsy (Right, 2/9/2018).      Social History     Social History   • Marital status:      Spouse name: N/A   • Number of children: N/A   • Years of education: N/A     Social History Main Topics   • Smoking status: Never Smoker   • Smokeless tobacco: Never Used   • Alcohol use No   • Drug use: No   • Sexual activity: No     Other Topics Concern   • Not on file     Social History Narrative   • No narrative on file       Family History   Problem Relation Age of Onset   • Lung Disease Father    • Alcohol/Drug Brother    • Lung Disease Brother          ROS  Positive for tooth pain.   No fever, no chest pain, no shortness of breath, no abdominal pain, no rashes    All other systems reviewed and are negative.        Objective:     Blood pressure 146/80, pulse 86, temperature 36.9 °C (98.4 °F), temperature source Temporal, resp. rate 18, height 1.829 m (6'), weight 109.8 kg (242 lb), SpO2 97 %. Body mass index is 32.82 kg/m².    Physical Exam:   Constitutional: Alert, no distress. Patient not toxic or lethargic.   Eye: Equal, round and reactive, conjunctiva clear, lids normal.   ENMT: Lips without lesions, oropharynx clear.   TMs normal, without erythema.   No nasal drainage, normal appearance of external nose.   No pain with palpation of sinuses   Poor dentition, noted pus on a decaying tooth, R lower, black in color   Neck: Trachea midline, no masses. No cervical or supraclavicular lymphadenopathy  Respiratory: Unlabored respiratory effort, lungs clear to auscultation, no wheezes, no ronchi.  Cardiovascular: Normal S1, S2, no murmur, no edema.   Skin: Warm, dry, good turgor, no rashes in visible areas.  Psych: Alert and oriented x3, normal affect and mood.      Assessment and Plan:   The following treatment plan was discussed    1. Tooth infection  Will treat with augmentin and advised he must see dental. He does complain of pain often,  shows several short term scripts. Advised against long term use and risks. I sent a message to case  management, although he does not qualify, hoping they can send him some info on low cost dental care.   - amoxicillin-clavulanate (AUGMENTIN) 875-125 MG Tab; Take 1 Tab by mouth 2 times a day for 10 days.  Dispense: 20 Tab; Refill: 0  - Consent for Opiate Prescription  - REFERRAL TO [facility]NAGEMENT Services Requested: Care Manager to Evaluate and Recommend  - HYDROcodone-acetaminophen (NORCO) 5-325 MG Tab per tablet; Take 1-2 Tabs by mouth every four hours as needed for up to 7 days.  Dispense: 16 Tab; Refill: 0      Reviewed indication, dosage, usage and potential adverse effects of prescribed medications. Patient appears to understand, verbalizes understanding and is willing to try medications as prescribed.      Reviewed risks and benefits of treatment plan. Patient verbally agrees to plan of care.       Followup: Return if symptoms worsen or fail to improve.    FRANCISCO Moya.     PLEASE NOTE: This dictation was created using voice recognition software. I have made every reasonable attempt to correct obvious errors, but I expect that there may be errors of grammar and possibly content that I did not discover prior finalizing this note.

## 2018-09-25 NOTE — PATIENT INSTRUCTIONS
Abscessed Tooth  An abscessed tooth is an infection around your tooth. It may be caused by holes or damage to the tooth (cavity) or a dental disease. An abscessed tooth causes mild to very bad pain in and around the tooth. See your dentist right away if you have tooth or gum pain.  HOME CARE  · Take your medicine as told. Finish it even if you start to feel better.  · Do not drive after taking pain medicine.  · Rinse your mouth (gargle) often with salt water (¼ teaspoon salt in 8 ounces of warm water).  · Do not apply heat to the outside of your face.  GET HELP RIGHT AWAY IF:   · You have a temperature by mouth above 102° F (38.9° C), not controlled by medicine.  · You have chills and a very bad headache.  · You have problems breathing or swallowing.  · Your mouth will not open.  · You develop puffiness (swelling) on the neck or around the eye.  · Your pain is not helped by medicine.  · Your pain is getting worse instead of better.  MAKE SURE YOU:   · Understand these instructions.  · Will watch your condition.  · Will get help right away if you are not doing well or get worse.  Document Released: 06/05/2009 Document Revised: 03/11/2013 Document Reviewed: 03/27/2012  [employer]® Patient Information ©2014 [employer].    Your medical care was provided today by: REBECCA Pike    Thank You for the opportunity to serve you.    You may receive a brief survey in the mail shortly regarding your visit today. Please take a few moments to complete the survey and return it; no postage is necessary. We are working to serve our patient population better, improve customer service and our patients overall experience and your input can help us to accomplish this. We thank you for your help and for the opportunity to serve you today and in the future.     Special Instructions:  Always call 9-1-1 immediately if you develop a life threatening emergency.    Unless told otherwise please take all medications as directed and  complete prescription therapies.     Watch for the following signs that require additional evaluation: progressive lethargy or unresponsiveness, localized pain (chest, abdomen), shortness of breath, painful breathing, progressive vomiting with weakness, bloody stools, or new rash.     If you are prescribed pain medication or any other medication that is sedating, do not take medication before or while operating a vehicle or heavy machinery or equipment due to potential side effects such as drowsiness and/or dizziness.

## 2018-10-05 RX ORDER — TRAMADOL HYDROCHLORIDE 50 MG/1
TABLET ORAL
Qty: 30 TAB | Refills: 0 | OUTPATIENT
Start: 2018-10-05 | End: 2018-10-20

## 2018-10-05 NOTE — TELEPHONE ENCOUNTER
Was the patient seen in the last year in this department? Yes    Does patient have an active prescription for medications requested? Yes    Received Request Via: Pharmacy      Last visit: 9/25/18  Last labs:7/18/18

## 2018-10-05 NOTE — TELEPHONE ENCOUNTER
Please advise patient he would need an appt and I just prescribed pain medication for him for his tooth not too long ago.   REBECCA Pike

## 2018-11-18 ENCOUNTER — HOSPITAL ENCOUNTER (INPATIENT)
Facility: MEDICAL CENTER | Age: 73
LOS: 2 days | DRG: 872 | End: 2018-11-21
Attending: EMERGENCY MEDICINE | Admitting: HOSPITALIST
Payer: MEDICARE

## 2018-11-18 ENCOUNTER — APPOINTMENT (OUTPATIENT)
Dept: RADIOLOGY | Facility: MEDICAL CENTER | Age: 73
DRG: 872 | End: 2018-11-18
Attending: EMERGENCY MEDICINE
Payer: MEDICARE

## 2018-11-18 DIAGNOSIS — R11.2 NAUSEA AND VOMITING, INTRACTABILITY OF VOMITING NOT SPECIFIED, UNSPECIFIED VOMITING TYPE: ICD-10-CM

## 2018-11-18 PROBLEM — N17.9 ACUTE KIDNEY INJURY (HCC): Status: ACTIVE | Noted: 2018-11-18

## 2018-11-18 PROBLEM — R10.32 LEFT LOWER QUADRANT PAIN: Status: ACTIVE | Noted: 2018-11-18

## 2018-11-18 LAB
ALBUMIN SERPL BCP-MCNC: 4.2 G/DL (ref 3.2–4.9)
ALBUMIN/GLOB SERPL: 1.1 G/DL
ALP SERPL-CCNC: 91 U/L (ref 30–99)
ALT SERPL-CCNC: 12 U/L (ref 2–50)
ANION GAP SERPL CALC-SCNC: 12 MMOL/L (ref 0–11.9)
APPEARANCE UR: ABNORMAL
AST SERPL-CCNC: 11 U/L (ref 12–45)
BASOPHILS # BLD AUTO: 0.2 % (ref 0–1.8)
BASOPHILS # BLD: 0.04 K/UL (ref 0–0.12)
BILIRUB SERPL-MCNC: 0.8 MG/DL (ref 0.1–1.5)
BILIRUB UR QL STRIP.AUTO: NEGATIVE
BLOOD CULTURE HOLD CXBCH: NORMAL
BUN SERPL-MCNC: 23 MG/DL (ref 8–22)
CALCIUM SERPL-MCNC: 10.2 MG/DL (ref 8.5–10.5)
CHLORIDE SERPL-SCNC: 105 MMOL/L (ref 96–112)
CO2 SERPL-SCNC: 16 MMOL/L (ref 20–33)
COLOR UR: ABNORMAL
CREAT SERPL-MCNC: 1.95 MG/DL (ref 0.5–1.4)
EKG IMPRESSION: NORMAL
EOSINOPHIL # BLD AUTO: 0.02 K/UL (ref 0–0.51)
EOSINOPHIL NFR BLD: 0.1 % (ref 0–6.9)
ERYTHROCYTE [DISTWIDTH] IN BLOOD BY AUTOMATED COUNT: 45 FL (ref 35.9–50)
EST. AVERAGE GLUCOSE BLD GHB EST-MCNC: 128 MG/DL
GLOBULIN SER CALC-MCNC: 4 G/DL (ref 1.9–3.5)
GLUCOSE SERPL-MCNC: 193 MG/DL (ref 65–99)
GLUCOSE UR STRIP.AUTO-MCNC: NEGATIVE MG/DL
HBA1C MFR BLD: 6.1 % (ref 0–5.6)
HCT VFR BLD AUTO: 39.1 % (ref 42–52)
HEMOCCULT STL QL: NEGATIVE
HGB BLD-MCNC: 13.1 G/DL (ref 14–18)
IMM GRANULOCYTES # BLD AUTO: 0.19 K/UL (ref 0–0.11)
IMM GRANULOCYTES NFR BLD AUTO: 0.8 % (ref 0–0.9)
KETONES UR STRIP.AUTO-MCNC: NEGATIVE MG/DL
LACTATE BLD-SCNC: 1.7 MMOL/L (ref 0.5–2)
LEUKOCYTE ESTERASE UR QL STRIP.AUTO: ABNORMAL
LIPASE SERPL-CCNC: 7 U/L (ref 11–82)
LYMPHOCYTES # BLD AUTO: 0.83 K/UL (ref 1–4.8)
LYMPHOCYTES NFR BLD: 3.5 % (ref 22–41)
MCH RBC QN AUTO: 26.4 PG (ref 27–33)
MCHC RBC AUTO-ENTMCNC: 33.5 G/DL (ref 33.7–35.3)
MCV RBC AUTO: 78.7 FL (ref 81.4–97.8)
MICRO URNS: ABNORMAL
MONOCYTES # BLD AUTO: 1.45 K/UL (ref 0–0.85)
MONOCYTES NFR BLD AUTO: 6.1 % (ref 0–13.4)
NEUTROPHILS # BLD AUTO: 21.28 K/UL (ref 1.82–7.42)
NEUTROPHILS NFR BLD: 89.3 % (ref 44–72)
NITRITE UR QL STRIP.AUTO: POSITIVE
NRBC # BLD AUTO: 0 K/UL
NRBC BLD-RTO: 0 /100 WBC
PH UR STRIP.AUTO: 5 [PH]
PLATELET # BLD AUTO: 251 K/UL (ref 164–446)
PMV BLD AUTO: 9.5 FL (ref 9–12.9)
POTASSIUM SERPL-SCNC: 3.8 MMOL/L (ref 3.6–5.5)
PROCALCITONIN SERPL-MCNC: 1.35 NG/ML
PROT SERPL-MCNC: 8.2 G/DL (ref 6–8.2)
PROT UR QL STRIP: 100 MG/DL
RBC # BLD AUTO: 4.97 M/UL (ref 4.7–6.1)
RBC # URNS HPF: ABNORMAL /HPF
RBC UR QL AUTO: ABNORMAL
SODIUM SERPL-SCNC: 133 MMOL/L (ref 135–145)
SP GR UR STRIP.AUTO: 1.02
TSH SERPL DL<=0.005 MIU/L-ACNC: 1.12 UIU/ML (ref 0.38–5.33)
UROBILINOGEN UR STRIP.AUTO-MCNC: 0.2 MG/DL
WBC # BLD AUTO: 23.8 K/UL (ref 4.8–10.8)
WBC #/AREA URNS HPF: ABNORMAL /HPF

## 2018-11-18 PROCEDURE — 700111 HCHG RX REV CODE 636 W/ 250 OVERRIDE (IP): Performed by: EMERGENCY MEDICINE

## 2018-11-18 PROCEDURE — 96372 THER/PROPH/DIAG INJ SC/IM: CPT

## 2018-11-18 PROCEDURE — 700105 HCHG RX REV CODE 258: Performed by: EMERGENCY MEDICINE

## 2018-11-18 PROCEDURE — 83690 ASSAY OF LIPASE: CPT

## 2018-11-18 PROCEDURE — 85025 COMPLETE CBC W/AUTO DIFF WBC: CPT

## 2018-11-18 PROCEDURE — 80053 COMPREHEN METABOLIC PANEL: CPT

## 2018-11-18 PROCEDURE — 700105 HCHG RX REV CODE 258: Performed by: STUDENT IN AN ORGANIZED HEALTH CARE EDUCATION/TRAINING PROGRAM

## 2018-11-18 PROCEDURE — 83036 HEMOGLOBIN GLYCOSYLATED A1C: CPT

## 2018-11-18 PROCEDURE — 84145 PROCALCITONIN (PCT): CPT

## 2018-11-18 PROCEDURE — 87077 CULTURE AEROBIC IDENTIFY: CPT

## 2018-11-18 PROCEDURE — 74176 CT ABD & PELVIS W/O CONTRAST: CPT

## 2018-11-18 PROCEDURE — 93005 ELECTROCARDIOGRAM TRACING: CPT | Performed by: STUDENT IN AN ORGANIZED HEALTH CARE EDUCATION/TRAINING PROGRAM

## 2018-11-18 PROCEDURE — 700111 HCHG RX REV CODE 636 W/ 250 OVERRIDE (IP): Performed by: STUDENT IN AN ORGANIZED HEALTH CARE EDUCATION/TRAINING PROGRAM

## 2018-11-18 PROCEDURE — 81001 URINALYSIS AUTO W/SCOPE: CPT

## 2018-11-18 PROCEDURE — 82272 OCCULT BLD FECES 1-3 TESTS: CPT

## 2018-11-18 PROCEDURE — G0378 HOSPITAL OBSERVATION PER HR: HCPCS

## 2018-11-18 PROCEDURE — 96375 TX/PRO/DX INJ NEW DRUG ADDON: CPT

## 2018-11-18 PROCEDURE — 96365 THER/PROPH/DIAG IV INF INIT: CPT

## 2018-11-18 PROCEDURE — 700102 HCHG RX REV CODE 250 W/ 637 OVERRIDE(OP): Performed by: STUDENT IN AN ORGANIZED HEALTH CARE EDUCATION/TRAINING PROGRAM

## 2018-11-18 PROCEDURE — 93010 ELECTROCARDIOGRAM REPORT: CPT | Performed by: INTERNAL MEDICINE

## 2018-11-18 PROCEDURE — 87040 BLOOD CULTURE FOR BACTERIA: CPT

## 2018-11-18 PROCEDURE — 84443 ASSAY THYROID STIM HORMONE: CPT

## 2018-11-18 PROCEDURE — A9270 NON-COVERED ITEM OR SERVICE: HCPCS | Performed by: STUDENT IN AN ORGANIZED HEALTH CARE EDUCATION/TRAINING PROGRAM

## 2018-11-18 PROCEDURE — 87186 SC STD MICRODIL/AGAR DIL: CPT

## 2018-11-18 PROCEDURE — 99285 EMERGENCY DEPT VISIT HI MDM: CPT

## 2018-11-18 PROCEDURE — 96374 THER/PROPH/DIAG INJ IV PUSH: CPT

## 2018-11-18 PROCEDURE — 83605 ASSAY OF LACTIC ACID: CPT

## 2018-11-18 PROCEDURE — 87086 URINE CULTURE/COLONY COUNT: CPT

## 2018-11-18 RX ORDER — TAMSULOSIN HYDROCHLORIDE 0.4 MG/1
0.4 CAPSULE ORAL
Status: DISCONTINUED | OUTPATIENT
Start: 2018-11-19 | End: 2018-11-21 | Stop reason: HOSPADM

## 2018-11-18 RX ORDER — LISINOPRIL 10 MG/1
10 TABLET ORAL
Status: DISCONTINUED | OUTPATIENT
Start: 2018-11-19 | End: 2018-11-18

## 2018-11-18 RX ORDER — AMOXICILLIN 250 MG
2 CAPSULE ORAL 2 TIMES DAILY
Status: DISCONTINUED | OUTPATIENT
Start: 2018-11-18 | End: 2018-11-18

## 2018-11-18 RX ORDER — HEPARIN SODIUM 5000 [USP'U]/ML
5000 INJECTION, SOLUTION INTRAVENOUS; SUBCUTANEOUS EVERY 8 HOURS
Status: DISCONTINUED | OUTPATIENT
Start: 2018-11-18 | End: 2018-11-21

## 2018-11-18 RX ORDER — POLYETHYLENE GLYCOL 3350 17 G/17G
1 POWDER, FOR SOLUTION ORAL
Status: DISCONTINUED | OUTPATIENT
Start: 2018-11-18 | End: 2018-11-18

## 2018-11-18 RX ORDER — ONDANSETRON 2 MG/ML
4 INJECTION INTRAMUSCULAR; INTRAVENOUS EVERY 4 HOURS PRN
Status: DISCONTINUED | OUTPATIENT
Start: 2018-11-18 | End: 2018-11-21 | Stop reason: HOSPADM

## 2018-11-18 RX ORDER — ONDANSETRON 2 MG/ML
4 INJECTION INTRAMUSCULAR; INTRAVENOUS ONCE
Status: COMPLETED | OUTPATIENT
Start: 2018-11-18 | End: 2018-11-18

## 2018-11-18 RX ORDER — SODIUM CHLORIDE 9 MG/ML
1000 INJECTION, SOLUTION INTRAVENOUS ONCE
Status: COMPLETED | OUTPATIENT
Start: 2018-11-18 | End: 2018-11-18

## 2018-11-18 RX ORDER — ONDANSETRON 4 MG/1
4 TABLET, ORALLY DISINTEGRATING ORAL EVERY 4 HOURS PRN
Status: DISCONTINUED | OUTPATIENT
Start: 2018-11-18 | End: 2018-11-21 | Stop reason: HOSPADM

## 2018-11-18 RX ORDER — SODIUM CHLORIDE 9 MG/ML
INJECTION, SOLUTION INTRAVENOUS CONTINUOUS
Status: DISCONTINUED | OUTPATIENT
Start: 2018-11-18 | End: 2018-11-20

## 2018-11-18 RX ORDER — LABETALOL HYDROCHLORIDE 5 MG/ML
10 INJECTION, SOLUTION INTRAVENOUS EVERY 4 HOURS PRN
Status: DISCONTINUED | OUTPATIENT
Start: 2018-11-18 | End: 2018-11-19

## 2018-11-18 RX ORDER — ACETAMINOPHEN 325 MG/1
650 TABLET ORAL EVERY 6 HOURS PRN
Status: DISCONTINUED | OUTPATIENT
Start: 2018-11-18 | End: 2018-11-19

## 2018-11-18 RX ORDER — FINASTERIDE 5 MG/1
5 TABLET, FILM COATED ORAL DAILY
Status: DISCONTINUED | OUTPATIENT
Start: 2018-11-19 | End: 2018-11-21 | Stop reason: HOSPADM

## 2018-11-18 RX ORDER — METRONIDAZOLE 500 MG/1
500 TABLET ORAL EVERY 8 HOURS
Status: DISCONTINUED | OUTPATIENT
Start: 2018-11-18 | End: 2018-11-20

## 2018-11-18 RX ORDER — BISACODYL 10 MG
10 SUPPOSITORY, RECTAL RECTAL
Status: DISCONTINUED | OUTPATIENT
Start: 2018-11-18 | End: 2018-11-18

## 2018-11-18 RX ORDER — HYDROCHLOROTHIAZIDE 25 MG/1
25 TABLET ORAL
Status: DISCONTINUED | OUTPATIENT
Start: 2018-11-20 | End: 2018-11-18

## 2018-11-18 RX ADMIN — ONDANSETRON 4 MG: 4 TABLET, ORALLY DISINTEGRATING ORAL at 21:50

## 2018-11-18 RX ADMIN — SODIUM CHLORIDE 1000 ML: 9 INJECTION, SOLUTION INTRAVENOUS at 14:15

## 2018-11-18 RX ADMIN — SODIUM CHLORIDE 1000 ML: 9 INJECTION, SOLUTION INTRAVENOUS at 18:17

## 2018-11-18 RX ADMIN — ACETAMINOPHEN 650 MG: 325 TABLET, FILM COATED ORAL at 20:10

## 2018-11-18 RX ADMIN — CEFTRIAXONE SODIUM 2 G: 2 INJECTION, POWDER, FOR SOLUTION INTRAMUSCULAR; INTRAVENOUS at 22:31

## 2018-11-18 RX ADMIN — SODIUM CHLORIDE: 9 INJECTION, SOLUTION INTRAVENOUS at 19:00

## 2018-11-18 RX ADMIN — ONDANSETRON 4 MG: 2 INJECTION INTRAMUSCULAR; INTRAVENOUS at 13:58

## 2018-11-18 RX ADMIN — METRONIDAZOLE 500 MG: 500 TABLET ORAL at 21:44

## 2018-11-18 RX ADMIN — HEPARIN SODIUM 5000 UNITS: 5000 INJECTION, SOLUTION INTRAVENOUS; SUBCUTANEOUS at 21:44

## 2018-11-18 ASSESSMENT — COGNITIVE AND FUNCTIONAL STATUS - GENERAL
STANDING UP FROM CHAIR USING ARMS: A LITTLE
MOVING FROM LYING ON BACK TO SITTING ON SIDE OF FLAT BED: A LITTLE
DAILY ACTIVITIY SCORE: 24
MOBILITY SCORE: 19
CLIMB 3 TO 5 STEPS WITH RAILING: A LITTLE
WALKING IN HOSPITAL ROOM: A LITTLE
MOVING TO AND FROM BED TO CHAIR: A LITTLE
SUGGESTED CMS G CODE MODIFIER MOBILITY: CK
SUGGESTED CMS G CODE MODIFIER DAILY ACTIVITY: CH

## 2018-11-18 ASSESSMENT — ENCOUNTER SYMPTOMS
VOMITING: 1
RESPIRATORY NEGATIVE: 1
WEIGHT LOSS: 0
CHILLS: 0
DIARRHEA: 1
MUSCULOSKELETAL NEGATIVE: 1
FEVER: 0
HEARTBURN: 0
NAUSEA: 1
ABDOMINAL PAIN: 1
BLOOD IN STOOL: 0
WEAKNESS: 1
CONSTIPATION: 0
EYES NEGATIVE: 1
CARDIOVASCULAR NEGATIVE: 1
PSYCHIATRIC NEGATIVE: 1
DIAPHORESIS: 1

## 2018-11-18 ASSESSMENT — PAIN SCALES - GENERAL
PAINLEVEL_OUTOF10: 10
PAINLEVEL_OUTOF10: 0

## 2018-11-18 ASSESSMENT — PATIENT HEALTH QUESTIONNAIRE - PHQ9
2. FEELING DOWN, DEPRESSED, IRRITABLE, OR HOPELESS: NOT AT ALL
1. LITTLE INTEREST OR PLEASURE IN DOING THINGS: NOT AT ALL
SUM OF ALL RESPONSES TO PHQ9 QUESTIONS 1 AND 2: 0

## 2018-11-18 NOTE — ED TRIAGE NOTES
Pt c/o n/v/d for a couple of days. Pt with hx of SBO obstruction, pt c/o RUQ pain. Pt in obvious discomfort. +diarrhea this AM.

## 2018-11-18 NOTE — ED PROVIDER NOTES
ED Provider Note    CHIEF COMPLAINT  Chief Complaint   Patient presents with   • Nausea/Vomiting/Diarrhea   • Weakness       HPI  Chidi Tatum is a 73 y.o. male who presents for evaluation of generalized weakness nausea vomiting diarrhea.  The patient has been ill for approximately 2 days last episode of vomiting was 30 minutes ago.  He cannot tolerate p.o. intake because of nausea.  He also has some pain in his back.  He has a history of small bowel obstruction.  Nothing specific makes his symptoms worse or better.  Denies any chest pain shortness of breath complains of dry mouth denies any weakness.  No blood noted in his diarrhea    REVIEW OF SYSTEMS  See HPI for further details.  All other systems reviewed and negative except as noted above    PAST MEDICAL HISTORY  Past Medical History:   Diagnosis Date   • BPH (benign prostatic hyperplasia)    • GERD (gastroesophageal reflux disease)    • HTN (hypertension)    • Hypertension    • Insomnia    • SBO (small bowel obstruction) (HCC)        FAMILY HISTORY  Family History   Problem Relation Age of Onset   • Lung Disease Father    • Alcohol/Drug Brother    • Lung Disease Brother        SOCIAL HISTORY  Social History     Social History   • Marital status:      Spouse name: N/A   • Number of children: N/A   • Years of education: N/A     Social History Main Topics   • Smoking status: Never Smoker   • Smokeless tobacco: Never Used   • Alcohol use No   • Drug use: No   • Sexual activity: No     Other Topics Concern   • Not on file     Social History Narrative   • No narrative on file       SURGICAL HISTORY  Past Surgical History:   Procedure Laterality Date   • TEMPORAL ARTERY BIOPSY Right 2/9/2018    Procedure: TEMPORAL ARTERY BIOPSY;  Surgeon: Bryant Corey M.D.;  Location: SURGERY [facility];  Service: Vascular   • HERNIA REPAIR      umbilical, groin        CURRENT MEDICATIONS  Home Medications    **Home medications have not yet been reviewed for  this encounter**          ALLERGIES  Allergies   Allergen Reactions   • Celebrex [Celecoxib] Rash     .       PHYSICAL EXAM  VITAL SIGNS: /72   Pulse (!) 109   Temp 36.2 °C (97.2 °F) (Temporal)   Resp 16   Ht 1.829 m (6')   Wt 109.8 kg (242 lb)   SpO2 97%   BMI 32.82 kg/m²   Constitutional :  Well developed, Well nourished, appears uncomfortable  HENT: Head is atraumatic normocephalic dry mucous membranes  Eyes: Normal-appearing nonicteric  Neck: Normal range of motion, No tenderness, Supple, No stridor.   Lymphatic: No cervical adenopathy is noted.   Cardiovascular: Tachycardia noted normal rhythm, No murmurs, No rubs, No gallops.   Thorax & Lungs: Equal breath sounds bilaterally no rales rhonchi  Skin: Warm, Dry, No erythema, No rash.   Abdomen is soft, mild generalized tenderness is elicited to palpation no rebound or guarding  Extremities no cyanosis or edema  Neurologically the patient is awake is alert strength appears normal upper and lower extremities  Psychiatric no impairment of judgment appropriate mood and affect    Results for orders placed or performed during the hospital encounter of 11/18/18   CBC WITH DIFFERENTIAL   Result Value Ref Range    WBC 23.8 (H) 4.8 - 10.8 K/uL    RBC 4.97 4.70 - 6.10 M/uL    Hemoglobin 13.1 (L) 14.0 - 18.0 g/dL    Hematocrit 39.1 (L) 42.0 - 52.0 %    MCV 78.7 (L) 81.4 - 97.8 fL    MCH 26.4 (L) 27.0 - 33.0 pg    MCHC 33.5 (L) 33.7 - 35.3 g/dL    RDW 45.0 35.9 - 50.0 fL    Platelet Count 251 164 - 446 K/uL    MPV 9.5 9.0 - 12.9 fL    Neutrophils-Polys 89.30 (H) 44.00 - 72.00 %    Lymphocytes 3.50 (L) 22.00 - 41.00 %    Monocytes 6.10 0.00 - 13.40 %    Eosinophils 0.10 0.00 - 6.90 %    Basophils 0.20 0.00 - 1.80 %    Immature Granulocytes 0.80 0.00 - 0.90 %    Nucleated RBC 0.00 /100 WBC    Neutrophils (Absolute) 21.28 (H) 1.82 - 7.42 K/uL    Lymphs (Absolute) 0.83 (L) 1.00 - 4.80 K/uL    Monos (Absolute) 1.45 (H) 0.00 - 0.85 K/uL    Eos (Absolute) 0.02 0.00 -  0.51 K/uL    Baso (Absolute) 0.04 0.00 - 0.12 K/uL    Immature Granulocytes (abs) 0.19 (H) 0.00 - 0.11 K/uL    NRBC (Absolute) 0.00 K/uL   COMP METABOLIC PANEL   Result Value Ref Range    Sodium 133 (L) 135 - 145 mmol/L    Potassium 3.8 3.6 - 5.5 mmol/L    Chloride 105 96 - 112 mmol/L    Co2 16 (L) 20 - 33 mmol/L    Anion Gap 12.0 (H) 0.0 - 11.9    Glucose 193 (H) 65 - 99 mg/dL    Bun 23 (H) 8 - 22 mg/dL    Creatinine 1.95 (H) 0.50 - 1.40 mg/dL    Calcium 10.2 8.5 - 10.5 mg/dL    AST(SGOT) 11 (L) 12 - 45 U/L    ALT(SGPT) 12 2 - 50 U/L    Alkaline Phosphatase 91 30 - 99 U/L    Total Bilirubin 0.8 0.1 - 1.5 mg/dL    Albumin 4.2 3.2 - 4.9 g/dL    Total Protein 8.2 6.0 - 8.2 g/dL    Globulin 4.0 (H) 1.9 - 3.5 g/dL    A-G Ratio 1.1 g/dL   LIPASE   Result Value Ref Range    Lipase 7 (L) 11 - 82 U/L   LACTIC ACID   Result Value Ref Range    Lactic Acid 1.7 0.5 - 2.0 mmol/L   URINALYSIS CULTURE, IF INDICATED   Result Value Ref Range    Color DK Yellow     Character Turbid (A)     Specific Gravity 1.016 <1.035    Ph 5.0 5.0 - 8.0    Glucose Negative Negative mg/dL    Ketones Negative Negative mg/dL    Protein 100 (A) Negative mg/dL    Bilirubin Negative Negative    Urobilinogen, Urine 0.2 Negative    Nitrite Positive (A) Negative    Leukocyte Esterase Large (A) Negative    Occult Blood Moderate (A) Negative    Micro Urine Req Microscopic    ESTIMATED GFR   Result Value Ref Range    GFR If  41 (A) >60 mL/min/1.73 m 2    GFR If Non  34 (A) >60 mL/min/1.73 m 2   BLOOD CULTURE,HOLD   Result Value Ref Range    Blood Culture Hold Collected    URINE MICROSCOPIC (W/UA)   Result Value Ref Range    WBC Packed (A) /hpf    RBC 20-50 (A) /hpf         HYDRATION: Based on the patient's presentation of Dehydration the patient was given IV fluids. IV Hydration was used because oral hydration was not adequate alone. Upon recheck following hydration, the patient was In continued need of fluids secondary to  sepsis criteria.    CT-RENAL COLIC EVALUATION(A/P W/O)   Final Result      1.  New pericecal fat stranding without wall thickening or evident appendix abnormality. This is nonspecific, could indicate colitis and/or terminal ileitis.      2.  Stable mild bilateral hydroureteronephrosis with no obstructing process seen although bladder wall trabeculation is again demonstrated most likely from neurogenic bladder given lack of prostate gland enlargement. Chronic outlet obstruction is also    possible. No abnormal dilatation at this time      3.  Numerous chronic findings including hepatic steatosis, splenomegaly, bilateral renal cortical thinning/atrophy with simple and complex renal cysts that require follow-up              COURSE & MEDICAL DECISION MAKING  Pertinent Labs & Imaging studies reviewed. (See chart for details)  The patient presented with nausea and vomiting he appears to be clinically dehydrated and found to have sepsis criteria hydrated with fluid bolus per protocol.  He was somewhat improved with hydration of his mucous membranes after infusion.  Because of abdominal pain CT imaging was indicated to rule out possible appendicitis diverticulitis cholecystitis.  There is some evidence of possible colitis or terminal ileitis.  The patient is also found to have urinary tract infection.  He started empirically on ceftriaxone per resident staff the patient is admitted to the internal medicine staff for continued IV antibiotics.  White count 23,000 patient's condition is serious    FINAL IMPRESSION  1.  Urinary tract infection  2.  Sepsis  3.  Abdominal pain      Electronically signed by: Stewart Hernandez, 11/18/2018

## 2018-11-19 PROBLEM — N18.9 ACUTE KIDNEY INJURY SUPERIMPOSED ON CKD (HCC): Status: ACTIVE | Noted: 2018-11-18

## 2018-11-19 PROBLEM — N18.30 ACUTE RENAL FAILURE SUPERIMPOSED ON STAGE 3 CHRONIC KIDNEY DISEASE (HCC): Status: ACTIVE | Noted: 2018-11-18

## 2018-11-19 LAB
ALBUMIN SERPL BCP-MCNC: 3.9 G/DL (ref 3.2–4.9)
ALBUMIN/GLOB SERPL: 1 G/DL
ALP SERPL-CCNC: 81 U/L (ref 30–99)
ALT SERPL-CCNC: 11 U/L (ref 2–50)
ANION GAP SERPL CALC-SCNC: 13 MMOL/L (ref 0–11.9)
AST SERPL-CCNC: 11 U/L (ref 12–45)
BASOPHILS # BLD AUTO: 0.1 % (ref 0–1.8)
BASOPHILS # BLD AUTO: 0.2 % (ref 0–1.8)
BASOPHILS # BLD: 0.01 K/UL (ref 0–0.12)
BASOPHILS # BLD: 0.05 K/UL (ref 0–0.12)
BILIRUB SERPL-MCNC: 0.5 MG/DL (ref 0.1–1.5)
BUN SERPL-MCNC: 30 MG/DL (ref 8–22)
C DIFF DNA SPEC QL NAA+PROBE: NEGATIVE
C DIFF TOX A+B STL QL IA: NEGATIVE
C DIFF TOX GENS STL QL NAA+PROBE: NORMAL
CALCIUM SERPL-MCNC: 9.6 MG/DL (ref 8.5–10.5)
CHLORIDE SERPL-SCNC: 105 MMOL/L (ref 96–112)
CO2 SERPL-SCNC: 19 MMOL/L (ref 20–33)
CREAT SERPL-MCNC: 2.02 MG/DL (ref 0.5–1.4)
EOSINOPHIL # BLD AUTO: 0 K/UL (ref 0–0.51)
EOSINOPHIL # BLD AUTO: 0.01 K/UL (ref 0–0.51)
EOSINOPHIL NFR BLD: 0 % (ref 0–6.9)
EOSINOPHIL NFR BLD: 0 % (ref 0–6.9)
ERYTHROCYTE [DISTWIDTH] IN BLOOD BY AUTOMATED COUNT: 46.5 FL (ref 35.9–50)
ERYTHROCYTE [DISTWIDTH] IN BLOOD BY AUTOMATED COUNT: 48.2 FL (ref 35.9–50)
GLOBULIN SER CALC-MCNC: 4.1 G/DL (ref 1.9–3.5)
GLUCOSE SERPL-MCNC: 116 MG/DL (ref 65–99)
HCT VFR BLD AUTO: 32.1 % (ref 42–52)
HCT VFR BLD AUTO: 40.3 % (ref 42–52)
HGB BLD-MCNC: 10.6 G/DL (ref 14–18)
HGB BLD-MCNC: 12.5 G/DL (ref 14–18)
IMM GRANULOCYTES # BLD AUTO: 0.15 K/UL (ref 0–0.11)
IMM GRANULOCYTES # BLD AUTO: 0.27 K/UL (ref 0–0.11)
IMM GRANULOCYTES NFR BLD AUTO: 0.9 % (ref 0–0.9)
IMM GRANULOCYTES NFR BLD AUTO: 1.1 % (ref 0–0.9)
LACTATE BLD-SCNC: 1.1 MMOL/L (ref 0.5–2)
LYMPHOCYTES # BLD AUTO: 0.86 K/UL (ref 1–4.8)
LYMPHOCYTES # BLD AUTO: 2.52 K/UL (ref 1–4.8)
LYMPHOCYTES NFR BLD: 5.1 % (ref 22–41)
LYMPHOCYTES NFR BLD: 9.9 % (ref 22–41)
MCH RBC QN AUTO: 25.8 PG (ref 27–33)
MCH RBC QN AUTO: 26.6 PG (ref 27–33)
MCHC RBC AUTO-ENTMCNC: 31 G/DL (ref 33.7–35.3)
MCHC RBC AUTO-ENTMCNC: 33 G/DL (ref 33.7–35.3)
MCV RBC AUTO: 80.7 FL (ref 81.4–97.8)
MCV RBC AUTO: 83.3 FL (ref 81.4–97.8)
MONOCYTES # BLD AUTO: 1.34 K/UL (ref 0–0.85)
MONOCYTES # BLD AUTO: 1.93 K/UL (ref 0–0.85)
MONOCYTES NFR BLD AUTO: 7.6 % (ref 0–13.4)
MONOCYTES NFR BLD AUTO: 7.9 % (ref 0–13.4)
NEUTROPHILS # BLD AUTO: 14.61 K/UL (ref 1.82–7.42)
NEUTROPHILS # BLD AUTO: 20.75 K/UL (ref 1.82–7.42)
NEUTROPHILS NFR BLD: 81.2 % (ref 44–72)
NEUTROPHILS NFR BLD: 86 % (ref 44–72)
NRBC # BLD AUTO: 0 K/UL
NRBC # BLD AUTO: 0 K/UL
NRBC BLD-RTO: 0 /100 WBC
NRBC BLD-RTO: 0 /100 WBC
PLATELET # BLD AUTO: 171 K/UL (ref 164–446)
PLATELET # BLD AUTO: 268 K/UL (ref 164–446)
PMV BLD AUTO: 10.9 FL (ref 9–12.9)
PMV BLD AUTO: 9.8 FL (ref 9–12.9)
POTASSIUM SERPL-SCNC: 3.6 MMOL/L (ref 3.6–5.5)
PROT SERPL-MCNC: 8 G/DL (ref 6–8.2)
RBC # BLD AUTO: 3.98 M/UL (ref 4.7–6.1)
RBC # BLD AUTO: 4.84 M/UL (ref 4.7–6.1)
SODIUM SERPL-SCNC: 137 MMOL/L (ref 135–145)
WBC # BLD AUTO: 17 K/UL (ref 4.8–10.8)
WBC # BLD AUTO: 25.5 K/UL (ref 4.8–10.8)

## 2018-11-19 PROCEDURE — 700105 HCHG RX REV CODE 258: Performed by: STUDENT IN AN ORGANIZED HEALTH CARE EDUCATION/TRAINING PROGRAM

## 2018-11-19 PROCEDURE — 700102 HCHG RX REV CODE 250 W/ 637 OVERRIDE(OP): Performed by: STUDENT IN AN ORGANIZED HEALTH CARE EDUCATION/TRAINING PROGRAM

## 2018-11-19 PROCEDURE — 770006 HCHG ROOM/CARE - MED/SURG/GYN SEMI*

## 2018-11-19 PROCEDURE — 83605 ASSAY OF LACTIC ACID: CPT

## 2018-11-19 PROCEDURE — 85025 COMPLETE CBC W/AUTO DIFF WBC: CPT

## 2018-11-19 PROCEDURE — 36415 COLL VENOUS BLD VENIPUNCTURE: CPT

## 2018-11-19 PROCEDURE — 87493 C DIFF AMPLIFIED PROBE: CPT

## 2018-11-19 PROCEDURE — A9270 NON-COVERED ITEM OR SERVICE: HCPCS | Performed by: STUDENT IN AN ORGANIZED HEALTH CARE EDUCATION/TRAINING PROGRAM

## 2018-11-19 PROCEDURE — 87324 CLOSTRIDIUM AG IA: CPT

## 2018-11-19 PROCEDURE — 51798 US URINE CAPACITY MEASURE: CPT

## 2018-11-19 PROCEDURE — 99223 1ST HOSP IP/OBS HIGH 75: CPT | Mod: GC | Performed by: INTERNAL MEDICINE

## 2018-11-19 PROCEDURE — 80053 COMPREHEN METABOLIC PANEL: CPT

## 2018-11-19 PROCEDURE — 87186 SC STD MICRODIL/AGAR DIL: CPT

## 2018-11-19 PROCEDURE — 87086 URINE CULTURE/COLONY COUNT: CPT

## 2018-11-19 PROCEDURE — 700111 HCHG RX REV CODE 636 W/ 250 OVERRIDE (IP): Performed by: STUDENT IN AN ORGANIZED HEALTH CARE EDUCATION/TRAINING PROGRAM

## 2018-11-19 PROCEDURE — 87077 CULTURE AEROBIC IDENTIFY: CPT

## 2018-11-19 RX ORDER — ACETAMINOPHEN 325 MG/1
650 TABLET ORAL EVERY 6 HOURS
Status: DISCONTINUED | OUTPATIENT
Start: 2018-11-19 | End: 2018-11-21 | Stop reason: HOSPADM

## 2018-11-19 RX ORDER — SODIUM CHLORIDE 9 MG/ML
1000 INJECTION, SOLUTION INTRAVENOUS ONCE
Status: COMPLETED | OUTPATIENT
Start: 2018-11-19 | End: 2018-11-19

## 2018-11-19 RX ORDER — HYDRALAZINE HYDROCHLORIDE 20 MG/ML
10 INJECTION INTRAMUSCULAR; INTRAVENOUS EVERY 6 HOURS PRN
Status: DISCONTINUED | OUTPATIENT
Start: 2018-11-19 | End: 2018-11-21 | Stop reason: HOSPADM

## 2018-11-19 RX ADMIN — ACETAMINOPHEN 650 MG: 325 TABLET, FILM COATED ORAL at 23:36

## 2018-11-19 RX ADMIN — SODIUM CHLORIDE 1000 ML: 9 INJECTION, SOLUTION INTRAVENOUS at 08:49

## 2018-11-19 RX ADMIN — ACETAMINOPHEN 650 MG: 325 TABLET, FILM COATED ORAL at 17:16

## 2018-11-19 RX ADMIN — SERTRALINE 25 MG: 50 TABLET, FILM COATED ORAL at 05:26

## 2018-11-19 RX ADMIN — FINASTERIDE 5 MG: 5 TABLET, FILM COATED ORAL at 05:26

## 2018-11-19 RX ADMIN — SODIUM CHLORIDE: 9 INJECTION, SOLUTION INTRAVENOUS at 02:56

## 2018-11-19 RX ADMIN — ONDANSETRON 4 MG: 2 INJECTION INTRAMUSCULAR; INTRAVENOUS at 17:11

## 2018-11-19 RX ADMIN — METRONIDAZOLE 500 MG: 500 TABLET ORAL at 05:26

## 2018-11-19 RX ADMIN — CEFTRIAXONE SODIUM 2 G: 2 INJECTION, POWDER, FOR SOLUTION INTRAMUSCULAR; INTRAVENOUS at 21:04

## 2018-11-19 RX ADMIN — TAMSULOSIN HYDROCHLORIDE 0.4 MG: 0.4 CAPSULE ORAL at 08:54

## 2018-11-19 RX ADMIN — ACETAMINOPHEN 650 MG: 325 TABLET, FILM COATED ORAL at 03:00

## 2018-11-19 RX ADMIN — METRONIDAZOLE 500 MG: 500 TABLET ORAL at 21:04

## 2018-11-19 RX ADMIN — ACETAMINOPHEN 650 MG: 325 TABLET, FILM COATED ORAL at 10:48

## 2018-11-19 RX ADMIN — METRONIDAZOLE 500 MG: 500 TABLET ORAL at 14:47

## 2018-11-19 RX ADMIN — SODIUM CHLORIDE: 9 INJECTION, SOLUTION INTRAVENOUS at 17:17

## 2018-11-19 ASSESSMENT — ENCOUNTER SYMPTOMS
CONSTIPATION: 0
COUGH: 0
FEVER: 1
BLOOD IN STOOL: 0
HEMOPTYSIS: 0
SENSORY CHANGE: 0
HEARTBURN: 0
SPUTUM PRODUCTION: 0
HEADACHES: 1
PALPITATIONS: 0
MYALGIAS: 0
DIAPHORESIS: 1
NAUSEA: 1
ABDOMINAL PAIN: 1
TREMORS: 0
DIARRHEA: 1
TINGLING: 0
DIZZINESS: 0
VOMITING: 1
CHILLS: 1
BACK PAIN: 0

## 2018-11-19 ASSESSMENT — LIFESTYLE VARIABLES: EVER_SMOKED: NEVER

## 2018-11-19 ASSESSMENT — PAIN SCALES - GENERAL
PAINLEVEL_OUTOF10: 2
PAINLEVEL_OUTOF10: 0
PAINLEVEL_OUTOF10: 0

## 2018-11-19 ASSESSMENT — COPD QUESTIONNAIRES
HAVE YOU SMOKED AT LEAST 100 CIGARETTES IN YOUR ENTIRE LIFE: NO/DON'T KNOW
COPD SCREENING SCORE: 2
DURING THE PAST 4 WEEKS HOW MUCH DID YOU FEEL SHORT OF BREATH: NONE/LITTLE OF THE TIME
DO YOU EVER COUGH UP ANY MUCUS OR PHLEGM?: NO/ONLY WITH OCCASIONAL COLDS OR INFECTIONS

## 2018-11-19 NOTE — ASSESSMENT & PLAN NOTE
-EGFR 34, serum creatinine 1.95, BUN 23 on presentation  -withheld lisinopril, avoid other nephrotoxic agents  -resolving, near baseline

## 2018-11-19 NOTE — ASSESSMENT & PLAN NOTE
-Patient presented with urinary retention and urgency  -On CT scan:  Bilateral renal cortical thinning/atrophy with simple and complex renal cysts  -Urine Culture 11/19/2018 positive preliminary for Gram (-) lactose fermenting rods  -resistant to ceftriaxone.  As patient improved on C3, likely asymptomatic bacteriuria  -follow up with as outpatient for renal cysts workup

## 2018-11-19 NOTE — H&P
I saw and examined the patient and discussed the management with the resident. I reviewed the resident's H & P note and agree with the documented findings and plan of care except as documented in this attending note.    Please see resident H & P for 11/18/2018 for complete details    Additional attending comments:  Other Review of Systems  Constitutional:positive for chills and fever.   HENT: Negative for congestion, hearing loss and tinnitus.    Eyes: Negative for blurred vision, double vision and discharge.   Respiratory: Negative for cough, hemoptysis and shortness of breath.    Cardiovascular: Negative for chest pain, palpitations and leg swelling.   Gastrointestinal: Positive for abdominal pain, diarrhea, nausea and vomiting.     Genitourinary: positive for dysuria.Negative for frequency, hematuria and urgency.   Musculoskeletal: Negative for joint pain and myalgias.   Skin: Negative for new rash.   Neurological: Negative for dizziness, sensory change, speech change. Positive for generalized weakness.   Endo/Heme/Allergies: Negative for environmental allergies. Does not bruise/bleed easily.   Psychiatric/Behavioral: Negative for depression and  Hallucinations.     Physical exam;  Constitutional :  Well developed, Well nourished, appears uncomfortable  HENT: Head is atraumatic normocephalic dry mucous membranes  Eyes: Normal-appearing nonicteric  Neck: Normal range of motion, No tenderness, Supple, No stridor.   Lymphatic: No cervical adenopathy is noted.   Cardiovascular: Tachycardia noted normal rhythm, No murmurs, No rubs, No gallops.   Thorax & Lungs: Equal breath sounds bilaterally no rales rhonchi  Skin: Warm, Dry, No erythema, No rash.   Abdominal: Soft. Bowel sounds are normal. He exhibits no distension. There is tenderness in the left lower quadrant. There is no rebound and no CVA tenderness. Extremities no cyanosis or edema  Neurologically the patient is awake is alert strength appears normal upper and  lower extremities  Psychiatric no impairment of judgment appropriate mood and affect  Skin: Skin is warm and dry. No erythema.   Extremities;Warm and intact pulses  Nursing note and vitals reviewed.    continue IVF,IV ABX,FU stool studies and cultures  Avoid nephrotoxins,bladder scan  Low threshold for surgical eval  D/W RN  Please also see senior resident note for further details    Please see resident H & P of 11/18/2018 for complete details

## 2018-11-19 NOTE — NON-PROVIDER
Internal Medicine Medical Student Note  Note Author: Mikala Elizalde, Student    Name Chidi Tatum Ruiz     1945   Age/Sex 73 y.o. male   MRN [id number]   Code Status FULL              Reason for interval visit  (Principal Problem)   Left lower quadrant pain    Interval Problem Daily Status Update  (problem status, last 24 hours, new history, new data )   Mr. Figueroa temperature has remained at 100.3F. In addition, his WBC has increased. Upon examination before AM rounds, he was experiencing chills; however, during rounds reported feeling a lot stronger. He demonstrated slight tenderness to palpation in the right lower quadrant but no pain in the left lower quadrant. He also continues to experience diarrhea which warranted stool C. Diff toxin assessment. Urine culture is positive for lactose fermenting gram negative rods >100,000.         Physical Exam       Vitals:    18 0330 18 0518 18 0800 18 1203   BP: 137/70  138/81    Pulse: (!) 101 89 100 88   Resp: (!) 24 17 18 18   Temp: 38 °C (100.4 °F) 37.9 °C (100.3 °F) (!) 38.4 °C (101.2 °F)    TempSrc: Temporal Temporal Temporal    SpO2: 91%  95% 97%   Weight:       Height:         Body mass index is 32.82 kg/m².    Oxygen Therapy:  Pulse Oximetry: 97 %, O2 (LPM): 0, O2 Delivery: None (Room Air)    Physical Exam   General: normal and appropriate, not toxic appearing, not in acute distress  HEENT: normocephalic, atraumatic, PERRLA.   Cardio: normal rate and rhythm, no murmurs, rubs, or gallops  Pulm: clear to auscultation bilaterally  GI: tenderness to deep palpation RLQ, negative McBurney's sign, non tender in other quadrants, nondistended, bowel sounds present  : no dysuria, no flank pain  Extremities: appropriate color, no edema, well-perfused, pulses present bilaterally           Assessment/Plan     Mr. Tatum is a 72 y/o male with a history of HTN, BPH, depression, hiatal hernia, and small bowel obstruction who was  admitted 11/18/18 due to fever, nausea, vomiting, and diarrhea. CT scan revealed new pericecal fat stranding without wall thickening or appendix abnormality, thereby making his presentation suspicious for colitis or terminal ileitis. He met SIRS 2/4 on admission and was provided IV fluids and IV ceftriaxone and metronidazole and remains on the floor due to IV antibiotic administration. His plan of care will proceed as such:     #left lower quadrant pain  #right lower quadrant pain  -patient presented with left lower quadrant pain and tenderness on admission  -as of late, patient demonstrates tenderness in RLQ to deep palpation  -CT scan: pericecal fat stranding without wall thickening or evident appendix abnormality. May indicate colitis or terminal ileitis  -CT scan also reveals hepatic steatosis, splenomegaly, bilateral renal cortical thinning with simple and complex renal cysts  -fluid bolus provided, IV maintenance 100ml/hr.  -IV ceftriaxone for possible colitis vs ileitis  -will continue antibiotic regime as followed due to patient's intrabdominal infection presentation    #watery diarrhea  #unstable vitals  #increased WBCs  -patient continues to have diarrhea, now more watery in consistency  -patient's vitals have not improved; WBCs have increased  -stool sample collected to assess for C. Diff toxin  -will order repeat CBC with differential, lactic acid,  And legionella to assess for other etiology of intrabdominal infectious process  -no need for surgery consult at this time     #urinary tract infection without hematuria  -at ER patient presented with urinary retention for which catheter was used  -has voided normally with increasing urine output since admission to hospital floor   -CT scan: bilateral renal cortical thinning/atrophy with simple and complex renal cysts  -urinalysis: large leukocyte esterase w/moderate occult blood in urine  -urine culture results 11/19/18: lactose-fermenting gram-neg rods  >100,000  -bladder scan tttnzss=929 ml  -patient is voiding and not retaining urine  -patient is not symptomatic  -IV ceftriaxone will also cover UTI  -no need to modify existing treatment

## 2018-11-19 NOTE — PROGRESS NOTES
Patient is A/Ox4, up with SBA to bathroom. One episode of diarrhea, c/o n/v, medicated per MAR. Continuous NS running at 125, pt tolerating IVF. Pt was febrile at beginning of shift, PRN Tylenol given, ice pack provided, room temp decreased, Pt temp trending down. Pt has regular diet but is unable to tolerate any food at this time, ice chips and clear liquids provided. Welcome folder, quiet pack given and explained to patient. Fall precautions in place. Bed in low and locked position, bed alarm active and audible. Call light within reach. Hourly rounding done.

## 2018-11-19 NOTE — PROGRESS NOTES
Per , patient being admitted with sepsis.  Orders in place for antibiotics, but there were no ordered blood cultures. A set was drawn in the ER per the lab, but no orders were received.  Called  again, he advised he would order blood cultures.  Antibiotics on hold until blood cultures are drawn.

## 2018-11-19 NOTE — H&P
[facility] [place] INTERNAL MEDICINE SENIOR ADMIT NOTE:    Patient ID:   Name:             Chidi Tatum   YOB: 1945  Age:                 73 y.o.  male   MRN:               [id number]                                                          Chief Complaint:       Nausea, vomiting, diarrhea abdominal pain    History of Present Illness:     Mr. Tatum is a 73 y.o. male with a past medical history of chronic kidney disease, hypertension, BPH, gastroesophageal reflux disease, SBO and anxiety brought him to the hospital because of nausea vomiting, diarrhea and abdominal pain.  He has a history of small bowel obstruction treated conservatively in July 2018, yesterday patient was not feeling well, had decreased appetite, temperature of 100 last night associated with chills, this morning he has been complaining of nausea, vomiting and diarrhea associated with abdominal pain and feeling weak, was brought into the ED by his daughter who lives with him, in the ED he had a white count of 23.8, AK I on CKD creatinine 1.95 baseline 1.3, he had tachycardia, treated with bolus NS, CT renal showed possible colitis or terminal ileitis.      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[facility]) 05/18/2018   • Pre-diabetes 05/18/2018   • Gross hematuria 05/18/2018   • Small bowel obstruction (HCC) 07/07/2018   • Anemia 07/07/2018   • Urinary tract infection without hematuria 07/07/2018   • Cough 07/13/2018   • Penile rash  07/13/2018   • Tooth infection 08/22/2018     Resolved Ambulatory Problems     Diagnosis Date Noted   • Hematuria 06/03/2014   • Fatigue 04/13/2016   • Myalgia 04/13/2016   • Left foot pain 08/02/2016   • Gastroparesis 09/06/2016   • Nasal congestion 09/01/2017   • Right temporal headache 01/09/2018   • Elevated sedimentation rate 01/09/2018   • Yeast cystitis 01/09/2018   • Postoperative pain 02/09/2018   • Thrush, oral 03/16/2018   • Shortness of breath 04/12/2018   • Leukocytosis 04/12/2018   • Tooth pain 07/03/2018   • Lactic acidosis 07/07/2018     Past Medical History:   Diagnosis Date   • BPH (benign prostatic hyperplasia)    • GERD (gastroesophageal reflux disease)    • HTN (hypertension)    • Hypertension    • Insomnia    • SBO (small bowel obstruction) ([facility])        PHYSICAL EXAM  Vitals:   Weight/BMI: Body mass index is 32.82 kg/m².  Blood pressure 131/61, pulse (!) 110, temperature (!) 38.9 °C (102 °F), temperature source Temporal, resp. rate 18, height 1.829 m (6'), weight 109.8 kg (242 lb), SpO2 94 %.  Vitals:    11/18/18 1600 11/18/18 1608 11/18/18 1655 11/18/18 1956   BP:   114/70 131/61   Pulse: 89 87 89 (!) 110   Resp:   16 18   Temp:   36.5 °C (97.7 °F) (!) 38.9 °C (102 °F)   TempSrc:   Temporal Temporal   SpO2: 93% 95% 94% 94%   Weight:       Height:         Oxygen Therapy:  Pulse Oximetry: 94 %, O2 (LPM): 0, O2 Delivery: None (Room Air)    Physical Exam   Constitutional: He is oriented to person, place, and time and well-developed, well-nourished, and in no distress. No distress.   HENT:   Head: Normocephalic.   Cardiovascular: Exam reveals no gallop.    No murmur heard.  Pulmonary/Chest: Effort normal. No respiratory distress. He has no wheezes.   Abdominal: Soft. There is tenderness in the right lower quadrant and suprapubic area. There is no rebound.   Genitourinary:   Genitourinary Comments: Patient refused rectal exam.   Musculoskeletal: He exhibits no edema.   Neurological: He is alert and  oriented to person, place, and time.   Skin: He is not diaphoretic.           Imaging:  CT-RENAL COLIC EVALUATION(A/P W/O)   Final Result      1.  New pericecal fat stranding without wall thickening or evident appendix abnormality. This is nonspecific, could indicate colitis and/or terminal ileitis.      2.  Stable mild bilateral hydroureteronephrosis with no obstructing process seen although bladder wall trabeculation is again demonstrated most likely from neurogenic bladder given lack of prostate gland enlargement. Chronic outlet obstruction is also    possible. No abnormal dilatation at this time      3.  Numerous chronic findings including hepatic steatosis, splenomegaly, bilateral renal cortical thinning/atrophy with simple and complex renal cysts that require follow-up              ASSESSMENT:  1) possible colitis  2) possible UTI  3) dehydration, AK I      PLAN:  -IV hydration  -Start IV ceftriaxone and metronidazole.  -Urine cultures, blood cultures pending.  -Hold off blood pressure medications.  -Monitor vital signs.          Lewis Banks M.D.

## 2018-11-19 NOTE — CARE PLAN
Problem: Communication  Goal: The ability to communicate needs accurately and effectively will improve  Outcome: PROGRESSING AS EXPECTED    Intervention: Educate patient and significant other/support system about the plan of care, procedures, treatments, medications and allow for questions  Discussed plan of care with pt, he acknowledges understanding      Problem: Safety  Goal: Will remain free from falls  Outcome: PROGRESSING AS EXPECTED  Patient will remain free from fall/injury during shift. Fall precautions in place. Pt is A/Ox4 and uses call light appropriately.

## 2018-11-19 NOTE — DISCHARGE SUMMARY
Internal Medicine Discharge Summary  Note Author: Edgar Luevano M.D.       Name Chidi Tatum     1945   Age/Sex 73 y.o. male   MRN [id number]         Admit Date:  2018       Discharge Date:   18      Service:   [facility] Internal Medicine Gray Team  Attending Physician(s):   Dr. Berry       Senior Resident(s):   Dr. Banks  Ant Resident(s):   Dr. Luevano  PCP: SAM Moya      Primary Diagnosis:   Severe Sepsis (ZAIN on CKD stage III) Secondary to Infectious Colitis    Secondary Diagnoses:                Principal Problem (Resolved):    Left lower quadrant pain POA: Unknown  Active Problems:    Essential hypertension POA: Yes    BPH (benign prostatic hyperplasia) POA: Yes    Moderate episode of recurrent major depressive disorder (HCC) POA: Yes    Pre-diabetes POA: Yes    Anemia POA: Yes  Resolved Problems:    Urinary tract infection without hematuria POA: Yes    Acute renal failure superimposed on stage 3 chronic kidney disease (HCC) POA: Unknown      Hospital Summary (Brief Narrative):       Chidi Tatum is a 73 y.o. Male medical history of chronic kidney disease, hypertension, BPH, gastroesophageal reflux disease, anxiety and past medical history of SBO treated conservatively in 2018 presented to the hospital because of 2 days history of fever, chills, nausea, vomiting, diarrhea and abdominal pain.  On admission patient was dehydrated and met sepsis criteria, his WBC was 23.8, he had AK I on CKD and was tachycardic, he was treated with sepsis protocol with IV fluid hydration, CT renal showed possible right colitis/ileitis, he was started on ceftriaxone and metronidazole IV, UA was infected, urine culture was positive for lactose fermenting gram negative rods (10-50k units) which were intermediate sensitivity to ceftriaxone.  However, patient improved on ceftriaxone and flagyl therefore it was thought to be asymptomatic bacteriuria.  Antibiotics changed to Augmentin  and patient tolerated well.   WBC''s trended downward, and ZAIN resolved with Creatinine near baseline on discharge.  He will be discharged with Augmentin 875 bid for 7 days of total treatment starting 11/25/2018 for infectious colitis.    CT scan incidentally showed complex renal cysts, which should be followed up with primary care provider.         Patient /Hospital Summary (Details -- Problem Oriented) :          Urinary tract infection without hematuria-resolved as of 11/21/2018   Assessment & Plan    -Patient presented with urinary retention and urgency  -On CT scan:  Bilateral renal cortical thinning/atrophy with simple and complex renal cysts  -Urine Culture 11/19/2018 positive preliminary for Gram (-) lactose fermenting rods  -resistant to ceftriaxone.  As patient improved on C3, likely asymptomatic bacteriuria  -follow up with as outpatient for renal cysts workup     * Left lower quadrant pain-resolved as of 11/21/2018   Assessment & Plan    -Patient was presented with RLQ tenderness, N/V, F/C, diarrhea.  -SIRS 3/4 with WBC's 25 on admission  -On CT scan: New pericecal fat stranding possibly indicates colitis and/or terminal ileitis.  -during admission multiple diarrhea ranging from loosely formed to brown watery  -C. Diff negative  -Blood cultures NGTD  -Switch C3/flagyl to Augmentin to test oral tolerance.  Total Abx 7 days starting 11/18/2018         BPH (benign prostatic hyperplasia)   Assessment & Plan    - PSA 2.3 in May 2018  -presented with urinary urgency  -bladder scan 250 ml  -Continue finasteride 5 mg and tamsulosin 0.4 mg.  If the patient still has urinary retention, consider increasing tamsulosin to 0.8 mg       Essential hypertension   Assessment & Plan    Continue home medications :hydrochlorothiazide 25 mg, labetalol 10 mg   Hold lisinopril in the setting of acute kidney injury     Acute renal failure superimposed on stage 3 chronic kidney disease (HCC)-resolved as of 11/21/2018   Assessment  & Plan    -EGFR 34, serum creatinine 1.95, BUN 23 on presentation  -withheld lisinopril, avoid other nephrotoxic agents  -resolving, near baseline     Anemia   Assessment & Plan    -MCV 80  -mixed picture of ACD with Iron deficiency  -replete iron     Pre-diabetes   Assessment & Plan    A1c 6.1%     Moderate episode of recurrent major depressive disorder (HCC)   Assessment & Plan    Denies having any symptoms of depression  Continue home dose of sertraline 25 mg         Consultants:     none    Procedures:        none    Imaging/ Testing:      CT-RENAL COLIC EVALUATION(A/P W/O)   Final Result      1.  New pericecal fat stranding without wall thickening or evident appendix abnormality. This is nonspecific, could indicate colitis and/or terminal ileitis.      2.  Stable mild bilateral hydroureteronephrosis with no obstructing process seen although bladder wall trabeculation is again demonstrated most likely from neurogenic bladder given lack of prostate gland enlargement. Chronic outlet obstruction is also    possible. No abnormal dilatation at this time      3.  Numerous chronic findings including hepatic steatosis, splenomegaly, bilateral renal cortical thinning/atrophy with simple and complex renal cysts that require follow-up            Discharge Medications:         Medication Reconciliation: Completed       Medication List      START taking these medications      Instructions   amoxicillin-clavulanate 875-125 MG Tabs  Commonly known as:  AUGMENTIN   Take 1 Tab by mouth 2 times a day for 3 days.  Dose:  1 Tab     cyanocobalamin 1000 MCG Tabs  Start taking on:  11/22/2018  Commonly known as:  VITAMIN B12   Take 1 Tab by mouth every day.  Dose:  1000 mcg     ferrous sulfate 325 (65 Fe) MG tablet   Take 1 Tab by mouth every morning with breakfast.  Dose:  325 mg     loperamide 2 MG Caps  Commonly known as:  IMODIUM   Take 1 Cap by mouth every 6 hours as needed for Diarrhea.  Dose:  2 mg        CONTINUE taking these  medications      Instructions   finasteride 5 MG Tabs  Commonly known as:  PROSCAR   TAKE ONE TABLET BY MOUTH ONCE DAILY     hydroCHLOROthiazide 25 MG Tabs  Commonly known as:  HYDRODIURIL   Take 1 Tab by mouth every 48 hours.  Dose:  25 mg     lisinopril 10 MG Tabs  Commonly known as:  PRINIVIL   TAKE ONE TABLET BY MOUTH ONCE DAILY *REPLACES  5MG     sertraline 25 MG tablet  Commonly known as:  ZOLOFT   Take 1 Tab by mouth every day.  Dose:  25 mg     tamsulosin 0.4 MG capsule  Commonly known as:  FLOMAX   TAKE ONE CAPSULE BY MOUTH ONE-HALF HOUR AFTER BREAKFAST            Can use .DISCHARGEMEDSLIST if going to another facility         Disposition:   To home in stable condition    Diet:   As tolerated    Activity:   As tolerated    Instructions:         The patient was instructed to return to the ER in the event of worsening symptoms. I have counseled the patient on the importance of compliance and the patient has agreed to proceed with all medical recommendations and follow up plan indicated above.   The patient understands that all medications come with benefits and risks. Risks may include permanent injury or death and these risks can be minimized with close reassessment and monitoring.        Primary Care Provider:    SAM Moya    Discharge summary faxed to primary care provider:  Deferred  Copy of discharge summary given to the patient: Completed      Follow up appointment details :      Please follow up with PCP for post discharge appointment    Pending Studies:          Time spent on discharge day patient visit, preparing discharge paperwork and arranging for patient follow up.    Summary of follow up issues:   -clinical assessment for improvement of infectious colitis    -refer to nephrology for outpatient management renal cysts    Discharge Time (Minutes) :    58  Hospital Course Type:  Inpatient Stay >2 midnights      Condition on Discharge       ______________________________________________________________________    Interval history/exam for day of discharge:         -No acute events overnight.  Per patient still having diarrhea, but slighty more formed stool.  tolerating oral foods well. Denies abdominal pain, n/v, f/c, bloody stools.  Changed to augmentin yesterday without worsening condition. WBC's continue to trend downward.     -patient assessed at bedside, management plan explained to patient            Constitutional: He is oriented to person, place, and time and well-developed, well-nourished, and in no distress.   HENT:   Head: Normocephalic and atraumatic.   Eyes: Pupils are equal, round, and reactive to light. EOM are normal.   Neck: Neck supple.   Cardiovascular: Normal rate, regular rhythm and normal heart sounds.  Exam reveals no gallop and no friction rub.    No murmur heard.  Pulmonary/Chest: Effort normal and breath sounds normal. No respiratory distress. He has no wheezes.   Abdominal: Soft. Bowel sounds are normal. He exhibits distension. There is no tenderness. There is no rebound and no guarding.   Musculoskeletal: Normal range of motion. He exhibits no edema or deformity.   Neurological: He is alert and oriented to person, place, and time.   Skin: Skin is warm and dry.   Psychiatric: Mood and affect normal.       Most Recent Labs:    Lab Results   Component Value Date/Time    WBC 8.4 11/21/2018 03:21 AM    WBC 8.9 03/06/2013 12:00 AM    RBC 3.87 (L) 11/21/2018 03:21 AM    RBC 5.49 03/06/2013 12:00 AM    HEMOGLOBIN 10.2 (L) 11/21/2018 03:21 AM    HEMATOCRIT 31.4 (L) 11/21/2018 03:21 AM    MCV 81.1 (L) 11/21/2018 03:21 AM    MCV 85 03/06/2013 12:00 AM    MCH 26.4 (L) 11/21/2018 03:21 AM    MCH 29.9 03/06/2013 12:00 AM    MCHC 32.5 (L) 11/21/2018 03:21 AM    MPV 12.4 11/21/2018 03:21 AM    NEUTSPOLYS 75.40 (H) 11/21/2018 03:21 AM    LYMPHOCYTES 12.80 (L) 11/21/2018 03:21 AM    MONOCYTES 9.80 11/21/2018 03:21 AM    EOSINOPHILS 1.40  11/21/2018 03:21 AM    BASOPHILS 0.20 11/21/2018 03:21 AM      Lab Results   Component Value Date/Time    SODIUM 137 11/21/2018 03:21 AM    POTASSIUM 3.3 (L) 11/21/2018 03:21 AM    CHLORIDE 112 11/21/2018 03:21 AM    CO2 18 (L) 11/21/2018 03:21 AM    GLUCOSE 105 (H) 11/21/2018 03:21 AM    BUN 28 (H) 11/21/2018 03:21 AM    CREATININE 1.57 (H) 11/21/2018 03:21 AM    CREATININE 0.97 03/06/2013 12:00 AM    BUNCREATRAT 22 03/06/2013 12:00 AM      Lab Results   Component Value Date/Time    ALTSGPT 9 11/21/2018 03:21 AM    ASTSGOT 11 (L) 11/21/2018 03:21 AM    ALKPHOSPHAT 58 11/21/2018 03:21 AM    TBILIRUBIN 0.2 11/21/2018 03:21 AM    LIPASE 7 (L) 11/18/2018 01:50 PM    ALBUMIN 3.0 (L) 11/21/2018 03:21 AM    GLOBULIN 3.3 11/21/2018 03:21 AM    INR 1.13 04/12/2018 12:37 PM     Lab Results   Component Value Date/Time    PROTHROMBTM 14.2 04/12/2018 12:37 PM    INR 1.13 04/12/2018 12:37 PM

## 2018-11-19 NOTE — PROGRESS NOTES
Called MANDO Mao to clarify admit orders for regular diet and an additional fluid bolus.  Per , orders were confirmed.  The patient is to receive an additional bolus and is to be on a regular diet.  No additional orders received.

## 2018-11-19 NOTE — PROGRESS NOTES
Assumed care of pt at 0700. Pt A&O x4. Pt up with SBA, voiding per urinal. Pt febrile this am, MD aware, new orders for NS bolus and increased IVF rate, scheduled tylenol. No c/o pain or nausea, some abdominal tenderness. POC discussed with pt and daughter over the phone, both verbalized understanding. Pt tolerating clear liquid diet. Fall precautions and hourly rounding in place, bed alarm activated.

## 2018-11-19 NOTE — ASSESSMENT & PLAN NOTE
Continue home medications :hydrochlorothiazide 25 mg, labetalol 10 mg   Hold lisinopril in the setting of acute kidney injury

## 2018-11-19 NOTE — H&P
Internal Medicine Admitting History and Physical    Note Author: Gigi Larkin M.D.       Name Chidi Tatum     1945   Age/Sex 73 y.o. male   MRN [id number]   Code Status FULL CODE     After 5PM or if no immediate response to page, please call for cross-coverage  Attending/Team: Dr. Villatoro/ DALLIN See Patient List for primary contact information  Call [phone] to page    1st Call - Day Intern (R1):   Dr. Larkin 2nd Call - Day Sr. Resident (R2/R3):   Dr. Cantu       Chief Complaint: Vomiting and Diarrhea     HPI:  Chidi Tatum is a 73 y.o. old patient with PMH of hypertension, BPH and depression presented with nausea, vomiting and diarrhea.  Patient was feeling completely fine 2 days ago.  He started having fever (100 F) with chills/shakiness and night sweats yesterday.  The patient started feeling weak as well.  He took Tylenol for fever which relieved his fever.  Patient started having nausea, nonbloody biliary vomiting (3-4 episodes), and diarrhea (one episode every hour) today morning.  He denied having any fever or chills today.  The patient has mild abdominal pain on the left lower quadrant started this morning.  On presentation, the patient had urinary retention and needed straight cath to relieve retention.  Patient denied having any other urinary symptoms. the patient denied having any headache, vision change, chest pain, shortness of breath, or constipation.   The patient has a history of small bowel obstruction 3 months ago which was treated conservatively.  Patient has a history of hiatal hernia treated 2 years ago.  In the ED, the patient was stable. Patient denied rectal exam.    Review of Systems   Constitutional: Positive for diaphoresis. Negative for chills, fever, malaise/fatigue and weight loss.   HENT: Negative.    Eyes: Negative.    Respiratory: Negative.    Cardiovascular: Negative.    Gastrointestinal: Positive for abdominal pain, diarrhea, nausea and  vomiting. Negative for blood in stool, constipation, heartburn and melena.   Genitourinary: Positive for dysuria. Negative for frequency, hematuria and urgency.   Musculoskeletal: Negative.    Skin: Negative.    Neurological: Positive for weakness.   Endo/Heme/Allergies: Negative.    Psychiatric/Behavioral: Negative.              Past Medical History (Chronic medical problem, known complications and current treatment)    Past Medical History:   Diagnosis Date   • BPH (benign prostatic hyperplasia)    • GERD (gastroesophageal reflux disease)    • HTN (hypertension)    • Hypertension    • Insomnia    • SBO (small bowel obstruction) (HCC)        Past Surgical History:  Past Surgical History:   Procedure Laterality Date   • TEMPORAL ARTERY BIOPSY Right 2/9/2018    Procedure: TEMPORAL ARTERY BIOPSY;  Surgeon: Bryant Corey M.D.;  Location: SURGERY [facility];  Service: Vascular   • HERNIA REPAIR      umbilical, groin        Current Outpatient Medications:  Home Medications     Reviewed by Balaji Lopez (Pharmacy Tech) on 11/18/18 at 1604  Med List Status: Complete   Medication Last Dose Status   finasteride (PROSCAR) 5 MG Tab 11/18/2018 Active   hydroCHLOROthiazide (HYDRODIURIL) 25 MG Tab 11/18/2018 Active   lisinopril (PRINIVIL) 10 MG Tab 11/18/2018 Active   sertraline (ZOLOFT) 25 MG tablet 11/18/2018 Active   tamsulosin (FLOMAX) 0.4 MG capsule 11/18/2018 Active                Medication Allergy/Sensitivities:  Allergies   Allergen Reactions   • Celebrex [Celecoxib] Rash     .         Family History (mandatory)   Family History   Problem Relation Age of Onset   • Lung Disease Father    • Alcohol/Drug Brother    • Lung Disease Brother        Social History (mandatory)   Social History     Social History   • Marital status:      Spouse name: N/A   • Number of children: N/A   • Years of education: N/A     Occupational History   • Not on file.     Social History Main Topics   • Smoking status: Never  Smoker   • Smokeless tobacco: Never Used   • Alcohol use No   • Drug use: No   • Sexual activity: No     Other Topics Concern   • Not on file     Social History Narrative   • No narrative on file     Living situation: With daughter  PCP : SAM Moya    Physical Exam     Vitals:    11/18/18 1321 11/18/18 1600 11/18/18 1608 11/18/18 1655   BP: 113/72   114/70   Pulse: (!) 109 89 87 89   Resp: 16   16   Temp: 36.2 °C (97.2 °F)   36.5 °C (97.7 °F)   TempSrc: Temporal   Temporal   SpO2: 97% 93% 95% 94%   Weight:       Height: 1.829 m (6')        Body mass index is 32.82 kg/m².  /70   Pulse 89   Temp 36.5 °C (97.7 °F) (Temporal)   Resp 16   Ht 1.829 m (6')   Wt 109.8 kg (242 lb)   SpO2 94%   BMI 32.82 kg/m²   O2 therapy: Pulse Oximetry: 94 %, O2 (LPM): 94, O2 Delivery: None (Room Air)    Physical Exam   Constitutional: He is well-developed, well-nourished, and in no distress.   HENT:   Head: Normocephalic and atraumatic.   Eyes: Pupils are equal, round, and reactive to light.   Neck: Normal range of motion. Neck supple.   Cardiovascular: Normal rate, regular rhythm and normal heart sounds.    Pulmonary/Chest: Effort normal and breath sounds normal.   Abdominal: Soft. Bowel sounds are normal. He exhibits no distension. There is tenderness in the left lower quadrant. There is no rebound and no CVA tenderness.           Data Review       Old Records Request:   Completed  Current Records review/summary: Completed    Lab Data Review:  Recent Results (from the past 24 hour(s))   CBC WITH DIFFERENTIAL    Collection Time: 11/18/18  1:50 PM   Result Value Ref Range    WBC 23.8 (H) 4.8 - 10.8 K/uL    RBC 4.97 4.70 - 6.10 M/uL    Hemoglobin 13.1 (L) 14.0 - 18.0 g/dL    Hematocrit 39.1 (L) 42.0 - 52.0 %    MCV 78.7 (L) 81.4 - 97.8 fL    MCH 26.4 (L) 27.0 - 33.0 pg    MCHC 33.5 (L) 33.7 - 35.3 g/dL    RDW 45.0 35.9 - 50.0 fL    Platelet Count 251 164 - 446 K/uL    MPV 9.5 9.0 - 12.9 fL     Neutrophils-Polys 89.30 (H) 44.00 - 72.00 %    Lymphocytes 3.50 (L) 22.00 - 41.00 %    Monocytes 6.10 0.00 - 13.40 %    Eosinophils 0.10 0.00 - 6.90 %    Basophils 0.20 0.00 - 1.80 %    Immature Granulocytes 0.80 0.00 - 0.90 %    Nucleated RBC 0.00 /100 WBC    Neutrophils (Absolute) 21.28 (H) 1.82 - 7.42 K/uL    Lymphs (Absolute) 0.83 (L) 1.00 - 4.80 K/uL    Monos (Absolute) 1.45 (H) 0.00 - 0.85 K/uL    Eos (Absolute) 0.02 0.00 - 0.51 K/uL    Baso (Absolute) 0.04 0.00 - 0.12 K/uL    Immature Granulocytes (abs) 0.19 (H) 0.00 - 0.11 K/uL    NRBC (Absolute) 0.00 K/uL   COMP METABOLIC PANEL    Collection Time: 11/18/18  1:50 PM   Result Value Ref Range    Sodium 133 (L) 135 - 145 mmol/L    Potassium 3.8 3.6 - 5.5 mmol/L    Chloride 105 96 - 112 mmol/L    Co2 16 (L) 20 - 33 mmol/L    Anion Gap 12.0 (H) 0.0 - 11.9    Glucose 193 (H) 65 - 99 mg/dL    Bun 23 (H) 8 - 22 mg/dL    Creatinine 1.95 (H) 0.50 - 1.40 mg/dL    Calcium 10.2 8.5 - 10.5 mg/dL    AST(SGOT) 11 (L) 12 - 45 U/L    ALT(SGPT) 12 2 - 50 U/L    Alkaline Phosphatase 91 30 - 99 U/L    Total Bilirubin 0.8 0.1 - 1.5 mg/dL    Albumin 4.2 3.2 - 4.9 g/dL    Total Protein 8.2 6.0 - 8.2 g/dL    Globulin 4.0 (H) 1.9 - 3.5 g/dL    A-G Ratio 1.1 g/dL   LIPASE    Collection Time: 11/18/18  1:50 PM   Result Value Ref Range    Lipase 7 (L) 11 - 82 U/L   LACTIC ACID    Collection Time: 11/18/18  1:50 PM   Result Value Ref Range    Lactic Acid 1.7 0.5 - 2.0 mmol/L   ESTIMATED GFR    Collection Time: 11/18/18  1:50 PM   Result Value Ref Range    GFR If  41 (A) >60 mL/min/1.73 m 2    GFR If Non  34 (A) >60 mL/min/1.73 m 2   BLOOD CULTURE,HOLD    Collection Time: 11/18/18  1:50 PM   Result Value Ref Range    Blood Culture Hold Collected    TSH (Thyroid Stimulating Hormone)    Collection Time: 11/18/18  1:50 PM   Result Value Ref Range    TSH 1.120 0.380 - 5.330 uIU/mL   Hemoglobin A1c    Collection Time: 11/18/18  1:50 PM   Result Value Ref Range     Glycohemoglobin 6.1 (H) 0.0 - 5.6 %    Est Avg Glucose 128 mg/dL   PROCALCITONIN    Collection Time: 18  1:50 PM   Result Value Ref Range    Procalcitonin 1.35 (H) <0.25 ng/mL   URINALYSIS CULTURE, IF INDICATED    Collection Time: 18  3:20 PM   Result Value Ref Range    Color DK Yellow     Character Turbid (A)     Specific Gravity 1.016 <1.035    Ph 5.0 5.0 - 8.0    Glucose Negative Negative mg/dL    Ketones Negative Negative mg/dL    Protein 100 (A) Negative mg/dL    Bilirubin Negative Negative    Urobilinogen, Urine 0.2 Negative    Nitrite Positive (A) Negative    Leukocyte Esterase Large (A) Negative    Occult Blood Moderate (A) Negative    Micro Urine Req Microscopic    URINE MICROSCOPIC (W/UA)    Collection Time: 18  3:20 PM   Result Value Ref Range    WBC Packed (A) /hpf    RBC 20-50 (A) /hpf   EKG (IP)    Collection Time: 18  5:20 PM   Result Value Ref Range    Report       [facility]    Test Date:  2018  Pt Name:    RENATA CLEMENT                 Department: ER  MRN:        [id number]                      Room:       [facility]  Gender:     Male                         Technician: [facility]  :        1945                   Requested By:VANE SHEETS  Order #:    [id number]                    Reading MD:    Measurements  Intervals                                Axis  Rate:       85                           P:          55  CO:         168                          QRS:        -18  QRSD:       84                           T:          23  QT:         352  QTc:        419    Interpretive Statements  SINUS RHYTHM  BORDERLINE LEFT AXIS DEVIATION  Compared to ECG 2018 22:07:34  Accelerated junctional rhythm no longer present         Imaging/Procedures Review:    Independant Imaging Review: Completed  CT-RENAL COLIC EVALUATION(A/P W/O)   Final Result      1.  New pericecal fat stranding without wall thickening or evident appendix abnormality. This is nonspecific, could indicate  colitis and/or terminal ileitis.      2.  Stable mild bilateral hydroureteronephrosis with no obstructing process seen although bladder wall trabeculation is again demonstrated most likely from neurogenic bladder given lack of prostate gland enlargement. Chronic outlet obstruction is also    possible. No abnormal dilatation at this time      3.  Numerous chronic findings including hepatic steatosis, splenomegaly, bilateral renal cortical thinning/atrophy with simple and complex renal cysts that require follow-up         EKG:   EKG Independant Review: Completed  QTc:419, HR: 85, Normal Sinus Rhythm, no ST/T changes.    Records reviewed and summarized in current documentation :  Yes  [employer] teaching service handout given to patient:  No         Assessment/Plan     * Left lower quadrant pain   Assessment & Plan    Patient was presented with nausea, vomiting and diarrhea for 1 day.  Patient has left lower quadrant abdominal pain and tenderness on admission.  -On CT scan: New pericecal fat stranding without wall thickening or evident appendix abnormality. This is nonspecific, could indicate colitis and/or terminal ileitis.  -He also has hepatic steatosis, splenomegaly, bilateral renal cortical thinning/atrophy with simple and complex renal cysts  - SIRS 2/4, leukocytosis and tachycardia on presentation  - qSOFA 0/3 on presentation, pro calcitonin 1.35 on presentation and WBC 23.8 with left shift on presentation  - Fluid bolus 1000 ml received in the ED.  Another fluid bolus of 1 L was given on the floor.   - IV maintenance 100 ml/hr  - Ceftriaxone and Flagyl for possible colitis versus ileitis  - Lactic acid 1.7 on admission  - Pan culture.  2 sets of blood cultures were sent and follow-up with urine culture         Urinary tract infection without hematuria- (present on admission)   Assessment & Plan    Patient presented with urinary retention.  Denied having any burning, frequency or blood in the urine.  - On CT scan:   Bilateral renal cortical thinning/atrophy with simple and complex renal cysts  -He may require a nephrology consultation in the setting of microscopic hematuria and complex renal cysts  - SIRS 2/4 on admission  -Large leukocyte esterase with moderate occult blood in the urine  -Ceftriaxone will cover for UTI as well  -Follow-up with urine culture  -Straight catheter as needed, maximum 3 times and consider Barrios if needed     Acute kidney injury (HCC)   Assessment & Plan    EGFR 34, serum creatinine 1.95, BUN 23 on presentation, acute on chronic kidney disease stage III  - Continue IV fluid, likely associated with dehydration  -Avoid lisinopril in the setting of acute kidney injury, avoid other nephrotoxic agents  -Follow-up with CHEM panel tomorrow     BPH (benign prostatic hyperplasia)- (present on admission)   Assessment & Plan    - PSA 2.3 in May 2018  -Continue finasteride 5 mg and tamsulosin 0.4 mg.  If the patient still has urinary retention, consider increasing tamsulosin to 0.8 mg tomorrow  -Straight cath to relieve urinary retention, consider Barrios if needed  -Patient may have UTI however is asymptomatic as of now     Essential hypertension- (present on admission)   Assessment & Plan    Stable blood pressure on admission  Continue home medications :hydrochlorothiazide 25 mg, labetalol 10 mg   Hold lisinopril in the setting of acute kidney injury     Pre-diabetes- (present on admission)   Assessment & Plan    A1c 6.1%     Moderate episode of recurrent major depressive disorder (HCC)- (present on admission)   Assessment & Plan    Denies having any symptoms of depression  Continue home dose of sertraline 25 mg     Obesity (BMI 30-39.9)- (present on admission)   Assessment & Plan    BMI 32.8  -Consider nutrition consult         Anticipated Hospital stay: Observation admit      Quality Measures  Quality-Core Measures   Reviewed items::  EKG reviewed, Labs reviewed, Medications reviewed and Radiology images  reviewed  Barrios catheter::  No Barrios  DVT prophylaxis pharmacological::  Heparin  Ulcer Prophylaxis::  Not indicated    PCP: SAM Moya

## 2018-11-19 NOTE — ASSESSMENT & PLAN NOTE
- PSA 2.3 in May 2018  -presented with urinary urgency  -bladder scan 250 ml  -Continue finasteride 5 mg and tamsulosin 0.4 mg.  If the patient still has urinary retention, consider increasing tamsulosin to 0.8 mg

## 2018-11-19 NOTE — CARE PLAN
Problem: Safety  Goal: Will remain free from injury  Fall precautions in place. Bed in low locked position, belongings and call light within reach. Patient using call light for assistance. Hourly rounding in place.

## 2018-11-19 NOTE — PROGRESS NOTES
Received call report from the ER and accepted care of patient.  Patient arrived on unit and currently resting in bed in no visible or stated distress.  Bed controls on and bed in locked position.  Bed alarm not on, patient up independently without the need for assistance at this time.  Call light and personal possessions within reach.  Plan of care to include IV fluids and antibiotics, pain management, assistance with ADL's, and continued work up of admit diagnosis.  Patient verbalizes agreement with plan of care, and has no additional questions or concerns at this time.  Will continue to update notes/plan of care as needed throughout shift.

## 2018-11-19 NOTE — ASSESSMENT & PLAN NOTE
-Patient was presented with RLQ tenderness, N/V, F/C, diarrhea.  -SIRS 3/4 with WBC's 25 on admission  -On CT scan: New pericecal fat stranding possibly indicates colitis and/or terminal ileitis.  -during admission multiple diarrhea ranging from loosely formed to brown watery  -C. Diff negative  -Blood cultures NGTD  -Switch C3/flagyl to Augmentin to test oral tolerance.  Total Abx 7 days starting 11/18/2018

## 2018-11-20 LAB
ALBUMIN SERPL BCP-MCNC: 2.9 G/DL (ref 3.2–4.9)
ALBUMIN/GLOB SERPL: 0.9 G/DL
ALP SERPL-CCNC: 58 U/L (ref 30–99)
ALT SERPL-CCNC: 9 U/L (ref 2–50)
ANION GAP SERPL CALC-SCNC: 8 MMOL/L (ref 0–11.9)
AST SERPL-CCNC: 10 U/L (ref 12–45)
BACTERIA UR CULT: ABNORMAL
BACTERIA UR CULT: ABNORMAL
BASOPHILS # BLD AUTO: 0.2 % (ref 0–1.8)
BASOPHILS # BLD: 0.02 K/UL (ref 0–0.12)
BILIRUB SERPL-MCNC: 0.2 MG/DL (ref 0.1–1.5)
BUN SERPL-MCNC: 32 MG/DL (ref 8–22)
CALCIUM SERPL-MCNC: 8.5 MG/DL (ref 8.5–10.5)
CHLORIDE SERPL-SCNC: 110 MMOL/L (ref 96–112)
CO2 SERPL-SCNC: 18 MMOL/L (ref 20–33)
CREAT SERPL-MCNC: 1.78 MG/DL (ref 0.5–1.4)
EOSINOPHIL # BLD AUTO: 0.03 K/UL (ref 0–0.51)
EOSINOPHIL NFR BLD: 0.3 % (ref 0–6.9)
ERYTHROCYTE [DISTWIDTH] IN BLOOD BY AUTOMATED COUNT: 47.4 FL (ref 35.9–50)
FERRITIN SERPL-MCNC: 99.1 NG/ML (ref 22–322)
FOLATE SERPL-MCNC: 17.1 NG/ML
GLOBULIN SER CALC-MCNC: 3.3 G/DL (ref 1.9–3.5)
GLUCOSE SERPL-MCNC: 100 MG/DL (ref 65–99)
HCT VFR BLD AUTO: 30.6 % (ref 42–52)
HGB BLD-MCNC: 10 G/DL (ref 14–18)
HGB RETIC QN AUTO: 27.7 PG/CELL (ref 29–35)
IMM GRANULOCYTES # BLD AUTO: 0.08 K/UL (ref 0–0.11)
IMM GRANULOCYTES NFR BLD AUTO: 0.7 % (ref 0–0.9)
IMM RETICS NFR: 14 % (ref 9.3–17.4)
IRON SATN MFR SERPL: 6 % (ref 15–55)
IRON SERPL-MCNC: 17 UG/DL (ref 50–180)
LYMPHOCYTES # BLD AUTO: 0.79 K/UL (ref 1–4.8)
LYMPHOCYTES NFR BLD: 6.9 % (ref 22–41)
MCH RBC QN AUTO: 26.6 PG (ref 27–33)
MCHC RBC AUTO-ENTMCNC: 32.7 G/DL (ref 33.7–35.3)
MCV RBC AUTO: 81.4 FL (ref 81.4–97.8)
MONOCYTES # BLD AUTO: 0.97 K/UL (ref 0–0.85)
MONOCYTES NFR BLD AUTO: 8.5 % (ref 0–13.4)
NEUTROPHILS # BLD AUTO: 9.54 K/UL (ref 1.82–7.42)
NEUTROPHILS NFR BLD: 83.4 % (ref 44–72)
NRBC # BLD AUTO: 0 K/UL
NRBC BLD-RTO: 0 /100 WBC
PLATELET # BLD AUTO: ABNORMAL K/UL (ref 164–446)
PMV BLD AUTO: ABNORMAL FL (ref 9–12.9)
POTASSIUM SERPL-SCNC: 3.3 MMOL/L (ref 3.6–5.5)
PROT SERPL-MCNC: 6.2 G/DL (ref 6–8.2)
RBC # BLD AUTO: 3.76 M/UL (ref 4.7–6.1)
RETICS # AUTO: 0.04 M/UL (ref 0.04–0.06)
RETICS/RBC NFR: 0.9 % (ref 0.8–2.1)
SIGNIFICANT IND 70042: ABNORMAL
SITE SITE: ABNORMAL
SODIUM SERPL-SCNC: 136 MMOL/L (ref 135–145)
SOURCE SOURCE: ABNORMAL
TIBC SERPL-MCNC: 270 UG/DL (ref 250–450)
VIT B12 SERPL-MCNC: 369 PG/ML (ref 211–911)
WBC # BLD AUTO: 11.4 K/UL (ref 4.8–10.8)

## 2018-11-20 PROCEDURE — 36415 COLL VENOUS BLD VENIPUNCTURE: CPT

## 2018-11-20 PROCEDURE — 700105 HCHG RX REV CODE 258: Performed by: STUDENT IN AN ORGANIZED HEALTH CARE EDUCATION/TRAINING PROGRAM

## 2018-11-20 PROCEDURE — 83540 ASSAY OF IRON: CPT

## 2018-11-20 PROCEDURE — 83550 IRON BINDING TEST: CPT

## 2018-11-20 PROCEDURE — 82728 ASSAY OF FERRITIN: CPT

## 2018-11-20 PROCEDURE — 700102 HCHG RX REV CODE 250 W/ 637 OVERRIDE(OP): Performed by: STUDENT IN AN ORGANIZED HEALTH CARE EDUCATION/TRAINING PROGRAM

## 2018-11-20 PROCEDURE — 80053 COMPREHEN METABOLIC PANEL: CPT

## 2018-11-20 PROCEDURE — 82607 VITAMIN B-12: CPT

## 2018-11-20 PROCEDURE — 99232 SBSQ HOSP IP/OBS MODERATE 35: CPT | Mod: GC | Performed by: INTERNAL MEDICINE

## 2018-11-20 PROCEDURE — 770006 HCHG ROOM/CARE - MED/SURG/GYN SEMI*

## 2018-11-20 PROCEDURE — 82746 ASSAY OF FOLIC ACID SERUM: CPT

## 2018-11-20 PROCEDURE — A9270 NON-COVERED ITEM OR SERVICE: HCPCS | Performed by: STUDENT IN AN ORGANIZED HEALTH CARE EDUCATION/TRAINING PROGRAM

## 2018-11-20 PROCEDURE — 85025 COMPLETE CBC W/AUTO DIFF WBC: CPT

## 2018-11-20 PROCEDURE — 85046 RETICYTE/HGB CONCENTRATE: CPT

## 2018-11-20 RX ORDER — FOLIC ACID 1 MG/1
1 TABLET ORAL DAILY
Status: DISCONTINUED | OUTPATIENT
Start: 2018-11-20 | End: 2018-11-21

## 2018-11-20 RX ORDER — FERROUS SULFATE 325(65) MG
325 TABLET ORAL
Status: DISCONTINUED | OUTPATIENT
Start: 2018-11-20 | End: 2018-11-21 | Stop reason: HOSPADM

## 2018-11-20 RX ORDER — LOPERAMIDE HYDROCHLORIDE 2 MG/1
2 CAPSULE ORAL EVERY 6 HOURS PRN
Status: DISCONTINUED | OUTPATIENT
Start: 2018-11-20 | End: 2018-11-21 | Stop reason: HOSPADM

## 2018-11-20 RX ORDER — AMOXICILLIN AND CLAVULANATE POTASSIUM 875; 125 MG/1; MG/1
1 TABLET, FILM COATED ORAL EVERY 12 HOURS
Status: DISCONTINUED | OUTPATIENT
Start: 2018-11-20 | End: 2018-11-21 | Stop reason: HOSPADM

## 2018-11-20 RX ORDER — POTASSIUM CHLORIDE 20 MEQ/1
40 TABLET, EXTENDED RELEASE ORAL ONCE
Status: COMPLETED | OUTPATIENT
Start: 2018-11-20 | End: 2018-11-20

## 2018-11-20 RX ORDER — CHOLECALCIFEROL (VITAMIN D3) 125 MCG
1000 CAPSULE ORAL DAILY
Status: DISCONTINUED | OUTPATIENT
Start: 2018-11-20 | End: 2018-11-21 | Stop reason: HOSPADM

## 2018-11-20 RX ADMIN — SERTRALINE 25 MG: 50 TABLET, FILM COATED ORAL at 04:38

## 2018-11-20 RX ADMIN — FINASTERIDE 5 MG: 5 TABLET, FILM COATED ORAL at 04:38

## 2018-11-20 RX ADMIN — AMOXICILLIN AND CLAVULANATE POTASSIUM 1 TABLET: 875; 125 TABLET, FILM COATED ORAL at 17:05

## 2018-11-20 RX ADMIN — SODIUM CHLORIDE: 9 INJECTION, SOLUTION INTRAVENOUS at 10:06

## 2018-11-20 RX ADMIN — FOLIC ACID 1 MG: 1 TABLET ORAL at 17:05

## 2018-11-20 RX ADMIN — TAMSULOSIN HYDROCHLORIDE 0.4 MG: 0.4 CAPSULE ORAL at 09:17

## 2018-11-20 RX ADMIN — LOPERAMIDE HYDROCHLORIDE 2 MG: 2 CAPSULE ORAL at 13:48

## 2018-11-20 RX ADMIN — POTASSIUM CHLORIDE 40 MEQ: 1500 TABLET, EXTENDED RELEASE ORAL at 10:06

## 2018-11-20 RX ADMIN — METRONIDAZOLE 500 MG: 500 TABLET ORAL at 04:38

## 2018-11-20 RX ADMIN — FERROUS SULFATE TAB 325 MG (65 MG ELEMENTAL FE) 325 MG: 325 (65 FE) TAB at 09:18

## 2018-11-20 RX ADMIN — CYANOCOBALAMIN TAB 500 MCG 1000 MCG: 500 TAB at 17:05

## 2018-11-20 RX ADMIN — ACETAMINOPHEN 650 MG: 325 TABLET, FILM COATED ORAL at 12:54

## 2018-11-20 RX ADMIN — ACETAMINOPHEN 650 MG: 325 TABLET, FILM COATED ORAL at 04:38

## 2018-11-20 RX ADMIN — METRONIDAZOLE 500 MG: 500 TABLET ORAL at 12:54

## 2018-11-20 ASSESSMENT — ENCOUNTER SYMPTOMS
TINGLING: 0
NAUSEA: 1
CHILLS: 0
BACK PAIN: 0
PALPITATIONS: 0
SHORTNESS OF BREATH: 0
HEADACHES: 0
CONSTIPATION: 0
BLOOD IN STOOL: 0
VOMITING: 0
DIZZINESS: 0
DIARRHEA: 1
COUGH: 0
ABDOMINAL PAIN: 1
FEVER: 0

## 2018-11-20 ASSESSMENT — PAIN SCALES - GENERAL
PAINLEVEL_OUTOF10: 0
PAINLEVEL_OUTOF10: 2

## 2018-11-20 NOTE — CARE PLAN
Problem: Safety  Goal: Will remain free from falls    Intervention: Implement fall precautions  Call light in use, belongings within reach, bed alarm on, treaded socks on, bed locked in low position, patient in room near nursing station, educated to call before attempting to ambulate       Problem: Knowledge Deficit  Goal: Knowledge of disease process/condition, treatment plan, diagnostic tests, and medications will improve    Intervention: Explain information regarding disease process/condition, treatment plan, diagnostic tests, and medications and document in education  Updated patient on plan of care including IV antibiotics and lab results. Verbalized understanding.

## 2018-11-20 NOTE — PROGRESS NOTES
Patient is AOx4. Plan of care discussed. Verbalized understanding. Denies pain. Tolerated all oral medications. Call light in use, belongings within reach, bed alarm on, treaded socks on, bed locked in low position

## 2018-11-20 NOTE — PROGRESS NOTES
Assumed care of pt at 0700. Pt A&O x4, vss. POC discussed, pt verbalizes understanding. No c/o pain or nausea, tolerating regular diet. Pt ambulating to bathroom with SBA, using call light for assistance. Fall precautions and hourly rounding in place. Will continue to monitor.

## 2018-11-20 NOTE — PROGRESS NOTES
Internal Medicine Interval Note  Note Author: Edgar Luevano M.D.     Name Chidi Tatum Ruiz     1945   Age/Sex 73 y.o. male   MRN [id number]   Code Status  full     After 5PM or if no immediate response to page, please call for cross-coverage  Attending/Team: Mayco Patterson See Patient List for primary contact information  Call [phone] to page    1st Call - Day Intern (R1):   Dr. Luevano 2nd Call - Day Sr. Resident (R2/R3):   Dr. Banegas         Reason for interval visit  (Principal Problem)   Colitis      Interval Problem Daily Status Update  (24 hours, problem oriented, brief subjective history, new lab/imaging data pertinent to that problem)     -Overnight, patient has still been having nausea and vomiting.  Also having diarrhea, described as brown and watery, with some formed stool in it.  Denies melena, hematochezia, hematemesis.  Patient is also complaining of right lower quadrant pain.  Patient is also having fevers and chills.  Allen sign negative, negative McBurney point tenderness, negative Rovsing sign.    -Preliminary urine culture is back positive for gram-negative lactose fermenting rods.  Patient denies dysuria,, dribbling, hematuria.  Patient does admit to having some urgency, where he can make to the bathroom in time before leaking.    Review of Systems   Constitutional: Positive for chills, diaphoresis and fever.   Respiratory: Negative for cough, hemoptysis and sputum production.    Cardiovascular: Negative for chest pain and palpitations.   Gastrointestinal: Positive for abdominal pain, diarrhea, nausea and vomiting. Negative for blood in stool, constipation, heartburn and melena.   Genitourinary: Positive for urgency. Negative for dysuria, frequency and hematuria.   Musculoskeletal: Negative for back pain, joint pain and myalgias.   Neurological: Positive for headaches. Negative for dizziness, tingling, tremors and sensory change.       Disposition/Barriers to discharge:   To  home  Anticipated barriers to discharge    Consultants/Specialty  None  PCP: SAM Moya      Quality Measures  Quality-Core Measures   Reviewed items::  Labs reviewed and Medications reviewed  Barrios catheter::  No Barrios  DVT prophylaxis pharmacological::  Heparin          Physical Exam       Vitals:    11/19/18 0800 11/19/18 1203 11/19/18 1600 11/19/18 1630   BP: 138/81  122/54 120/87   Pulse: 100 88 (!) 106 (!) 107   Resp: 18 18 18 19   Temp: (!) 38.4 °C (101.2 °F)  37.2 °C (99 °F) 37.8 °C (100.1 °F)   TempSrc: Temporal  Temporal Temporal   SpO2: 95% 97% 93% 89%   Weight:       Height:         Body mass index is 32.82 kg/m².    Oxygen Therapy:  Pulse Oximetry: 89 % (RN notified), O2 (LPM): 0, O2 Delivery: None (Room Air)    Physical Exam   Constitutional: He is oriented to person, place, and time and well-developed, well-nourished, and in no distress.   HENT:   Head: Normocephalic and atraumatic.   Eyes: Pupils are equal, round, and reactive to light. Conjunctivae and EOM are normal.   Neck: Normal range of motion.   Cardiovascular: Normal rate, regular rhythm and normal heart sounds.  Exam reveals no gallop and no friction rub.    No murmur heard.  Pulmonary/Chest: Effort normal and breath sounds normal. No respiratory distress. He has no wheezes.   Abdominal: Bowel sounds are normal.   Abdomen is mildly distended, nontender.  Negative Allen sign, Rovsing sign, McBurney point tenderness.  No rebound or guarding, although tender to very deep palpation of the right lower quadrant.  Positive bowel sounds.   Musculoskeletal: Normal range of motion. He exhibits no edema.   Neurological: He is alert and oriented to person, place, and time.   Skin: Skin is warm and dry.   Psychiatric: Affect normal.             Assessment/Plan     * Left lower quadrant pain   Assessment & Plan    -Patient was presented with RLQ tenderness, N/V, F/C, diarrhea.  -SIRS 3/4 with WBC's 25 on admission  -On CT scan: New  pericecal fat stranding possibly indicates colitis and/or terminal ileitis.  -during admission multiple diarrhea ranging from loosely formed to brown watery    Plan  -IV maintenance 175ml/hr.  CTM fluid status  -Ceftriaxone and Flagyl for possible colitis versus ileitis  -C. Diff toxin ordered, contact isolation  -Blood cultures NGTD       Urinary tract infection without hematuria- (present on admission)   Assessment & Plan    -Patient presented with urinary retention and urgency  -On CT scan:  Bilateral renal cortical thinning/atrophy with simple and complex renal cysts  -He may require a nephrology consultation in the setting of microscopic hematuria and complex renal cysts  -Urine Culture 11/19/2018 positive preliminary for Gram (-) lactose fermenting rods  Plan  -Ceftriaxone will cover for UTI  -Straight catheter as needed     Acute kidney injury (HCC)   Assessment & Plan    EGFR 34, serum creatinine 1.95, BUN 23 on presentation, acute on chronic kidney disease stage III  - Continue IV fluid, likely associated with dehydration  -Avoid lisinopril in the setting of acute kidney injury, avoid other nephrotoxic agents  -Follow-up with CHEM panel tomorrow     BPH (benign prostatic hyperplasia)- (present on admission)   Assessment & Plan    - PSA 2.3 in May 2018  -presented with urinary urgency  -Continue finasteride 5 mg and tamsulosin 0.4 mg.  If the patient still has urinary retention, consider increasing tamsulosin to 0.8 mg tomorrow as needed       Essential hypertension- (present on admission)   Assessment & Plan    Continue home medications :hydrochlorothiazide 25 mg, labetalol 10 mg   Hold lisinopril in the setting of acute kidney injury     Pre-diabetes- (present on admission)   Assessment & Plan    A1c 6.1%     Moderate episode of recurrent major depressive disorder (HCC)- (present on admission)   Assessment & Plan    Denies having any symptoms of depression  Continue home dose of sertraline 25 mg

## 2018-11-20 NOTE — DOCUMENTATION QUERY
DOCUMENTATION QUERY    PROVIDERS: Please select “Cosign w/ note”to reply to query.    To better represent the severity of illness of your patient, please review the following information and exercise your independent professional judgment in responding to this query.     Sepsis is documented in the ER MD note;  SIRS 2/4, UTI and acute kidney injury are documented in the H&P. Based upon the clinical findings, risk factors, and treatment, can a diagnosis be provided to support this finding?    Please select all that apply:   • Sepsis present on admission (specify causative organism if known and any associated organ dysfunction if known)  • Sepsis developed after admission  • Sepsis has been ruled out  • SIRS that is unrelated to any infection  • SIRS of non-infectious origin with acute organ dysfunction  • Other explanation of clinical findings  • Findings of no clinical significance  • Unable to determine      The medical record reflects the following:   Clinical Findings · Admit Labs: WBC 23.8  · Vital signs in first 6.5 hours: P 109, T 102  · H&P: SIRS 2/4, UTI, ZAIN  · 11/19 MD Note: SIRS 3/4  · 11/19 urine cx +Gram (-) lactose fermenting rods   Treatment Ceftriaxone, straight cath, NS bolus/infusion, CT scan, Urine cx, Flagyl   Risk Factors Urine retention, N/V/D, SIRS 2/4, UTI, ZAIN   Location within medical record History and Physical, Progress Notes, Lab Results  and Vital sign flowsheet     Thank you,   Phi Flores RN BSN  Clinical   [phone] [facility] office  [phone] Cell phone

## 2018-11-20 NOTE — NON-PROVIDER
Internal Medicine Medical Student Note  Note Author: Mikala Elizalde, Student    Name Chidi Tatum Ruiz     1945   Age/Sex 73 y.o. male   MRN [id number]   Code Status FULL              Reason for interval visit  (Principal Problem)   Left lower quadrant pain    Interval Problem Daily Status Update  (problem status, last 24 hours, new history, new data )   Due to PE finding of glossitis yesterday and lab values demonstrating low RBCs, low Hgb, and low Hct, iron studies with TIBC, ferritin, reticulocytes, vitamin B12, and folate were ordered. Results came back demonstrating iron deficiency with B12 and folate deficiencies.  CBC demonstrates improvement and response to the antibiotic therapy and has not met SIRS criteria since admission. He reports significant improvement in his symptoms. He is negative for C. Diff.  He was switched to oral diet today. Depending on how he tolerates the diet and switch to oral medication, discharge may be considered for tomorrow.         Physical Exam       Vitals:    18 1630 18 1925 18 0355 18 0720   BP: 120/87 128/62 128/57 128/84   Pulse: (!) 107 84 87 71   Resp:    Temp: 37.8 °C (100.1 °F) 36.2 °C (97.2 °F) 36.6 °C (97.8 °F) 36.7 °C (98 °F)   TempSrc: Temporal Temporal Temporal Temporal   SpO2: 89% 91% 91% 95%   Weight:       Height:         Body mass index is 32.82 kg/m².    Oxygen Therapy:  Pulse Oximetry: 95 %, O2 (LPM): 0, O2 Delivery: None (Room Air)    Physical Exam   HEENT: glossitis, normocephalic, atraumatic. No LAD, no thyromegaly.   Cardio: normal rate and rhythm, no murmurs, rubs, or gallops  Pulm: clear to auscultation bilaterally  GI: watery stool, no tenderness to palpation, no guarding, no rebound tenderness  : no dysuria, no polyuria  Extremities: well perfused, no edema         Assessment/Plan    Mr. Tatum is a 72 y/o man who was admitted due to LLQ pain due to colitis vs terminal ileatis and SIRS 2/4 criteria.  He has improved in symptoms and CBC values indicating response to therapy. He is C diff negative and has not met SIRS criteria since admission. He was found to be iron and B12/folate deficient. He was switched to oral diet today and will be switched to orals. If he tolerates the diet and oral medication, he may be discharged tomorrow.     # colitis  # terminal ileitis  -patient was admitted due to LLQ, fevers, and tachycardia (SIRS 2/4)  -CT scan: pericecal fat stranding without wall thickening or evident appendix abnormality--indicative of colitis vs terminal ileitis  -patient was administered IV ceftriaxone and flagyl  -stool negative for C diff  -CBC improvement demonstrates response to antibiotic  -was started on oral diet and switched to oral medications  -if patient tolerates, may consider discharge with oral antibiotic therapy     # iron deficiency anemia  # vitamin B12 deficiency   # folate deficiency   -reticulocytes, iron/TIBC, ferritin, vitamin B12, and folate levels came back today with findings consistent with iron deficiency anemia and vitamin B12 and folate deficiency  -provided ferrous sulfate 325 mg  -will provide B12 and folate  -will provide prescription upon discharge    # watery stool  -patient continues to experience watery stool  -C diff was negative  -due to tolerance of PO liquids, will hold IV fluids  -will provide 2mg loperamide q6 hours prn to slow the bowel movements    # positive urinalysis   # gram negative, lactose fermenting rods  -positive urinalysis with lactose-fermenting gram-negative rods (likely E. Coli)  -sensitivities came back indicating resistance to most antibiotics including ceftriaxone indicating this is likely local colonization   -patient is asymptomatic with CBC values showing improvement and response to flagyl and ceftriaxone; infection is thereby more likely intraabdominal than urinary  -no need for treatment for this bacturia at this time    Dispo: inpatient; will  consider discharge depending on tolerance of oral diet and medication

## 2018-11-20 NOTE — PROGRESS NOTES
Internal Medicine Interval Note  Note Author: Edgar Luevano M.D.     Name Chidi Tatum Ruiz     1945   Age/Sex 73 y.o. male   MRN [id number]   Code Status Full     After 5PM or if no immediate response to page, please call for cross-coverage  Attending/Team: Mayco Patterson See Patient List for primary contact information  Call [phone] to page    1st Call - Day Intern (R1)u:   Dr. Luevano 2nd Call - Day Sr. Resident (R2/R3):   Dr. Banks         Reason for interval visit  (Principal Problem)   Infectious Colitis      Interval Problem Daily Status Update  (24 hours, problem oriented, brief subjective history, new lab/imaging data pertinent to that problem)     -C. Diff testing negative.  No acute events overnight.    -morning assessment -patient feels much better.  Decreased abdominal pain, decreased fevers/chills.  Per patient, stool is slightly more formed, and able to tolerate clears well without vomiting. Patient requests solid foods and has appetite for it.  Diet changed to regular, advised patient caution and slow advancement.    -bladder scan negative for retention     Review of Systems   Constitutional: Negative for chills and fever.   Respiratory: Negative for cough and shortness of breath.    Cardiovascular: Negative for chest pain and palpitations.   Gastrointestinal: Positive for abdominal pain (improving), diarrhea and nausea. Negative for blood in stool, constipation, melena and vomiting (improved).   Genitourinary: Negative for dysuria, frequency and urgency.   Musculoskeletal: Negative for back pain.   Neurological: Negative for dizziness, tingling and headaches.       Disposition/Barriers to discharge:   Disposition to home  No anticipated barriers to discharge    Consultants/Specialty  none  PCP: SAM Moya      Quality Measures  Quality-Core Measures   Reviewed items::  Labs reviewed and Medications reviewed  Barrios catheter::  No Barrios  DVT prophylaxis  pharmacological::  Heparin          Physical Exam       Vitals:    11/20/18 0355 11/20/18 0720 11/20/18 1257 11/20/18 1500   BP: 128/57 128/84 138/77 127/81   Pulse: 87 71 81 77   Resp: 18 18 18 16   Temp: 36.6 °C (97.8 °F) 36.7 °C (98 °F) 37 °C (98.6 °F) 36.8 °C (98.2 °F)   TempSrc: Temporal Temporal Temporal Temporal   SpO2: 91% 95% 95% 96%   Weight:       Height:         Body mass index is 32.82 kg/m².    Oxygen Therapy:  Pulse Oximetry: 96 %, O2 (LPM): 0, O2 Delivery: None (Room Air)    Physical Exam   Constitutional: He is oriented to person, place, and time and well-developed, well-nourished, and in no distress.   HENT:   Head: Normocephalic and atraumatic.   Eyes: Pupils are equal, round, and reactive to light. EOM are normal.   Neck: Neck supple.   Cardiovascular: Normal rate, regular rhythm and normal heart sounds.  Exam reveals no gallop and no friction rub.    No murmur heard.  Pulmonary/Chest: Effort normal and breath sounds normal. No respiratory distress. He has no wheezes.   Abdominal: Soft. Bowel sounds are normal. He exhibits distension. There is no tenderness. There is no rebound and no guarding.   Musculoskeletal: Normal range of motion. He exhibits no edema or deformity.   Neurological: He is alert and oriented to person, place, and time.   Skin: Skin is warm and dry.   Psychiatric: Mood and affect normal.         Assessment/Plan     * Left lower quadrant pain   Assessment & Plan    -Patient was presented with RLQ tenderness, N/V, F/C, diarrhea.  -SIRS 3/4 with WBC's 25 on admission  -On CT scan: New pericecal fat stranding possibly indicates colitis and/or terminal ileitis.  -during admission multiple diarrhea ranging from loosely formed to brown watery  -C. Diff negative    Plan  -D/c fluids as able to take oral  -Switch C3/flagyl to Augmentin to test oral tolerance.  Total Abx 7 days  -Blood cultures NGTD       Urinary tract infection without hematuria- (present on admission)   Assessment &  Plan    -Patient presented with urinary retention and urgency  -On CT scan:  Bilateral renal cortical thinning/atrophy with simple and complex renal cysts  -He may require a nephrology consultation in the setting of microscopic hematuria and complex renal cysts  -Urine Culture 11/19/2018 positive preliminary for Gram (-) lactose fermenting rods  -resistant to ceftriaxone.  As patient improved on C3, likely asymptomatic bacteriuria  -follow up with nephrology as outpatient for renal cysts     Acute renal failure superimposed on stage 3 chronic kidney disease (HCC)   Assessment & Plan    -EGFR 34, serum creatinine 1.95, BUN 23 on presentation  -withheld lisinopril, avoid other nephrotoxic agents  -resolving     BPH (benign prostatic hyperplasia)- (present on admission)   Assessment & Plan    - PSA 2.3 in May 2018  -presented with urinary urgency  -bladder scan 250 ml  -Continue finasteride 5 mg and tamsulosin 0.4 mg.  If the patient still has urinary retention, consider increasing tamsulosin to 0.8 mg       Essential hypertension- (present on admission)   Assessment & Plan    Continue home medications :hydrochlorothiazide 25 mg, labetalol 10 mg   Hold lisinopril in the setting of acute kidney injury     Anemia- (present on admission)   Assessment & Plan    -MCV 80  -mixed picture of ACD with Iron deficiency, as well as mild B12/folate deficiency  -replete iron, folate, b12     Pre-diabetes- (present on admission)   Assessment & Plan    A1c 6.1%     Moderate episode of recurrent major depressive disorder (HCC)- (present on admission)   Assessment & Plan    Denies having any symptoms of depression  Continue home dose of sertraline 25 mg

## 2018-11-21 ENCOUNTER — PATIENT OUTREACH (OUTPATIENT)
Dept: HEALTH INFORMATION MANAGEMENT | Facility: OTHER | Age: 73
End: 2018-11-21

## 2018-11-21 VITALS
BODY MASS INDEX: 32.78 KG/M2 | SYSTOLIC BLOOD PRESSURE: 129 MMHG | OXYGEN SATURATION: 96 % | TEMPERATURE: 98.1 F | WEIGHT: 242 LBS | HEIGHT: 72 IN | HEART RATE: 67 BPM | DIASTOLIC BLOOD PRESSURE: 73 MMHG | RESPIRATION RATE: 16 BRPM

## 2018-11-21 PROBLEM — N18.30 ACUTE RENAL FAILURE SUPERIMPOSED ON STAGE 3 CHRONIC KIDNEY DISEASE (HCC): Status: RESOLVED | Noted: 2018-11-18 | Resolved: 2018-11-21

## 2018-11-21 PROBLEM — N17.9 ACUTE RENAL FAILURE SUPERIMPOSED ON STAGE 3 CHRONIC KIDNEY DISEASE (HCC): Status: RESOLVED | Noted: 2018-11-18 | Resolved: 2018-11-21

## 2018-11-21 PROBLEM — N39.0 URINARY TRACT INFECTION WITHOUT HEMATURIA: Status: RESOLVED | Noted: 2018-07-07 | Resolved: 2018-11-21

## 2018-11-21 PROBLEM — R10.32 LEFT LOWER QUADRANT PAIN: Status: RESOLVED | Noted: 2018-11-18 | Resolved: 2018-11-21

## 2018-11-21 LAB
ALBUMIN SERPL BCP-MCNC: 3 G/DL (ref 3.2–4.9)
ALBUMIN/GLOB SERPL: 0.9 G/DL
ALP SERPL-CCNC: 58 U/L (ref 30–99)
ALT SERPL-CCNC: 9 U/L (ref 2–50)
ANION GAP SERPL CALC-SCNC: 7 MMOL/L (ref 0–11.9)
AST SERPL-CCNC: 11 U/L (ref 12–45)
BACTERIA UR CULT: ABNORMAL
BACTERIA UR CULT: ABNORMAL
BASOPHILS # BLD AUTO: 0.2 % (ref 0–1.8)
BASOPHILS # BLD: 0.02 K/UL (ref 0–0.12)
BILIRUB SERPL-MCNC: 0.2 MG/DL (ref 0.1–1.5)
BUN SERPL-MCNC: 28 MG/DL (ref 8–22)
CALCIUM SERPL-MCNC: 8.5 MG/DL (ref 8.5–10.5)
CHLORIDE SERPL-SCNC: 112 MMOL/L (ref 96–112)
CO2 SERPL-SCNC: 18 MMOL/L (ref 20–33)
CREAT SERPL-MCNC: 1.57 MG/DL (ref 0.5–1.4)
EOSINOPHIL # BLD AUTO: 0.12 K/UL (ref 0–0.51)
EOSINOPHIL NFR BLD: 1.4 % (ref 0–6.9)
ERYTHROCYTE [DISTWIDTH] IN BLOOD BY AUTOMATED COUNT: 47.1 FL (ref 35.9–50)
GLOBULIN SER CALC-MCNC: 3.3 G/DL (ref 1.9–3.5)
GLUCOSE SERPL-MCNC: 105 MG/DL (ref 65–99)
HCT VFR BLD AUTO: 31.4 % (ref 42–52)
HGB BLD-MCNC: 10.2 G/DL (ref 14–18)
IMM GRANULOCYTES # BLD AUTO: 0.03 K/UL (ref 0–0.11)
IMM GRANULOCYTES NFR BLD AUTO: 0.4 % (ref 0–0.9)
LYMPHOCYTES # BLD AUTO: 1.07 K/UL (ref 1–4.8)
LYMPHOCYTES NFR BLD: 12.8 % (ref 22–41)
MCH RBC QN AUTO: 26.4 PG (ref 27–33)
MCHC RBC AUTO-ENTMCNC: 32.5 G/DL (ref 33.7–35.3)
MCV RBC AUTO: 81.1 FL (ref 81.4–97.8)
MONOCYTES # BLD AUTO: 0.82 K/UL (ref 0–0.85)
MONOCYTES NFR BLD AUTO: 9.8 % (ref 0–13.4)
NEUTROPHILS # BLD AUTO: 6.31 K/UL (ref 1.82–7.42)
NEUTROPHILS NFR BLD: 75.4 % (ref 44–72)
NRBC # BLD AUTO: 0 K/UL
NRBC BLD-RTO: 0 /100 WBC
PLATELET # BLD AUTO: 97 K/UL (ref 164–446)
PMV BLD AUTO: 12.4 FL (ref 9–12.9)
POTASSIUM SERPL-SCNC: 3.3 MMOL/L (ref 3.6–5.5)
PROT SERPL-MCNC: 6.3 G/DL (ref 6–8.2)
RBC # BLD AUTO: 3.87 M/UL (ref 4.7–6.1)
SIGNIFICANT IND 70042: ABNORMAL
SITE SITE: ABNORMAL
SODIUM SERPL-SCNC: 137 MMOL/L (ref 135–145)
SOURCE SOURCE: ABNORMAL
WBC # BLD AUTO: 8.4 K/UL (ref 4.8–10.8)

## 2018-11-21 PROCEDURE — 99238 HOSP IP/OBS DSCHRG MGMT 30/<: CPT | Mod: GC | Performed by: INTERNAL MEDICINE

## 2018-11-21 PROCEDURE — 36415 COLL VENOUS BLD VENIPUNCTURE: CPT

## 2018-11-21 PROCEDURE — 80053 COMPREHEN METABOLIC PANEL: CPT

## 2018-11-21 PROCEDURE — 700102 HCHG RX REV CODE 250 W/ 637 OVERRIDE(OP): Performed by: STUDENT IN AN ORGANIZED HEALTH CARE EDUCATION/TRAINING PROGRAM

## 2018-11-21 PROCEDURE — A9270 NON-COVERED ITEM OR SERVICE: HCPCS | Performed by: STUDENT IN AN ORGANIZED HEALTH CARE EDUCATION/TRAINING PROGRAM

## 2018-11-21 PROCEDURE — 85025 COMPLETE CBC W/AUTO DIFF WBC: CPT

## 2018-11-21 RX ORDER — FERROUS SULFATE 325(65) MG
325 TABLET ORAL
Qty: 30 TAB | Refills: 0 | Status: SHIPPED | OUTPATIENT
Start: 2018-11-21 | End: 2019-01-04 | Stop reason: CLARIF

## 2018-11-21 RX ORDER — AMOXICILLIN AND CLAVULANATE POTASSIUM 875; 125 MG/1; MG/1
1 TABLET, FILM COATED ORAL 2 TIMES DAILY
Qty: 6 TAB | Refills: 0 | Status: SHIPPED | OUTPATIENT
Start: 2018-11-21 | End: 2018-11-24

## 2018-11-21 RX ORDER — POTASSIUM CHLORIDE 20 MEQ/1
40 TABLET, EXTENDED RELEASE ORAL ONCE
Status: COMPLETED | OUTPATIENT
Start: 2018-11-21 | End: 2018-11-21

## 2018-11-21 RX ORDER — LOPERAMIDE HYDROCHLORIDE 2 MG/1
2 CAPSULE ORAL EVERY 6 HOURS PRN
Qty: 30 CAP | Refills: 0 | Status: SHIPPED | OUTPATIENT
Start: 2018-11-21 | End: 2019-01-04 | Stop reason: CLARIF

## 2018-11-21 RX ADMIN — FERROUS SULFATE TAB 325 MG (65 MG ELEMENTAL FE) 325 MG: 325 (65 FE) TAB at 09:02

## 2018-11-21 RX ADMIN — SERTRALINE 25 MG: 50 TABLET, FILM COATED ORAL at 05:22

## 2018-11-21 RX ADMIN — FOLIC ACID 1 MG: 1 TABLET ORAL at 05:23

## 2018-11-21 RX ADMIN — LOPERAMIDE HYDROCHLORIDE 2 MG: 2 CAPSULE ORAL at 09:02

## 2018-11-21 RX ADMIN — FINASTERIDE 5 MG: 5 TABLET, FILM COATED ORAL at 05:22

## 2018-11-21 RX ADMIN — AMOXICILLIN AND CLAVULANATE POTASSIUM 1 TABLET: 875; 125 TABLET, FILM COATED ORAL at 05:22

## 2018-11-21 RX ADMIN — CYANOCOBALAMIN TAB 500 MCG 1000 MCG: 500 TAB at 05:22

## 2018-11-21 RX ADMIN — POTASSIUM CHLORIDE 40 MEQ: 1500 TABLET, EXTENDED RELEASE ORAL at 09:43

## 2018-11-21 RX ADMIN — ACETAMINOPHEN 650 MG: 325 TABLET, FILM COATED ORAL at 05:22

## 2018-11-21 RX ADMIN — TAMSULOSIN HYDROCHLORIDE 0.4 MG: 0.4 CAPSULE ORAL at 09:02

## 2018-11-21 ASSESSMENT — ENCOUNTER SYMPTOMS
VOMITING: 0
BACK PAIN: 0
HEADACHES: 0
FEVER: 0
TINGLING: 0
CONSTIPATION: 0
BLOOD IN STOOL: 0
CHILLS: 0
DIARRHEA: 0
DIZZINESS: 0
ABDOMINAL PAIN: 0
SHORTNESS OF BREATH: 0
COUGH: 0
NAUSEA: 0
PALPITATIONS: 0

## 2018-11-21 ASSESSMENT — PAIN SCALES - GENERAL: PAINLEVEL_OUTOF10: 0

## 2018-11-21 NOTE — PROGRESS NOTES
Internal Medicine Interval Note  Note Author: Edgar Luevano M.D.     Name Chidi Tatum Ruiz     1945   Age/Sex 73 y.o. male   MRN [id number]   Code Status Full     After 5PM or if no immediate response to page, please call for cross-coverage  Attending/Team: Mayco Patterson See Patient List for primary contact information  Call [phone] to page    1st Call - Day Intern (R1)u:   Dr. Luevano 2nd Call - Day Sr. Resident (R2/R3):   Dr. Banks         Reason for interval visit  (Principal Problem)   Infectious Colitis      Interval Problem Daily Status Update  (24 hours, problem oriented, brief subjective history, new lab/imaging data pertinent to that problem)     -No acute events overnight.  Per patient still having diarrhea, but slighty more formed stool.  tolerating oral foods well. Denies abdominal pain, n/v, f/c, bloody stools.  Changed to augmentin yesterday without worsening condition. WBC's continue to trend downward.    -patient assessed at bedside, management plan explained to patient     Review of Systems   Constitutional: Negative for chills and fever.   Respiratory: Negative for cough and shortness of breath.    Cardiovascular: Negative for chest pain and palpitations.   Gastrointestinal: Negative for abdominal pain (improving), blood in stool, constipation, diarrhea, melena, nausea and vomiting (improved).   Genitourinary: Negative for dysuria, frequency and urgency.   Musculoskeletal: Negative for back pain.   Neurological: Negative for dizziness, tingling and headaches.       Disposition/Barriers to discharge:   Disposition to home  No anticipated barriers to discharge    Consultants/Specialty  none  PCP: CAROLINE MoyaRKetanNKetan      Quality Measures  Quality-Core Measures   Reviewed items::  Labs reviewed and Medications reviewed  Barrios catheter::  No Barrios  DVT prophylaxis pharmacological::  Heparin          Physical Exam       Vitals:    18 1257 18 1500 18 1935  11/21/18 0408   BP: 138/77 127/81 133/70 124/73   Pulse: 81 77 79 72   Resp: 18 16 18 18   Temp: 37 °C (98.6 °F) 36.8 °C (98.2 °F) 36.1 °C (97 °F) 36.9 °C (98.4 °F)   TempSrc: Temporal Temporal Temporal Temporal   SpO2: 95% 96% 100% 94%   Weight:       Height:         Body mass index is 32.82 kg/m².    Oxygen Therapy:  Pulse Oximetry: 94 %, O2 (LPM): 0, O2 Delivery: None (Room Air)    Physical Exam   Constitutional: He is oriented to person, place, and time and well-developed, well-nourished, and in no distress.   HENT:   Head: Normocephalic and atraumatic.   Eyes: Pupils are equal, round, and reactive to light. EOM are normal.   Neck: Neck supple.   Cardiovascular: Normal rate, regular rhythm and normal heart sounds.  Exam reveals no gallop and no friction rub.    No murmur heard.  Pulmonary/Chest: Effort normal and breath sounds normal. No respiratory distress. He has no wheezes.   Abdominal: Soft. Bowel sounds are normal. He exhibits distension. There is no tenderness. There is no rebound and no guarding.   Musculoskeletal: Normal range of motion. He exhibits no edema or deformity.   Neurological: He is alert and oriented to person, place, and time.   Skin: Skin is warm and dry.   Psychiatric: Mood and affect normal.         Assessment/Plan     * Left lower quadrant pain   Assessment & Plan    -Patient was presented with RLQ tenderness, N/V, F/C, diarrhea.  -SIRS 3/4 with WBC's 25 on admission  -On CT scan: New pericecal fat stranding possibly indicates colitis and/or terminal ileitis.  -during admission multiple diarrhea ranging from loosely formed to brown watery  -C. Diff negative  -Blood cultures NGTD  -Switch C3/flagyl to Augmentin to test oral tolerance.  Total Abx 7 days starting 11/18/2018         Urinary tract infection without hematuria- (present on admission)   Assessment & Plan    -Patient presented with urinary retention and urgency  -On CT scan:  Bilateral renal cortical thinning/atrophy with simple  and complex renal cysts  -Urine Culture 11/19/2018 positive preliminary for Gram (-) lactose fermenting rods  -resistant to ceftriaxone.  As patient improved on C3, likely asymptomatic bacteriuria  -follow up with as outpatient for renal cysts workup     Acute renal failure superimposed on stage 3 chronic kidney disease (HCC)   Assessment & Plan    -EGFR 34, serum creatinine 1.95, BUN 23 on presentation  -withheld lisinopril, avoid other nephrotoxic agents  -resolving, near baseline     BPH (benign prostatic hyperplasia)- (present on admission)   Assessment & Plan    - PSA 2.3 in May 2018  -presented with urinary urgency  -bladder scan 250 ml  -Continue finasteride 5 mg and tamsulosin 0.4 mg.  If the patient still has urinary retention, consider increasing tamsulosin to 0.8 mg       Essential hypertension- (present on admission)   Assessment & Plan    Continue home medications :hydrochlorothiazide 25 mg, labetalol 10 mg   Hold lisinopril in the setting of acute kidney injury     Anemia- (present on admission)   Assessment & Plan    -MCV 80  -mixed picture of ACD with Iron deficiency  -replete iron     Pre-diabetes- (present on admission)   Assessment & Plan    A1c 6.1%     Moderate episode of recurrent major depressive disorder (HCC)- (present on admission)   Assessment & Plan    Denies having any symptoms of depression  Continue home dose of sertraline 25 mg

## 2018-11-21 NOTE — PROGRESS NOTES
Discharging Patient home per physician order.  Discharged with daughters.  Demonstrated understanding of discharge instructions, follow up appointments, home medications, prescriptions, and nursing care instructions for UTI.  Ambulating independently. voiding without difficulty, pain well controlled, tolerating oral medications, oxygen saturation greater than 90% , tolerating diet.   Educational handouts about colitis and UTI given and discussed. PIV removed.  Verbalized understanding of discharge instructions and educational handouts.  All questions answered.  Belongings with patient at time of discharge.

## 2018-11-21 NOTE — CARE PLAN
Problem: Safety  Goal: Will remain free from falls    Intervention: Implement fall precautions  Patient steady and able to ambulate independently. Educated patient to call for staff assistance as needed for help ambulating. Verbalized understanding       Problem: Knowledge Deficit  Goal: Knowledge of disease process/condition, treatment plan, diagnostic tests, and medications will improve    Intervention: Explain information regarding disease process/condition, treatment plan, diagnostic tests, and medications and document in education  Educated patient on plan of care. Verbalized understanding       Problem: Pain Management  Goal: Pain level will decrease to patient's comfort goal    Intervention: Follow pain managment plan developed in collaboration with patient and Interdisciplinary Team  Educated patient on his pain control options. Verbalized understanding. Declined medication

## 2018-11-21 NOTE — PROGRESS NOTES
Patient is AOx4. Plan of care discussed. Verbalized understanding. Complains of headache, encouraged rest and repositioning,declined medication. Call light in use, belongings within reach, treaded socks on, bed locked in low position

## 2018-11-21 NOTE — DISCHARGE SUMMARY
Internal Medicine Discharge Summary  Note Author: Edgar Luevano M.D.       Name Chidi Tatum     1945   Age/Sex 73 y.o. male   MRN [id number]         Admit Date:  2018       Discharge Date:   18      Service:   [facility] Internal Medicine Gray Team  Attending Physician(s):   Dr. Berry       Senior Resident(s):   Dr. Banks  Ant Resident(s):   Dr. Luevano  PCP: SAM Moya      Primary Diagnosis:   Severe Sepsis (ZAIN on CKD stage III) Secondary to Infectious Colitis    Secondary Diagnoses:                Principal Problem (Resolved):    Left lower quadrant pain POA: Unknown  Active Problems:    Essential hypertension POA: Yes    BPH (benign prostatic hyperplasia) POA: Yes    Moderate episode of recurrent major depressive disorder (HCC) POA: Yes    Pre-diabetes POA: Yes    Anemia POA: Yes  Resolved Problems:    Urinary tract infection without hematuria POA: Yes    Acute renal failure superimposed on stage 3 chronic kidney disease (HCC) POA: Unknown      Hospital Summary (Brief Narrative):       Chidi Tatum is a 73 y.o. Male medical history of chronic kidney disease, hypertension, BPH, gastroesophageal reflux disease, anxiety and past medical history of SBO treated conservatively in 2018 presented to the hospital because of 2 days history of fever, chills, nausea, vomiting, diarrhea and abdominal pain.  On admission patient was dehydrated and met sepsis criteria, his WBC was 23.8, he had AK I on CKD and was tachycardic, he was treated with sepsis protocol with IV fluid hydration, CT renal showed possible right colitis/ileitis, he was started on ceftriaxone and metronidazole IV, UA was infected, urine culture was positive for lactose fermenting gram negative rods (10-50k units) which were intermediate sensitivity to ceftriaxone.  However, patient improved on ceftriaxone and flagyl therefore it was thought to be asymptomatic bacteriuria.  Antibiotics changed to Augmentin  and patient tolerated well.   WBC''s trended downward, and ZAIN resolved with Creatinine near baseline on discharge.  He will be discharged with Augmentin 875 bid for 7 days of total treatment starting 11/25/2018 for infectious colitis.    CT scan incidentally showed complex renal cysts, which should be followed up with primary care provider.         Patient /Hospital Summary (Details -- Problem Oriented) :          Urinary tract infection without hematuria-resolved as of 11/21/2018   Assessment & Plan    -Patient presented with urinary retention and urgency  -On CT scan:  Bilateral renal cortical thinning/atrophy with simple and complex renal cysts  -Urine Culture 11/19/2018 positive preliminary for Gram (-) lactose fermenting rods  -resistant to ceftriaxone.  As patient improved on C3, likely asymptomatic bacteriuria  -follow up with as outpatient for renal cysts workup     * Left lower quadrant pain-resolved as of 11/21/2018   Assessment & Plan    -Patient was presented with RLQ tenderness, N/V, F/C, diarrhea.  -SIRS 3/4 with WBC's 25 on admission  -On CT scan: New pericecal fat stranding possibly indicates colitis and/or terminal ileitis.  -during admission multiple diarrhea ranging from loosely formed to brown watery  -C. Diff negative  -Blood cultures NGTD  -Switch C3/flagyl to Augmentin to test oral tolerance.  Total Abx 7 days starting 11/18/2018         BPH (benign prostatic hyperplasia)   Assessment & Plan    - PSA 2.3 in May 2018  -presented with urinary urgency  -bladder scan 250 ml  -Continue finasteride 5 mg and tamsulosin 0.4 mg.  If the patient still has urinary retention, consider increasing tamsulosin to 0.8 mg       Essential hypertension   Assessment & Plan    Continue home medications :hydrochlorothiazide 25 mg, labetalol 10 mg   Hold lisinopril in the setting of acute kidney injury     Acute renal failure superimposed on stage 3 chronic kidney disease (HCC)-resolved as of 11/21/2018   Assessment  & Plan    -EGFR 34, serum creatinine 1.95, BUN 23 on presentation  -withheld lisinopril, avoid other nephrotoxic agents  -resolving, near baseline     Anemia   Assessment & Plan    -MCV 80  -mixed picture of ACD with Iron deficiency  -replete iron     Pre-diabetes   Assessment & Plan    A1c 6.1%     Moderate episode of recurrent major depressive disorder (HCC)   Assessment & Plan    Denies having any symptoms of depression  Continue home dose of sertraline 25 mg         Consultants:     none    Procedures:        none    Imaging/ Testing:      CT-RENAL COLIC EVALUATION(A/P W/O)   Final Result      1.  New pericecal fat stranding without wall thickening or evident appendix abnormality. This is nonspecific, could indicate colitis and/or terminal ileitis.      2.  Stable mild bilateral hydroureteronephrosis with no obstructing process seen although bladder wall trabeculation is again demonstrated most likely from neurogenic bladder given lack of prostate gland enlargement. Chronic outlet obstruction is also    possible. No abnormal dilatation at this time      3.  Numerous chronic findings including hepatic steatosis, splenomegaly, bilateral renal cortical thinning/atrophy with simple and complex renal cysts that require follow-up            Discharge Medications:         Medication Reconciliation: Completed       Medication List      START taking these medications      Instructions   amoxicillin-clavulanate 875-125 MG Tabs  Commonly known as:  AUGMENTIN   Take 1 Tab by mouth 2 times a day for 3 days.  Dose:  1 Tab     cyanocobalamin 1000 MCG Tabs  Start taking on:  11/22/2018  Commonly known as:  VITAMIN B12   Take 1 Tab by mouth every day.  Dose:  1000 mcg     ferrous sulfate 325 (65 Fe) MG tablet   Take 1 Tab by mouth every morning with breakfast.  Dose:  325 mg     loperamide 2 MG Caps  Commonly known as:  IMODIUM   Take 1 Cap by mouth every 6 hours as needed for Diarrhea.  Dose:  2 mg        CONTINUE taking these  medications      Instructions   finasteride 5 MG Tabs  Commonly known as:  PROSCAR   TAKE ONE TABLET BY MOUTH ONCE DAILY     hydroCHLOROthiazide 25 MG Tabs  Commonly known as:  HYDRODIURIL   Take 1 Tab by mouth every 48 hours.  Dose:  25 mg     lisinopril 10 MG Tabs  Commonly known as:  PRINIVIL   TAKE ONE TABLET BY MOUTH ONCE DAILY *REPLACES  5MG     sertraline 25 MG tablet  Commonly known as:  ZOLOFT   Take 1 Tab by mouth every day.  Dose:  25 mg     tamsulosin 0.4 MG capsule  Commonly known as:  FLOMAX   TAKE ONE CAPSULE BY MOUTH ONE-HALF HOUR AFTER BREAKFAST            Can use .DISCHARGEMEDSLIST if going to another facility         Disposition:   To home in stable condition    Diet:   As tolerated    Activity:   As tolerated    Instructions:         The patient was instructed to return to the ER in the event of worsening symptoms. I have counseled the patient on the importance of compliance and the patient has agreed to proceed with all medical recommendations and follow up plan indicated above.   The patient understands that all medications come with benefits and risks. Risks may include permanent injury or death and these risks can be minimized with close reassessment and monitoring.        Primary Care Provider:    SAM Moya    Discharge summary faxed to primary care provider:  Deferred  Copy of discharge summary given to the patient: Completed      Follow up appointment details :      Please follow up with PCP for post discharge appointment    Pending Studies:          Time spent on discharge day patient visit, preparing discharge paperwork and arranging for patient follow up.    Summary of follow up issues:   -clinical assessment for improvement of infectious colitis    -refer to nephrology for outpatient management renal cysts    Discharge Time (Minutes) :    58  Hospital Course Type:  Inpatient Stay >2 midnights      Condition on Discharge     ______________________________________________________________________    Interval history/exam for day of discharge:         -No acute events overnight.  Per patient still having diarrhea, but slighty more formed stool.  tolerating oral foods well. Denies abdominal pain, n/v, f/c, bloody stools.  Changed to augmentin yesterday without worsening condition. WBC's continue to trend downward.     -patient assessed at bedside, management plan explained to patient            Constitutional: He is oriented to person, place, and time and well-developed, well-nourished, and in no distress.   HENT:   Head: Normocephalic and atraumatic.   Eyes: Pupils are equal, round, and reactive to light. EOM are normal.   Neck: Neck supple.   Cardiovascular: Normal rate, regular rhythm and normal heart sounds.  Exam reveals no gallop and no friction rub.    No murmur heard.  Pulmonary/Chest: Effort normal and breath sounds normal. No respiratory distress. He has no wheezes.   Abdominal: Soft. Bowel sounds are normal. He exhibits distension. There is no tenderness. There is no rebound and no guarding.   Musculoskeletal: Normal range of motion. He exhibits no edema or deformity.   Neurological: He is alert and oriented to person, place, and time.   Skin: Skin is warm and dry.   Psychiatric: Mood and affect normal.       Most Recent Labs:    Lab Results   Component Value Date/Time    WBC 8.4 11/21/2018 03:21 AM    WBC 8.9 03/06/2013 12:00 AM    RBC 3.87 (L) 11/21/2018 03:21 AM    RBC 5.49 03/06/2013 12:00 AM    HEMOGLOBIN 10.2 (L) 11/21/2018 03:21 AM    HEMATOCRIT 31.4 (L) 11/21/2018 03:21 AM    MCV 81.1 (L) 11/21/2018 03:21 AM    MCV 85 03/06/2013 12:00 AM    MCH 26.4 (L) 11/21/2018 03:21 AM    MCH 29.9 03/06/2013 12:00 AM    MCHC 32.5 (L) 11/21/2018 03:21 AM    MPV 12.4 11/21/2018 03:21 AM    NEUTSPOLYS 75.40 (H) 11/21/2018 03:21 AM    LYMPHOCYTES 12.80 (L) 11/21/2018 03:21 AM    MONOCYTES 9.80 11/21/2018 03:21 AM    EOSINOPHILS 1.40  11/21/2018 03:21 AM    BASOPHILS 0.20 11/21/2018 03:21 AM      Lab Results   Component Value Date/Time    SODIUM 137 11/21/2018 03:21 AM    POTASSIUM 3.3 (L) 11/21/2018 03:21 AM    CHLORIDE 112 11/21/2018 03:21 AM    CO2 18 (L) 11/21/2018 03:21 AM    GLUCOSE 105 (H) 11/21/2018 03:21 AM    BUN 28 (H) 11/21/2018 03:21 AM    CREATININE 1.57 (H) 11/21/2018 03:21 AM    CREATININE 0.97 03/06/2013 12:00 AM    BUNCREATRAT 22 03/06/2013 12:00 AM      Lab Results   Component Value Date/Time    ALTSGPT 9 11/21/2018 03:21 AM    ASTSGOT 11 (L) 11/21/2018 03:21 AM    ALKPHOSPHAT 58 11/21/2018 03:21 AM    TBILIRUBIN 0.2 11/21/2018 03:21 AM    LIPASE 7 (L) 11/18/2018 01:50 PM    ALBUMIN 3.0 (L) 11/21/2018 03:21 AM    GLOBULIN 3.3 11/21/2018 03:21 AM    INR 1.13 04/12/2018 12:37 PM     Lab Results   Component Value Date/Time    PROTHROMBTM 14.2 04/12/2018 12:37 PM    INR 1.13 04/12/2018 12:37 PM

## 2018-11-21 NOTE — DISCHARGE INSTRUCTIONS
Discharge Instructions    Discharged to home by car with relative. Discharged via walking, hospital escort: Refused.  Special equipment needed: Not Applicable    Be sure to schedule a follow-up appointment with your primary care doctor or any specialists as instructed.     Discharge Plan:   Diet Plan: Discussed  Activity Level: Discussed  Confirmed Follow up Appointment: Appointment Scheduled  Confirmed Symptoms Management: Discussed  Medication Reconciliation Updated: Yes  Influenza Vaccine Indication: Patient Refuses    I understand that a diet low in cholesterol, fat, and sodium is recommended for good health. Unless I have been given specific instructions below for another diet, I accept this instruction as my diet prescription.   Other diet: regular    Special Instructions:      · Is patient discharged on Warfarin / Coumadin?   No     Depression / Suicide Risk    As you are discharged from this [facility] facility, it is important to learn how to keep safe from harming yourself.    Recognize the warning signs:  · Abrupt changes in personality, positive or negative- including increase in energy   · Giving away possessions  · Change in eating patterns- significant weight changes-  positive or negative  · Change in sleeping patterns- unable to sleep or sleeping all the time   · Unwillingness or inability to communicate  · Depression  · Unusual sadness, discouragement and loneliness  · Talk of wanting to die  · Neglect of personal appearance   · Rebelliousness- reckless behavior  · Withdrawal from people/activities they love  · Confusion- inability to concentrate     If you or a loved one observes any of these behaviors or has concerns about self-harm, here's what you can do:  · Talk about it- your feelings and reasons for harming yourself  · Remove any means that you might use to hurt yourself (examples: pills, rope, extension cords, firearm)  · Get professional help from the community (Mental Health, Substance  Abuse, psychological counseling)  · Do not be alone:Call your Safe Contact- someone whom you trust who will be there for you.  · Call your local CRISIS HOTLINE 254-5321 or 805-533-3350  · Call your local Children's Mobile Crisis Response Team [place] (210) 551-3734 or www.[employer]  · Call the toll free National Suicide Prevention Hotlines   · National Suicide Prevention Lifeline 899-306-ZWQS (1372)  · [employer] 800-SUICIDE (296-2484)      Colitis  Introduction  Colitis is inflammation of the colon. Colitis may last a short time (acute) or it may last a long time (chronic).  What are the causes?  This condition may be caused by:  · Viruses.  · Bacteria.  · Reactions to medicine.  · Certain autoimmune diseases, such as Crohn disease or ulcerative colitis.  What are the signs or symptoms?  Symptoms of this condition include:  · Diarrhea.  · Passing bloody or tarry stool.  · Pain.  · Fever.  · Vomiting.  · Tiredness (fatigue).  · Weight loss.  · Bloating.  · Sudden increase in abdominal pain.  · Having fewer bowel movements than usual.  How is this diagnosed?  This condition is diagnosed with a stool test or a blood test. You may also have other tests, including X-rays, a CT scan, or a colonoscopy.  How is this treated?  Treatment may include:  · Resting the bowel. This involves not eating or drinking for a period of time.  · Fluids that are given through an IV tube.  · Medicine for pain and diarrhea.  · Antibiotic medicines.  · Cortisone medicines.  · Surgery.  Follow these instructions at home:  Eating and drinking  · Follow instructions from your health care provider about eating or drinking restrictions.  · Drink enough fluid to keep your urine clear or pale yellow.  · Work with a dietitian to determine which foods cause your condition to flare up.  · Avoid foods that cause flare-ups.  · Eat a well-balanced diet.  Medicines  · Take over-the-counter and prescription medicines only as  told by your health care provider.  · If you were prescribed an antibiotic medicine, take it as told by your health care provider. Do not stop taking the antibiotic even if you start to feel better.  General instructions  · Keep all follow-up visits as told by your health care provider. This is important.  Contact a health care provider if:  · Your symptoms do not go away.  · You develop new symptoms.  Get help right away if:  · You have a fever that does not go away with treatment.  · You develop chills.  · You have extreme weakness, fainting, or dehydration.  · You have repeated vomiting.  · You develop severe pain in your abdomen.  · You pass bloody or tarry stool.  This information is not intended to replace advice given to you by your health care provider. Make sure you discuss any questions you have with your health care provider.  Document Released: 01/25/2006 Document Revised: 05/25/2017 Document Reviewed: 04/11/2016  © 2017 Elsevier        Urinary Tract Infection, Adult  Introduction  A urinary tract infection (UTI) is an infection of any part of the urinary tract. The urinary tract includes the:  · Kidneys.  · Ureters.  · Bladder.  · Urethra.  These organs make, store, and get rid of pee (urine) in the body.  Follow these instructions at home:  · Take over-the-counter and prescription medicines only as told by your doctor.  · If you were prescribed an antibiotic medicine, take it as told by your doctor. Do not stop taking the antibiotic even if you start to feel better.  · Avoid the following drinks:  ¨ Alcohol.  ¨ Caffeine.  ¨ Tea.  ¨ Carbonated drinks.  · Drink enough fluid to keep your pee clear or pale yellow.  · Keep all follow-up visits as told by your doctor. This is important.  · Make sure to:  ¨ Empty your bladder often and completely. Do not to hold pee for long periods of time.  ¨ Empty your bladder before and after sex.  ¨ Wipe from front to back after a bowel movement if you are female. Use  each tissue one time when you wipe.  Contact a doctor if:  · You have back pain.  · You have a fever.  · You feel sick to your stomach (nauseous).  · You throw up (vomit).  · Your symptoms do not get better after 3 days.  · Your symptoms go away and then come back.  Get help right away if:  · You have very bad back pain.  · You have very bad lower belly (abdominal) pain.  · You are throwing up and cannot keep down any medicines or water.  This information is not intended to replace advice given to you by your health care provider. Make sure you discuss any questions you have with your health care provider.  Document Released: 06/05/2009 Document Revised: 05/25/2017 Document Reviewed: 11/07/2016  © 2017 Elsevier

## 2018-11-23 LAB
BACTERIA BLD CULT: NORMAL
SIGNIFICANT IND 70042: NORMAL
SITE SITE: NORMAL
SOURCE SOURCE: NORMAL

## 2018-12-07 ENCOUNTER — APPOINTMENT (OUTPATIENT)
Dept: RADIOLOGY | Facility: MEDICAL CENTER | Age: 73
DRG: 872 | End: 2018-12-07
Attending: EMERGENCY MEDICINE
Payer: MEDICARE

## 2018-12-07 ENCOUNTER — HOSPITAL ENCOUNTER (INPATIENT)
Facility: MEDICAL CENTER | Age: 73
LOS: 2 days | DRG: 872 | End: 2018-12-09
Attending: EMERGENCY MEDICINE | Admitting: HOSPITALIST
Payer: MEDICARE

## 2018-12-07 DIAGNOSIS — A41.59: Primary | ICD-10-CM

## 2018-12-07 DIAGNOSIS — S80.02XA CONTUSION OF LEFT KNEE, INITIAL ENCOUNTER: ICD-10-CM

## 2018-12-07 DIAGNOSIS — S00.83XA CONTUSION OF FACE, INITIAL ENCOUNTER: ICD-10-CM

## 2018-12-07 DIAGNOSIS — R55 SYNCOPE, UNSPECIFIED SYNCOPE TYPE: ICD-10-CM

## 2018-12-07 DIAGNOSIS — N28.9 RENAL INSUFFICIENCY: ICD-10-CM

## 2018-12-07 DIAGNOSIS — E87.1 HYPONATREMIA: ICD-10-CM

## 2018-12-07 DIAGNOSIS — S80.212A KNEE ABRASION, LEFT, INITIAL ENCOUNTER: ICD-10-CM

## 2018-12-07 DIAGNOSIS — R42 DIZZINESS: ICD-10-CM

## 2018-12-07 DIAGNOSIS — S00.81XA FACIAL ABRASION, INITIAL ENCOUNTER: ICD-10-CM

## 2018-12-07 PROBLEM — A41.9 SEPSIS (HCC): Status: ACTIVE | Noted: 2018-12-07
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
## 2018-12-07 PROBLEM — D50.9 IRON DEFICIENCY ANEMIA: Status: ACTIVE | Noted: 2018-07-07

## 2018-12-07 PROBLEM — E87.6 HYPOKALEMIA: Status: ACTIVE | Noted: 2018-12-07

## 2018-12-07 PROBLEM — R19.7 DIARRHEA: Status: ACTIVE | Noted: 2018-12-07

## 2018-12-07 PROBLEM — F41.9 ANXIETY: Status: ACTIVE | Noted: 2018-12-07

## 2018-12-07 LAB
ALBUMIN SERPL BCP-MCNC: 3.7 G/DL (ref 3.2–4.9)
ALBUMIN/GLOB SERPL: 0.9 G/DL
ALP SERPL-CCNC: 83 U/L (ref 30–99)
ALT SERPL-CCNC: 11 U/L (ref 2–50)
AMMONIA PLAS-SCNC: 27 UMOL/L (ref 11–45)
ANION GAP SERPL CALC-SCNC: 12 MMOL/L (ref 0–11.9)
APPEARANCE UR: CLEAR
AST SERPL-CCNC: 11 U/L (ref 12–45)
BACTERIA #/AREA URNS HPF: ABNORMAL /HPF
BASOPHILS # BLD AUTO: 0.2 % (ref 0–1.8)
BASOPHILS # BLD: 0.03 K/UL (ref 0–0.12)
BILIRUB SERPL-MCNC: 0.6 MG/DL (ref 0.1–1.5)
BILIRUB UR QL STRIP.AUTO: NEGATIVE
BNP SERPL-MCNC: 10 PG/ML (ref 0–100)
BUN SERPL-MCNC: 26 MG/DL (ref 8–22)
CALCIUM SERPL-MCNC: 9.6 MG/DL (ref 8.5–10.5)
CHLORIDE SERPL-SCNC: 102 MMOL/L (ref 96–112)
CO2 SERPL-SCNC: 17 MMOL/L (ref 20–33)
COLOR UR: YELLOW
CREAT SERPL-MCNC: 2.22 MG/DL (ref 0.5–1.4)
EOSINOPHIL # BLD AUTO: 0.03 K/UL (ref 0–0.51)
EOSINOPHIL NFR BLD: 0.2 % (ref 0–6.9)
EPI CELLS #/AREA URNS HPF: ABNORMAL /HPF
ERYTHROCYTE [DISTWIDTH] IN BLOOD BY AUTOMATED COUNT: 47.6 FL (ref 35.9–50)
GLOBULIN SER CALC-MCNC: 4 G/DL (ref 1.9–3.5)
GLUCOSE SERPL-MCNC: 153 MG/DL (ref 65–99)
GLUCOSE UR STRIP.AUTO-MCNC: NEGATIVE MG/DL
HCT VFR BLD AUTO: 37.3 % (ref 42–52)
HGB BLD-MCNC: 12.4 G/DL (ref 14–18)
IMM GRANULOCYTES # BLD AUTO: 0.1 K/UL (ref 0–0.11)
IMM GRANULOCYTES NFR BLD AUTO: 0.6 % (ref 0–0.9)
KETONES UR STRIP.AUTO-MCNC: NEGATIVE MG/DL
LACTATE BLD-SCNC: 1.7 MMOL/L (ref 0.5–2)
LACTATE BLD-SCNC: 1.9 MMOL/L (ref 0.5–2)
LACTATE BLD-SCNC: 2.6 MMOL/L (ref 0.5–2)
LEUKOCYTE ESTERASE UR QL STRIP.AUTO: ABNORMAL
LIPASE SERPL-CCNC: 16 U/L (ref 11–82)
LYMPHOCYTES # BLD AUTO: 0.83 K/UL (ref 1–4.8)
LYMPHOCYTES NFR BLD: 5.2 % (ref 22–41)
MCH RBC QN AUTO: 26.8 PG (ref 27–33)
MCHC RBC AUTO-ENTMCNC: 33.2 G/DL (ref 33.7–35.3)
MCV RBC AUTO: 80.7 FL (ref 81.4–97.8)
MICRO URNS: ABNORMAL
MONOCYTES # BLD AUTO: 1.36 K/UL (ref 0–0.85)
MONOCYTES NFR BLD AUTO: 8.5 % (ref 0–13.4)
NEUTROPHILS # BLD AUTO: 13.68 K/UL (ref 1.82–7.42)
NEUTROPHILS NFR BLD: 85.3 % (ref 44–72)
NITRITE UR QL STRIP.AUTO: POSITIVE
NRBC # BLD AUTO: 0 K/UL
NRBC BLD-RTO: 0 /100 WBC
PH UR STRIP.AUTO: 6 [PH]
PLATELET # BLD AUTO: 322 K/UL (ref 164–446)
PMV BLD AUTO: 9.7 FL (ref 9–12.9)
POTASSIUM SERPL-SCNC: 3.5 MMOL/L (ref 3.6–5.5)
PROT SERPL-MCNC: 7.7 G/DL (ref 6–8.2)
PROT UR QL STRIP: 30 MG/DL
RBC # BLD AUTO: 4.62 M/UL (ref 4.7–6.1)
RBC # URNS HPF: ABNORMAL /HPF
RBC UR QL AUTO: ABNORMAL
SODIUM SERPL-SCNC: 131 MMOL/L (ref 135–145)
SP GR UR STRIP.AUTO: 1.02
TROPONIN I SERPL-MCNC: <0.01 NG/ML (ref 0–0.04)
UROBILINOGEN UR STRIP.AUTO-MCNC: 0.2 MG/DL
WBC # BLD AUTO: 16 K/UL (ref 4.8–10.8)
WBC #/AREA URNS HPF: ABNORMAL /HPF
WBC STL QL MICRO: NORMAL

## 2018-12-07 PROCEDURE — 700105 HCHG RX REV CODE 258: Performed by: EMERGENCY MEDICINE

## 2018-12-07 PROCEDURE — 84484 ASSAY OF TROPONIN QUANT: CPT

## 2018-12-07 PROCEDURE — 94760 N-INVAS EAR/PLS OXIMETRY 1: CPT

## 2018-12-07 PROCEDURE — 36415 COLL VENOUS BLD VENIPUNCTURE: CPT

## 2018-12-07 PROCEDURE — 700102 HCHG RX REV CODE 250 W/ 637 OVERRIDE(OP): Performed by: STUDENT IN AN ORGANIZED HEALTH CARE EDUCATION/TRAINING PROGRAM

## 2018-12-07 PROCEDURE — 99223 1ST HOSP IP/OBS HIGH 75: CPT | Mod: GC | Performed by: HOSPITALIST

## 2018-12-07 PROCEDURE — 87077 CULTURE AEROBIC IDENTIFY: CPT | Mod: 91

## 2018-12-07 PROCEDURE — 82140 ASSAY OF AMMONIA: CPT

## 2018-12-07 PROCEDURE — 85025 COMPLETE CBC W/AUTO DIFF WBC: CPT

## 2018-12-07 PROCEDURE — 87040 BLOOD CULTURE FOR BACTERIA: CPT | Mod: 91

## 2018-12-07 PROCEDURE — 73564 X-RAY EXAM KNEE 4 OR MORE: CPT | Mod: LT

## 2018-12-07 PROCEDURE — 700111 HCHG RX REV CODE 636 W/ 250 OVERRIDE (IP): Performed by: EMERGENCY MEDICINE

## 2018-12-07 PROCEDURE — 87086 URINE CULTURE/COLONY COUNT: CPT

## 2018-12-07 PROCEDURE — 96365 THER/PROPH/DIAG IV INF INIT: CPT

## 2018-12-07 PROCEDURE — 70450 CT HEAD/BRAIN W/O DYE: CPT

## 2018-12-07 PROCEDURE — 89055 LEUKOCYTE ASSESSMENT FECAL: CPT

## 2018-12-07 PROCEDURE — 72125 CT NECK SPINE W/O DYE: CPT

## 2018-12-07 PROCEDURE — 83605 ASSAY OF LACTIC ACID: CPT

## 2018-12-07 PROCEDURE — 83630 LACTOFERRIN FECAL (QUAL): CPT

## 2018-12-07 PROCEDURE — 71045 X-RAY EXAM CHEST 1 VIEW: CPT

## 2018-12-07 PROCEDURE — 83690 ASSAY OF LIPASE: CPT

## 2018-12-07 PROCEDURE — A9270 NON-COVERED ITEM OR SERVICE: HCPCS | Performed by: STUDENT IN AN ORGANIZED HEALTH CARE EDUCATION/TRAINING PROGRAM

## 2018-12-07 PROCEDURE — 3E0234Z INTRODUCTION OF SERUM, TOXOID AND VACCINE INTO MUSCLE, PERCUTANEOUS APPROACH: ICD-10-PCS | Performed by: EMERGENCY MEDICINE

## 2018-12-07 PROCEDURE — 70486 CT MAXILLOFACIAL W/O DYE: CPT

## 2018-12-07 PROCEDURE — 87186 SC STD MICRODIL/AGAR DIL: CPT

## 2018-12-07 PROCEDURE — 81001 URINALYSIS AUTO W/SCOPE: CPT

## 2018-12-07 PROCEDURE — 93005 ELECTROCARDIOGRAM TRACING: CPT | Performed by: EMERGENCY MEDICINE

## 2018-12-07 PROCEDURE — 96375 TX/PRO/DX INJ NEW DRUG ADDON: CPT

## 2018-12-07 PROCEDURE — 90715 TDAP VACCINE 7 YRS/> IM: CPT | Performed by: EMERGENCY MEDICINE

## 2018-12-07 PROCEDURE — 770020 HCHG ROOM/CARE - TELE (206)

## 2018-12-07 PROCEDURE — 90471 IMMUNIZATION ADMIN: CPT

## 2018-12-07 PROCEDURE — 80053 COMPREHEN METABOLIC PANEL: CPT

## 2018-12-07 PROCEDURE — 82272 OCCULT BLD FECES 1-3 TESTS: CPT

## 2018-12-07 PROCEDURE — 87150 DNA/RNA AMPLIFIED PROBE: CPT

## 2018-12-07 PROCEDURE — 83880 ASSAY OF NATRIURETIC PEPTIDE: CPT

## 2018-12-07 PROCEDURE — 99285 EMERGENCY DEPT VISIT HI MDM: CPT

## 2018-12-07 PROCEDURE — 700105 HCHG RX REV CODE 258: Performed by: STUDENT IN AN ORGANIZED HEALTH CARE EDUCATION/TRAINING PROGRAM

## 2018-12-07 RX ORDER — HYDROCHLOROTHIAZIDE 25 MG/1
25 TABLET ORAL
Status: DISCONTINUED | OUTPATIENT
Start: 2018-12-08 | End: 2018-12-07

## 2018-12-07 RX ORDER — POTASSIUM CHLORIDE 20 MEQ/1
40 TABLET, EXTENDED RELEASE ORAL ONCE
Status: COMPLETED | OUTPATIENT
Start: 2018-12-07 | End: 2018-12-07

## 2018-12-07 RX ORDER — SODIUM CHLORIDE 9 MG/ML
INJECTION, SOLUTION INTRAVENOUS CONTINUOUS
Status: DISCONTINUED | OUTPATIENT
Start: 2018-12-07 | End: 2018-12-09 | Stop reason: HOSPADM

## 2018-12-07 RX ORDER — ONDANSETRON 4 MG/1
4 TABLET, ORALLY DISINTEGRATING ORAL ONCE
Status: ACTIVE | OUTPATIENT
Start: 2018-12-07 | End: 2018-12-08

## 2018-12-07 RX ORDER — TRAZODONE HYDROCHLORIDE 50 MG/1
50 TABLET ORAL ONCE
Status: COMPLETED | OUTPATIENT
Start: 2018-12-07 | End: 2018-12-08

## 2018-12-07 RX ORDER — OMEPRAZOLE 20 MG/1
40 CAPSULE, DELAYED RELEASE ORAL DAILY
Status: DISCONTINUED | OUTPATIENT
Start: 2018-12-08 | End: 2018-12-09 | Stop reason: HOSPADM

## 2018-12-07 RX ORDER — SULFAMETHOXAZOLE AND TRIMETHOPRIM 800; 160 MG/1; MG/1
1 TABLET ORAL EVERY 12 HOURS
Status: DISCONTINUED | OUTPATIENT
Start: 2018-12-07 | End: 2018-12-09 | Stop reason: HOSPADM

## 2018-12-07 RX ORDER — LISINOPRIL 10 MG/1
10 TABLET ORAL
Status: DISCONTINUED | OUTPATIENT
Start: 2018-12-08 | End: 2018-12-07

## 2018-12-07 RX ORDER — SODIUM CHLORIDE 9 MG/ML
30 INJECTION, SOLUTION INTRAVENOUS ONCE
Status: COMPLETED | OUTPATIENT
Start: 2018-12-07 | End: 2018-12-07

## 2018-12-07 RX ORDER — SODIUM CHLORIDE 9 MG/ML
30 INJECTION, SOLUTION INTRAVENOUS
Status: DISCONTINUED | OUTPATIENT
Start: 2018-12-07 | End: 2018-12-07

## 2018-12-07 RX ORDER — CHOLECALCIFEROL (VITAMIN D3) 125 MCG
1000 CAPSULE ORAL DAILY
Status: DISCONTINUED | OUTPATIENT
Start: 2018-12-08 | End: 2018-12-09 | Stop reason: HOSPADM

## 2018-12-07 RX ORDER — ESOMEPRAZOLE MAGNESIUM 40 MG/1
40 CAPSULE, DELAYED RELEASE ORAL
Status: DISCONTINUED | OUTPATIENT
Start: 2018-12-08 | End: 2018-12-07

## 2018-12-07 RX ORDER — FINASTERIDE 5 MG/1
5 TABLET, FILM COATED ORAL DAILY
Status: DISCONTINUED | OUTPATIENT
Start: 2018-12-08 | End: 2018-12-09 | Stop reason: HOSPADM

## 2018-12-07 RX ORDER — TAMSULOSIN HYDROCHLORIDE 0.4 MG/1
0.4 CAPSULE ORAL
Status: DISCONTINUED | OUTPATIENT
Start: 2018-12-08 | End: 2018-12-09 | Stop reason: HOSPADM

## 2018-12-07 RX ORDER — FERROUS SULFATE 325(65) MG
325 TABLET ORAL
Status: DISCONTINUED | OUTPATIENT
Start: 2018-12-08 | End: 2018-12-09 | Stop reason: HOSPADM

## 2018-12-07 RX ORDER — SODIUM CHLORIDE 9 MG/ML
500 INJECTION, SOLUTION INTRAVENOUS
Status: DISCONTINUED | OUTPATIENT
Start: 2018-12-07 | End: 2018-12-09 | Stop reason: HOSPADM

## 2018-12-07 RX ORDER — LOPERAMIDE HYDROCHLORIDE 2 MG/1
2 CAPSULE ORAL EVERY 6 HOURS PRN
Status: DISCONTINUED | OUTPATIENT
Start: 2018-12-07 | End: 2018-12-08

## 2018-12-07 RX ORDER — ESOMEPRAZOLE MAGNESIUM 40 MG/1
40 CAPSULE, DELAYED RELEASE ORAL
COMMUNITY
End: 2019-01-18 | Stop reason: CLARIF

## 2018-12-07 RX ORDER — ACETAMINOPHEN 500 MG
500 TABLET ORAL EVERY 6 HOURS PRN
Status: DISCONTINUED | OUTPATIENT
Start: 2018-12-07 | End: 2018-12-09 | Stop reason: HOSPADM

## 2018-12-07 RX ADMIN — SULFAMETHOXAZOLE AND TRIMETHOPRIM 1 TABLET: 800; 160 TABLET ORAL at 18:00

## 2018-12-07 RX ADMIN — SODIUM CHLORIDE 3117 ML: 9 INJECTION, SOLUTION INTRAVENOUS at 12:36

## 2018-12-07 RX ADMIN — ACETAMINOPHEN 500 MG: 500 TABLET ORAL at 18:45

## 2018-12-07 RX ADMIN — SODIUM CHLORIDE: 9 INJECTION, SOLUTION INTRAVENOUS at 18:45

## 2018-12-07 RX ADMIN — MEROPENEM 500 MG: 500 INJECTION, POWDER, FOR SOLUTION INTRAVENOUS at 13:37

## 2018-12-07 RX ADMIN — FENTANYL CITRATE 50 MCG: 50 INJECTION, SOLUTION INTRAMUSCULAR; INTRAVENOUS at 12:37

## 2018-12-07 RX ADMIN — CLOSTRIDIUM TETANI TOXOID ANTIGEN (FORMALDEHYDE INACTIVATED), CORYNEBACTERIUM DIPHTHERIAE TOXOID ANTIGEN (FORMALDEHYDE INACTIVATED), BORDETELLA PERTUSSIS TOXOID ANTIGEN (GLUTARALDEHYDE INACTIVATED), BORDETELLA PERTUSSIS FILAMENTOUS HEMAGGLUTININ ANTIGEN (FORMALDEHYDE INACTIVATED), BORDETELLA PERTUSSIS PERTACTIN ANTIGEN, AND BORDETELLA PERTUSSIS FIMBRIAE 2/3 ANTIGEN 0.5 ML: 5; 2; 2.5; 5; 3; 5 INJECTION, SUSPENSION INTRAMUSCULAR at 17:23

## 2018-12-07 RX ADMIN — POTASSIUM CHLORIDE 40 MEQ: 1500 TABLET, EXTENDED RELEASE ORAL at 18:45

## 2018-12-07 ASSESSMENT — ENCOUNTER SYMPTOMS
FALLS: 1
FLANK PAIN: 0
PALPITATIONS: 0
DIZZINESS: 0
MYALGIAS: 1
DOUBLE VISION: 0
SENSORY CHANGE: 0
SINUS PAIN: 0
SORE THROAT: 0
BRUISES/BLEEDS EASILY: 1
NERVOUS/ANXIOUS: 0
HEADACHES: 1
BLOOD IN STOOL: 0
NAUSEA: 1
ABDOMINAL PAIN: 0
BLURRED VISION: 0
SHORTNESS OF BREATH: 0
VOMITING: 1
DEPRESSION: 0
DIARRHEA: 1
POLYDIPSIA: 0
CHILLS: 1
SPEECH CHANGE: 0
CONSTIPATION: 0
LOSS OF CONSCIOUSNESS: 0
FEVER: 1
COUGH: 0
WHEEZING: 0

## 2018-12-07 ASSESSMENT — PAIN SCALES - GENERAL
PAINLEVEL_OUTOF10: 0
PAINLEVEL_OUTOF10: 0

## 2018-12-07 NOTE — ED TRIAGE NOTES
Chief Complaint   Patient presents with   • Fall     bib AMB from [email or web address]. pt was bent over throwing up, got weak, and fell. face hit ground. bystanders said (+) LOC. denies blood thinners. A&Ox4.    • Facial Injury   • Dizziness   • Vomiting     vomiting and fever for three days.   • Fever     Pt was 100.4 temporal on amb - was given 1G of tylenol.

## 2018-12-07 NOTE — ED PROVIDER NOTES
ED Provider Note    Scribed for Maribell Ireland M.D. by Jaziel Arciniega. 12/7/2018, 12:16 PM.    Primary care provider: SAM Moya  Means of arrival: EMS  History obtained from: Patient  History limited by: None    CHIEF COMPLAINT  Chief Complaint   Patient presents with   • Fall     bib AMB from [employer]. pt was bent over throwing up, got weak, and fell. face hit ground. bystanders said (+) LOC. denies blood thinners. A&Ox4.    • Facial Injury   • Dizziness   • Vomiting     vomiting and fever for three days.   • Fever     HPI  Chidi Tatum is a 73 y.o. male who presents to the Emergency Department after a ground level fall onto his face.  This occurred when he was at his grandson's school, felt nauseous, bent over and vomited, and then fell on his face.  By standers state he lost consciousness but he denies it.  He denies chest pain, urinary retention or coughing.  Two weeks ago the patient was admitted to the hospital for the nausea, vomiting, fever, flank, and back pain that he is still experiencing.  He has associated chills, left knee pain, facial pain, and neck pain.  The patient is unsure when his last tetanus shot was but reports this was over 10 years ago when he lost his left ring finger.  He denies any blood thinners and states his dentures feel in the correct location.    REVIEW OF SYSTEMS  Pertinent positives include facial pain, left knee pain, fever, chills, nausea, vomiting, back pain, flank pain, and fall. Pertinent negatives include no chest pain, urinary retention, or coughing.  +/- Loss of consciousness. As above, all other systems reviewed and are negative.   See HPI for further details.     PAST MEDICAL HISTORY  Past Medical History:   Diagnosis Date   • BPH (benign prostatic hyperplasia)    • GERD (gastroesophageal reflux disease)    • HTN (hypertension)    • Hypertension    • Insomnia    • SBO (small bowel obstruction) (HCC)        SURGICAL HISTORY  Past Surgical  History:   Procedure Laterality Date   • TEMPORAL ARTERY BIOPSY Right 2/9/2018    Procedure: TEMPORAL ARTERY BIOPSY;  Surgeon: Bryant Corey M.D.;  Location: SURGERY [facility];  Service: Vascular   • HERNIA REPAIR      umbilical, groin        SOCIAL HISTORY  Social History   Substance Use Topics   • Smoking status: Never Smoker   • Smokeless tobacco: Never Used   • Alcohol use No      History   Drug Use No       FAMILY HISTORY  Family History   Problem Relation Age of Onset   • Lung Disease Father    • Alcohol/Drug Brother    • Lung Disease Brother        CURRENT MEDICATIONS  Home Medications     Reviewed by Jessica Byers, Pharmacy Intern (Pharmacy Intern) on 12/07/18 at 1256  Med List Status: Complete   Medication Last Dose Status   cyanocobalamin (VITAMIN B12) 1000 MCG Tab 12/6/2018 Active   esomeprazole (NEXIUM) 40 MG delayed-release capsule 12/6/2018 Active   ferrous sulfate 325 (65 Fe) MG tablet 12/6/2018 Active   finasteride (PROSCAR) 5 MG Tab 12/6/2018 Active   hydroCHLOROthiazide (HYDRODIURIL) 25 MG Tab 12/6/2018 Active   lisinopril (PRINIVIL) 10 MG Tab 12/6/2018 Active   loperamide (IMODIUM) 2 MG Cap PRN Active   Potassium (POTASSIMIN PO) 12/6/2018 Active   sertraline (ZOLOFT) 25 MG tablet 12/6/2018 Active   tamsulosin (FLOMAX) 0.4 MG capsule 12/6/2018 Active                ALLERGIES  Allergies   Allergen Reactions   • Celebrex [Celecoxib] Rash     .       PHYSICAL EXAM  VITAL SIGNS: /76   Pulse 96   Temp 37.3 °C (99.1 °F) (Temporal)   Resp 17   Ht 1.829 m (6')   Wt 103.9 kg (229 lb)   SpO2 94%   BMI 31.06 kg/m²   Vitals reviewed.    Consitutional: Well-developed, well-nourished. Negative for: distress.  HENT: Normocephalic, right external ear normal, left external ear normal, oropharynx clear and dry.  Swelling and abrasions to right eyebrow, right cheek, lateral right cheek, and right chin.  No malocclusion.  No hemotympanum  Eyes: Conjunctivae normal, extraocular movements  normal. Negative for: discharge in right and left eye, icterus. NANDO at 4.  Neck:  Negative for cervical adenopathy.  Tender mid-cspine on the left.  Cardiovascular: Mild tachycardia, regular rhythm, heart sounds normal, intact distal pulses. Negative for: murmur, rub, gallop.  Pulmonary/Chest Wall: Effort normal, breath sounds normal. Negative for: respiratory distress, wheezes, rales, rhonchi.   Abdominal: Soft, bowel sounds normal. Negative for: distention, tenderness, rebound, guarding.  Musculoskeletal: Normal range of motion. Negative for edema.  No pain with logroll of left lower extremity, no tenderness of the left greater trochanter.  Swelling over the left patella.  No pain with longitudinal compression through the calcaneous.  Positive tenderness across medial tibial tuberosity  Neurological: Alert and oriented x3. No focal deficits.  Skin: Hot and sweaty. Negative for rash.  Deep abrasions and ecchymosis to the anterior left knee.  Psych: Mildly anxious, behavior normal, judgment normal.    DIAGNOSTIC STUDIES / PROCEDURES    LABS  Results for orders placed or performed during the hospital encounter of 12/07/18   CBC WITH DIFFERENTIAL   Result Value Ref Range    WBC 16.0 (H) 4.8 - 10.8 K/uL    RBC 4.62 (L) 4.70 - 6.10 M/uL    Hemoglobin 12.4 (L) 14.0 - 18.0 g/dL    Hematocrit 37.3 (L) 42.0 - 52.0 %    MCV 80.7 (L) 81.4 - 97.8 fL    MCH 26.8 (L) 27.0 - 33.0 pg    MCHC 33.2 (L) 33.7 - 35.3 g/dL    RDW 47.6 35.9 - 50.0 fL    Platelet Count 322 164 - 446 K/uL    MPV 9.7 9.0 - 12.9 fL    Neutrophils-Polys 85.30 (H) 44.00 - 72.00 %    Lymphocytes 5.20 (L) 22.00 - 41.00 %    Monocytes 8.50 0.00 - 13.40 %    Eosinophils 0.20 0.00 - 6.90 %    Basophils 0.20 0.00 - 1.80 %    Immature Granulocytes 0.60 0.00 - 0.90 %    Nucleated RBC 0.00 /100 WBC    Neutrophils (Absolute) 13.68 (H) 1.82 - 7.42 K/uL    Lymphs (Absolute) 0.83 (L) 1.00 - 4.80 K/uL    Monos (Absolute) 1.36 (H) 0.00 - 0.85 K/uL    Eos (Absolute) 0.03  0.00 - 0.51 K/uL    Baso (Absolute) 0.03 0.00 - 0.12 K/uL    Immature Granulocytes (abs) 0.10 0.00 - 0.11 K/uL    NRBC (Absolute) 0.00 K/uL   COMP METABOLIC PANEL   Result Value Ref Range    Sodium 131 (L) 135 - 145 mmol/L    Potassium 3.5 (L) 3.6 - 5.5 mmol/L    Chloride 102 96 - 112 mmol/L    Co2 17 (L) 20 - 33 mmol/L    Anion Gap 12.0 (H) 0.0 - 11.9    Glucose 153 (H) 65 - 99 mg/dL    Bun 26 (H) 8 - 22 mg/dL    Creatinine 2.22 (H) 0.50 - 1.40 mg/dL    Calcium 9.6 8.5 - 10.5 mg/dL    AST(SGOT) 11 (L) 12 - 45 U/L    ALT(SGPT) 11 2 - 50 U/L    Alkaline Phosphatase 83 30 - 99 U/L    Total Bilirubin 0.6 0.1 - 1.5 mg/dL    Albumin 3.7 3.2 - 4.9 g/dL    Total Protein 7.7 6.0 - 8.2 g/dL    Globulin 4.0 (H) 1.9 - 3.5 g/dL    A-G Ratio 0.9 g/dL   LIPASE   Result Value Ref Range    Lipase 16 11 - 82 U/L   TROPONIN   Result Value Ref Range    Troponin I <0.01 0.00 - 0.04 ng/mL   BTYPE NATRIURETIC PEPTIDE   Result Value Ref Range    B Natriuretic Peptide 10 0 - 100 pg/mL   URINALYSIS (UA)   Result Value Ref Range    Color Yellow     Character Clear     Specific Gravity 1.020 <1.035    Ph 6.0 5.0 - 8.0    Glucose Negative Negative mg/dL    Ketones Negative Negative mg/dL    Protein 30 (A) Negative mg/dL    Bilirubin Negative Negative    Urobilinogen, Urine 0.2 Negative    Nitrite Positive (A) Negative    Leukocyte Esterase Large (A) Negative    Occult Blood Small (A) Negative    Micro Urine Req Microscopic    LACTIC ACID   Result Value Ref Range    Lactic Acid 2.6 (H) 0.5 - 2.0 mmol/L   AMMONIA   Result Value Ref Range    Ammonia 27 11 - 45 umol/L   ESTIMATED GFR   Result Value Ref Range    GFR If African American 35 (A) >60 mL/min/1.73 m 2    GFR If Non  29 (A) >60 mL/min/1.73 m 2   URINE MICROSCOPIC (W/UA)   Result Value Ref Range    WBC Packed (A) /hpf    RBC 2-5 (A) /hpf    Bacteria Many (A) None /hpf    Epithelial Cells Rare /hpf   STOOL WBC'S   Result Value Ref Range    Stool WBC's None seen None seen    LACTIC ACID   Result Value Ref Range    Lactic Acid 1.9 0.5 - 2.0 mmol/L      All labs reviewed by me.      RADIOLOGY  DX-KNEE COMPLETE 4+ LEFT   Final Result      No acute fracture. Degenerative changes.      DX-CHEST-PORTABLE (1 VIEW)   Final Result      Lung base linear atelectasis. No consolidation.      CT-MAXILLOFACIAL W/O PLUS RECONS   Final Result      1.  RIGHT facial and periorbital soft tissue swelling.   2.  No facial fracture.   3.  Chronic paranasal sinus disease.      CT-CSPINE WITHOUT PLUS RECONS   Final Result      1.  Straightening and moderate degenerative change of cervical spine.   2.  No fracture or subluxation.      CT-HEAD W/O   Final Result      1.  Diffuse atrophy and white matter changes.   2.  No acute intracranial hemorrhage or territorial infarct.   3.  Chronic paranasal sinus disease.        The radiologist's interpretation of all radiological studies have been reviewed by me.    COURSE & MEDICAL DECISION MAKING  Nursing notes, VS, PMSFHx reviewed in chart.    Obtained and reviewed past medical records from 11/18/2018 which indicated he was admitted for sepsis secondary to infectious colitis and pyelonephritis with Enterobacter.    12:16 PM Patient seen and examined at bedside. The patient presents with  a fall with lightheadedness and the differential diagnosis includes but is not limited to  cardiac arrhythmia, anemia, electrolyte abnormality, MI, sepsis/SIRS, encephalopathy. Ordered Left knee Xray, CT of the head, CT of the C spine, Ct-maxillofacial, Chest Xray, CBC, CMP, Lipase, Troponin, BNP, UA, Urine culture, Lactic acid, ammonia, and EKG. Patient will be treated with 0.5 mg tetanus.  I discussed we would complete our evaluation here, he will likely be admitted until his symptoms improve, and he will follow up with his primary care once he is discharged.    12:45 PM Patients care was discussed with the pharmacy who approves use of antibiotics.  Patient will be treated with 500  mg Merrem, 50mcg fentanyl, 3117 mL NS bolus for his symptoms.  Cancelled Ammonia and Rocephin orders.  IV fluids were administered for suspected sepsis.    2:37 PM Paged internal medicine.  Patient appears to continue to have pyelonephritis and now has sepsis.    2:48 PM I discussed the patient's case and the above findings with Dr. Mcgowan from [facility] (Internal Medicine) who agrees to admit the patient.  There was a positive response to IV fluids.    DISPOSITION:  Patient will be admitted to Dr. mcgowan in guarded condition.     CRITICAL CARE  The very real possibilty of a deterioration of this patient's condition required the highest level of my preparedness for sudden, emergent intervention.  I provided critical care services, which included medication orders, frequent reevaluations of the patient's condition and response to treatment, ordering and reviewing test results, and discussing the case with various consultants.  The critical care time associated with the care of the patient was 35 minutes. Review chart for interventions. This time is exclusive of any other billable procedures.       FINAL IMPRESSION  1. Sepsis due to Enterobacter species (HCC)    2. Syncope, unspecified syncope type    3. Dizziness    4. Contusion of face, initial encounter    5. Renal insufficiency    6. Hyponatremia    7. Contusion of left knee, initial encounter    8. Facial abrasion, initial encounter    9. Knee abrasion, left, initial encounter          Jaziel DYE (Scribe), am scribing for, and in the presence of, Maribell Ireland M.D..    Electronically signed by: Jaziel Arciniega (Scribe), 12/7/2018    Maribell DYE M.D. personally performed the services described in this documentation, as scribed by Jaziel Arciniega in my presence, and it is both accurate and complete.  C.    The note accurately reflects work and decisions made by me.  Maribell Ireland  12/7/2018  8:06 PM

## 2018-12-07 NOTE — ED NOTES
Med rec complete per pt at bedside   Allergies reviewed  Pt finished a course of Amoxicillin about 3 days ago

## 2018-12-08 LAB
ANION GAP SERPL CALC-SCNC: 7 MMOL/L (ref 0–11.9)
BASOPHILS # BLD AUTO: 0.2 % (ref 0–1.8)
BASOPHILS # BLD: 0.03 K/UL (ref 0–0.12)
BUN SERPL-MCNC: 26 MG/DL (ref 8–22)
C DIFF DNA SPEC QL NAA+PROBE: NEGATIVE
C DIFF TOX GENS STL QL NAA+PROBE: NORMAL
CALCIUM SERPL-MCNC: 8.9 MG/DL (ref 8.5–10.5)
CHLORIDE SERPL-SCNC: 105 MMOL/L (ref 96–112)
CO2 SERPL-SCNC: 20 MMOL/L (ref 20–33)
CREAT SERPL-MCNC: 1.88 MG/DL (ref 0.5–1.4)
EOSINOPHIL # BLD AUTO: 0.04 K/UL (ref 0–0.51)
EOSINOPHIL NFR BLD: 0.3 % (ref 0–6.9)
ERYTHROCYTE [DISTWIDTH] IN BLOOD BY AUTOMATED COUNT: 49.6 FL (ref 35.9–50)
GLUCOSE SERPL-MCNC: 114 MG/DL (ref 65–99)
HCT VFR BLD AUTO: 31.9 % (ref 42–52)
HEMOCCULT STL QL: NEGATIVE
HGB BLD-MCNC: 10.4 G/DL (ref 14–18)
IMM GRANULOCYTES # BLD AUTO: 0.1 K/UL (ref 0–0.11)
IMM GRANULOCYTES NFR BLD AUTO: 0.8 % (ref 0–0.9)
LYMPHOCYTES # BLD AUTO: 1.57 K/UL (ref 1–4.8)
LYMPHOCYTES NFR BLD: 12 % (ref 22–41)
MCH RBC QN AUTO: 26.9 PG (ref 27–33)
MCHC RBC AUTO-ENTMCNC: 32.6 G/DL (ref 33.7–35.3)
MCV RBC AUTO: 82.6 FL (ref 81.4–97.8)
MONOCYTES # BLD AUTO: 1.4 K/UL (ref 0–0.85)
MONOCYTES NFR BLD AUTO: 10.7 % (ref 0–13.4)
NEUTROPHILS # BLD AUTO: 9.92 K/UL (ref 1.82–7.42)
NEUTROPHILS NFR BLD: 76 % (ref 44–72)
NRBC # BLD AUTO: 0 K/UL
NRBC BLD-RTO: 0 /100 WBC
PLATELET # BLD AUTO: 221 K/UL (ref 164–446)
PMV BLD AUTO: 9.4 FL (ref 9–12.9)
POTASSIUM SERPL-SCNC: 4 MMOL/L (ref 3.6–5.5)
RBC # BLD AUTO: 3.86 M/UL (ref 4.7–6.1)
SODIUM SERPL-SCNC: 132 MMOL/L (ref 135–145)
WBC # BLD AUTO: 13.1 K/UL (ref 4.8–10.8)

## 2018-12-08 PROCEDURE — A9270 NON-COVERED ITEM OR SERVICE: HCPCS | Performed by: STUDENT IN AN ORGANIZED HEALTH CARE EDUCATION/TRAINING PROGRAM

## 2018-12-08 PROCEDURE — 87324 CLOSTRIDIUM AG IA: CPT

## 2018-12-08 PROCEDURE — 700102 HCHG RX REV CODE 250 W/ 637 OVERRIDE(OP): Performed by: STUDENT IN AN ORGANIZED HEALTH CARE EDUCATION/TRAINING PROGRAM

## 2018-12-08 PROCEDURE — 87493 C DIFF AMPLIFIED PROBE: CPT

## 2018-12-08 PROCEDURE — 700105 HCHG RX REV CODE 258: Performed by: STUDENT IN AN ORGANIZED HEALTH CARE EDUCATION/TRAINING PROGRAM

## 2018-12-08 PROCEDURE — 770020 HCHG ROOM/CARE - TELE (206)

## 2018-12-08 PROCEDURE — 99233 SBSQ HOSP IP/OBS HIGH 50: CPT | Mod: GC | Performed by: HOSPITALIST

## 2018-12-08 PROCEDURE — 85025 COMPLETE CBC W/AUTO DIFF WBC: CPT

## 2018-12-08 PROCEDURE — 700111 HCHG RX REV CODE 636 W/ 250 OVERRIDE (IP): Performed by: STUDENT IN AN ORGANIZED HEALTH CARE EDUCATION/TRAINING PROGRAM

## 2018-12-08 PROCEDURE — 700102 HCHG RX REV CODE 250 W/ 637 OVERRIDE(OP): Performed by: HOSPITALIST

## 2018-12-08 PROCEDURE — 80048 BASIC METABOLIC PNL TOTAL CA: CPT

## 2018-12-08 PROCEDURE — A9270 NON-COVERED ITEM OR SERVICE: HCPCS | Performed by: HOSPITALIST

## 2018-12-08 PROCEDURE — 36415 COLL VENOUS BLD VENIPUNCTURE: CPT

## 2018-12-08 RX ORDER — ONDANSETRON 4 MG/1
4 TABLET, ORALLY DISINTEGRATING ORAL EVERY 4 HOURS PRN
Status: DISCONTINUED | OUTPATIENT
Start: 2018-12-08 | End: 2018-12-09 | Stop reason: HOSPADM

## 2018-12-08 RX ORDER — TRAZODONE HYDROCHLORIDE 50 MG/1
50 TABLET ORAL
Status: DISCONTINUED | OUTPATIENT
Start: 2018-12-08 | End: 2018-12-09 | Stop reason: HOSPADM

## 2018-12-08 RX ORDER — ONDANSETRON 2 MG/ML
4 INJECTION INTRAMUSCULAR; INTRAVENOUS EVERY 4 HOURS PRN
Status: DISCONTINUED | OUTPATIENT
Start: 2018-12-08 | End: 2018-12-09 | Stop reason: HOSPADM

## 2018-12-08 RX ADMIN — FINASTERIDE 5 MG: 5 TABLET, FILM COATED ORAL at 05:50

## 2018-12-08 RX ADMIN — OMEPRAZOLE 40 MG: 20 CAPSULE, DELAYED RELEASE ORAL at 05:50

## 2018-12-08 RX ADMIN — TRAZODONE HYDROCHLORIDE 50 MG: 50 TABLET ORAL at 00:01

## 2018-12-08 RX ADMIN — TAMSULOSIN HYDROCHLORIDE 0.4 MG: 0.4 CAPSULE ORAL at 07:48

## 2018-12-08 RX ADMIN — SODIUM CHLORIDE: 9 INJECTION, SOLUTION INTRAVENOUS at 10:31

## 2018-12-08 RX ADMIN — LOPERAMIDE HYDROCHLORIDE 2 MG: 2 CAPSULE ORAL at 09:40

## 2018-12-08 RX ADMIN — SODIUM CHLORIDE: 9 INJECTION, SOLUTION INTRAVENOUS at 22:36

## 2018-12-08 RX ADMIN — ACETAMINOPHEN 500 MG: 500 TABLET ORAL at 00:01

## 2018-12-08 RX ADMIN — ACETAMINOPHEN 500 MG: 500 TABLET ORAL at 07:47

## 2018-12-08 RX ADMIN — CYANOCOBALAMIN TAB 500 MCG 1000 MCG: 500 TAB at 05:50

## 2018-12-08 RX ADMIN — ACETAMINOPHEN 500 MG: 500 TABLET ORAL at 20:32

## 2018-12-08 RX ADMIN — SODIUM CHLORIDE: 9 INJECTION, SOLUTION INTRAVENOUS at 00:01

## 2018-12-08 RX ADMIN — SERTRALINE 25 MG: 50 TABLET, FILM COATED ORAL at 05:50

## 2018-12-08 RX ADMIN — SULFAMETHOXAZOLE AND TRIMETHOPRIM 1 TABLET: 800; 160 TABLET ORAL at 17:29

## 2018-12-08 RX ADMIN — SULFAMETHOXAZOLE AND TRIMETHOPRIM 1 TABLET: 800; 160 TABLET ORAL at 05:50

## 2018-12-08 RX ADMIN — ACETAMINOPHEN 500 MG: 500 TABLET ORAL at 14:00

## 2018-12-08 RX ADMIN — FERROUS SULFATE TAB 325 MG (65 MG ELEMENTAL FE) 325 MG: 325 (65 FE) TAB at 07:47

## 2018-12-08 ASSESSMENT — ENCOUNTER SYMPTOMS
MYALGIAS: 1
SPEECH CHANGE: 0
DEPRESSION: 0
DIARRHEA: 1
ABDOMINAL PAIN: 0
CHILLS: 1
NAUSEA: 1
BRUISES/BLEEDS EASILY: 1
FEVER: 1
SHORTNESS OF BREATH: 0
PALPITATIONS: 0
DOUBLE VISION: 0
BLOOD IN STOOL: 0
VOMITING: 1
HEADACHES: 1
NERVOUS/ANXIOUS: 0
BLURRED VISION: 0
LOSS OF CONSCIOUSNESS: 0
SENSORY CHANGE: 0
POLYDIPSIA: 0
COUGH: 0
CONSTIPATION: 0
DIZZINESS: 0
FALLS: 1

## 2018-12-08 ASSESSMENT — PAIN SCALES - GENERAL
PAINLEVEL_OUTOF10: 0
PAINLEVEL_OUTOF10: 3
PAINLEVEL_OUTOF10: 5
PAINLEVEL_OUTOF10: 0
PAINLEVEL_OUTOF10: 4
PAINLEVEL_OUTOF10: 5

## 2018-12-08 NOTE — PROGRESS NOTES
Internal Medicine Interval Note  Note Author: Adiel Patel M.D.     Name Chidi Tatum Ruiz     1945   Age/Sex 73 y.o. male   MRN [id number]   Code Status full     After 5PM or if no immediate response to page, please call for cross-coverage  Attending/Team: Osiel/Justice See Patient List for primary contact information  Call [phone] to page    1st Call - Day Intern (R1):   Jorge 2nd Call - Day Sr. Resident (R2/R3):   Amanda         Reason for interval visit  (Principal Problem)   Nausea, vomiting, s/p fall      Interval Problem Daily Status Update  (24 hours, problem oriented, brief subjective history, new lab/imaging data pertinent to that problem)   -patient febrile and tachycardic overnight, requesting antiemetic and sleep aide last night, otherwise no new complaints     -diet advanced  -CT abd deferred as patient's abdominal exam benign  -PT/OT consulted  -patient still with diarrhea, C diff ordered      Review of Systems   Constitutional: Positive for chills and fever.   HENT: Negative for ear pain and hearing loss.    Eyes: Negative for blurred vision and double vision.   Respiratory: Negative for cough and shortness of breath.    Cardiovascular: Negative for chest pain and palpitations.   Gastrointestinal: Positive for diarrhea, nausea and vomiting. Negative for abdominal pain, blood in stool, constipation and melena.   Genitourinary: Negative for dysuria and hematuria.   Musculoskeletal: Positive for falls and myalgias. Negative for joint pain.   Skin: Negative for itching and rash.   Neurological: Positive for headaches. Negative for dizziness, sensory change, speech change and loss of consciousness.   Endo/Heme/Allergies: Negative for polydipsia. Bruises/bleeds easily.   Psychiatric/Behavioral: Negative for depression. The patient is not nervous/anxious.        Disposition/Barriers to discharge:   Clinical improvement    Consultants/Specialty  none  PCP: Bernardo Dasilva,  A.P.R.N.      Quality Measures  Quality-Core Measures   Reviewed items::  Labs reviewed and Medications reviewed  Barrios catheter::  No Barrios  DVT prophylaxis pharmacological::  Enoxaparin (Lovenox)          Physical Exam       Vitals:    12/08/18 0000 12/08/18 0400 12/08/18 0559 12/08/18 0800   BP: 114/62 126/68  150/79   Pulse: 98 88  96   Resp: (!) 21 20  18   Temp: 37.8 °C (100.1 °F) 36.9 °C (98.5 °F) 37.6 °C (99.7 °F) 37.8 °C (100 °F)   TempSrc: Temporal Temporal Temporal Temporal   SpO2: 95% 98%  90%   Weight:       Height:         Body mass index is 31.81 kg/m². Weight: 106.4 kg (234 lb 9.1 oz)  Oxygen Therapy:  Pulse Oximetry: 90 %, O2 (LPM): 0, O2 Delivery: None (Room Air)    Physical Exam   Constitutional: He is oriented to person, place, and time and well-developed, well-nourished, and in no distress. No distress.   HENT:   Head: Normocephalic and atraumatic.   Mouth/Throat: No oropharyngeal exudate.   Eyes: Pupils are equal, round, and reactive to light. Conjunctivae and EOM are normal. Right eye exhibits no discharge. Left eye exhibits no discharge. No scleral icterus.   Neck: Normal range of motion. Neck supple. No tracheal deviation present.   Cardiovascular: Normal rate, regular rhythm, normal heart sounds and intact distal pulses.  Exam reveals no gallop and no friction rub.    No murmur heard.  Pulmonary/Chest: Effort normal and breath sounds normal. No respiratory distress. He has no wheezes. He has no rales. He exhibits no tenderness.   Abdominal: Soft. Bowel sounds are normal. He exhibits no distension and no mass. There is no tenderness. There is no rebound and no guarding.   Musculoskeletal: Normal range of motion. He exhibits no edema, tenderness or deformity.   Neurological: He is alert and oriented to person, place, and time. He exhibits normal muscle tone. Coordination normal.   Skin: Skin is warm and dry. No rash noted. He is not diaphoretic. No erythema. No pallor.   Abrasions and  bruising to right lateral orbit, left knee and numerous small areas on extremities    Psychiatric: Mood and affect normal.             Assessment/Plan     * Sepsis (HCC)   Assessment & Plan    This is severe sepsis with the following associated acute organ dysfunction(s): acute kidney failure.   -SIRS 2/4 on admission with UTI shown on UA, previous urine cx on 11/19 growing Enterobacter aerogenes; leukocytosis  -previous admission on 11/18 for colitis/ileitis but treated and improved since then   -lactic acid initially elevated, normalized  -received meropenem, IVF bolus in ED    -telemetry  -blood Cx x2 NGTD  -strict I/O  -IVF maintenance  -bactrim for UA due to sensitivities from prior Cx; will follow current cultures       Urinary tract infection, site not specified- (present on admission)   Assessment & Plan    -patient with n/v/fv/HA, not resolved after treatment for ileitis/colitis on last admission on 11/18, UTI asymptomatic but prior cultures growing enterobacter aerogenes  -current cultures pending    -bactrim (due to sensitivities)     Diarrhea   Assessment & Plan    -patient was taking loperamide with relief but stool started to harden so he held medication over the past few days; loose but not watery; C diff negative on 11/19  -stool WBC negative, lactoferrin pending    -C diff pending     Fall from ground level   Assessment & Plan    -patient s/p fall after losing balance while vomiting, possibly vasovagal/hypovolemia; no LOC but with head trauma  -Abrasions and bruising to right lateral orbit, left knee and numerous small areas on extremities  -CT head, no acute processes; CT c-spine/maxillofascial, no fractures; XR left knee/CXR, no fracture  -orthostatics; 180s supine, 120s sitting  -Tdap given in ED    -IVF  -PT/OT     Acute renal failure superimposed on stage 3 chronic kidney disease (HCC)- (present on admission)   Assessment & Plan    -Cr 2.22 on admit, baseline around 1.3-1.4 around June/July  this year  -lisinopril held  -avoid nephrotoxic drugs     Essential hypertension- (present on admission)   Assessment & Plan    -holding lisinopril (nephrotoxic) and HCTZ  -monitor for need of reinstating meds     Anxiety   Assessment & Plan    -continue home dose sertraline     Hyponatremia   Assessment & Plan    -mild, asymptomatic  -monitor for symptoms     Hypokalemia   Assessment & Plan    -monitor and replete     BPH (benign prostatic hyperplasia)- (present on admission)   Assessment & Plan    -continue home dose tamsulosin, finasteride     GERD (gastroesophageal reflux disease)- (present on admission)   Assessment & Plan    -continue home nexium     Iron deficiency anemia- (present on admission)   Assessment & Plan    -iron deficiency anemia seen on labs done on 11/20    -continue home iron supplementation

## 2018-12-08 NOTE — SENIOR ADMIT NOTE
[facility] [place] INTERNAL MEDICINE SENIOR ADMIT NOTE:    Patient ID:   Name:             Chidi Tatum   YOB: 1945  Age:                 73 y.o.  male   MRN:               [id number]                                                          Chief Complaint:       Ground-level fall after ongoing nausea and vomiting    History of Present Illness:    Mr. Tatum is a 73 y.o. male with a PMHx of small bowel obstruction, chronic kidney disease, anemia, recent hospitalization for sepsis that presented after experiencing a ground-level fall the day of presentation.    2.5 weeks prior, the patient admitted for the treatment of sepsis due to infective colitis or even possibly urinary tract infection.  At that time he complained of fever, chills, nausea, vomiting, diarrhea, abdominal pain.  C. difficile was negative.  CT renal showed possible right colitis/ileitis.  The patient received IV fluids per sepsis protocol, ceftriaxone and metronidazole.  Urine cultures grew Enterobacter that was intermediately sensitive to ceftriaxone.  Despite this, the patient improved on ceftriaxone and he was believed to have asymptomatic bacteriuria.    At discharge the patient had  improvement of his symptoms, but they did not fully resolve.  He continued to have fever, chills, nausea, vomiting, diarrhea abdominal pain.  In the past several days nausea and vomiting increased and on the day of presentation he felt nauseous, vomited, and then fell to the ground striking his head.  He denied any lightheadedness, loss of consciousness, heart palpitation.  Vomiting has been nonbloody, food contents.  Diarrhea has been loose, but not watery, nonbloody.  He denies dysuria, urinary frequency, flank pain, suprapubic tenderness.  No abdominal pain.       ED orders: On admission tachycardic, afebrile.  Sepsis bolus of 3.1 L.  Meropenem 500 mg once    ROS: Positive for fever, nausea, vomiting, diarrhea.  EX: Abrasions on his right face, left  knee.  Otherwise normal physical exam.  No CVA tenderness.  No abdominal tenderness.  Reduced bowel sounds, no high pitch tinkling.  LABS: Leukocytosis, lactic acid 2.6, microcytic anemia.  Hyponatremia, hypokalemia, metabolic acidosis.  ZAIN.  Urinalysis showed occult blood, large leukocyte esterase, positive nitrates, packed WBCs, many bacteria  IMAGING:   -CT C-spine: No fractures no subluxation  -CT head: No acute intracranial hemorrhage or infarct  -CT maxillofacial: Right facial and periorbital soft tissue swelling, no fractures.  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[facility]) 05/18/2018   • Pre-diabetes 05/18/2018   • Gross hematuria 05/18/2018   • Small bowel obstruction ([facility]) 07/07/2018   • Anemia 07/07/2018   • Cough 07/13/2018   • Penile rash 07/13/2018   • Tooth infection 08/22/2018     Resolved Ambulatory Problems     Diagnosis Date Noted   • Hematuria 06/03/2014   • Fatigue 04/13/2016   • Myalgia 04/13/2016   • Left foot pain 08/02/2016   • Gastroparesis 09/06/2016   • Nasal congestion 09/01/2017   • Right temporal headache 01/09/2018   • Elevated sedimentation rate 01/09/2018   • Yeast cystitis 01/09/2018   • Postoperative pain 02/09/2018   • Thrush, oral 03/16/2018   • Shortness of breath 04/12/2018   • Leukocytosis 04/12/2018   • Tooth pain 07/03/2018   • Urinary tract infection without hematuria 07/07/2018   • Lactic acidosis 07/07/2018   • Left lower quadrant pain  11/18/2018   • Acute renal failure superimposed on stage 3 chronic kidney disease (HCC) 11/18/2018     Past Medical History:   Diagnosis Date   • BPH (benign prostatic hyperplasia)    • GERD (gastroesophageal reflux disease)    • HTN (hypertension)    • Hypertension    • Insomnia    • SBO (small bowel obstruction) ([facility])        PHYSICAL EXAM  Vitals:   Weight/BMI: Body mass index is 31.06 kg/m².  Blood pressure 138/76, pulse 96, temperature 37.3 °C (99.1 °F), temperature source Temporal, resp. rate 17, height 1.829 m (6'), weight 103.9 kg (229 lb), SpO2 96 %.  Vitals:    12/07/18 1146 12/07/18 1200 12/07/18 1530 12/07/18 1545   BP:       Pulse: 96      Resp:       Temp:       TempSrc:       SpO2: 94% 94% 95% 96%   Weight:       Height:         Oxygen Therapy:  Pulse Oximetry: 96 %      IMPRESSION  The patient is a 73-year-old male recently hospitalized for sepsis due to either infective colitis or UTI, presented with similar symptoms, including nausea vomiting, which precipitated a ground-level fall.  Patient is once again septic with urinalysis suggestive of UTI.    ASSESSMENT:  -Sepsis   -Likely due to UTI   -Presented with Sirs 3 out of 4 for tachycardia and leukocytosis.  Patient became febrile on floor   -ED provided 3.1 L bolus of normal and meropenem   -History of sepsis 2-1/2 wks prior due to either infective colitis or UTI   -Considering similar sources for this admission   -Lungs sound clear, no indication of infective process on chest x-ray   -Last echo 4/13/2018 showed normal L LV function, EF 65%, normal diastolic function  -Hypovolemia   -Presented with vomiting and diarrhea   -Orthostatic hypotension on floor after 3.1 L bolus positive.  Laying systolic BP was 180s.     Sitting systolic BP was 120  -Ground-level fall   -Likely due to combination of baseline weakness, vomiting, hypovolemia.   -Orthostatic hypotension positive.  (see above)  -Diarrhea   -Loose, not watery.  Last bowel movement was firm  the day of presentation was firm   -Negative C. difficile 11/19/2008   -At home on Imodium  -Abnormal urinalysis   -Current urinalysis indicative of infection.   -Urinalysis on 11/18/2018 also indicative of infection.   -Urine culture 11/19/2018 grew Enterobacter aerogenes, resistant to ampicillin,   piperacillin, immediate resistance to ceftriaxone  -Microcytic anemia   -Admitting hemoglobin 12.4, MCV 80.7   -Patient refused rectal exam   -Iron panel on 11/20/2018 showed picture of iron deficiency anemia   -at home on oral iron.  -ZAIN on Stage III chronic kidney disease   -Admitting creatinine 2.2    -Baseline creatinine 1.5   -Presented similarly 2.5 months ago  -Chronic Metabolic acidosis   -On admission bicarb 17, anion gap 12.0   -Presented similarly on 11/20/2018   -Base line BC 18.  -History of small bowel obstruction   -admitted 7/6/2018, responded to NGT suction, no laprotomy.  -Chronic hematuria   -New Ua shows hematuria.   -Primary care referred to nephrology, but patient never saw nephroolgist  -Essential hypertension    -At home on lisinopril  -Chronic hypokalemia   -At home on potassium  -GERD   -At home on   -Benign prostatic hyperplasia   -at home on tamsulosin and finasteride  -Chronic left-sided low back pain    PLAN:  -Admit to telemetry  -Continuous IV fluids: Normal saline at 125 mL/h  -Started Bactrium  repeating potassium  -Tylenol for fever.  -Fecal occult pending  -Repeat lactic acid  -Hold abdominal pelvic CT with contrast for now.  If patient doesn't improve, will order tomorrow  -Blood cultures pending  -Hold lisinopril, continue other home medications.    Please refer to Interns Dr. Patel H&P for complete admission details.

## 2018-12-08 NOTE — ASSESSMENT & PLAN NOTE
-patient was taking loperamide with relief but stool started to harden so he held medication over the past few days; loose but not watery; C diff negative on 11/19  -stool WBC negative, lactoferrin pending    -C diff pending

## 2018-12-08 NOTE — PROGRESS NOTES
2 RN skin check:    Abrasions to above and below right eye and left knee. Bottom is red and blanching. Otherwise skin is intact.

## 2018-12-08 NOTE — ASSESSMENT & PLAN NOTE
This is severe sepsis with the following associated acute organ dysfunction(s): acute kidney failure.   -SIRS 2/4 on admission with UTI shown on UA, previous urine cx on 11/19 growing Enterobacter aerogenes; leukocytosis  -previous admission on 11/18 for colitis/ileitis but treated and improved since then   -lactic acid initially elevated, normalized  -received meropenem, IVF bolus in ED    -telemetry  -blood Cx x2 NGTD  -strict I/O  -IVF maintenance  -bactrim for UA due to sensitivities from prior Cx; will follow current cultures

## 2018-12-08 NOTE — H&P
Internal Medicine Admitting History and Physical    Note Author: Adiel Patel M.D.       Name Chidi Tatum Ruiz     1945   Age/Sex 73 y.o. male   MRN [id number]   Code Status Full     After 5PM or if no immediate response to page, please call for cross-coverage  Attending/Team: Osiel/Justice See Patient List for primary contact information  Call [phone] to page    1st Call - Day Intern (R1):   Jorge 2nd Call - Day Sr. Resident (R2/R3):   Ting       Chief Complaint:   Nausea, vomiting, s/p fall    HPI:  73M, PMH of CKD, HTN, BPH, GERD, anxiety, recent hospitalization for possible colitis/ileitis; presents s/p fall with nausea and vomiting. Patient was dropping off his grandson at school earlier today and had a ground level fall after losing his balance while having nausea and vomiting. Patient reports continued symptoms of intermittent nausea, fever, flank/back pain, headaches present since his last admission and worsening over the past few days. Vomiting only started today. Fever over the past two days was 101.9 and then 100.7 yesterday. He also has had diarrhea described as loose but not watery stools treated with loperamide but patient started holding it a few days ago when his stools started to become hard. He does not report any loss of consciousness during or preceding the fall.    He was recently hospitalized on  for presumed colitis/ileitis and was treated with ceftriaxone and metronidazole transitioned to Augmentin. He also had asymptomatic bacteruria growing enterobacter aerogenes. He was C diff negative at that time.    Review of Systems   Constitutional: Positive for chills and fever.   HENT: Negative for ear pain, hearing loss, sinus pain and sore throat.    Eyes: Negative for blurred vision and double vision.   Respiratory: Negative for cough, shortness of breath and wheezing.    Cardiovascular: Negative for chest pain and palpitations.   Gastrointestinal: Positive for diarrhea,  nausea and vomiting. Negative for abdominal pain, blood in stool, constipation and melena.   Genitourinary: Negative for dysuria, flank pain, frequency and hematuria.   Musculoskeletal: Positive for falls and myalgias. Negative for joint pain.   Skin: Negative for itching and rash.   Neurological: Positive for headaches. Negative for dizziness, sensory change, speech change and loss of consciousness.   Endo/Heme/Allergies: Negative for polydipsia. Bruises/bleeds easily.   Psychiatric/Behavioral: Negative for depression. The patient is not nervous/anxious.              Past Medical History (Chronic medical problem, known complications and current treatment)    CKD, HTN, BPH, GERD, anxiety, recent hospitalization for possible colitis/ileitis     Past Surgical History:  Past Surgical History:   Procedure Laterality Date   • TEMPORAL ARTERY BIOPSY Right 2/9/2018    Procedure: TEMPORAL ARTERY BIOPSY;  Surgeon: Bryant Corey M.D.;  Location: SURGERY [facility];  Service: Vascular   • HERNIA REPAIR      umbilical, groin        Current Outpatient Medications:  Home Medications     Reviewed by Jessica Byers, Pharmacy Intern (Pharmacy Intern) on 12/07/18 at 1256  Med List Status: Complete   Medication Last Dose Status   cyanocobalamin (VITAMIN B12) 1000 MCG Tab 12/6/2018 Active   esomeprazole (NEXIUM) 40 MG delayed-release capsule 12/6/2018 Active   ferrous sulfate 325 (65 Fe) MG tablet 12/6/2018 Active   finasteride (PROSCAR) 5 MG Tab 12/6/2018 Active   hydroCHLOROthiazide (HYDRODIURIL) 25 MG Tab 12/6/2018 Active   lisinopril (PRINIVIL) 10 MG Tab 12/6/2018 Active   loperamide (IMODIUM) 2 MG Cap PRN Active   Potassium (POTASSIMIN PO) 12/6/2018 Active   sertraline (ZOLOFT) 25 MG tablet 12/6/2018 Active   tamsulosin (FLOMAX) 0.4 MG capsule 12/6/2018 Active                Medication Allergy/Sensitivities:  Allergies   Allergen Reactions   • Celebrex [Celecoxib] Rash     .         Family History (mandatory)   Family  History   Problem Relation Age of Onset   • Lung Disease Father    • Alcohol/Drug Brother    • Lung Disease Brother        Social History (mandatory)   Social History     Social History   • Marital status:      Spouse name: N/A   • Number of children: N/A   • Years of education: N/A     Occupational History   • Not on file.     Social History Main Topics   • Smoking status: Never Smoker   • Smokeless tobacco: Never Used   • Alcohol use No   • Drug use: No   • Sexual activity: No     Other Topics Concern   • Not on file     Social History Narrative   • No narrative on file     Living situation: lives in [place] in a house with his family (daughter, grandson, etc.); currently retired  PCP : SAM Moya    Physical Exam     Vitals:    12/07/18 1700 12/07/18 1800 12/07/18 1815 12/07/18 1818   BP:    123/82   Pulse: 86 94  (!) 120   Resp: 16 (!) 7  18   Temp: 36.9 °C (98.4 °F)  (!) 38.8 °C (101.9 °F) (!) 38.6 °C (101.5 °F)   TempSrc:   Temporal Oral   SpO2: 94% 95%  92%   Weight:    105.9 kg (233 lb 7.5 oz)   Height:    1.829 m (6')     Body mass index is 31.66 kg/m².  /82   Pulse (!) 120   Temp (!) 38.6 °C (101.5 °F) (Oral) Comment: oral  Resp 18   Ht 1.829 m (6')   Wt 105.9 kg (233 lb 7.5 oz)   SpO2 92%   BMI 31.66 kg/m²   O2 therapy: Pulse Oximetry: 92 %, O2 Delivery: None (Room Air)    Physical Exam   Constitutional: He is oriented to person, place, and time and well-developed, well-nourished, and in no distress. No distress.   HENT:   Head: Normocephalic and atraumatic.   Mouth/Throat: Oropharynx is clear and moist. No oropharyngeal exudate.   MP3   Eyes: Pupils are equal, round, and reactive to light. Conjunctivae and EOM are normal. Right eye exhibits no discharge. Left eye exhibits no discharge. No scleral icterus.   Neck: Normal range of motion. Neck supple. No tracheal deviation present.   Cardiovascular: Normal rate, regular rhythm, normal heart sounds and intact  "distal pulses.  Exam reveals no gallop and no friction rub.    No murmur heard.  Pulmonary/Chest: Effort normal and breath sounds normal. No respiratory distress. He has no wheezes. He has no rales. He exhibits no tenderness.   Abdominal: Soft. Bowel sounds are normal. He exhibits no distension and no mass. There is no tenderness. There is no rebound and no guarding.   No CVA tenderness   Genitourinary:   Genitourinary Comments: CODEY refused, explained the indications for testing and possible complications of untreated GI bleed including morbidity and mortality; patient still endorsed he \"would rather not do that\"   Musculoskeletal: Normal range of motion. He exhibits no edema, tenderness or deformity.   No spinal/paraspinal tenderness   Neurological: He is alert and oriented to person, place, and time. No cranial nerve deficit. He exhibits normal muscle tone. Coordination normal.   NIHSS0, CN2-12 intact   Skin: Skin is warm and dry. No rash noted. He is not diaphoretic. No erythema. No pallor.   Abrasions and bruising to right lateral orbit, left knee and numerous small areas on extremities   Psychiatric: Mood and affect normal.         Data Review       Old Records Request:   Deferred  Current Records review/summary: Completed    Lab Data Review:  Recent Results (from the past 24 hour(s))   CBC WITH DIFFERENTIAL    Collection Time: 12/07/18 11:26 AM   Result Value Ref Range    WBC 16.0 (H) 4.8 - 10.8 K/uL    RBC 4.62 (L) 4.70 - 6.10 M/uL    Hemoglobin 12.4 (L) 14.0 - 18.0 g/dL    Hematocrit 37.3 (L) 42.0 - 52.0 %    MCV 80.7 (L) 81.4 - 97.8 fL    MCH 26.8 (L) 27.0 - 33.0 pg    MCHC 33.2 (L) 33.7 - 35.3 g/dL    RDW 47.6 35.9 - 50.0 fL    Platelet Count 322 164 - 446 K/uL    MPV 9.7 9.0 - 12.9 fL    Neutrophils-Polys 85.30 (H) 44.00 - 72.00 %    Lymphocytes 5.20 (L) 22.00 - 41.00 %    Monocytes 8.50 0.00 - 13.40 %    Eosinophils 0.20 0.00 - 6.90 %    Basophils 0.20 0.00 - 1.80 %    Immature Granulocytes 0.60 0.00 - " 0.90 %    Nucleated RBC 0.00 /100 WBC    Neutrophils (Absolute) 13.68 (H) 1.82 - 7.42 K/uL    Lymphs (Absolute) 0.83 (L) 1.00 - 4.80 K/uL    Monos (Absolute) 1.36 (H) 0.00 - 0.85 K/uL    Eos (Absolute) 0.03 0.00 - 0.51 K/uL    Baso (Absolute) 0.03 0.00 - 0.12 K/uL    Immature Granulocytes (abs) 0.10 0.00 - 0.11 K/uL    NRBC (Absolute) 0.00 K/uL   COMP METABOLIC PANEL    Collection Time: 12/07/18 11:26 AM   Result Value Ref Range    Sodium 131 (L) 135 - 145 mmol/L    Potassium 3.5 (L) 3.6 - 5.5 mmol/L    Chloride 102 96 - 112 mmol/L    Co2 17 (L) 20 - 33 mmol/L    Anion Gap 12.0 (H) 0.0 - 11.9    Glucose 153 (H) 65 - 99 mg/dL    Bun 26 (H) 8 - 22 mg/dL    Creatinine 2.22 (H) 0.50 - 1.40 mg/dL    Calcium 9.6 8.5 - 10.5 mg/dL    AST(SGOT) 11 (L) 12 - 45 U/L    ALT(SGPT) 11 2 - 50 U/L    Alkaline Phosphatase 83 30 - 99 U/L    Total Bilirubin 0.6 0.1 - 1.5 mg/dL    Albumin 3.7 3.2 - 4.9 g/dL    Total Protein 7.7 6.0 - 8.2 g/dL    Globulin 4.0 (H) 1.9 - 3.5 g/dL    A-G Ratio 0.9 g/dL   LIPASE    Collection Time: 12/07/18 11:26 AM   Result Value Ref Range    Lipase 16 11 - 82 U/L   TROPONIN    Collection Time: 12/07/18 11:26 AM   Result Value Ref Range    Troponin I <0.01 0.00 - 0.04 ng/mL   BTYPE NATRIURETIC PEPTIDE    Collection Time: 12/07/18 11:26 AM   Result Value Ref Range    B Natriuretic Peptide 10 0 - 100 pg/mL   LACTIC ACID    Collection Time: 12/07/18 11:26 AM   Result Value Ref Range    Lactic Acid 2.6 (H) 0.5 - 2.0 mmol/L   ESTIMATED GFR    Collection Time: 12/07/18 11:26 AM   Result Value Ref Range    GFR If African American 35 (A) >60 mL/min/1.73 m 2    GFR If Non  29 (A) >60 mL/min/1.73 m 2   URINALYSIS (UA)    Collection Time: 12/07/18 12:36 PM   Result Value Ref Range    Color Yellow     Character Clear     Specific Gravity 1.020 <1.035    Ph 6.0 5.0 - 8.0    Glucose Negative Negative mg/dL    Ketones Negative Negative mg/dL    Protein 30 (A) Negative mg/dL    Bilirubin Negative Negative     Urobilinogen, Urine 0.2 Negative    Nitrite Positive (A) Negative    Leukocyte Esterase Large (A) Negative    Occult Blood Small (A) Negative    Micro Urine Req Microscopic    URINE MICROSCOPIC (W/UA)    Collection Time: 12/07/18 12:36 PM   Result Value Ref Range    WBC Packed (A) /hpf    RBC 2-5 (A) /hpf    Bacteria Many (A) None /hpf    Epithelial Cells Rare /hpf   AMMONIA    Collection Time: 12/07/18  1:37 PM   Result Value Ref Range    Ammonia 27 11 - 45 umol/L   STOOL WBC'S    Collection Time: 12/07/18  4:10 PM   Result Value Ref Range    Stool WBC's None seen None seen   LACTIC ACID    Collection Time: 12/07/18  4:44 PM   Result Value Ref Range    Lactic Acid 1.9 0.5 - 2.0 mmol/L       Imaging/Procedures Review:    Independant Imaging Review: Completed  DX-KNEE COMPLETE 4+ LEFT   Final Result      No acute fracture. Degenerative changes.      DX-CHEST-PORTABLE (1 VIEW)   Final Result      Lung base linear atelectasis. No consolidation.      CT-MAXILLOFACIAL W/O PLUS RECONS   Final Result      1.  RIGHT facial and periorbital soft tissue swelling.   2.  No facial fracture.   3.  Chronic paranasal sinus disease.      CT-CSPINE WITHOUT PLUS RECONS   Final Result      1.  Straightening and moderate degenerative change of cervical spine.   2.  No fracture or subluxation.      CT-HEAD W/O   Final Result      1.  Diffuse atrophy and white matter changes.   2.  No acute intracranial hemorrhage or territorial infarct.   3.  Chronic paranasal sinus disease.               EKG:   EKG Independant Review: Completed  QTc:419, HR: 85, Normal Sinus Rhythm, no ST/T changes     Records reviewed and summarized in current documentation :  Yes  [employer] teaching service handout given to patient:  Yes         Assessment/Plan     * Sepsis (HCC)   Assessment & Plan    This is severe sepsis with the following associated acute organ dysfunction(s): acute kidney failure.   -SIRS 2/4 on admission with UTI shown on UA, previous urine cx on  11/19 growing Enterobacter aerogenes; leukocytosis  -previous admission on 11/18 for colitis/ileitis but treated and improved since then   -lactic acid initially elevated, normalized  -received meropenem, IVF bolus in ED    -telemetry  -blood Cx x2  -strict I/O  -CT abd/plv  -IVF maintenance  -bactrim for UA due to sensitivities from prior Cx; will follow current cultures       Urinary tract infection, site not specified- (present on admission)   Assessment & Plan    -patient with n/v/fv/HA, not resolved after treatment for ileitis/colitis on last admission on 11/18, UTI asymptomatic but prior cultures growing enterobacter aerogenes  -current cultures pending    -bactrim (due to sensitivities)     Diarrhea   Assessment & Plan    -patient was taking loperamide with relief but stool started to harden so he held medication over the past few days; loose but not watery; C diff negative on 11/19    -stool WBC/lactoferrin     Fall from ground level   Assessment & Plan    -patient s/p fall after losing balance while vomiting, possibly vasovagal/hypovolemia; no LOC but with head trauma  -Abrasions and bruising to right lateral orbit, left knee and numerous small areas on extremities  -CT head, no acute processes; CT c-spine/maxillofascial, no fractures; XR left knee/CXR, no fracture  -orthostatics; 180s supine, 120s sitting  -Tdap given in ED    -IVF     Acute renal failure superimposed on stage 3 chronic kidney disease (HCC)- (present on admission)   Assessment & Plan    -Cr 2.22 on admit, baseline around 1.3-1.4 around June/July this year  -lisinopril held  -avoid nephrotoxic drugs     Essential hypertension- (present on admission)   Assessment & Plan    -holding lisinopril (nephrotoxic) and HCTZ  -monitor for need of reinstating meds     Anxiety   Assessment & Plan    -continue home dose sertraline     Hyponatremia   Assessment & Plan    -mild, asymptomatic  -monitor for symptoms     Hypokalemia   Assessment & Plan     -monitor and replete     BPH (benign prostatic hyperplasia)- (present on admission)   Assessment & Plan    -continue home dose tamsulosin, finasteride     GERD (gastroesophageal reflux disease)- (present on admission)   Assessment & Plan    -continue home nexium     Iron deficiency anemia- (present on admission)   Assessment & Plan    -iron deficiency anemia seen on labs done on 11/20    -continue home iron supplementation         Anticipated Hospital stay:  >2 midnights        Quality Measures  Quality-Core Measures   Reviewed items::  Labs reviewed, Medications reviewed, Radiology images reviewed and EKG reviewed  Barrios catheter::  No Barrios  DVT prophylaxis pharmacological::  Enoxaparin (Lovenox)    PCP: SAM Moya

## 2018-12-08 NOTE — ASSESSMENT & PLAN NOTE
-patient s/p fall after losing balance while vomiting, possibly vasovagal/hypovolemia; no LOC but with head trauma  -Abrasions and bruising to right lateral orbit, left knee and numerous small areas on extremities  -CT head, no acute processes; CT c-spine/maxillofascial, no fractures; XR left knee/CXR, no fracture  -orthostatics; 180s supine, 120s sitting; likely from hypovolemia  -r/p orthostatics negative on discharge, supine 123/70, sitting 133/66, standing 145/68  -Tdap given in ED    -IVF  -PT/OT

## 2018-12-08 NOTE — ASSESSMENT & PLAN NOTE
-Cr 2.22 on admit, baseline around 1.3-1.4 around June/July this year  -lisinopril held  -avoid nephrotoxic drugs

## 2018-12-08 NOTE — ASSESSMENT & PLAN NOTE
-patient with n/v/fv/HA, not resolved after treatment for ileitis/colitis on last admission on 11/18, UTI asymptomatic but prior cultures growing enterobacter aerogenes  -current Urine Cx, Lactose fermenting Gram negative bhavin     -bactrim (due to sensitivities)

## 2018-12-08 NOTE — PROGRESS NOTES
Pt to T724 bed2 at this time. Skin assessed (please see note). AO x4, SR/ST .    MD at bedside. Pt daughter and pt updated on POC.    No questions at this time, pt oriented to room. Bed alarm on, bed in low position.

## 2018-12-08 NOTE — DISCHARGE PLANNING
Anticipated Discharge Disposition: Home    Action: Dtjuan pablo Sherwood requesting information on how to become a paid caregiver for her father.  RN ZAY left [facility] caregiver guide and contact information for the aging and disability office.     Barriers to Discharge: medical clearance    Plan: Home

## 2018-12-08 NOTE — PROGRESS NOTES
Bedside report received. Pt resting with family at bedside. No signs of distress. Tele box on. Call light within reach. Bed alarm on, bed locked/low position. White board updated.

## 2018-12-08 NOTE — CARE PLAN
Problem: Safety  Goal: Will remain free from injury    Intervention: Provide assistance with mobility  Call for help, 1 RN assist, front wheel walker, bed alarm on

## 2018-12-08 NOTE — CARE PLAN
Problem: Infection  Goal: Will remain free from infection    Intervention: Assess signs and symptoms of infection  Hand hygiene, standard precautions, educate on infection prevention

## 2018-12-09 VITALS
WEIGHT: 241.4 LBS | SYSTOLIC BLOOD PRESSURE: 139 MMHG | DIASTOLIC BLOOD PRESSURE: 68 MMHG | HEART RATE: 74 BPM | TEMPERATURE: 98.6 F | HEIGHT: 72 IN | BODY MASS INDEX: 32.7 KG/M2 | RESPIRATION RATE: 18 BRPM | OXYGEN SATURATION: 96 %

## 2018-12-09 LAB
ANION GAP SERPL CALC-SCNC: 8 MMOL/L (ref 0–11.9)
BACTERIA UR CULT: ABNORMAL
BACTERIA UR CULT: ABNORMAL
BASOPHILS # BLD AUTO: 0.2 % (ref 0–1.8)
BASOPHILS # BLD: 0.02 K/UL (ref 0–0.12)
BUN SERPL-MCNC: 21 MG/DL (ref 8–22)
CALCIUM SERPL-MCNC: 8.7 MG/DL (ref 8.5–10.5)
CHLORIDE SERPL-SCNC: 108 MMOL/L (ref 96–112)
CO2 SERPL-SCNC: 18 MMOL/L (ref 20–33)
CREAT SERPL-MCNC: 1.56 MG/DL (ref 0.5–1.4)
EOSINOPHIL # BLD AUTO: 0.19 K/UL (ref 0–0.51)
EOSINOPHIL NFR BLD: 2 % (ref 0–6.9)
ERYTHROCYTE [DISTWIDTH] IN BLOOD BY AUTOMATED COUNT: 49.6 FL (ref 35.9–50)
GLUCOSE SERPL-MCNC: 111 MG/DL (ref 65–99)
HCT VFR BLD AUTO: 30.9 % (ref 42–52)
HGB BLD-MCNC: 10 G/DL (ref 14–18)
IMM GRANULOCYTES # BLD AUTO: 0.04 K/UL (ref 0–0.11)
IMM GRANULOCYTES NFR BLD AUTO: 0.4 % (ref 0–0.9)
LACTOFERRIN STL QL IA: NEGATIVE
LYMPHOCYTES # BLD AUTO: 1.96 K/UL (ref 1–4.8)
LYMPHOCYTES NFR BLD: 21 % (ref 22–41)
MCH RBC QN AUTO: 26.7 PG (ref 27–33)
MCHC RBC AUTO-ENTMCNC: 32.4 G/DL (ref 33.7–35.3)
MCV RBC AUTO: 82.6 FL (ref 81.4–97.8)
MONOCYTES # BLD AUTO: 0.86 K/UL (ref 0–0.85)
MONOCYTES NFR BLD AUTO: 9.2 % (ref 0–13.4)
NEUTROPHILS # BLD AUTO: 6.26 K/UL (ref 1.82–7.42)
NEUTROPHILS NFR BLD: 67.2 % (ref 44–72)
NRBC # BLD AUTO: 0 K/UL
NRBC BLD-RTO: 0 /100 WBC
PLATELET # BLD AUTO: 193 K/UL (ref 164–446)
PMV BLD AUTO: 9.6 FL (ref 9–12.9)
POTASSIUM SERPL-SCNC: 3.8 MMOL/L (ref 3.6–5.5)
RBC # BLD AUTO: 3.74 M/UL (ref 4.7–6.1)
SIGNIFICANT IND 70042: ABNORMAL
SITE SITE: ABNORMAL
SODIUM SERPL-SCNC: 134 MMOL/L (ref 135–145)
SOURCE SOURCE: ABNORMAL
WBC # BLD AUTO: 9.3 K/UL (ref 4.8–10.8)

## 2018-12-09 PROCEDURE — 700102 HCHG RX REV CODE 250 W/ 637 OVERRIDE(OP): Performed by: STUDENT IN AN ORGANIZED HEALTH CARE EDUCATION/TRAINING PROGRAM

## 2018-12-09 PROCEDURE — 700111 HCHG RX REV CODE 636 W/ 250 OVERRIDE (IP): Performed by: STUDENT IN AN ORGANIZED HEALTH CARE EDUCATION/TRAINING PROGRAM

## 2018-12-09 PROCEDURE — A9270 NON-COVERED ITEM OR SERVICE: HCPCS | Performed by: STUDENT IN AN ORGANIZED HEALTH CARE EDUCATION/TRAINING PROGRAM

## 2018-12-09 PROCEDURE — 80048 BASIC METABOLIC PNL TOTAL CA: CPT

## 2018-12-09 PROCEDURE — A9270 NON-COVERED ITEM OR SERVICE: HCPCS | Performed by: HOSPITALIST

## 2018-12-09 PROCEDURE — 36415 COLL VENOUS BLD VENIPUNCTURE: CPT

## 2018-12-09 PROCEDURE — 700102 HCHG RX REV CODE 250 W/ 637 OVERRIDE(OP): Performed by: HOSPITALIST

## 2018-12-09 PROCEDURE — 99239 HOSP IP/OBS DSCHRG MGMT >30: CPT | Mod: GC | Performed by: HOSPITALIST

## 2018-12-09 PROCEDURE — 85025 COMPLETE CBC W/AUTO DIFF WBC: CPT

## 2018-12-09 PROCEDURE — 700105 HCHG RX REV CODE 258: Performed by: STUDENT IN AN ORGANIZED HEALTH CARE EDUCATION/TRAINING PROGRAM

## 2018-12-09 RX ORDER — SULFAMETHOXAZOLE AND TRIMETHOPRIM 800; 160 MG/1; MG/1
1 TABLET ORAL EVERY 12 HOURS
Qty: 10 TAB | Refills: 0 | Status: SHIPPED | OUTPATIENT
Start: 2018-12-09 | End: 2018-12-11

## 2018-12-09 RX ORDER — SULFAMETHOXAZOLE AND TRIMETHOPRIM 800; 160 MG/1; MG/1
1 TABLET ORAL EVERY 12 HOURS
Qty: 10 TAB | Refills: 0 | Status: SHIPPED | OUTPATIENT
Start: 2018-12-09 | End: 2018-12-09

## 2018-12-09 RX ADMIN — SULFAMETHOXAZOLE AND TRIMETHOPRIM 1 TABLET: 800; 160 TABLET ORAL at 04:53

## 2018-12-09 RX ADMIN — CYANOCOBALAMIN TAB 500 MCG 1000 MCG: 500 TAB at 04:53

## 2018-12-09 RX ADMIN — FINASTERIDE 5 MG: 5 TABLET, FILM COATED ORAL at 04:53

## 2018-12-09 RX ADMIN — SODIUM CHLORIDE: 9 INJECTION, SOLUTION INTRAVENOUS at 09:40

## 2018-12-09 RX ADMIN — OMEPRAZOLE 40 MG: 20 CAPSULE, DELAYED RELEASE ORAL at 04:54

## 2018-12-09 RX ADMIN — TAMSULOSIN HYDROCHLORIDE 0.4 MG: 0.4 CAPSULE ORAL at 09:34

## 2018-12-09 RX ADMIN — SERTRALINE 25 MG: 50 TABLET, FILM COATED ORAL at 04:54

## 2018-12-09 RX ADMIN — FERROUS SULFATE TAB 325 MG (65 MG ELEMENTAL FE) 325 MG: 325 (65 FE) TAB at 09:34

## 2018-12-09 RX ADMIN — ENOXAPARIN SODIUM 30 MG: 100 INJECTION SUBCUTANEOUS at 04:59

## 2018-12-09 ASSESSMENT — PAIN SCALES - GENERAL
PAINLEVEL_OUTOF10: 0
PAINLEVEL_OUTOF10: 0

## 2018-12-09 ASSESSMENT — ENCOUNTER SYMPTOMS
NERVOUS/ANXIOUS: 0
BLURRED VISION: 0
SPEECH CHANGE: 0
FALLS: 1
NAUSEA: 1
DIARRHEA: 1
SHORTNESS OF BREATH: 0
FEVER: 1
POLYDIPSIA: 0
SENSORY CHANGE: 0
BLOOD IN STOOL: 0
VOMITING: 1
PALPITATIONS: 0
DOUBLE VISION: 0
COUGH: 0
DIZZINESS: 0
CHILLS: 1
DEPRESSION: 0
ABDOMINAL PAIN: 0
HEADACHES: 1
MYALGIAS: 1
LOSS OF CONSCIOUSNESS: 0
CONSTIPATION: 0
BRUISES/BLEEDS EASILY: 1

## 2018-12-09 NOTE — PROGRESS NOTES
Internal Medicine Interval Note  Note Author: Adiel Patel M.D.     Name Chidi Tatumn     1945   Age/Sex 73 y.o. male   MRN [id number]   Code Status full     After 5PM or if no immediate response to page, please call for cross-coverage  Attending/Team: Osiel/Justice See Patient List for primary contact information  Call [phone] to page    1st Call - Day Intern (R1):   Jorge 2nd Call - Day Sr. Resident (R2/R3):   Amanda         Reason for interval visit  (Principal Problem)   Nausea, vomiting, s/p fall      Interval Problem Daily Status Update  (24 hours, problem oriented, brief subjective history, new lab/imaging data pertinent to that problem)   -patient still having diarrhea and occasionally febrile, still having some nausea, otherwise no new complaints     -PT/OT pending  -C diff preliminary negative, toxin still pending  -leukocytosis resolved, kidney function improving  -Ur Cx, Lactose fermenting Gram negative bhavin   -will continue bactrim for total 7days  -planning discharge today with decreased benefits of continued hospitalization   -spoke with daughter, Kaela, with patient's consent; she plans on spending a month to help take care of patient, patient and she both understood about him holding off driving for at least a month as a precaution, they have scheduled PCP f/u next week, and agreed to activity as tolerated  -r/p orthostatics negative, supine 123/70, sitting 133/66, standing 145/68      Review of Systems   Constitutional: Positive for chills and fever.   HENT: Negative for ear pain and hearing loss.    Eyes: Negative for blurred vision and double vision.   Respiratory: Negative for cough and shortness of breath.    Cardiovascular: Negative for chest pain and palpitations.   Gastrointestinal: Positive for diarrhea, nausea and vomiting. Negative for abdominal pain, blood in stool, constipation and melena.   Genitourinary: Negative for dysuria and hematuria.   Musculoskeletal:  Positive for falls and myalgias. Negative for joint pain.   Skin: Negative for itching and rash.   Neurological: Positive for headaches. Negative for dizziness, sensory change, speech change and loss of consciousness.   Endo/Heme/Allergies: Negative for polydipsia. Bruises/bleeds easily.   Psychiatric/Behavioral: Negative for depression. The patient is not nervous/anxious.        Disposition/Barriers to discharge:   Clinical improvement    Consultants/Specialty  none  PCP: SAM Moya      Quality Measures  Quality-Core Measures   Reviewed items::  Labs reviewed and Medications reviewed  Barrios catheter::  No Barrios  DVT prophylaxis pharmacological::  Enoxaparin (Lovenox)          Physical Exam       Vitals:    12/08/18 2015 12/09/18 0000 12/09/18 0400 12/09/18 0800   BP: 143/70 138/72 135/69 139/68   Pulse: 89 74 79 74   Resp: 19 18 17 18   Temp: (!) 38.8 °C (101.9 °F) 36.9 °C (98.5 °F) 36.9 °C (98.4 °F) 37 °C (98.6 °F)   TempSrc: Temporal Temporal Temporal Temporal   SpO2: 93% 93% 95% 96%   Weight: 109.5 kg (241 lb 6.5 oz)      Height:         Body mass index is 32.74 kg/m². Weight: 109.5 kg (241 lb 6.5 oz)  Oxygen Therapy:  Pulse Oximetry: 96 %, O2 (LPM): 0, O2 Delivery: None (Room Air)    Physical Exam   Constitutional: He is oriented to person, place, and time and well-developed, well-nourished, and in no distress. No distress.   HENT:   Head: Normocephalic and atraumatic.   Mouth/Throat: No oropharyngeal exudate.   Eyes: Pupils are equal, round, and reactive to light. Conjunctivae and EOM are normal. Right eye exhibits no discharge. Left eye exhibits no discharge. No scleral icterus.   Neck: Normal range of motion. Neck supple. No tracheal deviation present.   Cardiovascular: Normal rate, regular rhythm, normal heart sounds and intact distal pulses.  Exam reveals no gallop and no friction rub.    No murmur heard.  Pulmonary/Chest: Effort normal and breath sounds normal. No respiratory distress.  He has no wheezes. He has no rales. He exhibits no tenderness.   Abdominal: Soft. Bowel sounds are normal. He exhibits no distension and no mass. There is no tenderness. There is no rebound and no guarding.   Musculoskeletal: Normal range of motion. He exhibits no edema, tenderness or deformity.   Neurological: He is alert and oriented to person, place, and time. He exhibits normal muscle tone. Coordination normal.   Skin: Skin is warm and dry. No rash noted. He is not diaphoretic. No erythema. No pallor.   Abrasions and bruising to right lateral orbit, left knee and numerous small areas on extremities    Psychiatric: Mood and affect normal.             Assessment/Plan     * Sepsis (HCC)   Assessment & Plan    This is severe sepsis with the following associated acute organ dysfunction(s): acute kidney failure.   -SIRS 2/4 on admission with UTI shown on UA, previous urine cx on 11/19 growing Enterobacter aerogenes; leukocytosis  -previous admission on 11/18 for colitis/ileitis but treated and improved since then   -lactic acid initially elevated, normalized  -received meropenem, IVF bolus in ED    -telemetry  -blood Cx x2 NGTD  -strict I/O  -IVF maintenance  -bactrim for UA due to sensitivities from prior Cx; will follow current cultures       Urinary tract infection, site not specified- (present on admission)   Assessment & Plan    -patient with n/v/fv/HA, not resolved after treatment for ileitis/colitis on last admission on 11/18, UTI asymptomatic but prior cultures growing enterobacter aerogenes  -current Urine Cx, Lactose fermenting Gram negative bhavin     -bactrim (due to sensitivities)     Diarrhea   Assessment & Plan    -patient was taking loperamide with relief but stool started to harden so he held medication over the past few days; loose but not watery; C diff negative on 11/19  -stool WBC negative, lactoferrin pending    -C diff pending     Fall from ground level   Assessment & Plan    -patient s/p fall  after losing balance while vomiting, possibly vasovagal/hypovolemia; no LOC but with head trauma  -Abrasions and bruising to right lateral orbit, left knee and numerous small areas on extremities  -CT head, no acute processes; CT c-spine/maxillofascial, no fractures; XR left knee/CXR, no fracture  -orthostatics; 180s supine, 120s sitting; likely from hypovolemia  -r/p orthostatics negative on discharge, supine 123/70, sitting 133/66, standing 145/68  -Tdap given in ED    -IVF  -PT/OT     Acute renal failure superimposed on stage 3 chronic kidney disease (HCC)- (present on admission)   Assessment & Plan    -Cr 2.22 on admit, baseline around 1.3-1.4 around June/July this year  -lisinopril held  -avoid nephrotoxic drugs     Essential hypertension- (present on admission)   Assessment & Plan    -holding lisinopril (nephrotoxic) and HCTZ  -monitor for need of reinstating meds     Anxiety   Assessment & Plan    -continue home dose sertraline     Hyponatremia   Assessment & Plan    -mild, asymptomatic  -monitor for symptoms     Hypokalemia   Assessment & Plan    -monitor and replete     BPH (benign prostatic hyperplasia)- (present on admission)   Assessment & Plan    -continue home dose tamsulosin, finasteride     GERD (gastroesophageal reflux disease)- (present on admission)   Assessment & Plan    -continue home nexium     Iron deficiency anemia- (present on admission)   Assessment & Plan    -iron deficiency anemia seen on labs done on 11/20    -continue home iron supplementation

## 2018-12-09 NOTE — DISCHARGE SUMMARY
Internal Medicine Discharge Summary  Note Author: Adiel Patel M.D.       Name Chidi Tatum Ruiz     1945   Age/Sex 73 y.o. male   MRN [id number]         Admit Date:  2018       Discharge Date:   2018    Service:   R Internal Medicine Red Team  Attending Physician(s):   Osiel       Senior Resident(s):   Amanda  Ant Resident(s):   Jorge  PCP: SAM Moya      Primary Diagnosis:   Sepsis, UTI    Secondary Diagnoses:                Principal Problem:    Sepsis (HCC) POA: Unknown  Active Problems:    Urinary tract infection, site not specified POA: Yes    Essential hypertension POA: Yes    Acute renal failure superimposed on stage 3 chronic kidney disease (HCC) POA: Yes    Fall from ground level POA: Unknown    Diarrhea POA: Unknown    Iron deficiency anemia POA: Yes    GERD (gastroesophageal reflux disease) POA: Yes    BPH (benign prostatic hyperplasia) POA: Yes    Hypokalemia POA: Unknown    Hyponatremia POA: Unknown    Anxiety POA: Unknown  Resolved Problems:    * No resolved hospital problems. *      Hospital Summary (Brief Narrative):       73M, PMH of CKD, HTN, BPH, GERD, anxiety, recent hospitalization for possible colitis/ileitis; presents s/p fall with nausea and vomiting.  CT head showed no acute processes and no fractures were found on CT c-spine/maxillofacial, XR left knee and CXR. Patient was initially positive for orthostatic hypotension likely from hypovolemia and corrected by discharge.     Patient had intermittent fevers during admission and initially presented with sepsis presumed from UTI. He had a recent admission with asymptomatic bacteruria found on , thought to be cause of sepsis this admission and treated with bactrim due to sensitivities of previous grown Enterobacter aerogenes. Patient did receive one dose of meropenem in ED. Current cultures confirm same bacterial colonization. Leukocytosis corrected during stay.    He also had diarrhea with  loose stools but not watery, recent C diff on 11/19 negative and repeat during hospitalization preliminarily negative by PCR, toxin is pending. Stool WBCs negative, fecal lactoferrin pending.    Patient had ZAIN on CKD3, Cr downtrended to 1.56 on discharge with baseline around 1.3-1.4. Lisinopril was held while inpatient.    Patient discharged on 12/9 with PCP follow up the following week scheduled by patient/daughter, and bactrim for total 7d therapy.    Patient /Hospital Summary (Details -- Problem Oriented) :          Urinary tract infection, site not specified   Assessment & Plan    -patient with n/v/fv/HA, not resolved after treatment for ileitis/colitis on last admission on 11/18, UTI asymptomatic but prior cultures growing enterobacter aerogenes  -current Urine Cx, Lactose fermenting Gram negative bhavin     -bactrim (due to sensitivities)     * Sepsis (HCC)   Assessment & Plan    This is severe sepsis with the following associated acute organ dysfunction(s): acute kidney failure.   -SIRS 2/4 on admission with UTI shown on UA, previous urine cx on 11/19 growing Enterobacter aerogenes; leukocytosis  -previous admission on 11/18 for colitis/ileitis but treated and improved since then   -lactic acid initially elevated, normalized  -received meropenem, IVF bolus in ED    -telemetry  -blood Cx x2 NGTD  -strict I/O  -IVF maintenance  -bactrim for UA due to sensitivities from prior Cx; will follow current cultures       Diarrhea   Assessment & Plan    -patient was taking loperamide with relief but stool started to harden so he held medication over the past few days; loose but not watery; C diff negative on 11/19  -stool WBC negative, lactoferrin pending    -C diff pending     Fall from ground level   Assessment & Plan    -patient s/p fall after losing balance while vomiting, possibly vasovagal/hypovolemia; no LOC but with head trauma  -Abrasions and bruising to right lateral orbit, left knee and numerous small areas on  extremities  -CT head, no acute processes; CT c-spine/maxillofascial, no fractures; XR left knee/CXR, no fracture  -orthostatics; 180s supine, 120s sitting; likely from hypovolemia  -r/p orthostatics negative on discharge, supine 123/70, sitting 133/66, standing 145/68  -Tdap given in ED    -IVF  -PT/OT     Acute renal failure superimposed on stage 3 chronic kidney disease (HCC)   Assessment & Plan    -Cr 2.22 on admit, baseline around 1.3-1.4 around June/July this year  -lisinopril held  -avoid nephrotoxic drugs     Essential hypertension   Assessment & Plan    -holding lisinopril (nephrotoxic) and HCTZ  -monitor for need of reinstating meds     Anxiety   Assessment & Plan    -continue home dose sertraline     Hyponatremia   Assessment & Plan    -mild, asymptomatic  -monitor for symptoms     Hypokalemia   Assessment & Plan    -monitor and replete     BPH (benign prostatic hyperplasia)   Assessment & Plan    -continue home dose tamsulosin, finasteride     GERD (gastroesophageal reflux disease)   Assessment & Plan    -continue home nexium     Iron deficiency anemia   Assessment & Plan    -iron deficiency anemia seen on labs done on 11/20    -continue home iron supplementation         Consultants:     none    Procedures:        none    Imaging/ Testing:      DX-KNEE COMPLETE 4+ LEFT   Final Result      No acute fracture. Degenerative changes.      DX-CHEST-PORTABLE (1 VIEW)   Final Result      Lung base linear atelectasis. No consolidation.      CT-MAXILLOFACIAL W/O PLUS RECONS   Final Result      1.  RIGHT facial and periorbital soft tissue swelling.   2.  No facial fracture.   3.  Chronic paranasal sinus disease.      CT-CSPINE WITHOUT PLUS RECONS   Final Result      1.  Straightening and moderate degenerative change of cervical spine.   2.  No fracture or subluxation.      CT-HEAD W/O   Final Result      1.  Diffuse atrophy and white matter changes.   2.  No acute intracranial hemorrhage or territorial infarct.    3.  Chronic paranasal sinus disease.            Discharge Medications:         Medication Reconciliation: Completed       Medication List      START taking these medications      Instructions   sulfamethoxazole-trimethoprim 800-160 MG tablet  Commonly known as:  BACTRIM DS   Take 1 Tab by mouth every 12 hours for 5 days.  Dose:  1 Tab        CONTINUE taking these medications      Instructions   cyanocobalamin 1000 MCG Tabs  Commonly known as:  VITAMIN B12   Take 1 Tab by mouth every day.  Dose:  1000 mcg     ferrous sulfate 325 (65 Fe) MG tablet   Take 1 Tab by mouth every morning with breakfast.  Dose:  325 mg     finasteride 5 MG Tabs  Commonly known as:  PROSCAR   TAKE ONE TABLET BY MOUTH ONCE DAILY     hydroCHLOROthiazide 25 MG Tabs  Commonly known as:  HYDRODIURIL   Take 1 Tab by mouth every 48 hours.  Dose:  25 mg     lisinopril 10 MG Tabs  Commonly known as:  PRINIVIL   TAKE ONE TABLET BY MOUTH ONCE DAILY *REPLACES  5MG     loperamide 2 MG Caps  Commonly known as:  IMODIUM   Take 1 Cap by mouth every 6 hours as needed for Diarrhea.  Dose:  2 mg     NEXIUM 40 MG delayed-release capsule  Generic drug:  esomeprazole   Take 40 mg by mouth every morning before breakfast.  Dose:  40 mg     POTASSIMIN PO   Take 1 Tab by mouth every day. OTC  Dose:  1 Tab     sertraline 25 MG tablet  Commonly known as:  ZOLOFT   Take 1 Tab by mouth every day.  Dose:  25 mg     tamsulosin 0.4 MG capsule  Commonly known as:  FLOMAX   TAKE ONE CAPSULE BY MOUTH ONE-HALF HOUR AFTER BREAKFAST            Can use .DISCHARGEMEDSLIST if going to another facility         Disposition:   home    Diet:   As tolerated    Activity:   As tolerated    Instructions:         The patient was instructed to return to the ER in the event of worsening symptoms. I have counseled the patient on the importance of compliance and the patient has agreed to proceed with all medical recommendations and follow up plan indicated above.   The patient understands that  all medications come with benefits and risks. Risks may include permanent injury or death and these risks can be minimized with close reassessment and monitoring.        Primary Care Provider:    Bernardo Dasilva  Discharge summary faxed to primary care provider:  Deferred  Copy of discharge summary given to the patient: Deferred      Follow up appointment details :      PCP within a week, Bernardo Whyte    Pending Studies:        Fecal lactoferrin  C diff toxin    Time spent on discharge day patient visit, preparing discharge paperwork and arranging for patient follow up.    Summary of follow up issues:   Clinical improvement  Management of chronic conditions    Discharge Time (Minutes) :    50  Hospital Course Type:  Inpatient Stay >2 midnights      Condition on Discharge    ______________________________________________________________________    Interval history/exam for day of discharge:     -patient still having diarrhea and occasionally febrile, still having some nausea, otherwise no new complaints      -PT/OT pending  -C diff preliminary negative, toxin still pending  -leukocytosis resolved, kidney function improving  -Ur Cx, Lactose fermenting Gram negative bhavin   -will continue bactrim for total 7days  -planning discharge today with decreased benefits of continued hospitalization   -spoke with daughter, Kaela, with patient's consent; she plans on spending a month to help take care of patient, patient and she both understood about him holding off driving for at least a month as a precaution, they have scheduled PCP f/u next week, and agreed to activity as tolerated  -r/p orthostatics negative, supine 123/70, sitting 133/66, standing 145/68    Physical Exam   Constitutional: He is oriented to person, place, and time and well-developed, well-nourished, and in no distress. No distress.   HENT:   Head: Normocephalic and atraumatic.   Mouth/Throat: No oropharyngeal exudate.   Eyes: Pupils are equal, round,  and reactive to light. Conjunctivae and EOM are normal. Right eye exhibits no discharge. Left eye exhibits no discharge. No scleral icterus.   Neck: Normal range of motion. Neck supple. No tracheal deviation present.   Cardiovascular: Normal rate, regular rhythm, normal heart sounds and intact distal pulses.  Exam reveals no gallop and no friction rub.    No murmur heard.  Pulmonary/Chest: Effort normal and breath sounds normal. No respiratory distress. He has no wheezes. He has no rales. He exhibits no tenderness.   Abdominal: Soft. Bowel sounds are normal. He exhibits no distension and no mass. There is no tenderness. There is no rebound and no guarding.   Musculoskeletal: Normal range of motion. He exhibits no edema, tenderness or deformity.   Neurological: He is alert and oriented to person, place, and time. He exhibits normal muscle tone. Coordination normal.   Skin: Skin is warm and dry. No rash noted. He is not diaphoretic. No erythema. No pallor.   Abrasions and bruising to right lateral orbit, left knee and numerous small areas on extremities    Psychiatric: Mood and affect normal.       Most Recent Labs:    Lab Results   Component Value Date/Time    WBC 9.3 12/09/2018 03:20 AM    WBC 8.9 03/06/2013 12:00 AM    RBC 3.74 (L) 12/09/2018 03:20 AM    RBC 5.49 03/06/2013 12:00 AM    HEMOGLOBIN 10.0 (L) 12/09/2018 03:20 AM    HEMATOCRIT 30.9 (L) 12/09/2018 03:20 AM    MCV 82.6 12/09/2018 03:20 AM    MCV 85 03/06/2013 12:00 AM    MCH 26.7 (L) 12/09/2018 03:20 AM    MCH 29.9 03/06/2013 12:00 AM    MCHC 32.4 (L) 12/09/2018 03:20 AM    MPV 9.6 12/09/2018 03:20 AM    NEUTSPOLYS 67.20 12/09/2018 03:20 AM    LYMPHOCYTES 21.00 (L) 12/09/2018 03:20 AM    MONOCYTES 9.20 12/09/2018 03:20 AM    EOSINOPHILS 2.00 12/09/2018 03:20 AM    BASOPHILS 0.20 12/09/2018 03:20 AM      Lab Results   Component Value Date/Time    SODIUM 134 (L) 12/09/2018 03:20 AM    POTASSIUM 3.8 12/09/2018 03:20 AM    CHLORIDE 108 12/09/2018 03:20 AM     CO2 18 (L) 12/09/2018 03:20 AM    GLUCOSE 111 (H) 12/09/2018 03:20 AM    BUN 21 12/09/2018 03:20 AM    CREATININE 1.56 (H) 12/09/2018 03:20 AM    CREATININE 0.97 03/06/2013 12:00 AM    BUNCREATRAT 22 03/06/2013 12:00 AM      Lab Results   Component Value Date/Time    ALTSGPT 11 12/07/2018 11:26 AM    ASTSGOT 11 (L) 12/07/2018 11:26 AM    ALKPHOSPHAT 83 12/07/2018 11:26 AM    TBILIRUBIN 0.6 12/07/2018 11:26 AM    LIPASE 16 12/07/2018 11:26 AM    ALBUMIN 3.7 12/07/2018 11:26 AM    GLOBULIN 4.0 (H) 12/07/2018 11:26 AM    INR 1.13 04/12/2018 12:37 PM     Lab Results   Component Value Date/Time    PROTHROMBTM 14.2 04/12/2018 12:37 PM    INR 1.13 04/12/2018 12:37 PM

## 2018-12-09 NOTE — DISCHARGE INSTRUCTIONS
Discharge Instructions    Discharged to home by car with relative. Discharged via wheelchair, hospital escort: Yes.  Special equipment needed: Not Applicable    Be sure to schedule a follow-up appointment with your primary care doctor or any specialists as instructed.     Discharge Plan:   Diet Plan: Discussed  Activity Level: Discussed  Confirmed Follow up Appointment: Appointment Scheduled  Confirmed Symptoms Management: Discussed  Medication Reconciliation Updated: Yes    I understand that a diet low in cholesterol, fat, and sodium is recommended for good health. Unless I have been given specific instructions below for another diet, I accept this instruction as my diet prescription.     Special Instructions: None    · Is patient discharged on Warfarin / Coumadin?   No     Depression / Suicide Risk    As you are discharged from this [facility] facility, it is important to learn how to keep safe from harming yourself.    Recognize the warning signs:  · Abrupt changes in personality, positive or negative- including increase in energy   · Giving away possessions  · Change in eating patterns- significant weight changes-  positive or negative  · Change in sleeping patterns- unable to sleep or sleeping all the time   · Unwillingness or inability to communicate  · Depression  · Unusual sadness, discouragement and loneliness  · Talk of wanting to die  · Neglect of personal appearance   · Rebelliousness- reckless behavior  · Withdrawal from people/activities they love  · Confusion- inability to concentrate     If you or a loved one observes any of these behaviors or has concerns about self-harm, here's what you can do:  · Talk about it- your feelings and reasons for harming yourself  · Remove any means that you might use to hurt yourself (examples: pills, rope, extension cords, firearm)  · Get professional help from the community (Mental Health, Substance Abuse, psychological counseling)  · Do not be alone:Call your  Safe Contact- someone whom you trust who will be there for you.  · Call your local CRISIS HOTLINE 348-1868 or 178-272-6306  · Call your local Children's Mobile Crisis Response Team [place] (233) 643-3720 or www.[employer]  · Call the toll free National Suicide Prevention Hotlines   · National Suicide Prevention Lifeline 323-632-TYCS (4712)  · National [employer] Network 800-SUICIDE (117-7884)

## 2018-12-09 NOTE — PROGRESS NOTES
Received report from NOC RN. Assumed care of patient at 0700. Patient A&Ox4. On room air, no signs of respiratory distress. Patient denies pain at this time. POC discussed and agreed upon with patient. Call light and belongings within reach. Bed in lowest locked position. Upper side rails raised. Bed alarm on. Fall risk precautions in place. All questions answered at this time. Will continue to monitor.

## 2018-12-09 NOTE — PROGRESS NOTES
Discharge instructions given to patient at bedside, verbalizes understanding and states plans for follow-up with PCP. New and home medication review, post-discharge activity level and worsening of symptoms needing follow-up care discussed. Telemetry monitor/IV cathlon removed. All belongings accounted for, all questions answered at this time.    Patient will be escorted out in wheelchair to go home with family.

## 2018-12-09 NOTE — PROGRESS NOTES
Bedside report received, pt care assumed. Pt is a&ox4, visiting with family at this time. Pt educated on plan of care, call bell use, safety/fall precautions- states understanding. Will continue to monitor.

## 2018-12-09 NOTE — PROGRESS NOTES
RN call to laboratory to clarify Cdiff results. First part of cdiff testing on preliminary results positive, laboratory unable to officially rule out cdiff at this time as 2nd part of testing that is required will not be able to be performed until approx Tuesday, when they receive supplies. Pt moved to private room T718 for enteric/Cdiff precautions, pt and family updated. Will continue to monitor.

## 2018-12-11 ENCOUNTER — OFFICE VISIT (OUTPATIENT)
Dept: MEDICAL GROUP | Facility: CLINIC | Age: 73
End: 2018-12-11
Payer: MEDICARE

## 2018-12-11 ENCOUNTER — TELEPHONE (OUTPATIENT)
Dept: HOSPITALIST | Facility: MEDICAL CENTER | Age: 73
End: 2018-12-11

## 2018-12-11 ENCOUNTER — PATIENT OUTREACH (OUTPATIENT)
Dept: HEALTH INFORMATION MANAGEMENT | Facility: OTHER | Age: 73
End: 2018-12-11

## 2018-12-11 VITALS
DIASTOLIC BLOOD PRESSURE: 74 MMHG | OXYGEN SATURATION: 96 % | WEIGHT: 236.8 LBS | BODY MASS INDEX: 32.07 KG/M2 | HEIGHT: 72 IN | SYSTOLIC BLOOD PRESSURE: 128 MMHG | TEMPERATURE: 98.6 F | HEART RATE: 90 BPM
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
## 2018-12-11 DIAGNOSIS — A41.9 SEPSIS, DUE TO UNSPECIFIED ORGANISM: ICD-10-CM

## 2018-12-11 DIAGNOSIS — Z28.20 VACCINE REFUSED BY PATIENT: ICD-10-CM

## 2018-12-11 DIAGNOSIS — Z09 HOSPITAL DISCHARGE FOLLOW-UP: ICD-10-CM

## 2018-12-11 DIAGNOSIS — R78.81 BLOOD BACTERIAL CULTURE POSITIVE: ICD-10-CM

## 2018-12-11 DIAGNOSIS — Z12.11 SCREENING FOR COLON CANCER: ICD-10-CM

## 2018-12-11 DIAGNOSIS — M54.2 NECK PAIN: ICD-10-CM
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
## 2018-12-11 PROBLEM — K04.7 TOOTH INFECTION: Status: RESOLVED | Noted: 2018-08-22 | Resolved: 2018-12-11

## 2018-12-11 PROCEDURE — 99214 OFFICE O/P EST MOD 30 MIN: CPT | Performed by: NURSE PRACTITIONER

## 2018-12-11 RX ORDER — HYDROCODONE BITARTRATE AND ACETAMINOPHEN 5; 325 MG/1; MG/1
1 TABLET ORAL EVERY 4 HOURS PRN
Qty: 20 TAB | Refills: 0 | Status: SHIPPED | OUTPATIENT
Start: 2018-12-11 | End: 2018-12-18

## 2018-12-11 RX ORDER — CEPHALEXIN 250 MG/1
250 CAPSULE ORAL 3 TIMES DAILY
Qty: 21 CAP | Refills: 0 | Status: SHIPPED | OUTPATIENT
Start: 2018-12-11 | End: 2018-12-13

## 2018-12-11 NOTE — TELEPHONE ENCOUNTER
Received call from [facility] regarding new positive blood cultures from one bottle from 12/7/2018. Results are Gram positive cocci - Staph Aureus +, negative for MRSA. Called and spoke to patients daughter at [phone] about results and to contact REBECCA Dasilva [phone] for updates or pharmacy updates or changes. Also contacted REBECCA and left message with the results.

## 2018-12-12 ENCOUNTER — PATIENT OUTREACH (OUTPATIENT)
Dept: HEALTH INFORMATION MANAGEMENT | Facility: OTHER | Age: 73
End: 2018-12-12

## 2018-12-12 ENCOUNTER — TELEPHONE (OUTPATIENT)
Dept: MEDICAL GROUP | Facility: CLINIC | Age: 73
End: 2018-12-12

## 2018-12-12 LAB
BACTERIA BLD CULT: NORMAL
C DIFF TOX A+B STL QL IA: NEGATIVE
SIGNIFICANT IND 70042: NORMAL
SITE SITE: NORMAL
SOURCE SOURCE: NORMAL

## 2018-12-12 NOTE — ASSESSMENT & PLAN NOTE
Patient recently had a fall, it was inactive possible syncope, although patient does not believe he passed out.  Patient was reportedly feeling ill and vomiting and fell at his grandson's school.  He was really transported to the hospital via EMS and then admitted, concern for sepsis at the time.  Patient reports today that he just feels tired and weak.  No fevers or chills.

## 2018-12-12 NOTE — PROGRESS NOTES
Subjective:     Chidi Tatum is a 73 y.o. male here today for hospital follow-up    Blood bacterial culture positive  Received a phone call from hospitalist in regards to patient's recent blood culture results which have reportedly come back positive for staph aureus, negative for MRSA.  Double blood cultures were run, one was negative, one was positive.  I did speak with the hospitalist RN, Rona, who informed me of the patient result.  Patient had actually ready left the clinic and did receive a phone call in regards to this blood culture and then return to clinic to discuss results.  They were on their way to fill the Bactrim script.     Fall  Patient recently had a fall, it was inactive possible syncope, although patient does not believe he passed out.  Patient was reportedly feeling ill and vomiting and fell at his grandson's school.  He was really transported to the hospital via EMS and then admitted, concern for sepsis at the time.  Patient reports today that he just feels tired and weak.  No fevers or chills.    Sepsis (HCC)  Patient has been admitted twice in the last 2 months with possible sepsis.  He was recently discharged in good condition, with Bactrim.  He has been prescribed several antibiotics for several possible infections over the last 2 months.  Patient reports today that he is feels weak and tired, blood pressure appears to be within normal limits, no fever no chills.       Current medicines (including changes today)  Current Outpatient Prescriptions   Medication Sig Dispense Refill   • HYDROcodone-acetaminophen (NORCO) 5-325 MG Tab per tablet Take 1 Tab by mouth every four hours as needed for up to 7 days. 20 Tab 0   • sulfamethoxazole-trimethoprim (BACTRIM DS) 800-160 MG tablet Take 1 Tab by mouth every 12 hours for 5 days. 10 Tab 0   • esomeprazole (NEXIUM) 40 MG delayed-release capsule Take 40 mg by mouth every morning before breakfast.     • Potassium (POTASSIMIN PO) Take 1 Tab by  mouth every day. OTC     • tamsulosin (FLOMAX) 0.4 MG capsule TAKE ONE CAPSULE BY MOUTH ONE-HALF HOUR AFTER BREAKFAST 90 Cap 2   • finasteride (PROSCAR) 5 MG Tab TAKE ONE TABLET BY MOUTH ONCE DAILY 90 Tab 2   • lisinopril (PRINIVIL) 10 MG Tab TAKE ONE TABLET BY MOUTH ONCE DAILY *REPLACES  5MG 90 Tab 1   • sertraline (ZOLOFT) 25 MG tablet Take 1 Tab by mouth every day. 30 Tab 1   • hydroCHLOROthiazide (HYDRODIURIL) 25 MG Tab Take 1 Tab by mouth every 48 hours. 45 Tab 1   • cyanocobalamin (VITAMIN B12) 1000 MCG Tab Take 1 Tab by mouth every day. (Patient not taking: Reported on 12/11/2018) 30 Tab 3   • ferrous sulfate 325 (65 Fe) MG tablet Take 1 Tab by mouth every morning with breakfast. 30 Tab 0   • loperamide (IMODIUM) 2 MG Cap Take 1 Cap by mouth every 6 hours as needed for Diarrhea. 30 Cap 0     No current facility-administered medications for this visit.        He  has a past medical history of BPH (benign prostatic hyperplasia); GERD (gastroesophageal reflux disease); HTN (hypertension); Hypertension; Insomnia; and SBO (small bowel obstruction) (HCC).    He  has a past surgical history that includes hernia repair and temporal artery biopsy (Right, 2/9/2018).     Social History     Social History   • Marital status:      Spouse name: N/A   • Number of children: N/A   • Years of education: N/A     Social History Main Topics   • Smoking status: Never Smoker   • Smokeless tobacco: Never Used   • Alcohol use No   • Drug use: No   • Sexual activity: No     Other Topics Concern   • Not on file     Social History Narrative   • No narrative on file       Family History   Problem Relation Age of Onset   • Lung Disease Father    • Alcohol/Drug Brother    • Lung Disease Brother          ROS  Positive for fatigue.  No fever, no chest pain, no shortness of breath, no abdominal pain, no rashes    All other systems reviewed and are negative.        Objective:     Blood pressure 128/74, pulse 90, temperature 37 °C (98.6  °F), temperature source Temporal, height 1.829 m (6'), weight 107.4 kg (236 lb 12.8 oz), SpO2 96 %. Body mass index is 32.12 kg/m².    Physical Exam:   Constitutional: Alert, no distress. Patient appears tired at times, looking down during visit.   Eye: Equal, round and reactive, conjunctiva clear, lids normal.  He does have a well-healed scab on the right upper cheek, no signs or symptoms of infection there noted.  ENMT: Lips without lesions, oropharynx clear.   Neck: Trachea midline, no masses, no thyromegaly. No cervical or supraclavicular lymphadenopathy  Respiratory: Unlabored respiratory effort, lungs clear to auscultation, no wheezes, no ronchi.  Cardiovascular: Normal S1, S2, no murmur, no edema.   Abdomen: Soft, non-tender, no masses, no hepatosplenomegaly. Normal bowel sounds.   Skin: Warm, dry, good turgor, no rashes in visible areas.  Neuro: CN II-VII grossly intact.   Psych: Alert and oriented x3, normal affect and mood.        Assessment and Plan:   The following treatment plan was discussed    1. Hospital discharge follow-up    2. Sepsis, due to unspecified organism (HC  - REFERRAL TO COMPLEX CARE MANAGEMENT Services Requested: Care Manager to Evaluate and Recommend   At this time I am pretty certain that likely this is a contaminated blood culture, however, is switch patient to cephalexin 500 mg 3 times daily which will likely have better coverage for staph.  I discussed with patient and his daughter signs or symptoms to seek emergent care and discussed strict ER precautions.  At this time we will repeat labs in 1 week and then follow-up with me within 7-10 days.  Patient and his daughter verbalized understanding.  I know the patient does not qualify for care management services, however, they could use some recommendations for her daughter to receive some information on how she may become a caregiver with compensation for her father.  So I have placed this referral again.  - COMP METABOLIC PANEL;  Future  - CBC WITH DIFFERENTIAL; Future  - ESTIMATED GFR; Future    3. Fall, sequela    4. Neck pain  Patient reports that he has some pain in his neck as a result of his fall, he requests medication for pain control.  Discussed risks of medication, consent discussed and signed, short-term pain control provided.   - HYDROcodone-acetaminophen (NORCO) 5-325 MG Tab per tablet; Take 1 Tab by mouth every four hours as needed for up to 7 days.  Dispense: 20 Tab; Refill: 0  - Consent for Opiate Prescription    5. Vaccine refused by patient  Patient declines all recommended vaccinations    6. Screening for colon cancer  - OCCULT BLOOD FECES IMMUNOASSAY; Future    7. Blood bacterial culture positive  See note above.      Reviewed indication, dosage, usage and potential adverse effects of prescribed medications. Patient appears to understand, verbalizes understanding and is willing to try medications as prescribed.      Reviewed risks and benefits of treatment plan. Patient verbally agrees to plan of care.       Followup: Return in about 3 months (around 3/11/2019).    CAROLINE MoyaRLIYAH.     PLEASE NOTE: This dictation was created using voice recognition software. I have made every reasonable attempt to correct obvious errors, but I expect that there may be errors of grammar and possibly content that I did not discover prior finalizing this note.

## 2018-12-12 NOTE — ASSESSMENT & PLAN NOTE
Received a phone call from hospitalist in regards to patient's recent blood culture results which have reportedly come back positive for staph aureus, negative for MRSA.  Double blood cultures were run, one was negative, one was positive.  I did speak with the hospitalist RN, Rona, who informed me of the patient result.  Patient had actually ready left the clinic and did receive a phone call in regards to this blood culture and then return to clinic to discuss results.  They were on their way to fill the Bactrim script.

## 2018-12-12 NOTE — ASSESSMENT & PLAN NOTE
Patient has been admitted twice in the last 2 months with possible sepsis.  He was recently discharged in good condition, with Bactrim.  He has been prescribed several antibiotics for several possible infections over the last 2 months.  Patient reports today that he is feels weak and tired, blood pressure appears to be within normal limits, no fever no chills.

## 2018-12-13 LAB
BACTERIA BLD CULT: ABNORMAL
BACTERIA BLD CULT: ABNORMAL
SIGNIFICANT IND 70042: ABNORMAL
SITE SITE: ABNORMAL
SOURCE SOURCE: ABNORMAL

## 2018-12-13 RX ORDER — CEPHALEXIN 500 MG/1
500 CAPSULE ORAL 3 TIMES DAILY
Qty: 21 CAP | Refills: 0 | Status: SHIPPED | OUTPATIENT
Start: 2018-12-13 | End: 2019-01-04 | Stop reason: CLARIF

## 2018-12-13 NOTE — TELEPHONE ENCOUNTER
I called but they aren't open until 10am.   I would need to know which drug?   Please call back and get more information.   REBECCA Pike

## 2018-12-13 NOTE — TELEPHONE ENCOUNTER
I called and spoke to [facility] pharmacy, unfortunately insurance is refusing to fill either medication for ceftriaxone or Bactrim.  He was recently in the hospital and likely received some antibiotics.  Unfortunately patient was unwilling to be the cash pay price of $15.  At this time he is without any abx.    I did call and left a message with the patient as well.   Bernardo Dasilva, APRN

## 2018-12-15 LAB — EKG IMPRESSION: NORMAL

## 2018-12-21 ENCOUNTER — PATIENT OUTREACH (OUTPATIENT)
Dept: HEALTH INFORMATION MANAGEMENT | Facility: OTHER | Age: 73
End: 2018-12-21

## 2018-12-21 NOTE — PROGRESS NOTES
Pt referred to Community Care Management-SW CC by PCP with report of daughter being interested in being a paid caregiver. Outreach call to pt, daughter answered and reported she was not with pt but provided following number to reach pt directly [phone].  MSW placed call to pt to introduce self, discuss role of SW-CC, pt's identified needs and determine if pt would like MSW to speak directly to daughter regarding reason for referral. Pt did not answer. VM was left.     Outreach call to [facility] advocate on case to collaborate care.     Plan:  · Will mail out welcome letter and resource packet for care giver services.   · MSW will attempt to follow up with pt at later time/date, if MSW does not hear back.

## 2018-12-21 NOTE — LETTER
December 21, 2018        Chidi Tatum  [address]  [place] [address]        Dear Chidi:    Welcome to [facility] Community Care Management! Your team of Registered Nurses, Social Workers, and Pharmacists are partnered with your [facility] Providers to assist you with accessing resources and education to support your individual needs. As you work with your Community Care Management team, you will be empowered to be successful with learning how to self-manage your health with the Patient Centered goals of helping you to feel better and staying out of the hospital. This program is at no cost to you as this is a part of [facility]’s ongoing commitment to serve the people of our community.    Benefits of working with your Community Care Management team includes:  - Comprehensive assessment by a Registered Nurse on the telephone to identify your medical and health needs.   - Telephonic review of your medications by a Care Coordination Pharmacist to answer any questions you may have about your medications.  - Evaluation of social needs, such as, transportation; financial; food; housing; etc. by a Care Coordination  to connect you with eligible resources.    Please contact me at [phone] to start on the road to your Community Care Management services.  I am available 5 days a week, Monday through Friday from 8:00 a.m. - 5:00 p.m. I look forward to speaking with you soon.    Sincerely,       Your Care Coordination Team  Jana Mills, KATE, MSW  Electronically Signed

## 2018-12-22 NOTE — PROGRESS NOTES
Return call received from pt who gave consent for MSW to speak with his daughter regarding referral to Community Care Management for her interest in becoming a paid caregiver. Per conversation with pt's daughter she reported she assisted pt with his iADLs such as shopping, meal prep, transportation and the house cleaning/laundry. Daughter reported pt was independent with his ADLs including showering, dressing, mobility, turns/transferrs, grooming/hygiene, eating and toileting.     MSW discussed community resources/programs through [facility] including the HCBW and Homemaker program. Per daughter's reported it does not sound as though pt will qualify for HCBW based on level of care for his daughter to become a paid caregiver. Discussed that MSW mailed out information to pt and discussed them reviewing resources. Daughter agreeable.     Plan:  MSW will follow up at later time/date to review resources and information sent.

## 2019-01-04 ENCOUNTER — APPOINTMENT (OUTPATIENT)
Dept: RADIOLOGY | Facility: MEDICAL CENTER | Age: 74
DRG: 690 | End: 2019-01-04
Attending: HOSPITALIST
Payer: MEDICARE

## 2019-01-04 ENCOUNTER — APPOINTMENT (OUTPATIENT)
Dept: RADIOLOGY | Facility: MEDICAL CENTER | Age: 74
DRG: 690 | End: 2019-01-04
Attending: EMERGENCY MEDICINE
Payer: MEDICARE

## 2019-01-04 ENCOUNTER — HOSPITAL ENCOUNTER (INPATIENT)
Facility: MEDICAL CENTER | Age: 74
LOS: 4 days | DRG: 690 | End: 2019-01-08
Attending: EMERGENCY MEDICINE | Admitting: HOSPITALIST
Payer: MEDICARE

## 2019-01-04 DIAGNOSIS — N30.01 ACUTE CYSTITIS WITH HEMATURIA: ICD-10-CM

## 2019-01-04 DIAGNOSIS — N30.00 ACUTE CYSTITIS WITHOUT HEMATURIA: ICD-10-CM

## 2019-01-04 DIAGNOSIS — E86.0 DEHYDRATION: ICD-10-CM

## 2019-01-04 PROBLEM — N39.0 UTI (URINARY TRACT INFECTION): Status: ACTIVE | Noted: 2019-01-04

## 2019-01-04 PROBLEM — R19.7 NAUSEA, VOMITING, AND DIARRHEA: Status: ACTIVE | Noted: 2019-01-04

## 2019-01-04 PROBLEM — R65.10 SIRS (SYSTEMIC INFLAMMATORY RESPONSE SYNDROME) (HCC): Status: ACTIVE | Noted: 2019-01-04

## 2019-01-04 PROBLEM — R11.2 NAUSEA, VOMITING, AND DIARRHEA: Status: ACTIVE | Noted: 2019-01-04

## 2019-01-04 LAB
ANION GAP SERPL CALC-SCNC: 11 MMOL/L (ref 0–11.9)
APPEARANCE UR: ABNORMAL
BACTERIA #/AREA URNS HPF: ABNORMAL /HPF
BASOPHILS # BLD AUTO: 0.2 % (ref 0–1.8)
BASOPHILS # BLD: 0.04 K/UL (ref 0–0.12)
BILIRUB UR QL STRIP.AUTO: NEGATIVE
BUN SERPL-MCNC: 29 MG/DL (ref 8–22)
CALCIUM SERPL-MCNC: 10 MG/DL (ref 8.5–10.5)
CHLORIDE SERPL-SCNC: 105 MMOL/L (ref 96–112)
CO2 SERPL-SCNC: 17 MMOL/L (ref 20–33)
COLOR UR: YELLOW
CREAT SERPL-MCNC: 1.74 MG/DL (ref 0.5–1.4)
EOSINOPHIL # BLD AUTO: 0.02 K/UL (ref 0–0.51)
EOSINOPHIL NFR BLD: 0.1 % (ref 0–6.9)
EPI CELLS #/AREA URNS HPF: NEGATIVE /HPF
ERYTHROCYTE [DISTWIDTH] IN BLOOD BY AUTOMATED COUNT: 50.3 FL (ref 35.9–50)
GLUCOSE SERPL-MCNC: 153 MG/DL (ref 65–99)
GLUCOSE UR STRIP.AUTO-MCNC: NEGATIVE MG/DL
HCT VFR BLD AUTO: 38.4 % (ref 42–52)
HGB BLD-MCNC: 12.7 G/DL (ref 14–18)
HYALINE CASTS #/AREA URNS LPF: ABNORMAL /LPF
IMM GRANULOCYTES # BLD AUTO: 0.18 K/UL (ref 0–0.11)
IMM GRANULOCYTES NFR BLD AUTO: 1 % (ref 0–0.9)
KETONES UR STRIP.AUTO-MCNC: NEGATIVE MG/DL
LACTATE BLD-SCNC: 1.6 MMOL/L (ref 0.5–2)
LEUKOCYTE ESTERASE UR QL STRIP.AUTO: ABNORMAL
LYMPHOCYTES # BLD AUTO: 1.4 K/UL (ref 1–4.8)
LYMPHOCYTES NFR BLD: 7.8 % (ref 22–41)
MCH RBC QN AUTO: 27.1 PG (ref 27–33)
MCHC RBC AUTO-ENTMCNC: 33.1 G/DL (ref 33.7–35.3)
MCV RBC AUTO: 82.1 FL (ref 81.4–97.8)
MICRO URNS: ABNORMAL
MONOCYTES # BLD AUTO: 0.92 K/UL (ref 0–0.85)
MONOCYTES NFR BLD AUTO: 5.1 % (ref 0–13.4)
NEUTROPHILS # BLD AUTO: 15.32 K/UL (ref 1.82–7.42)
NEUTROPHILS NFR BLD: 85.8 % (ref 44–72)
NITRITE UR QL STRIP.AUTO: POSITIVE
NRBC # BLD AUTO: 0 K/UL
NRBC BLD-RTO: 0 /100 WBC
PH UR STRIP.AUTO: 5 [PH]
PLATELET # BLD AUTO: 260 K/UL (ref 164–446)
PMV BLD AUTO: 9.2 FL (ref 9–12.9)
POTASSIUM SERPL-SCNC: 3.8 MMOL/L (ref 3.6–5.5)
PROT UR QL STRIP: 30 MG/DL
RBC # BLD AUTO: 4.68 M/UL (ref 4.7–6.1)
RBC # URNS HPF: ABNORMAL /HPF
RBC UR QL AUTO: ABNORMAL
SODIUM SERPL-SCNC: 133 MMOL/L (ref 135–145)
SP GR UR STRIP.AUTO: 1.01
UROBILINOGEN UR STRIP.AUTO-MCNC: 0.2 MG/DL
WBC # BLD AUTO: 17.9 K/UL (ref 4.8–10.8)
WBC #/AREA URNS HPF: ABNORMAL /HPF

## 2019-01-04 PROCEDURE — 72100 X-RAY EXAM L-S SPINE 2/3 VWS: CPT

## 2019-01-04 PROCEDURE — 74176 CT ABD & PELVIS W/O CONTRAST: CPT

## 2019-01-04 PROCEDURE — 700105 HCHG RX REV CODE 258: Performed by: EMERGENCY MEDICINE

## 2019-01-04 PROCEDURE — 96375 TX/PRO/DX INJ NEW DRUG ADDON: CPT

## 2019-01-04 PROCEDURE — 85025 COMPLETE CBC W/AUTO DIFF WBC: CPT

## 2019-01-04 PROCEDURE — A9270 NON-COVERED ITEM OR SERVICE: HCPCS | Performed by: HOSPITALIST

## 2019-01-04 PROCEDURE — 81001 URINALYSIS AUTO W/SCOPE: CPT

## 2019-01-04 PROCEDURE — 770021 HCHG ROOM/CARE - ISO PRIVATE

## 2019-01-04 PROCEDURE — 99285 EMERGENCY DEPT VISIT HI MDM: CPT

## 2019-01-04 PROCEDURE — 87077 CULTURE AEROBIC IDENTIFY: CPT

## 2019-01-04 PROCEDURE — 83605 ASSAY OF LACTIC ACID: CPT

## 2019-01-04 PROCEDURE — 87186 SC STD MICRODIL/AGAR DIL: CPT

## 2019-01-04 PROCEDURE — 700102 HCHG RX REV CODE 250 W/ 637 OVERRIDE(OP): Performed by: HOSPITALIST

## 2019-01-04 PROCEDURE — 96365 THER/PROPH/DIAG IV INF INIT: CPT

## 2019-01-04 PROCEDURE — 99223 1ST HOSP IP/OBS HIGH 75: CPT | Performed by: HOSPITALIST

## 2019-01-04 PROCEDURE — 87086 URINE CULTURE/COLONY COUNT: CPT

## 2019-01-04 PROCEDURE — 700111 HCHG RX REV CODE 636 W/ 250 OVERRIDE (IP): Performed by: EMERGENCY MEDICINE

## 2019-01-04 PROCEDURE — 70450 CT HEAD/BRAIN W/O DYE: CPT

## 2019-01-04 PROCEDURE — 80048 BASIC METABOLIC PNL TOTAL CA: CPT

## 2019-01-04 RX ORDER — FINASTERIDE 5 MG/1
5 TABLET, FILM COATED ORAL DAILY
COMMUNITY

## 2019-01-04 RX ORDER — ONDANSETRON 2 MG/ML
4 INJECTION INTRAMUSCULAR; INTRAVENOUS EVERY 4 HOURS PRN
Status: DISCONTINUED | OUTPATIENT
Start: 2019-01-04 | End: 2019-01-08 | Stop reason: HOSPADM

## 2019-01-04 RX ORDER — SODIUM CHLORIDE 9 MG/ML
500 INJECTION, SOLUTION INTRAVENOUS ONCE
Status: COMPLETED | OUTPATIENT
Start: 2019-01-04 | End: 2019-01-04

## 2019-01-04 RX ORDER — OXYCODONE HYDROCHLORIDE 5 MG/1
2.5 TABLET ORAL
Status: DISCONTINUED | OUTPATIENT
Start: 2019-01-04 | End: 2019-01-08 | Stop reason: HOSPADM

## 2019-01-04 RX ORDER — ONDANSETRON 2 MG/ML
4 INJECTION INTRAMUSCULAR; INTRAVENOUS ONCE
Status: COMPLETED | OUTPATIENT
Start: 2019-01-04 | End: 2019-01-04

## 2019-01-04 RX ORDER — LISINOPRIL 10 MG/1
10 TABLET ORAL DAILY
COMMUNITY
End: 2020-02-26 | Stop reason: SDUPTHER

## 2019-01-04 RX ORDER — OMEPRAZOLE 20 MG/1
20 CAPSULE, DELAYED RELEASE ORAL
Status: DISCONTINUED | OUTPATIENT
Start: 2019-01-05 | End: 2019-01-08 | Stop reason: HOSPADM

## 2019-01-04 RX ORDER — TAMSULOSIN HYDROCHLORIDE 0.4 MG/1
0.4 CAPSULE ORAL DAILY
COMMUNITY

## 2019-01-04 RX ORDER — MORPHINE SULFATE 4 MG/ML
2 INJECTION, SOLUTION INTRAMUSCULAR; INTRAVENOUS
Status: DISCONTINUED | OUTPATIENT
Start: 2019-01-04 | End: 2019-01-08 | Stop reason: HOSPADM

## 2019-01-04 RX ORDER — OXYCODONE HYDROCHLORIDE 5 MG/1
5 TABLET ORAL
Status: DISCONTINUED | OUTPATIENT
Start: 2019-01-04 | End: 2019-01-08 | Stop reason: HOSPADM

## 2019-01-04 RX ORDER — HYDROCHLOROTHIAZIDE 25 MG/1
25 TABLET ORAL
Status: DISCONTINUED | OUTPATIENT
Start: 2019-01-06 | End: 2019-01-08 | Stop reason: HOSPADM

## 2019-01-04 RX ORDER — HYDROCHLOROTHIAZIDE 50 MG/1
50 TABLET ORAL DAILY
COMMUNITY
End: 2019-08-13 | Stop reason: SDUPTHER

## 2019-01-04 RX ORDER — HEPARIN SODIUM 5000 [USP'U]/ML
5000 INJECTION, SOLUTION INTRAVENOUS; SUBCUTANEOUS EVERY 8 HOURS
Status: DISCONTINUED | OUTPATIENT
Start: 2019-01-05 | End: 2019-01-08 | Stop reason: HOSPADM

## 2019-01-04 RX ORDER — TAMSULOSIN HYDROCHLORIDE 0.4 MG/1
0.4 CAPSULE ORAL
Status: DISCONTINUED | OUTPATIENT
Start: 2019-01-05 | End: 2019-01-08 | Stop reason: HOSPADM

## 2019-01-04 RX ORDER — SODIUM CHLORIDE 9 MG/ML
2000 INJECTION, SOLUTION INTRAVENOUS ONCE
Status: COMPLETED | OUTPATIENT
Start: 2019-01-04 | End: 2019-01-05

## 2019-01-04 RX ORDER — CEPHALEXIN 500 MG/1
500 CAPSULE ORAL 3 TIMES DAILY
Status: ON HOLD | COMMUNITY
Start: 2018-12-13 | End: 2019-01-07

## 2019-01-04 RX ORDER — ACETAMINOPHEN 325 MG/1
650 TABLET ORAL EVERY 6 HOURS PRN
Status: DISCONTINUED | OUTPATIENT
Start: 2019-01-04 | End: 2019-01-08 | Stop reason: HOSPADM

## 2019-01-04 RX ORDER — SERTRALINE HYDROCHLORIDE 25 MG/1
25 TABLET, FILM COATED ORAL DAILY
COMMUNITY
End: 2019-08-13

## 2019-01-04 RX ORDER — ONDANSETRON 4 MG/1
4 TABLET, ORALLY DISINTEGRATING ORAL EVERY 4 HOURS PRN
Status: DISCONTINUED | OUTPATIENT
Start: 2019-01-04 | End: 2019-01-08 | Stop reason: HOSPADM

## 2019-01-04 RX ORDER — FERROUS SULFATE 325(65) MG
325 TABLET ORAL DAILY
COMMUNITY
End: 2019-01-18 | Stop reason: CLARIF

## 2019-01-04 RX ORDER — LISINOPRIL 10 MG/1
10 TABLET ORAL
Status: DISCONTINUED | OUTPATIENT
Start: 2019-01-05 | End: 2019-01-08 | Stop reason: HOSPADM

## 2019-01-04 RX ORDER — FINASTERIDE 5 MG/1
5 TABLET, FILM COATED ORAL DAILY
Status: DISCONTINUED | OUTPATIENT
Start: 2019-01-05 | End: 2019-01-08 | Stop reason: HOSPADM

## 2019-01-04 RX ORDER — FERROUS SULFATE 325(65) MG
325 TABLET ORAL
Status: DISCONTINUED | OUTPATIENT
Start: 2019-01-05 | End: 2019-01-08 | Stop reason: HOSPADM

## 2019-01-04 RX ADMIN — ONDANSETRON 4 MG: 2 INJECTION INTRAMUSCULAR; INTRAVENOUS at 19:29

## 2019-01-04 RX ADMIN — ACETAMINOPHEN 650 MG: 325 TABLET, FILM COATED ORAL at 23:09

## 2019-01-04 RX ADMIN — SODIUM CHLORIDE 500 ML: 9 INJECTION, SOLUTION INTRAVENOUS at 19:29

## 2019-01-04 RX ADMIN — CEFTRIAXONE SODIUM 1 G: 1 INJECTION, POWDER, FOR SOLUTION INTRAMUSCULAR; INTRAVENOUS at 21:23

## 2019-01-04 ASSESSMENT — PAIN SCALES - GENERAL: PAINLEVEL_OUTOF10: 8

## 2019-01-05 LAB
C DIFF DNA SPEC QL NAA+PROBE: NEGATIVE
C DIFF TOX A+B STL QL IA: NEGATIVE
C DIFF TOX GENS STL QL NAA+PROBE: NORMAL

## 2019-01-05 PROCEDURE — 700105 HCHG RX REV CODE 258: Performed by: HOSPITALIST

## 2019-01-05 PROCEDURE — 87040 BLOOD CULTURE FOR BACTERIA: CPT | Mod: 91

## 2019-01-05 PROCEDURE — 87324 CLOSTRIDIUM AG IA: CPT

## 2019-01-05 PROCEDURE — 700111 HCHG RX REV CODE 636 W/ 250 OVERRIDE (IP): Performed by: HOSPITALIST

## 2019-01-05 PROCEDURE — A9270 NON-COVERED ITEM OR SERVICE: HCPCS | Performed by: HOSPITALIST

## 2019-01-05 PROCEDURE — 87493 C DIFF AMPLIFIED PROBE: CPT

## 2019-01-05 PROCEDURE — 99233 SBSQ HOSP IP/OBS HIGH 50: CPT | Performed by: INTERNAL MEDICINE

## 2019-01-05 PROCEDURE — 770021 HCHG ROOM/CARE - ISO PRIVATE

## 2019-01-05 PROCEDURE — 36415 COLL VENOUS BLD VENIPUNCTURE: CPT

## 2019-01-05 PROCEDURE — 700102 HCHG RX REV CODE 250 W/ 637 OVERRIDE(OP): Performed by: HOSPITALIST

## 2019-01-05 RX ADMIN — TAMSULOSIN HYDROCHLORIDE 0.4 MG: 0.4 CAPSULE ORAL at 09:30

## 2019-01-05 RX ADMIN — HEPARIN SODIUM 5000 UNITS: 5000 INJECTION, SOLUTION INTRAVENOUS; SUBCUTANEOUS at 04:59

## 2019-01-05 RX ADMIN — HEPARIN SODIUM 5000 UNITS: 5000 INJECTION, SOLUTION INTRAVENOUS; SUBCUTANEOUS at 20:58

## 2019-01-05 RX ADMIN — OMEPRAZOLE 20 MG: 20 CAPSULE, DELAYED RELEASE ORAL at 05:00

## 2019-01-05 RX ADMIN — SODIUM CHLORIDE 2000 ML: 9 INJECTION, SOLUTION INTRAVENOUS at 00:01

## 2019-01-05 RX ADMIN — FERROUS SULFATE TAB 325 MG (65 MG ELEMENTAL FE) 325 MG: 325 (65 FE) TAB at 09:30

## 2019-01-05 RX ADMIN — FINASTERIDE 5 MG: 5 TABLET, FILM COATED ORAL at 04:59

## 2019-01-05 RX ADMIN — ACETAMINOPHEN 650 MG: 325 TABLET, FILM COATED ORAL at 05:00

## 2019-01-05 RX ADMIN — LISINOPRIL 10 MG: 10 TABLET ORAL at 05:00

## 2019-01-05 RX ADMIN — CEFEPIME 2 G: 2 INJECTION, POWDER, FOR SOLUTION INTRAVENOUS at 04:59

## 2019-01-05 RX ADMIN — SERTRALINE 25 MG: 50 TABLET, FILM COATED ORAL at 04:59

## 2019-01-05 RX ADMIN — HEPARIN SODIUM 5000 UNITS: 5000 INJECTION, SOLUTION INTRAVENOUS; SUBCUTANEOUS at 14:34

## 2019-01-05 ASSESSMENT — ENCOUNTER SYMPTOMS
HEADACHES: 0
FEVER: 0
PALPITATIONS: 0
TINGLING: 0
WHEEZING: 0
ABDOMINAL PAIN: 0
MYALGIAS: 0
SHORTNESS OF BREATH: 0
DEPRESSION: 0
PHOTOPHOBIA: 0
DIARRHEA: 1
VOMITING: 1
CHILLS: 0
DIZZINESS: 0
SORE THROAT: 0
NAUSEA: 1
COUGH: 0
FOCAL WEAKNESS: 0

## 2019-01-05 ASSESSMENT — LIFESTYLE VARIABLES
EVER_SMOKED: NEVER
ALCOHOL_USE: NO

## 2019-01-05 ASSESSMENT — COPD QUESTIONNAIRES
IN THE PAST 12 MONTHS DO YOU DO LESS THAN YOU USED TO BECAUSE OF YOUR BREATHING PROBLEMS: DISAGREE/UNSURE
DURING THE PAST 4 WEEKS HOW MUCH DID YOU FEEL SHORT OF BREATH: NONE/LITTLE OF THE TIME
COPD SCREENING SCORE: 2
HAVE YOU SMOKED AT LEAST 100 CIGARETTES IN YOUR ENTIRE LIFE: NO/DON'T KNOW
DO YOU EVER COUGH UP ANY MUCUS OR PHLEGM?: NO/ONLY WITH OCCASIONAL COLDS OR INFECTIONS

## 2019-01-05 ASSESSMENT — PATIENT HEALTH QUESTIONNAIRE - PHQ9
SUM OF ALL RESPONSES TO PHQ9 QUESTIONS 1 AND 2: 0
1. LITTLE INTEREST OR PLEASURE IN DOING THINGS: NOT AT ALL
2. FEELING DOWN, DEPRESSED, IRRITABLE, OR HOPELESS: NOT AT ALL

## 2019-01-05 ASSESSMENT — COGNITIVE AND FUNCTIONAL STATUS - GENERAL
MOVING TO AND FROM BED TO CHAIR: A LITTLE
MOBILITY SCORE: 19
CLIMB 3 TO 5 STEPS WITH RAILING: A LITTLE
WALKING IN HOSPITAL ROOM: A LITTLE
DRESSING REGULAR UPPER BODY CLOTHING: A LITTLE
TOILETING: A LITTLE
SUGGESTED CMS G CODE MODIFIER MOBILITY: CK
DAILY ACTIVITIY SCORE: 21
SUGGESTED CMS G CODE MODIFIER DAILY ACTIVITY: CJ
DRESSING REGULAR LOWER BODY CLOTHING: A LITTLE
STANDING UP FROM CHAIR USING ARMS: A LITTLE
MOVING FROM LYING ON BACK TO SITTING ON SIDE OF FLAT BED: A LITTLE

## 2019-01-05 ASSESSMENT — PAIN SCALES - GENERAL
PAINLEVEL_OUTOF10: 0

## 2019-01-05 NOTE — ASSESSMENT & PLAN NOTE
Recurrent UTI.  His last urine culture was positive for Enterobacter which was sensitive to cefepime  Continue on cefepime   -- ?contaminant  --will treat for 5 days total (last day will be on 1/10)- then can discharge.    --discussed w/ pharmacy and agree with not switching antibiotics given patient had already been receiving Cefepime.

## 2019-01-05 NOTE — ED NOTES
Med Rec Updated and Complete per Pt at bedside  Allergies Reviewed    Pt reports he completed a 7-day course of Keflex 500mg three times daily from 12/13/18 to 12/19/18

## 2019-01-05 NOTE — ASSESSMENT & PLAN NOTE
Because of recent antibiotic exposure during previous hospital stay., check a C. difficile study.  In the meantime, start the patient on a clear liquid diet which we will then advance as tolerated.   -- n/v seems to have subsided as the patient is eating and drinking.   -- c. Diff is ruled out. ---->start Lomotil

## 2019-01-05 NOTE — ED PROVIDER NOTES
ED Provider Note    Scribed for Hari Briceño M.D. by Susan Claire. 1/4/2019  7:11 PM    Primary care provider: SAM Moya  Means of arrival: walk in  History obtained from: patient  History limited by: none    CHIEF COMPLAINT  Chief Complaint   Patient presents with   • GLF     Pt with GLF yesterday, fell 2 stairs after losing balance, pt landed on dirt/rocks and hitting head/ pt vomiting today mulitple times/ pt A&Ox4/ -LOC   • Headache   • N/V       HPI  Chidi Tatum is a 73 y.o. male who presents to the Emergency Department for evaluation of a headache secondary to a ground level mechanical fall yesterday. Patient explains that he lost balance and fell down two stairs. He is unsure if he hit his head and denies losing consciousness secondary to the fall. He has had multiple episodes of vomiting and diarrhea today. He denies any cough, abdominal pain. Patient reports a low grade fever today. Patient additionally has had lower back pain for the past few days. No alleviating or exacerbating factors mentioned. Per daughter, patient had two hernia repairs a few months ago. He has a history of small bowl obstruction, GERD, and hypertension. Daughter explains that he has been admitted two times in the past for similar symptoms but they have not been able to find a source.     REVIEW OF SYSTEMS  Pertinent positives include headache, nausea, vomiting, back pain. Pertinent negatives include no cough, abdominal pain, loss of consciousness.  All other systems reviewed and negative.    PAST MEDICAL HISTORY   has a past medical history of BPH (benign prostatic hyperplasia); GERD (gastroesophageal reflux disease); HTN (hypertension); Hypertension; Insomnia; and SBO (small bowel obstruction) (HCC).    SURGICAL HISTORY   has a past surgical history that includes hernia repair and temporal artery biopsy (Right, 2/9/2018).    SOCIAL HISTORY  Social History   Substance Use Topics   • Smoking status: Never  Smoker   • Smokeless tobacco: Never Used   • Alcohol use No      History   Drug Use No       FAMILY HISTORY  Family History   Problem Relation Age of Onset   • Lung Disease Father    • Alcohol/Drug Brother    • Lung Disease Brother        CURRENT MEDICATIONS  No current facility-administered medications for this encounter.     Current Outpatient Prescriptions:   •  cephALEXin (KEFLEX) 500 MG Cap, Take 1 Cap by mouth 3 times a day., Disp: 21 Cap, Rfl: 0  •  esomeprazole (NEXIUM) 40 MG delayed-release capsule, Take 40 mg by mouth every morning before breakfast., Disp: , Rfl:   •  Potassium (POTASSIMIN PO), Take 1 Tab by mouth every day. OTC, Disp: , Rfl:   •  cyanocobalamin (VITAMIN B12) 1000 MCG Tab, Take 1 Tab by mouth every day. (Patient not taking: Reported on 12/11/2018), Disp: 30 Tab, Rfl: 3  •  ferrous sulfate 325 (65 Fe) MG tablet, Take 1 Tab by mouth every morning with breakfast., Disp: 30 Tab, Rfl: 0  •  loperamide (IMODIUM) 2 MG Cap, Take 1 Cap by mouth every 6 hours as needed for Diarrhea., Disp: 30 Cap, Rfl: 0  •  tamsulosin (FLOMAX) 0.4 MG capsule, TAKE ONE CAPSULE BY MOUTH ONE-HALF HOUR AFTER BREAKFAST, Disp: 90 Cap, Rfl: 2  •  finasteride (PROSCAR) 5 MG Tab, TAKE ONE TABLET BY MOUTH ONCE DAILY, Disp: 90 Tab, Rfl: 2  •  lisinopril (PRINIVIL) 10 MG Tab, TAKE ONE TABLET BY MOUTH ONCE DAILY *REPLACES  5MG, Disp: 90 Tab, Rfl: 1  •  sertraline (ZOLOFT) 25 MG tablet, Take 1 Tab by mouth every day., Disp: 30 Tab, Rfl: 1  •  hydroCHLOROthiazide (HYDRODIURIL) 25 MG Tab, Take 1 Tab by mouth every 48 hours., Disp: 45 Tab, Rfl: 1    ALLERGIES  Allergies   Allergen Reactions   • Celebrex [Celecoxib] Rash     .       PHYSICAL EXAM  VITAL SIGNS: /85   Pulse (!) 118   Temp 36.1 °C (96.9 °F) (Temporal)   Resp 14   Ht 1.829 m (6')   Wt 105.2 kg (232 lb)   SpO2 93%   BMI 31.46 kg/m²     Vital signs reviewed.  Constitutional:  Well nourished elderly male  Head: Normocephalic  Mouth/Throat: Oropharynx  dry  Neck: Normal range of motion, nontender  Cardiovascular: Regular rate and rhythm  Pulmonary/Chest: Clear to auscultation  Abdominal: Soft, nontender  Musculoskeletal: C-spine and T-spine non tender, lumbar spine tenderness  Lymphadenopathy: No cervical lymphadenopathy   Neurological: Patient is alert and oriented to person, place, and time. CNs II - XII intact. DTRs intact. Normal sensation and strength.  Skin: Warm and dry  Psychiatric: Normal affect and mood      LABS  Results for orders placed or performed during the hospital encounter of 01/04/19   CBC WITH DIFFERENTIAL   Result Value Ref Range    WBC 17.9 (H) 4.8 - 10.8 K/uL    RBC 4.68 (L) 4.70 - 6.10 M/uL    Hemoglobin 12.7 (L) 14.0 - 18.0 g/dL    Hematocrit 38.4 (L) 42.0 - 52.0 %    MCV 82.1 81.4 - 97.8 fL    MCH 27.1 27.0 - 33.0 pg    MCHC 33.1 (L) 33.7 - 35.3 g/dL    RDW 50.3 (H) 35.9 - 50.0 fL    Platelet Count 260 164 - 446 K/uL    MPV 9.2 9.0 - 12.9 fL    Neutrophils-Polys 85.80 (H) 44.00 - 72.00 %    Lymphocytes 7.80 (L) 22.00 - 41.00 %    Monocytes 5.10 0.00 - 13.40 %    Eosinophils 0.10 0.00 - 6.90 %    Basophils 0.20 0.00 - 1.80 %    Immature Granulocytes 1.00 (H) 0.00 - 0.90 %    Nucleated RBC 0.00 /100 WBC    Neutrophils (Absolute) 15.32 (H) 1.82 - 7.42 K/uL    Lymphs (Absolute) 1.40 1.00 - 4.80 K/uL    Monos (Absolute) 0.92 (H) 0.00 - 0.85 K/uL    Eos (Absolute) 0.02 0.00 - 0.51 K/uL    Baso (Absolute) 0.04 0.00 - 0.12 K/uL    Immature Granulocytes (abs) 0.18 (H) 0.00 - 0.11 K/uL    NRBC (Absolute) 0.00 K/uL   BASIC METABOLIC PANEL   Result Value Ref Range    Sodium 133 (L) 135 - 145 mmol/L    Potassium 3.8 3.6 - 5.5 mmol/L    Chloride 105 96 - 112 mmol/L    Co2 17 (L) 20 - 33 mmol/L    Glucose 153 (H) 65 - 99 mg/dL    Bun 29 (H) 8 - 22 mg/dL    Creatinine 1.74 (H) 0.50 - 1.40 mg/dL    Calcium 10.0 8.5 - 10.5 mg/dL    Anion Gap 11.0 0.0 - 11.9   ESTIMATED GFR   Result Value Ref Range    GFR If  47 (A) >60 mL/min/1.73 m 2     GFR If Non  39 (A) >60 mL/min/1.73 m 2       All labs reviewed by me.    RADIOLOGY  CT-HEAD W/O   Final Result      1.  No CT evidence of acute intracranial injury.   2.  Pleural parenchymal atrophy and chronic small vessel ischemic changes.   3.  Stable paranasal sinus disease.      DX-LUMBAR SPINE-2 OR 3 VIEWS   Final Result      No acute fracture or listhesis.        The radiologist's interpretation of all radiological studies have been reviewed by me.    COURSE & MEDICAL DECISION MAKING  Pertinent Labs & Imaging studies reviewed. (See chart for details) The patient's [facility] and past medical  records were reviewed    7:11 PM - Patient seen and examined at bedside. Patient will be treated with 500 ml NS infusion for dehydration and 4 mg zofran for nausea and vomiting. Ordered Dx-lumbar spin, CT-head, CBC with differential, BMP to evaluate his symptoms. The differential diagnoses include but are not limited to: Gastroenteritis, UTI, head injury    Results show UTI with dehydration and acute kidney injury    9:03 PM Paged hospitalist.     HYDRATION: Based on the patient's presentation of Dehydration the patient was given IV fluids. IV Hydration was used because oral hydration was not adequate alone. Upon recheck following hydration, the patient was improved.    DISPOSITION:  Patient will be admitted to Dr. Belcher in guarded condition.    FINAL IMPRESSION  1. Dehydration    2. Acute cystitis with hematuria          Susan DYE (Osvaldo), am scribing for, and in the presence of, Hari Briceño M.D..    Electronically signed by: Susan Claire (Osvaldo), 1/4/2019    Hari DYE M.D. personally performed the services described in this documentation, as scribed by Susan Claire in my presence, and it is both accurate and complete.  C    The note accurately reflects work and decisions made by me.  Hari Briceño  1/4/2019  9:14 PM

## 2019-01-05 NOTE — PROGRESS NOTES
Assumed care of pt. A&Ox4. Currently sitting up in bed eating breakfast. Denies pain or discomfort this morning. Medications given per MAR. Pt currently on isolation for rule out C-diff. Stool sample collected this AM and sent to lab. All needs met. Safety precautions/hourly rounding in place.

## 2019-01-05 NOTE — H&P
Hospital Medicine History & Physical Note    Date of Service  1/4/2019    Primary Care Physician  FRANCISCO Moya.    Consultants  None    Code Status  Full    Chief Complaint  Chief Complaint   Patient presents with   • GLF     Pt with GLF yesterday, fell 2 stairs after losing balance, pt landed on dirt/rocks and hitting head/ pt vomiting today mulitple times/ pt A&Ox4/ -LOC   • Headache   • N/V       History of Presenting Illness  73 y.o. male who presented on 1/4/2019 with headache, nausea, vomiting and diarrhea after falling down 2 stairs yesterday.  The patient reports that he had been standing at the top of 2 stairs when he lost his balance and fell.  He does not believe that he lost consciousness and denies any preceding chest pain, headache, or dizziness.  The patient states that today, he developed several episodes of nausea, vomiting, and diarrhea.  He was last discharged from our facility by the [facility] resident team on December 9 after an admission for sepsis secondary to UTI with acute on chronic renal failure and diarrhea.  Patient reports that his diarrhea has never really gone away in the past month.  He does have occasional episodes of more formed stool but his default bowel movements are of diarrhea consistency.  He was sent home with oral antibiotic therapy which she completed as prescribed.  He otherwise denies any fevers, chills, chest pain, abdominal pain, or dysuria.  Upon assessment in the emergency room, a CT scan of the head was negative for acute intracranial injury however he is noted to have a recurrent UTI as well as clinical evidence of dehydration.    Review of Systems  Review of Systems   Constitutional: Negative for chills and fever.   HENT: Negative for congestion and sore throat.    Eyes: Negative for photophobia.   Respiratory: Negative for cough, shortness of breath and wheezing.    Cardiovascular: Negative for chest pain and palpitations.   Gastrointestinal:  Positive for diarrhea, nausea and vomiting. Negative for abdominal pain.   Genitourinary: Negative for dysuria.   Musculoskeletal: Negative for myalgias.   Skin: Negative.    Neurological: Negative for dizziness, tingling, focal weakness and headaches.   Psychiatric/Behavioral: Negative for depression and suicidal ideas.       Past Medical History  Past Medical History:   Diagnosis Date   • BPH (benign prostatic hyperplasia)    • GERD (gastroesophageal reflux disease)    • HTN (hypertension)    • Hypertension    • Insomnia    • SBO (small bowel obstruction) (HCC)        Surgical History  Past Surgical History:   Procedure Laterality Date   • TEMPORAL ARTERY BIOPSY Right 2/9/2018    Procedure: TEMPORAL ARTERY BIOPSY;  Surgeon: Bryant Corey M.D.;  Location: SURGERY [facility];  Service: Vascular   • HERNIA REPAIR      umbilical, groin        Family History  Family History   Problem Relation Age of Onset   • Lung Disease Father    • Alcohol/Drug Brother    • Lung Disease Brother        Social History  Social History   Substance Use Topics   • Smoking status: Never Smoker   • Smokeless tobacco: Never Used   • Alcohol use No       Allergies  Allergies   Allergen Reactions   • Celebrex [Celecoxib] Rash     .       Medications  No current facility-administered medications on file prior to encounter.      Current Outpatient Prescriptions on File Prior to Encounter   Medication Sig Dispense Refill   • cephALEXin (KEFLEX) 500 MG Cap Take 1 Cap by mouth 3 times a day. 21 Cap 0   • esomeprazole (NEXIUM) 40 MG delayed-release capsule Take 40 mg by mouth every morning before breakfast.     • Potassium (POTASSIMIN PO) Take 1 Tab by mouth every day. OTC     • cyanocobalamin (VITAMIN B12) 1000 MCG Tab Take 1 Tab by mouth every day. (Patient not taking: Reported on 12/11/2018) 30 Tab 3   • ferrous sulfate 325 (65 Fe) MG tablet Take 1 Tab by mouth every morning with breakfast. 30 Tab 0   • loperamide (IMODIUM) 2 MG Cap  Take 1 Cap by mouth every 6 hours as needed for Diarrhea. 30 Cap 0   • tamsulosin (FLOMAX) 0.4 MG capsule TAKE ONE CAPSULE BY MOUTH ONE-HALF HOUR AFTER BREAKFAST 90 Cap 2   • finasteride (PROSCAR) 5 MG Tab TAKE ONE TABLET BY MOUTH ONCE DAILY 90 Tab 2   • lisinopril (PRINIVIL) 10 MG Tab TAKE ONE TABLET BY MOUTH ONCE DAILY *REPLACES  5MG 90 Tab 1   • sertraline (ZOLOFT) 25 MG tablet Take 1 Tab by mouth every day. 30 Tab 1   • hydroCHLOROthiazide (HYDRODIURIL) 25 MG Tab Take 1 Tab by mouth every 48 hours. 45 Tab 1       Physical Exam  Hemodynamics  Temp (24hrs), Av.1 °C (96.9 °F), Min:36.1 °C (96.9 °F), Max:36.1 °C (96.9 °F)   Temperature: 36.1 °C (96.9 °F)  Pulse  Av.5  Min: 83  Max: 118    Blood Pressure : 127/71      Respiratory      Respiration: 16, Pulse Oximetry: 93 %             Physical Exam   Constitutional: He is oriented to person, place, and time. No distress.   HENT:   Head: Normocephalic and atraumatic.   Right Ear: External ear normal.   Left Ear: External ear normal.   Dry mucous membranes   Eyes: EOM are normal. Right eye exhibits no discharge. Left eye exhibits no discharge.   Neck: Neck supple. No JVD present.   Cardiovascular: Normal rate, regular rhythm and normal heart sounds.    Pulmonary/Chest: Effort normal and breath sounds normal. No respiratory distress. He exhibits no tenderness.   Abdominal: Soft. Bowel sounds are normal. He exhibits no distension. There is no tenderness.   Musculoskeletal: He exhibits no edema.   Neurological: He is alert and oriented to person, place, and time. No cranial nerve deficit.   Skin: Skin is dry. He is not diaphoretic. No erythema.   Psychiatric: He has a normal mood and affect. His behavior is normal.   Nursing note and vitals reviewed.    Capillary refill less than 3 seconds, distal pulses intact    Laboratory:  Recent Labs      19   WBC  17.9*   RBC  4.68*   HEMOGLOBIN  12.7*   HEMATOCRIT  38.4*   MCV  82.1   MCH  27.1   MCHC  33.1*    RDW  50.3*   PLATELETCT  260   MPV  9.2     Recent Labs      01/04/19 1932   SODIUM  133*   POTASSIUM  3.8   CHLORIDE  105   CO2  17*   GLUCOSE  153*   BUN  29*   CREATININE  1.74*   CALCIUM  10.0     Recent Labs      01/04/19 1932   GLUCOSE  153*                 Lab Results   Component Value Date    TROPONINI <0.01 12/07/2018       Imaging  Ct-cspine Without Plus Recons    Result Date: 12/7/2018 12/7/2018 12:49 PM HISTORY/REASON FOR EXAM: Neck pain, initial exam Fall, facial injury TECHNIQUE/EXAM DESCRIPTION: CT cervical spine without contrast, with reconstructions. The study was performed on a helical multidetector CT scanner. Thin-section helical scanning was performed from the skull base through T1. Sagittal and coronal multiplanar reconstructions were generated from the axial images. Low dose optimization technique was utilized for this CT exam including automated exposure control and adjustment of the mA and/or kV according to patient size. COMPARISON:  None. FINDINGS: Alignment of the cervical spine is straight. Vertebral body heights are preserved. Multilevel loss of disc height and osteophyte formation. Facets are normally aligned.  Multilevel facet hypertrophy present. Axial images show no acute fracture. Cervicothoracic junction is intact. Prevertebral soft tissues are within normal limits. Visualized lung apices are unremarkable.     1.  Straightening and moderate degenerative change of cervical spine. 2.  No fracture or subluxation.    Ct-head W/o    Result Date: 1/4/2019   CT HEAD WITHOUT CONTRAST 1/4/2019 7:58 PM HISTORY/REASON FOR EXAM:  Ground level fall, headache TECHNIQUE/EXAM DESCRIPTION AND NUMBER OF VIEWS: CT of the head without contrast. The study was performed on a helical multidetector CT scanner. Contiguous 2.5 mm axial sections were obtained from the skull base through the vertex. Up to date radiation dose reduction adjustments have been utilized to meet ALARA standards for  radiation dose reduction. COMPARISON:  12/7/2018 FINDINGS: Lateral ventricles are normal in size and symmetric. Mild to moderate global parenchymal atrophy. Chronic small vessel ischemic changes. No significant mass effect or midline shift. Basal cisterns are patent. No evidence for intracranial hemorrhage. Calvaria are intact. Atherosclerosis. Visualized orbits are unremarkable. Visualized mastoid air cells are clear. Bilateral paranasal sinus disease, similar to prior study.     1.  No CT evidence of acute intracranial injury. 2.  Pleural parenchymal atrophy and chronic small vessel ischemic changes. 3.  Stable paranasal sinus disease.    Ct-head W/o    Result Date: 12/7/2018 12/7/2018 12:49 PM HISTORY/REASON FOR EXAM: Fall, head injury, dizziness, vomiting. TECHNIQUE/EXAM DESCRIPTION AND NUMBER OF VIEWS: CT of the head without contrast. The study was performed on a helical multidetector CT scanner. Contiguous 2.5 mm axial sections were obtained from the skull base through the vertex. Up to date radiation dose reduction adjustments have been utilized to meet ALARA standards for radiation dose reduction. COMPARISON:  None available FINDINGS: Lateral ventricles are moderately enlarged but symmetric Cortical sulci are moderately enlarged. Patchy areas of low attenuation in the white matter bilaterally. No significant mass effect or midline shift. Basal cisterns are patent. No evidence for intracranial hemorrhage. Calvaria are intact. Visualized orbits are unremarkable. Visualized mastoid air cells are clear. Frontal, ethmoid and maxillary sinus polypoid mucosal thickening.     1.  Diffuse atrophy and white matter changes. 2.  No acute intracranial hemorrhage or territorial infarct. 3.  Chronic paranasal sinus disease.    Ct-maxillofacial W/o Plus Recons    Result Date: 12/7/2018 12/7/2018 12:49 PM HISTORY/REASON FOR EXAM:  Fall, facial injury. TECHNIQUE/EXAM DESCRIPTION AND NUMBER OF VIEWS: CT scan  maxillofacial/paranasal sinuses without contrast, with reconstructions. Thin-section helical imaging was obtained of the maxillofacial structures including paranasal sinuses from the orbital roofs through the mandible. Coronal and sagittal multiplanar volume reformat images were generated from the axial data. Low dose optimization technique was utilized for this CT exam including automated exposure control and adjustment of the mA and/or kV according to patient size. COMPARISON: CT head 12/7/2018 FINDINGS: The mandible is intact. Temporal mandibular joints are normally located. Zygomatic arches are intact. Bony orbits are intact. Orbital contents are unremarkable. RIGHT facial soft tissue swelling. RIGHT superior periorbital soft tissue swelling. Polypoid mucosal thickening in the frontal, ethmoid and maxillary sinuses. The visualized mastoids are unremarkable. Upper dentures in place.     1.  RIGHT facial and periorbital soft tissue swelling. 2.  No facial fracture. 3.  Chronic paranasal sinus disease.    Dx-chest-portable (1 View)    Result Date: 12/7/2018 12/7/2018 1:21 PM HISTORY/REASON FOR EXAM:  Fever. A TECHNIQUE/EXAM DESCRIPTION AND NUMBER OF VIEWS: Single portable view of the chest. COMPARISON: 4/12/18 FINDINGS: Lung base linear atelectasis. No consolidation. There is no evidence of pleural effusion. Cardiomegaly.     Lung base linear atelectasis. No consolidation.    Dx-knee Complete 4+ Left    Result Date: 12/7/2018 12/7/2018 1:21 PM HISTORY/REASON FOR EXAM:  Pain/Deformity Following Trauma. TECHNIQUE/EXAM DESCRIPTION AND NUMBER OF VIEWS:  4 views of the LEFT knee. COMPARISON: None FINDINGS: No acute fracture identified. Moderate tricompartment degenerative changes. No evidence of knee joint effusion.     No acute fracture. Degenerative changes.    Dx-lumbar Spine-2 Or 3 Views    Result Date: 1/4/2019 1/4/2019 7:45 PM HISTORY/REASON FOR EXAM:  Pain Following Trauma. TECHNIQUE/ EXAM DESCRIPTION AND  NUMBER OF VIEWS:  3 views of the lumbar spine. COMPARISON: None. FINDINGS: Slight straightening of lumbar lordosis. No scoliosis. Mild multilevel spondylosis. Mild lower lumbar facet hypertrophy. No acute fracture. No listhesis.     No acute fracture or listhesis.        Assessment/Plan:  Anticipate that patient will need greater than 2 midnights for management of the discussed medical issues.    * UTI (urinary tract infection)   Assessment & Plan    Patient again is a positive urinalysis.  He also has leukocytosis and tachycardia although he remains afebrile with no evidence of end organ damage.  I will check a lactic acid level.  His last urine culture was positive for Enterobacter which was sensitive to cefepime.  I will start him on cefepime and we will send his current urine sample for culture and monitor for speciation and sensitivities.     Nausea, vomiting, and diarrhea   Assessment & Plan    Patient has been admitted for ileitis, colitis, and small bowel obstruction within the past year.  Point, his abdominal exam at bedside is benign therefore suspicion is lower for SBO.  Ileitis and colitis or other infectious diarrhea is higher on the differential.  During his last hospitalization, his stool studies were negative for C. difficile however since he has been home, he has been on antibiotics therefore I will check a C. difficile study.  In the meantime, the patient states that he feels hunger therefore I will start him on a clear liquid diet which we will then advance as tolerated.  I have requested a CT scan of the abdomen.  As I have no clear evidence of bacterial infection now, I am holding off on any antibiotic therapy that we will target the abdomen for now.     Dehydration   Assessment & Plan    Secondary to GI loss, patient will be on IV fluid hydration overnight and will monitor chemistries in the morning      SIRS (systemic inflammatory response syndrome) (HCC)   Assessment & Plan    Treatment as  noted above     Stage 3 chronic kidney disease (HCC)- (present on admission)   Assessment & Plan    Patient's renal function is close to his previous baseline.  Continue to monitor on IV fluids.     Essential hypertension- (present on admission)   Assessment & Plan    Okay to continue home Prinivil and hydrochlorothiazide         Prophylaxis: Heparin for DVT prophylaxis, home PPI indicated, bowel protocol as needed

## 2019-01-05 NOTE — ED TRIAGE NOTES
Chief Complaint   Patient presents with   • GLF     Pt with GLF yesterday, fell 2 stairs after losing balance, pt landed on dirt/rocks and hitting head/ pt vomiting today mulitple times/ pt A&Ox4/ -LOC   • Headache   • N/V     Explained to pt triage process, made pt aware to tell this RN/staff of any changes/concerns, pt verbalized understanding of process and instructions given. Pt to ER lobby.

## 2019-01-05 NOTE — ASSESSMENT & PLAN NOTE
Patient's renal function is close to his previous baseline.  -- CT abdomen is noted to have bilateral hydroureteronephrosis this dating back to July 2018.  Given the recurrent UTI and abnormal renal function, will check nm renogram as suggested to evaluate the patient's genitourinary system.   --Discussed w/ Dr. Arciniega, urology who recs outpt follow up   --referral for urology and nephrology outpt

## 2019-01-05 NOTE — CARE PLAN
Problem: Communication  Goal: The ability to communicate needs accurately and effectively will improve    Intervention: [place] patient and significant other/support system to call light to alert staff of needs  Oriented patient to room and unit routine. Verbalized understanding       Problem: Knowledge Deficit  Goal: Knowledge of disease process/condition, treatment plan, diagnostic tests, and medications will improve    Intervention: Explain information regarding disease process/condition, treatment plan, diagnostic tests, and medications and document in education  Educated patient and daughters on C diff infection prevention. Verbalized understanding

## 2019-01-05 NOTE — ED NOTES
Pt with + emesis in bathroom, pt comforted, updated on POC. Offered blanket, wait time explained.

## 2019-01-05 NOTE — PROGRESS NOTES
Educated patient about importance of skin checks. Patient verbalized understanding and consented to exam    2 RN skin check with Hyonja    Sacrum red but blanching. Scabs to left knee. Patient missing his ring finger on his left hand

## 2019-01-05 NOTE — PROGRESS NOTES
Patient arrived via gurney from ER with daughters. Oriented to room and unit routine. Updated on plan of care. Verbalized understanding. Admit profile completed. IV patent and fluids started. Denies pain. Encouraged rest and repositioning. Educated patient about fall prevention strategies. Educated patient to call and wait for staff assistance before attempting to ambulate. Verbalized understanding. Call light in use, belongings within reach, bed frame alarm on, treaded socks on, bed locked in low position

## 2019-01-05 NOTE — PROGRESS NOTES
"Patient refused strip alarm. \"It's uncomfortable to lie on\". Educated patient on the importance of bed alarms to prevent falls. Verbalized understanding but continued to refuse. Patient consented to frame alarm and promised to call and wait for staff assistance before attempting to ambulate. Charge Gaby notified of refusal   "

## 2019-01-06 ENCOUNTER — APPOINTMENT (OUTPATIENT)
Dept: RADIOLOGY | Facility: MEDICAL CENTER | Age: 74
DRG: 690 | End: 2019-01-06
Attending: INTERNAL MEDICINE
Payer: MEDICARE

## 2019-01-06 PROBLEM — R80.1 PERSISTENT PROTEINURIA: Status: ACTIVE | Noted: 2019-01-06

## 2019-01-06 LAB
ALBUMIN SERPL BCP-MCNC: 3.4 G/DL (ref 3.2–4.9)
BACTERIA UR CULT: ABNORMAL
BACTERIA UR CULT: ABNORMAL
BASOPHILS # BLD AUTO: 0.3 % (ref 0–1.8)
BASOPHILS # BLD: 0.03 K/UL (ref 0–0.12)
BUN SERPL-MCNC: 27 MG/DL (ref 8–22)
CALCIUM SERPL-MCNC: 9.2 MG/DL (ref 8.5–10.5)
CHLORIDE SERPL-SCNC: 105 MMOL/L (ref 96–112)
CO2 SERPL-SCNC: 18 MMOL/L (ref 20–33)
CREAT SERPL-MCNC: 1.74 MG/DL (ref 0.5–1.4)
EOSINOPHIL # BLD AUTO: 0.19 K/UL (ref 0–0.51)
EOSINOPHIL NFR BLD: 1.9 % (ref 0–6.9)
ERYTHROCYTE [DISTWIDTH] IN BLOOD BY AUTOMATED COUNT: 50.1 FL (ref 35.9–50)
GLUCOSE SERPL-MCNC: 95 MG/DL (ref 65–99)
HCT VFR BLD AUTO: 33.2 % (ref 42–52)
HGB BLD-MCNC: 10.8 G/DL (ref 14–18)
IMM GRANULOCYTES # BLD AUTO: 0.05 K/UL (ref 0–0.11)
IMM GRANULOCYTES NFR BLD AUTO: 0.5 % (ref 0–0.9)
LYMPHOCYTES # BLD AUTO: 1.68 K/UL (ref 1–4.8)
LYMPHOCYTES NFR BLD: 17.1 % (ref 22–41)
MCH RBC QN AUTO: 26.7 PG (ref 27–33)
MCHC RBC AUTO-ENTMCNC: 32.5 G/DL (ref 33.7–35.3)
MCV RBC AUTO: 82 FL (ref 81.4–97.8)
MONOCYTES # BLD AUTO: 0.96 K/UL (ref 0–0.85)
MONOCYTES NFR BLD AUTO: 9.8 % (ref 0–13.4)
NEUTROPHILS # BLD AUTO: 6.9 K/UL (ref 1.82–7.42)
NEUTROPHILS NFR BLD: 70.4 % (ref 44–72)
NRBC # BLD AUTO: 0 K/UL
NRBC BLD-RTO: 0 /100 WBC
PHOSPHATE SERPL-MCNC: 3.4 MG/DL (ref 2.5–4.5)
PLATELET # BLD AUTO: 234 K/UL (ref 164–446)
PMV BLD AUTO: 9.6 FL (ref 9–12.9)
POTASSIUM SERPL-SCNC: 3.1 MMOL/L (ref 3.6–5.5)
RBC # BLD AUTO: 4.05 M/UL (ref 4.7–6.1)
SIGNIFICANT IND 70042: ABNORMAL
SITE SITE: ABNORMAL
SODIUM SERPL-SCNC: 132 MMOL/L (ref 135–145)
SOURCE SOURCE: ABNORMAL
URATE SERPL-MCNC: 8.1 MG/DL (ref 2.5–8.3)
WBC # BLD AUTO: 9.8 K/UL (ref 4.8–10.8)

## 2019-01-06 PROCEDURE — A9270 NON-COVERED ITEM OR SERVICE: HCPCS | Performed by: HOSPITALIST

## 2019-01-06 PROCEDURE — 80069 RENAL FUNCTION PANEL: CPT

## 2019-01-06 PROCEDURE — 700102 HCHG RX REV CODE 250 W/ 637 OVERRIDE(OP): Performed by: HOSPITALIST

## 2019-01-06 PROCEDURE — 85025 COMPLETE CBC W/AUTO DIFF WBC: CPT

## 2019-01-06 PROCEDURE — 770021 HCHG ROOM/CARE - ISO PRIVATE

## 2019-01-06 PROCEDURE — 700111 HCHG RX REV CODE 636 W/ 250 OVERRIDE (IP)

## 2019-01-06 PROCEDURE — 78708 K FLOW/FUNCT IMAGE W/DRUG: CPT

## 2019-01-06 PROCEDURE — 84550 ASSAY OF BLOOD/URIC ACID: CPT

## 2019-01-06 PROCEDURE — 97161 PT EVAL LOW COMPLEX 20 MIN: CPT | Performed by: PHYSICAL THERAPIST

## 2019-01-06 PROCEDURE — 700111 HCHG RX REV CODE 636 W/ 250 OVERRIDE (IP): Performed by: HOSPITALIST

## 2019-01-06 PROCEDURE — 99233 SBSQ HOSP IP/OBS HIGH 50: CPT | Performed by: HOSPITALIST

## 2019-01-06 PROCEDURE — 700111 HCHG RX REV CODE 636 W/ 250 OVERRIDE (IP): Performed by: INTERNAL MEDICINE

## 2019-01-06 PROCEDURE — 700105 HCHG RX REV CODE 258: Performed by: INTERNAL MEDICINE

## 2019-01-06 PROCEDURE — 36415 COLL VENOUS BLD VENIPUNCTURE: CPT

## 2019-01-06 RX ORDER — POTASSIUM CHLORIDE 20 MEQ/1
40 TABLET, EXTENDED RELEASE ORAL ONCE
Status: COMPLETED | OUTPATIENT
Start: 2019-01-06 | End: 2019-01-06

## 2019-01-06 RX ORDER — FUROSEMIDE 10 MG/ML
INJECTION INTRAMUSCULAR; INTRAVENOUS
Status: COMPLETED
Start: 2019-01-06 | End: 2019-01-06

## 2019-01-06 RX ORDER — DIPHENOXYLATE HYDROCHLORIDE AND ATROPINE SULFATE 2.5; .025 MG/1; MG/1
1 TABLET ORAL 4 TIMES DAILY PRN
Status: DISCONTINUED | OUTPATIENT
Start: 2019-01-06 | End: 2019-01-08 | Stop reason: HOSPADM

## 2019-01-06 RX ADMIN — ACETAMINOPHEN 650 MG: 325 TABLET, FILM COATED ORAL at 14:06

## 2019-01-06 RX ADMIN — POTASSIUM CHLORIDE 40 MEQ: 1500 TABLET, EXTENDED RELEASE ORAL at 20:10

## 2019-01-06 RX ADMIN — OXYCODONE HYDROCHLORIDE 5 MG: 5 TABLET ORAL at 08:36

## 2019-01-06 RX ADMIN — OMEPRAZOLE 20 MG: 20 CAPSULE, DELAYED RELEASE ORAL at 05:12

## 2019-01-06 RX ADMIN — FINASTERIDE 5 MG: 5 TABLET, FILM COATED ORAL at 05:11

## 2019-01-06 RX ADMIN — HYDROCHLOROTHIAZIDE 25 MG: 25 TABLET ORAL at 05:12

## 2019-01-06 RX ADMIN — SERTRALINE 25 MG: 50 TABLET, FILM COATED ORAL at 05:12

## 2019-01-06 RX ADMIN — HEPARIN SODIUM 5000 UNITS: 5000 INJECTION, SOLUTION INTRAVENOUS; SUBCUTANEOUS at 14:02

## 2019-01-06 RX ADMIN — DIPHENOXYLATE HYDROCHLORIDE AND ATROPINE SULFATE 1 TABLET: 2.5; .025 TABLET ORAL at 17:45

## 2019-01-06 RX ADMIN — FERROUS SULFATE TAB 325 MG (65 MG ELEMENTAL FE) 325 MG: 325 (65 FE) TAB at 08:36

## 2019-01-06 RX ADMIN — CEFEPIME 2 G: 2 INJECTION, POWDER, FOR SOLUTION INTRAVENOUS at 05:12

## 2019-01-06 RX ADMIN — HEPARIN SODIUM 5000 UNITS: 5000 INJECTION, SOLUTION INTRAVENOUS; SUBCUTANEOUS at 05:12

## 2019-01-06 RX ADMIN — FUROSEMIDE 20 MG: 10 INJECTION, SOLUTION INTRAMUSCULAR; INTRAVENOUS at 11:30

## 2019-01-06 RX ADMIN — HEPARIN SODIUM 5000 UNITS: 5000 INJECTION, SOLUTION INTRAVENOUS; SUBCUTANEOUS at 20:10

## 2019-01-06 RX ADMIN — TAMSULOSIN HYDROCHLORIDE 0.4 MG: 0.4 CAPSULE ORAL at 08:36

## 2019-01-06 RX ADMIN — LISINOPRIL 10 MG: 10 TABLET ORAL at 05:12

## 2019-01-06 ASSESSMENT — ENCOUNTER SYMPTOMS
SPUTUM PRODUCTION: 0
VOMITING: 0
BLURRED VISION: 0
SHORTNESS OF BREATH: 0
NAUSEA: 1
FLANK PAIN: 0
ABDOMINAL PAIN: 0
NAUSEA: 0
DIZZINESS: 0
WEAKNESS: 1
FEVER: 0

## 2019-01-06 ASSESSMENT — GAIT ASSESSMENTS
ASSISTIVE DEVICE: FRONT WHEEL WALKER
GAIT LEVEL OF ASSIST: STAND BY ASSIST
DEVIATION: BRADYKINETIC;DECREASED HEEL STRIKE;DECREASED TOE OFF
DISTANCE (FEET): 500

## 2019-01-06 ASSESSMENT — PAIN SCALES - GENERAL
PAINLEVEL_OUTOF10: 3
PAINLEVEL_OUTOF10: 6
PAINLEVEL_OUTOF10: 7

## 2019-01-06 ASSESSMENT — PAIN SCALES - WONG BAKER: WONGBAKER_NUMERICALRESPONSE: HURTS JUST A LITTLE BIT

## 2019-01-06 NOTE — CARE PLAN
Problem: Communication  Goal: The ability to communicate needs accurately and effectively will improve  Outcome: PROGRESSING AS EXPECTED  Able to make needs known. Renal ultrasound verbalized to pt. Able to express needs.     Problem: Safety  Goal: Will remain free from injury  Outcome: PROGRESSING AS EXPECTED  Compliant with using call light and able to make needs known. One standby assist in place while ambulating.

## 2019-01-06 NOTE — PROGRESS NOTES
A&O X4, pt able to make needs known. Pt aware of renal ultrasound today. C/o 7/10 back pain this a.m. And medicated, see MAR. Resting in bed at this time. Call bell in reach.

## 2019-01-06 NOTE — THERAPY
"Physical Therapy Evaluation completed.   Bed Mobility:  Supine to Sit: Supervised  Transfers: Sit to Stand: Stand by Assist  Gait: Level Of Assist: Stand by Assist with Will Continue to Assess for Equipment Needs       Plan of Care: Will benefit from Physical Therapy 2 times per week  Discharge Recommendations: Equipment: Will Continue to Assess for Equipment Needs. Post-acute therapy Currently anticipate no further skilled therapy needs once patient is discharged from the inpatient setting.    See \"Rehab Therapy-Acute\" Patient Summary Report for complete documentation.     "

## 2019-01-06 NOTE — PROGRESS NOTES
Patient is AOx4. Plan of care discussed. Verbalized understanding. Denies pain. Educated patient to call and wait for staff assistance before attempting to ambulate. Verbalized understanding. Call light in use, belongings within reach, treaded socks on, bed locked in low position

## 2019-01-06 NOTE — PROGRESS NOTES
[facility] Medicine Daily Progress Note    Date of Service  1/6/2019    Chief Complaint  73 y.o. male admitted 1/4/2019 with ground level fall, headache, and n/v.    Hospital Course    73 y.o. male who presented on 1/4/2019 with headache, nausea, vomiting and diarrhea after falling down 2 stairs yesterday.  The patient reports that he had been standing at the top of 2 stairs when he lost his balance and fell.  He does not believe that he lost consciousness and denies any preceding chest pain, headache, or dizziness.  The patient states that today, he developed several episodes of nausea, vomiting, and diarrhea.  He was last discharged from our facility by the [place] resident team on December 9 after an admission for sepsis secondary to UTI with acute on chronic renal failure and diarrhea. Upon assessment in the emergency room, a CT scan of the head was negative for acute intracranial injury however he is noted to have a recurrent UTI as well as clinical evidence of dehydration.       Interval Problem Update  1/5/19:  The patient reports feeling okay.  He states that he has his non-bloody diarrhea for couple of weeks.  CT abdomen found bilateral hydroureteronephrosis, progressive since November 2018 on both sides.  However, the patient denies any dysuria or flank pain.  1/6/19- Lasix scan reviewed.  Discussed w/ Dr. Arciniega, urology who recommended outpt workup.  Referral to urology and nephrology on discharge.  Pt treatment of 5 days for Enterobacter in urine discovered on admission.      Consultants/Specialty  None    Code Status  Full    Disposition  TBD    Review of Systems  Review of Systems   Constitutional: Negative for fever.   Eyes: Negative for blurred vision.   Respiratory: Negative for sputum production and shortness of breath.    Cardiovascular: Negative for chest pain.   Gastrointestinal: Negative for abdominal pain, nausea and vomiting.   Genitourinary: Negative for dysuria, flank pain and hematuria.    Neurological: Negative for dizziness.        Physical Exam  Temp:  [36.3 °C (97.4 °F)-37.5 °C (99.5 °F)] 36.7 °C (98.1 °F)  Pulse:  [79-91] 79  Resp:  [16-18] 18  BP: (118-137)/(69-74) 118/69    Physical Exam   Constitutional: He is oriented to person, place, and time. He appears well-developed and well-nourished. No distress.   HENT:   Head: Normocephalic.   Right Ear: External ear normal.   Left Ear: External ear normal.   Mouth/Throat: No oropharyngeal exudate.   Eyes: Pupils are equal, round, and reactive to light. Conjunctivae are normal. Right eye exhibits no discharge. Left eye exhibits no discharge.   Neck: Neck supple. No JVD present.   Cardiovascular: Normal rate and regular rhythm.    No murmur heard.  Pulmonary/Chest: Effort normal and breath sounds normal. No respiratory distress. He has no wheezes.   Abdominal: Soft. Bowel sounds are normal. He exhibits no distension.   Musculoskeletal: Normal range of motion. He exhibits no edema.   Neurological: He is alert and oriented to person, place, and time. No cranial nerve deficit.   Skin: Skin is warm and dry. He is not diaphoretic. No erythema.   Psychiatric: He has a normal mood and affect.   Nursing note and vitals reviewed.      Fluids    Intake/Output Summary (Last 24 hours) at 01/06/19 1530  Last data filed at 01/06/19 1000   Gross per 24 hour   Intake              560 ml   Output              100 ml   Net              460 ml       Laboratory  Recent Labs      01/04/19 1932   WBC  17.9*   RBC  4.68*   HEMOGLOBIN  12.7*   HEMATOCRIT  38.4*   MCV  82.1   MCH  27.1   MCHC  33.1*   RDW  50.3*   PLATELETCT  260   MPV  9.2     Recent Labs      01/04/19 1932   SODIUM  133*   POTASSIUM  3.8   CHLORIDE  105   CO2  17*   GLUCOSE  153*   BUN  29*   CREATININE  1.74*   CALCIUM  10.0                   Imaging  NM-RENAL WITH DIURETIC WASHOUT   Final Result      1.  Delayed uptake with slowed but definite excretion      2.  Findings are consistent with mild  bilateral chronic hydronephrosis      CT-ABDOMEN-PELVIS W/O   Final Result         1. Bilateral hydroureteronephrosis, progressive since November 2018 on both sides. Small nonobstructing stone in the left renal pelvis. No ureteral stones.   2. Unchanged irregularity and trabeculation of the bladder wall, nonspecific.   3. Stable hepatic steatosis.   4. Stable splenomegaly.   5. Stable renal cysts, with stable in size left renal cyst containing internal calcifications.      CT-HEAD W/O   Final Result      1.  No CT evidence of acute intracranial injury.   2.  Pleural parenchymal atrophy and chronic small vessel ischemic changes.   3.  Stable paranasal sinus disease.      DX-LUMBAR SPINE-2 OR 3 VIEWS   Final Result      No acute fracture or listhesis.           Assessment/Plan  * UTI (urinary tract infection)- (present on admission)   Assessment & Plan    Recurrent UTI.  His last urine culture was positive for Enterobacter which was sensitive to cefepime  Continue on cefepime   -- ?contaminant  --will treat for 5 days total     Nausea, vomiting, and diarrhea   Assessment & Plan    Because of recent antibiotic exposure during previous hospital stay., check a C. difficile study.  In the meantime, start the patient on a clear liquid diet which we will then advance as tolerated.   -- n/v seems to have subsided as the patient is eating and drinking.   -- c. Diff is ruled out. ---->start Lomotil today     Persistent proteinuria- (present on admission)   Assessment & Plan    Will need referral to nephrology upon discharge.       Dehydration   Assessment & Plan    Secondary to GI loss.  -- continue iv fluid for hydration.     SIRS (systemic inflammatory response syndrome) (HCC)   Assessment & Plan    Treatment as noted above     Stage 3 chronic kidney disease (HCC)- (present on admission)   Assessment & Plan    Patient's renal function is close to his previous baseline.  -- CT abdomen is noted to have bilateral  hydroureteronephrosis this dating back to July 2018.  Given the recurrent UTI and abnormal renal function, will check nm renogram as suggested to evaluate the patient's genitourinary system.   --Discussed w/ Dr. Arciniega, urology who recs outpt follow up   --referral for urology and nephrology outpt       Essential hypertension- (present on admission)   Assessment & Plan    Continue Prinivil and hydrochlorothiazide          VTE prophylaxis: Heparin

## 2019-01-06 NOTE — CARE PLAN
Problem: Safety  Goal: Will remain free from falls    Intervention: Implement fall precautions  Educated patient on importance of calling for help and waiting for staff assistance before attempting to ambulate. Verbalized understanding       Problem: Knowledge Deficit  Goal: Knowledge of disease process/condition, treatment plan, diagnostic tests, and medications will improve    Intervention: Explain information regarding disease process/condition, treatment plan, diagnostic tests, and medications and document in education  Updated patient on his lab results, c diff results, and plan of care. Verbalized understanding

## 2019-01-06 NOTE — PROGRESS NOTES
[facility] Medicine Daily Progress Note    Date of Service  1/5/2019    Chief Complaint  73 y.o. male admitted 1/4/2019 with ground level fall, headache, and n/v.    Hospital Course    73 y.o. male who presented on 1/4/2019 with headache, nausea, vomiting and diarrhea after falling down 2 stairs yesterday.  The patient reports that he had been standing at the top of 2 stairs when he lost his balance and fell.  He does not believe that he lost consciousness and denies any preceding chest pain, headache, or dizziness.  The patient states that today, he developed several episodes of nausea, vomiting, and diarrhea.  He was last discharged from our facility by the [place] resident team on December 9 after an admission for sepsis secondary to UTI with acute on chronic renal failure and diarrhea. Upon assessment in the emergency room, a CT scan of the head was negative for acute intracranial injury however he is noted to have a recurrent UTI as well as clinical evidence of dehydration.       Interval Problem Update  1/5/19:  The patient reports feeling okay.  He states that he has his non-bloody diarrhea for couple of weeks.  CT abdomen found bilateral hydroureteronephrosis, progressive since November 2018 on both sides.  However, the patient denies any dysuria or flank pain.    Consultants/Specialty  None    Code Status  Full    Disposition  TBD    Review of Systems  Review of Systems   Constitutional: Negative for fever.   Eyes: Negative for blurred vision.   Respiratory: Negative for sputum production and shortness of breath.    Cardiovascular: Negative for chest pain.   Gastrointestinal: Positive for nausea. Negative for abdominal pain and vomiting.   Genitourinary: Negative for dysuria, flank pain and hematuria.   Neurological: Positive for weakness. Negative for dizziness.        Physical Exam  Temp:  [36.7 °C (98 °F)-38.6 °C (101.4 °F)] 37.5 °C (99.5 °F)  Pulse:  [80-89] 89  Resp:  [16-20] 18  BP: (127-150)/(71-80)  136/74    Physical Exam   Constitutional: He is oriented to person, place, and time. He appears well-nourished. No distress.   HENT:   Head: Normocephalic.   Right Ear: External ear normal.   Left Ear: External ear normal.   Mouth/Throat: No oropharyngeal exudate.   Eyes: Pupils are equal, round, and reactive to light. Conjunctivae are normal. Right eye exhibits no discharge. Left eye exhibits no discharge.   Neck: Neck supple. No JVD present.   Cardiovascular: Normal rate and regular rhythm.    No murmur heard.  Pulmonary/Chest: No respiratory distress. He has no wheezes.   Abdominal: Soft. Bowel sounds are normal. He exhibits no distension.   No flank pain on palpation.   Musculoskeletal: Normal range of motion. He exhibits no edema.   Neurological: He is alert and oriented to person, place, and time. No cranial nerve deficit.   Skin: Skin is warm and dry. He is not diaphoretic. No erythema.   Psychiatric: He has a normal mood and affect.       Fluids    Intake/Output Summary (Last 24 hours) at 01/05/19 1806  Last data filed at 01/05/19 0600   Gross per 24 hour   Intake             1000 ml   Output              150 ml   Net              850 ml       Laboratory  Recent Labs      01/04/19   1932   WBC  17.9*   RBC  4.68*   HEMOGLOBIN  12.7*   HEMATOCRIT  38.4*   MCV  82.1   MCH  27.1   MCHC  33.1*   RDW  50.3*   PLATELETCT  260   MPV  9.2     Recent Labs      01/04/19 1932   SODIUM  133*   POTASSIUM  3.8   CHLORIDE  105   CO2  17*   GLUCOSE  153*   BUN  29*   CREATININE  1.74*   CALCIUM  10.0                   Imaging  CT-ABDOMEN-PELVIS W/O   Final Result         1. Bilateral hydroureteronephrosis, progressive since November 2018 on both sides. Small nonobstructing stone in the left renal pelvis. No ureteral stones.   2. Unchanged irregularity and trabeculation of the bladder wall, nonspecific.   3. Stable hepatic steatosis.   4. Stable splenomegaly.   5. Stable renal cysts, with stable in size left renal cyst  containing internal calcifications.      CT-HEAD W/O   Final Result      1.  No CT evidence of acute intracranial injury.   2.  Pleural parenchymal atrophy and chronic small vessel ischemic changes.   3.  Stable paranasal sinus disease.      DX-LUMBAR SPINE-2 OR 3 VIEWS   Final Result      No acute fracture or listhesis.      NM-RENAL WITH DIURETIC WASHOUT    (Results Pending)        Assessment/Plan  * UTI (urinary tract infection)   Assessment & Plan    Recurrent UTI.  His last urine culture was positive for Enterobacter which was sensitive to cefepime  Continue on cefepime   -- follow up urine culture and monitor for speciation and sensitivities.     Nausea, vomiting, and diarrhea   Assessment & Plan    Because of recent antibiotic exposure during previous hospital stay., check a C. difficile study.  In the meantime, start the patient on a clear liquid diet which we will then advance as tolerated.   -- n/v seems to have subsided as the patient is eating and drinking.   -- consider loperamide if c. Diff is ruled out.     Dehydration   Assessment & Plan    Secondary to GI loss.  -- continue iv fluid for hydration.     SIRS (systemic inflammatory response syndrome) (HCC)   Assessment & Plan    Treatment as noted above     Stage 3 chronic kidney disease (HCC)- (present on admission)   Assessment & Plan    Patient's renal function is close to his previous baseline.  -- CT abdomen is noted to have bilateral hydroureteronephrosis this dating back to July 2018.  Given the recurrent UTI and abnormal renal function, will check nm renogram as suggested to evaluate the patient's genitourinary system. May need urology consult in outpatient.  -- Continue to monitor on IV fluids.     Essential hypertension- (present on admission)   Assessment & Plan    Continue Prinivil and hydrochlorothiazide          VTE prophylaxis: Heparin

## 2019-01-06 NOTE — PROGRESS NOTES
Pt arrived back on floor from ultrasound in stable condition via wheelchair with transport. Resting in bed at this time. Family present. Call bell in reach. Bed alarm in place.

## 2019-01-07 ENCOUNTER — PATIENT OUTREACH (OUTPATIENT)
Dept: HEALTH INFORMATION MANAGEMENT | Facility: OTHER | Age: 74
End: 2019-01-07

## 2019-01-07 PROBLEM — E86.0 DEHYDRATION: Status: RESOLVED | Noted: 2019-01-04 | Resolved: 2019-01-07

## 2019-01-07 PROBLEM — R65.10 SIRS (SYSTEMIC INFLAMMATORY RESPONSE SYNDROME) (HCC): Status: RESOLVED | Noted: 2019-01-04 | Resolved: 2019-01-07

## 2019-01-07 LAB
ANION GAP SERPL CALC-SCNC: 9 MMOL/L (ref 0–11.9)
BUN SERPL-MCNC: 25 MG/DL (ref 8–22)
CALCIUM SERPL-MCNC: 9.4 MG/DL (ref 8.5–10.5)
CHLORIDE SERPL-SCNC: 107 MMOL/L (ref 96–112)
CO2 SERPL-SCNC: 19 MMOL/L (ref 20–33)
CREAT SERPL-MCNC: 1.71 MG/DL (ref 0.5–1.4)
GLUCOSE SERPL-MCNC: 112 MG/DL (ref 65–99)
POTASSIUM SERPL-SCNC: 3.5 MMOL/L (ref 3.6–5.5)
SODIUM SERPL-SCNC: 135 MMOL/L (ref 135–145)

## 2019-01-07 PROCEDURE — 700102 HCHG RX REV CODE 250 W/ 637 OVERRIDE(OP): Performed by: HOSPITALIST

## 2019-01-07 PROCEDURE — 700111 HCHG RX REV CODE 636 W/ 250 OVERRIDE (IP): Performed by: HOSPITALIST

## 2019-01-07 PROCEDURE — A9270 NON-COVERED ITEM OR SERVICE: HCPCS | Performed by: HOSPITALIST

## 2019-01-07 PROCEDURE — 99231 SBSQ HOSP IP/OBS SF/LOW 25: CPT | Performed by: HOSPITALIST

## 2019-01-07 PROCEDURE — 700105 HCHG RX REV CODE 258: Performed by: HOSPITALIST

## 2019-01-07 PROCEDURE — 770021 HCHG ROOM/CARE - ISO PRIVATE

## 2019-01-07 PROCEDURE — 36415 COLL VENOUS BLD VENIPUNCTURE: CPT

## 2019-01-07 PROCEDURE — 80048 BASIC METABOLIC PNL TOTAL CA: CPT

## 2019-01-07 RX ADMIN — HEPARIN SODIUM 5000 UNITS: 5000 INJECTION, SOLUTION INTRAVENOUS; SUBCUTANEOUS at 15:04

## 2019-01-07 RX ADMIN — ACETAMINOPHEN 650 MG: 325 TABLET, FILM COATED ORAL at 15:10

## 2019-01-07 RX ADMIN — CEFEPIME 2 G: 2 INJECTION, POWDER, FOR SOLUTION INTRAVENOUS at 05:38

## 2019-01-07 RX ADMIN — LISINOPRIL 10 MG: 10 TABLET ORAL at 05:39

## 2019-01-07 RX ADMIN — DIPHENOXYLATE HYDROCHLORIDE AND ATROPINE SULFATE 1 TABLET: 2.5; .025 TABLET ORAL at 05:54

## 2019-01-07 RX ADMIN — HEPARIN SODIUM 5000 UNITS: 5000 INJECTION, SOLUTION INTRAVENOUS; SUBCUTANEOUS at 05:39

## 2019-01-07 RX ADMIN — ACETAMINOPHEN 650 MG: 325 TABLET, FILM COATED ORAL at 21:36

## 2019-01-07 RX ADMIN — OMEPRAZOLE 20 MG: 20 CAPSULE, DELAYED RELEASE ORAL at 09:26

## 2019-01-07 RX ADMIN — SERTRALINE 25 MG: 50 TABLET, FILM COATED ORAL at 05:38

## 2019-01-07 RX ADMIN — DIPHENOXYLATE HYDROCHLORIDE AND ATROPINE SULFATE 1 TABLET: 2.5; .025 TABLET ORAL at 21:36

## 2019-01-07 RX ADMIN — FERROUS SULFATE TAB 325 MG (65 MG ELEMENTAL FE) 325 MG: 325 (65 FE) TAB at 09:27

## 2019-01-07 RX ADMIN — TAMSULOSIN HYDROCHLORIDE 0.4 MG: 0.4 CAPSULE ORAL at 09:27

## 2019-01-07 RX ADMIN — HEPARIN SODIUM 5000 UNITS: 5000 INJECTION, SOLUTION INTRAVENOUS; SUBCUTANEOUS at 21:37

## 2019-01-07 RX ADMIN — FINASTERIDE 5 MG: 5 TABLET, FILM COATED ORAL at 05:38

## 2019-01-07 ASSESSMENT — PAIN SCALES - GENERAL
PAINLEVEL_OUTOF10: 0
PAINLEVEL_OUTOF10: 5
PAINLEVEL_OUTOF10: 4

## 2019-01-07 ASSESSMENT — ENCOUNTER SYMPTOMS
VOMITING: 0
NAUSEA: 0
FLANK PAIN: 0
SPUTUM PRODUCTION: 0
ABDOMINAL PAIN: 0
FEVER: 0
BLURRED VISION: 0
DIZZINESS: 0
SHORTNESS OF BREATH: 0

## 2019-01-07 NOTE — PROGRESS NOTES
Dr. Valentin was paged regarding lab value of potassium level of 3.1. This has dropped from lab value of 3.8 from 1/4/19. Dr. Valentin ordered PO Potassium chloride SA tablet 40 mEq stat one time dose. Order was read back verbally via telephone to verify correctness.

## 2019-01-07 NOTE — PROGRESS NOTES
Potassium level 3.1 noted at this time from 1500 lab draw. Dr. Frances paged at this time. Awaiting call back and further orders.

## 2019-01-07 NOTE — CARE PLAN
Problem: Bowel/Gastric:  Goal: Normal bowel function is maintained or improved  Outcome: PROGRESSING AS EXPECTED  Pt is frequently assessed for number of loose stools and monitored frequently for episodes of diarrhea. Per pt report pt has had 3 occurances during night shift and during night shift pt requested medication administration Lomotil tablet per MAR for diarrhea.    Problem: Knowledge Deficit  Goal: Knowledge of disease process/condition, treatment plan, diagnostic tests, and medications will improve  Outcome: PROGRESSING AS EXPECTED  Pt was educated regarding decrease lab value for serum potassium at 3.1. Pt was administered 40 mEq oral potassium chloride oral tablets once. Pt verbalizes understanding regarding plan of care and reasons for medication administration.

## 2019-01-07 NOTE — PROGRESS NOTES
[facility] Medicine Daily Progress Note    Date of Service  1/7/2019    Chief Complaint  73 y.o. male admitted 1/4/2019 with ground level fall, headache, and n/v.    Hospital Course    73 y.o. male who presented on 1/4/2019 with headache, nausea, vomiting and diarrhea after falling down 2 stairs yesterday.  The patient reports that he had been standing at the top of 2 stairs when he lost his balance and fell.  He does not believe that he lost consciousness and denies any preceding chest pain, headache, or dizziness.  The patient states that today, he developed several episodes of nausea, vomiting, and diarrhea.  He was last discharged from our facility by the [place] resident team on December 9 after an admission for sepsis secondary to UTI with acute on chronic renal failure and diarrhea. Upon assessment in the emergency room, a CT scan of the head was negative for acute intracranial injury however he is noted to have a recurrent UTI as well as clinical evidence of dehydration.       Interval Problem Update  1/5/19:  The patient reports feeling okay.  He states that he has his non-bloody diarrhea for couple of weeks.  CT abdomen found bilateral hydroureteronephrosis, progressive since November 2018 on both sides.  However, the patient denies any dysuria or flank pain.  1/6/19- Lasix scan reviewed.  Discussed w/ Dr. Arciniega, urology who recommended outpt workup.  Referral to urology and nephrology on discharge.  Pt treatment of 5 days for Enterobacter in urine discovered on admission.    1/7/19 - still some diarrhea overnight.  Pt no sob, f, c, v, n.  BMP ordered for tomorrow prior to discharge to ensure no electrolyte derangments prior to planned discharge.  Pt no acute events overnight. Discussed in IDT rounds- 10 x 10 discharged planned for tomorrow.       Consultants/Specialty  None    Code Status  Full    Disposition  TBD    Review of Systems  Review of Systems   Constitutional: Negative for fever.   Eyes:  Negative for blurred vision.   Respiratory: Negative for sputum production and shortness of breath.    Cardiovascular: Negative for chest pain.   Gastrointestinal: Negative for abdominal pain, nausea and vomiting.   Genitourinary: Negative for dysuria, flank pain and hematuria.   Neurological: Negative for dizziness.        Physical Exam  Temp:  [36.4 °C (97.5 °F)-36.8 °C (98.2 °F)] 36.7 °C (98 °F)  Pulse:  [68-84] 73  Resp:  [15-18] 15  BP: (110-129)/(70-77) 129/73    Physical Exam   Constitutional: He is oriented to person, place, and time. He appears well-developed and well-nourished. No distress.   HENT:   Head: Normocephalic.   Right Ear: External ear normal.   Left Ear: External ear normal.   Mouth/Throat: No oropharyngeal exudate.   Eyes: Pupils are equal, round, and reactive to light. Conjunctivae are normal. Right eye exhibits no discharge. Left eye exhibits no discharge.   Neck: Neck supple. No JVD present.   Cardiovascular: Normal rate and regular rhythm.    No murmur heard.  Pulmonary/Chest: Effort normal and breath sounds normal. No respiratory distress. He has no wheezes.   Abdominal: Soft. Bowel sounds are normal. He exhibits no distension.   Musculoskeletal: Normal range of motion. He exhibits no edema.   Neurological: He is alert and oriented to person, place, and time. No cranial nerve deficit.   Skin: Skin is warm and dry. He is not diaphoretic. No erythema.   Psychiatric: He has a normal mood and affect.   Nursing note and vitals reviewed.      Fluids    Intake/Output Summary (Last 24 hours) at 01/07/19 0838  Last data filed at 01/06/19 2100   Gross per 24 hour   Intake             1440 ml   Output                0 ml   Net             1440 ml       Laboratory  Recent Labs      01/04/19   1932  01/06/19   1500   WBC  17.9*  9.8   RBC  4.68*  4.05*   HEMOGLOBIN  12.7*  10.8*   HEMATOCRIT  38.4*  33.2*   MCV  82.1  82.0   MCH  27.1  26.7*   MCHC  33.1*  32.5*   RDW  50.3*  50.1*   PLATELETCT  260   234   MPV  9.2  9.6     Recent Labs      01/04/19   1932  01/06/19   1500  01/07/19   0509   SODIUM  133*  132*  135   POTASSIUM  3.8  3.1*  3.5*   CHLORIDE  105  105  107   CO2  17*  18*  19*   GLUCOSE  153*  95  112*   BUN  29*  27*  25*   CREATININE  1.74*  1.74*  1.71*   CALCIUM  10.0  9.2  9.4                   Imaging  NM-RENAL WITH DIURETIC WASHOUT   Final Result      1.  Delayed uptake with slowed but definite excretion      2.  Findings are consistent with mild bilateral chronic hydronephrosis      CT-ABDOMEN-PELVIS W/O   Final Result         1. Bilateral hydroureteronephrosis, progressive since November 2018 on both sides. Small nonobstructing stone in the left renal pelvis. No ureteral stones.   2. Unchanged irregularity and trabeculation of the bladder wall, nonspecific.   3. Stable hepatic steatosis.   4. Stable splenomegaly.   5. Stable renal cysts, with stable in size left renal cyst containing internal calcifications.      CT-HEAD W/O   Final Result      1.  No CT evidence of acute intracranial injury.   2.  Pleural parenchymal atrophy and chronic small vessel ischemic changes.   3.  Stable paranasal sinus disease.      DX-LUMBAR SPINE-2 OR 3 VIEWS   Final Result      No acute fracture or listhesis.           Assessment/Plan  * UTI (urinary tract infection)- (present on admission)   Assessment & Plan    Recurrent UTI.  His last urine culture was positive for Enterobacter which was sensitive to cefepime  Continue on cefepime   -- ?contaminant  --will treat for 5 days total (last day will be on 1/10)- then can discharge.    --discussed w/ pharmacy and agree with not switching antibiotics given patient had already been receiving Cefepime.      Nausea, vomiting, and diarrhea   Assessment & Plan    Because of recent antibiotic exposure during previous hospital stay., check a C. difficile study.  In the meantime, start the patient on a clear liquid diet which we will then advance as tolerated.   -- n/v  seems to have subsided as the patient is eating and drinking.   -- c. Diff is ruled out. ---->start Lomotil      Persistent proteinuria- (present on admission)   Assessment & Plan    Will need referral to nephrology upon discharge.       Stage 3 chronic kidney disease (HCC)- (present on admission)   Assessment & Plan    Patient's renal function is close to his previous baseline.  -- CT abdomen is noted to have bilateral hydroureteronephrosis this dating back to July 2018.  Given the recurrent UTI and abnormal renal function, will check nm renogram as suggested to evaluate the patient's genitourinary system.   --Discussed w/ Dr. Arciniega, urology who recs outpt follow up   --referral for urology and nephrology outpt       Essential hypertension- (present on admission)   Assessment & Plan    Continue Prinivil and hydrochlorothiazide          VTE prophylaxis: Heparin

## 2019-01-07 NOTE — PROGRESS NOTES
No call back from Dr. Frances's yet. Called and paged her again at this time. Awaiting further orders.

## 2019-01-08 VITALS
BODY MASS INDEX: 31.74 KG/M2 | RESPIRATION RATE: 16 BRPM | SYSTOLIC BLOOD PRESSURE: 136 MMHG | DIASTOLIC BLOOD PRESSURE: 77 MMHG | TEMPERATURE: 97.6 F | HEART RATE: 74 BPM | OXYGEN SATURATION: 94 % | WEIGHT: 234.35 LBS | HEIGHT: 72 IN

## 2019-01-08 PROCEDURE — A9270 NON-COVERED ITEM OR SERVICE: HCPCS | Performed by: HOSPITALIST

## 2019-01-08 PROCEDURE — 99239 HOSP IP/OBS DSCHRG MGMT >30: CPT | Performed by: INTERNAL MEDICINE

## 2019-01-08 PROCEDURE — 700102 HCHG RX REV CODE 250 W/ 637 OVERRIDE(OP): Performed by: HOSPITALIST

## 2019-01-08 PROCEDURE — 700111 HCHG RX REV CODE 636 W/ 250 OVERRIDE (IP): Performed by: HOSPITALIST

## 2019-01-08 PROCEDURE — 97530 THERAPEUTIC ACTIVITIES: CPT

## 2019-01-08 PROCEDURE — 700105 HCHG RX REV CODE 258: Performed by: HOSPITALIST

## 2019-01-08 PROCEDURE — 97116 GAIT TRAINING THERAPY: CPT

## 2019-01-08 RX ORDER — CIPROFLOXACIN 500 MG/1
500 TABLET, FILM COATED ORAL 2 TIMES DAILY
Qty: 4 TAB | Refills: 0 | Status: SHIPPED | OUTPATIENT
Start: 2019-01-08 | End: 2019-01-10

## 2019-01-08 RX ADMIN — LISINOPRIL 10 MG: 10 TABLET ORAL at 06:03

## 2019-01-08 RX ADMIN — HYDROCHLOROTHIAZIDE 25 MG: 25 TABLET ORAL at 06:02

## 2019-01-08 RX ADMIN — TAMSULOSIN HYDROCHLORIDE 0.4 MG: 0.4 CAPSULE ORAL at 08:23

## 2019-01-08 RX ADMIN — SERTRALINE 25 MG: 50 TABLET, FILM COATED ORAL at 06:02

## 2019-01-08 RX ADMIN — OMEPRAZOLE 20 MG: 20 CAPSULE, DELAYED RELEASE ORAL at 06:03

## 2019-01-08 RX ADMIN — CEFEPIME 2 G: 2 INJECTION, POWDER, FOR SOLUTION INTRAVENOUS at 08:23

## 2019-01-08 RX ADMIN — ACETAMINOPHEN 650 MG: 325 TABLET, FILM COATED ORAL at 08:23

## 2019-01-08 RX ADMIN — FERROUS SULFATE TAB 325 MG (65 MG ELEMENTAL FE) 325 MG: 325 (65 FE) TAB at 08:23

## 2019-01-08 RX ADMIN — FINASTERIDE 5 MG: 5 TABLET, FILM COATED ORAL at 06:03

## 2019-01-08 RX ADMIN — DIPHENOXYLATE HYDROCHLORIDE AND ATROPINE SULFATE 1 TABLET: 2.5; .025 TABLET ORAL at 08:23

## 2019-01-08 ASSESSMENT — COGNITIVE AND FUNCTIONAL STATUS - GENERAL
SUGGESTED CMS G CODE MODIFIER MOBILITY: CH
MOBILITY SCORE: 24

## 2019-01-08 ASSESSMENT — GAIT ASSESSMENTS
DISTANCE (FEET): 200
GAIT LEVEL OF ASSIST: SUPERVISED

## 2019-01-08 ASSESSMENT — PAIN SCALES - GENERAL: PAINLEVEL_OUTOF10: 4

## 2019-01-08 NOTE — DISCHARGE INSTRUCTIONS
Discharge Instructions    Discharged to home by car with relative. Discharged via wheelchair, hospital escort: Refused.  Special equipment needed: Not Applicable    Be sure to schedule a follow-up appointment with your primary care doctor or any specialists as instructed.     Discharge Plan:   Influenza Vaccine Indication: Patient Refuses    I understand that a diet low in cholesterol, fat, and sodium is recommended for good health. Unless I have been given specific instructions below for another diet, I accept this instruction as my diet prescription.   Other diet: GI Soft    Special Instructions: None    · Is patient discharged on Warfarin / Coumadin?   No     Depression / Suicide Risk    As you are discharged from this Critical access hospital facility, it is important to learn how to keep safe from harming yourself.    Recognize the warning signs:  · Abrupt changes in personality, positive or negative- including increase in energy   · Giving away possessions  · Change in eating patterns- significant weight changes-  positive or negative  · Change in sleeping patterns- unable to sleep or sleeping all the time   · Unwillingness or inability to communicate  · Depression  · Unusual sadness, discouragement and loneliness  · Talk of wanting to die  · Neglect of personal appearance   · Rebelliousness- reckless behavior  · Withdrawal from people/activities they love  · Confusion- inability to concentrate     If you or a loved one observes any of these behaviors or has concerns about self-harm, here's what you can do:  · Talk about it- your feelings and reasons for harming yourself  · Remove any means that you might use to hurt yourself (examples: pills, rope, extension cords, firearm)  · Get professional help from the community (Mental Health, Substance Abuse, psychological counseling)  · Do not be alone:Call your Safe Contact- someone whom you trust who will be there for you.  · Call your local CRISIS HOTLINE 503-1273 or  386.390.8287  · Call your local Children's Mobile Crisis Response Team [place] (936) 320-9956 or www.vmock.com  · Call the toll free National Suicide Prevention Hotlines   · National Suicide Prevention Lifeline 771-655-FFBI (1459)  · [employer] Line Network 800-SUICIDE (754-9047)      Urinary Tract Infection, Adult  A urinary tract infection (UTI) is an infection of any part of the urinary tract, which includes the kidneys, ureters, bladder, and urethra. These organs make, store, and get rid of urine in the body. UTI can be a bladder infection (cystitis) or kidney infection (pyelonephritis).  What are the causes?  This infection may be caused by fungi, viruses, or bacteria. Bacteria are the most common cause of UTIs. This condition can also be caused by repeated incomplete emptying of the bladder during urination.  What increases the risk?  This condition is more likely to develop if:  · You ignore your need to urinate or hold urine for long periods of time.  · You do not empty your bladder completely during urination.  · You wipe back to front after urinating or having a bowel movement, if you are female.  · You are uncircumcised, if you are male.  · You are constipated.  · You have a urinary catheter that stays in place (indwelling).  · You have a weak defense (immune) system.  · You have a medical condition that affects your bowels, kidneys, or bladder.  · You have diabetes.  · You take antibiotic medicines frequently or for long periods of time, and the antibiotics no longer work well against certain types of infections (antibiotic resistance).  · You take medicines that irritate your urinary tract.  · You are exposed to chemicals that irritate your urinary tract.  · You are female.  What are the signs or symptoms?  Symptoms of this condition include:  · Fever.  · Frequent urination or passing small amounts of urine frequently.  · Needing to urinate urgently.  · Pain or burning with  urination.  · Urine that smells bad or unusual.  · Cloudy urine.  · Pain in the lower abdomen or back.  · Trouble urinating.  · Blood in the urine.  · Vomiting or being less hungry than normal.  · Diarrhea or abdominal pain.  · Vaginal discharge, if you are female.  How is this diagnosed?  This condition is diagnosed with a medical history and physical exam. You will also need to provide a urine sample to test your urine. Other tests may be done, including:  · Blood tests.  · Sexually transmitted disease (STD) testing.  If you have had more than one UTI, a cystoscopy or imaging studies may be done to determine the cause of the infections.  How is this treated?  Treatment for this condition often includes a combination of two or more of the following:  · Antibiotic medicine.  · Other medicines to treat less common causes of UTI.  · Over-the-counter medicines to treat pain.  · Drinking enough water to stay hydrated.  Follow these instructions at home:  · Take over-the-counter and prescription medicines only as told by your health care provider.  · If you were prescribed an antibiotic, take it as told by your health care provider. Do not stop taking the antibiotic even if you start to feel better.  · Avoid alcohol, caffeine, tea, and carbonated beverages. They can irritate your bladder.  · Drink enough fluid to keep your urine clear or pale yellow.  · Keep all follow-up visits as told by your health care provider. This is important.  · Make sure to:  ¨ Empty your bladder often and completely. Do not hold urine for long periods of time.  ¨ Empty your bladder before and after sex.  ¨ Wipe from front to back after a bowel movement if you are female. Use each tissue one time when you wipe.  Contact a health care provider if:  · You have back pain.  · You have a fever.  · You feel nauseous or vomit.  · Your symptoms do not get better after 3 days.  · Your symptoms go away and then return.  Get help right away if:  · You  have severe back pain or lower abdominal pain.  · You are vomiting and cannot keep down any medicines or water.  This information is not intended to replace advice given to you by your health care provider. Make sure you discuss any questions you have with your health care provider.  Document Released: 09/27/2006 Document Revised: 05/31/2017 Document Reviewed: 11/07/2016  [employer] Interactive Patient Education © 2017 [employer]      Hydronephrosis  Introduction  Hydronephrosis is the enlargement of a kidney due to a blockage that stops urine from flowing out of the body.  What are the causes?  Common causes of this condition include:  · A birth (congenital) defect of the kidney.  · A congenital defect of the tube through which urine travels (ureter).  · Kidney stones.  · An enlarged prostate gland.  · A tumor.  · Cancer of the prostate, bladder, uterus, ovary, or colon.  · A blood clot.  What are the signs or symptoms?  Symptoms of this condition include:  · Pain or discomfort in your side (flank).  · Swelling of the abdomen.  · Pain in the abdomen.  · Nausea and vomiting.  · Fever.  · Pain while passing urine.  · Feeling of urgency to urinate.  · Frequent urination.  · Infection of the urinary tract.  In some cases, there are no symptoms.  How is this diagnosed?  This condition may be diagnosed with:  · A medical history.  · A physical exam.  · Blood and urine tests to check kidney function.  · Imaging tests, such as an X-ray, ultrasound, CT scan, or MRI.  · A test in which a rigid or flexible telescope (cystoscope) is used to view the site of the blockage.  How is this treated?  Treatment for this condition depends on where the blockage is located, how long it has been there, and what caused it. The goal of treatment is to remove the blockage. Treatment options include:  · A procedure to put in a soft tube to help drain urine.  · Antibiotic medicines to treat or prevent infection.  · Shock-wave therapy  (lithotripsy) to help eliminate kidney stones.  Follow these instructions at home:  · Get lots of rest.  · Drink enough fluid to keep your urine clear or pale yellow.  · If you have a drain in, follow your health care provider's instructions about how to care for it.  · Take medicines only as directed by your health care provider.  · If you were prescribed an antibiotic medicine, finish all of it even if you start to feel better.  · Keep all follow-up visits as directed by your health care provider. This is important.  Contact a health care provider if:  · You continue to have symptoms after treatment.  · You develop new symptoms.  · You have a problem with a drainage device.  · Your urine becomes cloudy or bloody.  · You have a fever.  Get help right away if:  · You have severe flank or abdominal pain.  · You develop vomiting and are unable to keep fluids down.  This information is not intended to replace advice given to you by your health care provider. Make sure you discuss any questions you have with your health care provider.  Document Released: 10/14/2008 Document Revised: 05/25/2017 Document Reviewed: 12/14/2015  © 2017 [employer]

## 2019-01-08 NOTE — PROGRESS NOTES
Pt pulled out the IV this morning. Antibiotics wasn't given. Attempted to start new IV. Attempt was not successfully. Passed on next shift RN to start new IV to start the antibiotics.

## 2019-01-08 NOTE — CARE PLAN
Problem: Communication  Goal: The ability to communicate needs accurately and effectively will improve  Outcome: PROGRESSING AS EXPECTED  Pt has discharge orders, pt ok to discharge after next dose of antibiotics      Problem: Safety  Goal: Will remain free from injury  Outcome: PROGRESSING AS EXPECTED  Pt refusing bed alarm, pt educated to call staff if feeling weak.

## 2019-01-08 NOTE — PROGRESS NOTES
Received discharge orders. Removed IV. Went over discharge instructions with pt and pts family. All questions answered, patient feels safe to discharge. Patient went home with daughter. All belongings gathered and patient dressed.

## 2019-01-08 NOTE — CARE PLAN
Problem: Safety  Goal: Will remain free from injury  Outcome: PROGRESSING AS EXPECTED  Pt refusing bed alarm, pt steady.    Problem: Infection  Goal: Will remain free from infection  Outcome: PROGRESSING AS EXPECTED  PT has IV in place, pt educated about the increased risk for infection when showering. Educated the pt to keep the area clean, dry and intact.

## 2019-01-08 NOTE — DISCHARGE SUMMARY
Discharge Summary    CHIEF COMPLAINT ON ADMISSION  Chief Complaint   Patient presents with   • GLF     Pt with GLF yesterday, fell 2 stairs after losing balance, pt landed on dirt/rocks and hitting head/ pt vomiting today mulitple times/ pt A&Ox4/ -LOC   • Headache   • N/V       Reason for Admission  Fall; vomit     Admission Date  1/4/2019    CODE STATUS  Prior    HPI & HOSPITAL COURSE    73 y.o. male who presented on 1/4/2019 with headache, nausea, vomiting and diarrhea after falling down 2 stairs yesterday.  The patient reports that he had been standing at the top of 2 stairs when he lost his balance and fell.  He does not believe that he lost consciousness and denies any preceding chest pain, headache, or dizziness.  The patient states that today, he developed several episodes of nausea, vomiting, and diarrhea.  He was last discharged from our facility by the [facility] resident team on December 9 after an admission for sepsis secondary to UTI with acute on chronic renal failure and diarrhea. Upon assessment in the emergency room, a CT scan of the head was negative for acute intracranial injury however he is noted to have a recurrent UTI as well as clinical evidence of dehydration.        Patient has been treated for 3 days on IV cefepime.  Urine cultures positive for Enterobacter aerogenes.,  Questionable contaminant however will complete treatment for 5-day course.  Sensitive to ciprofloxacin.  He has been transitioned for an additional 3-day course on ciprofloxacin to complete full course of antibiotic treatment.  Blood cultures remain negative.  During this admission he is noted to have a hydroureteronephrosis on Lexiscan.  Per urology, outpatient follow-up.  At this point patient's symptoms have improved.   He Is tolerating a diet.  He is cleared for discharge to home.       Therefore, he is discharged in good and stable condition to home with close outpatient follow-up.    The patient met 2-midnight  criteria for an inpatient stay at the time of discharge.    Discharge Date  January 8, 2019    FOLLOW UP ITEMS POST DISCHARGE  Primary care provider/discharge clinic in 1 week  Urology in 2-3 weeks    DISCHARGE DIAGNOSES  Principal Problem:    UTI (urinary tract infection) POA: Yes  Active Problems:    Nausea, vomiting, and diarrhea POA: Unknown    Essential hypertension POA: Yes    Stage 3 chronic kidney disease (HCC) POA: Yes    Persistent proteinuria POA: Yes  Resolved Problems:    SIRS (systemic inflammatory response syndrome) (HCC) POA: Unknown    Dehydration POA: Unknown      FOLLOW UP  Future Appointments  Date Time Provider Department Center   1/15/2019 3:20 PM [facility] None   3/13/2019 4:20 PM [facility] None     No follow-up provider specified.    MEDICATIONS ON DISCHARGE     Medication List      START taking these medications      Instructions   ciprofloxacin 500 MG Tabs  Commonly known as:  CIPRO   Take 1 Tab by mouth 2 times a day for 2 days.  Dose:  500 mg        CONTINUE taking these medications      Instructions   ferrous sulfate 325 (65 Fe) MG tablet   Take 325 mg by mouth every day.  Dose:  325 mg     finasteride 5 MG Tabs  Commonly known as:  PROSCAR   Take 5 mg by mouth every day.  Dose:  5 mg     hydroCHLOROthiazide 25 MG Tabs  Commonly known as:  HYDRODIURIL   Take 25 mg by mouth every 48 hours.  Dose:  25 mg     lisinopril 10 MG Tabs  Commonly known as:  PRINIVIL   Take 10 mg by mouth every day.  Dose:  10 mg     NEXIUM 40 MG delayed-release capsule  Generic drug:  esomeprazole   Take 40 mg by mouth every morning before breakfast.  Dose:  40 mg     POTASSIMIN PO   Take 1 Tab by mouth every day. Unknown OTC Strength  Dose:  1 Tab     sertraline 25 MG tablet  Commonly known as:  ZOLOFT   Take 25 mg by mouth every day.  Dose:  25 mg     tamsulosin 0.4 MG capsule  Commonly known as:  FLOMAX   Take 0.4 mg by mouth every day.  Dose:  0.4 mg     Vitamin B12  1000 MCG Tbcr   Take 1,000 mg by mouth every day.  Dose:  1000 mg        STOP taking these medications    cephALEXin 500 MG Caps  Commonly known as:  KEFLEX            Allergies  Allergies   Allergen Reactions   • Celecoxib      Rash       DIET  No orders of the defined types were placed in this encounter.      ACTIVITY  As tolerated.  Weight bearing as tolerated    CONSULTATIONS  Urology    PROCEDURES  None    LABORATORY  Lab Results   Component Value Date    SODIUM 135 01/07/2019    POTASSIUM 3.5 (L) 01/07/2019    CHLORIDE 107 01/07/2019    CO2 19 (L) 01/07/2019    GLUCOSE 112 (H) 01/07/2019    BUN 25 (H) 01/07/2019    CREATININE 1.71 (H) 01/07/2019    CREATININE 0.97 03/06/2013        Lab Results   Component Value Date    WBC 9.8 01/06/2019    WBC 8.9 03/06/2013    HEMOGLOBIN 10.8 (L) 01/06/2019    HEMATOCRIT 33.2 (L) 01/06/2019    PLATELETCT 234 01/06/2019        Total time of the discharge process exceeds 45 minutes.

## 2019-01-08 NOTE — CARE PLAN
Problem: Safety  Goal: Will remain free from falls  Outcome: PROGRESSING AS EXPECTED  Pt refused bed alarm. Educated pt to call for assistance. Pt verbalized understanding.     Problem: Venous Thromboembolism (VTW)/Deep Vein Thrombosis (DVT) Prevention:  Goal: Patient will participate in Venous Thrombosis (VTE)/Deep Vein Thrombosis (DVT)Prevention Measures  Outcome: PROGRESSING AS EXPECTED  Encouraged early ambulation. Lovenox in place

## 2019-01-08 NOTE — PROGRESS NOTES
Assumed care of pt at 0700. A/Ox4, discussed plan of care. Pt tolerating GI soft diet, up self with steady gait, pt refusing bed alarm. All needs met at this time. Bed in lowest position, treaded socks on, personal belongings and call light within reach, instructed to call for any assistance.

## 2019-01-08 NOTE — PROGRESS NOTES
Assumed care of pt at shift change. A/Ox4, discussed plan of care. Pt on room air, tolerating GI soft diet diet, complained headache and complained multiple loose stool. PRN tylenol and Lomotil given. All needs met at this time. Bed in lowest position, treaded socks on, personal belongings and call light within reach, instructed to call for any assistance, hourly rounding in place.   Normal and symmetric appearance/Without webbing

## 2019-01-08 NOTE — PROGRESS NOTES
Assumed care of pt at 0730. A/Ox4, discussed plan of care. Pt on room air, tolerating Gi soft diet, up self with steady gait. All needs met at this time. Bed in lowest position, treaded socks on, personal belongings and call light within reach, instructed to call for any assistance.

## 2019-01-08 NOTE — THERAPY
Pt is mobilizing w/o assist and w/o assistive device.  He declines the need to perform stairs or to ambulate 500 ft.  He is able to mobilize w/o loss of balance.  He feels as though he will be fine upon d/c.  PT will not follow.

## 2019-01-09 ENCOUNTER — PATIENT OUTREACH (OUTPATIENT)
Dept: HEALTH INFORMATION MANAGEMENT | Facility: OTHER | Age: 74
End: 2019-01-09

## 2019-01-09 NOTE — PROGRESS NOTES
[facility] reached out to patient after readmission. Patient stated that he was doing well and reported that he had diarrhea prior to admission that was black, but states that diarrhea is now brown and is also not as loose. IHD will follow-up on symptoms on Friday. Patient declined needing additional assistance regarding diarrhea, as symptoms have been improving. Patient needs handrails for home, I[facility] provided information for [employer] squad and [facility]. No further updates.

## 2019-01-10 ENCOUNTER — PATIENT OUTREACH (OUTPATIENT)
Dept: HEALTH INFORMATION MANAGEMENT | Facility: OTHER | Age: 74
End: 2019-01-10

## 2019-01-10 LAB
BACTERIA BLD CULT: NORMAL
BACTERIA BLD CULT: NORMAL
SIGNIFICANT IND 70042: NORMAL
SIGNIFICANT IND 70042: NORMAL
SITE SITE: NORMAL
SITE SITE: NORMAL
SOURCE SOURCE: NORMAL
SOURCE SOURCE: NORMAL

## 2019-01-10 NOTE — PROGRESS NOTES
Pt admitted to [facility] on 01/04/2019 to 01/08/2019 due to GLF., Chart notes indicated pt fell from 2 stairs after losing his balance. Indicated he landed on dirt/rocks and hit head. Also reported pt had been vomiting the day of admission. Pt was discharge home with direction to follow up with his primary care.      MSW received report from [facility] Advocate, who is also following reported she spoke to daughter who indicated they received resources/informaiton sent but will not be utilizing at this time. IAdvocate reported while pt was in-patient, [facility] staff provided resoruces to family on the MOD squad and [facility] to look into obtaining handrails.      Outreach call to pt and daughter today to follow up. There was no answer, VM was left on both home and mobile phone requesting call back.     Plan:  · MSW will attempt to follow up with pt and daughter at later time/date, if MSW does not hear back.

## 2019-01-11 NOTE — DOCUMENTATION QUERY
DOCUMENTATION QUERY    PROVIDERS: Please select “Cosign w/ note”to reply to query.    To better represent the severity of illness of your patient, please review the following information and exercise your independent professional judgment in responding to this query.     UTI and SIRS are documented per H&P, progress notes and Discharge Summary.    Based upon the clinical findings, risk factors, and treatment, can this diagnosis of SIRS be further specified?      Please select that which applies:     • Sepsis present on admission (specify causative organism if known and any related acute organ dysfunction)  • Sepsis developed after admission (specify causative organism if known and any related acute organ dysfunction)    • SIRS related to UTI without sepsis   • SIRS that is unrelated to any infection   • SIRS of noninfectious origin with acute organ dysfunction     • Other explanation of clinical findings (please document)  • Unable to determine      The medical record reflects the following:   Clinical Findings H&P 1/4/19, Gita Belcher MD    * UTI (urinary tract infection)   Assessment & Plan     Patient again is a positive urinalysis.  He also has leukocytosis and tachycardia although he remains afebrile with no evidence of end organ damage.     SIRS (systemic inflammatory response syndrome) (HCC)   Assessment & Plan     Treatment as noted above         Discharge Summary 1/8/19, Kelly Cantrell DO    DISCHARGE DIAGNOSES  Principal Problem:    UTI (urinary tract infection) POA: Yes    Resolved Problems:    SIRS (systemic inflammatory response syndrome) (HCC) POA: Unknown   Treatment IV antibx, IV fluids, cultures     Risk Factors UTI, SIRS, hydronephrosis, CKD 3   Location within  medical record H&P, PN, D/S     Thank you,   [email or web address]

## 2019-01-15 ENCOUNTER — OFFICE VISIT (OUTPATIENT)
Dept: MEDICAL GROUP | Facility: CLINIC | Age: 74
End: 2019-01-15
Payer: MEDICARE

## 2019-01-15 VITALS
DIASTOLIC BLOOD PRESSURE: 68 MMHG | BODY MASS INDEX: 32.53 KG/M2 | TEMPERATURE: 98.6 F | HEIGHT: 72 IN | WEIGHT: 240.2 LBS | OXYGEN SATURATION: 96 % | SYSTOLIC BLOOD PRESSURE: 130 MMHG | HEART RATE: 98 BPM

## 2019-01-15 DIAGNOSIS — G89.29 CHRONIC LEFT-SIDED LOW BACK PAIN WITHOUT SCIATICA: ICD-10-CM

## 2019-01-15 DIAGNOSIS — Z86.19 HISTORY OF SEPSIS: ICD-10-CM

## 2019-01-15 DIAGNOSIS — R31.9 URINARY TRACT INFECTION WITH HEMATURIA, SITE UNSPECIFIED: ICD-10-CM

## 2019-01-15 DIAGNOSIS — N18.3 ACUTE RENAL FAILURE SUPERIMPOSED ON STAGE 3 CHRONIC KIDNEY DISEASE, UNSPECIFIED ACUTE RENAL FAILURE TYPE: ICD-10-CM

## 2019-01-15 DIAGNOSIS — N39.0 URINARY TRACT INFECTION WITH HEMATURIA, SITE UNSPECIFIED: ICD-10-CM

## 2019-01-15 DIAGNOSIS — F33.1 MODERATE EPISODE OF RECURRENT MAJOR DEPRESSIVE DISORDER (HCC): ICD-10-CM
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
## 2019-01-15 DIAGNOSIS — M54.50 CHRONIC LEFT-SIDED LOW BACK PAIN WITHOUT SCIATICA: ICD-10-CM

## 2019-01-15 DIAGNOSIS — Z78.9 FREQUENT HOSPITAL ADMISSIONS: ICD-10-CM

## 2019-01-15 DIAGNOSIS — N17.9 ACUTE RENAL FAILURE SUPERIMPOSED ON STAGE 3 CHRONIC KIDNEY DISEASE, UNSPECIFIED ACUTE RENAL FAILURE TYPE: ICD-10-CM

## 2019-01-15 PROCEDURE — 99214 OFFICE O/P EST MOD 30 MIN: CPT | Performed by: NURSE PRACTITIONER

## 2019-01-15 RX ORDER — HYDROCODONE BITARTRATE AND ACETAMINOPHEN 7.5; 325 MG/1; MG/1
1-2 TABLET ORAL EVERY 6 HOURS PRN
Qty: 20 TAB | Refills: 0 | Status: SHIPPED | OUTPATIENT
Start: 2019-01-15 | End: 2019-01-18 | Stop reason: CLARIF

## 2019-01-15 NOTE — Clinical Note
Dr. Walsl, patient to see you for consult. He has had several hospitalizations over the last few months and I think would benefit from IM.   Jana, patient and daughter would like to utilize services, can you help them? Needs some hand rails. Thank you!  REBECCA Pike

## 2019-01-15 NOTE — PROGRESS NOTES
Subjective:     Chidi Tatum is a 73 y.o. male here today for hospital follow-up    Fall  Patient has a history of several ground-level falls.  Typically it is due to some sort of dehydration?  Sepsis?    Patient's daughter reports today that she really is in need of some hand rales at home and would like to work with social work in regards to this.    Chronic left-sided low back pain without sciatica  This is a chronic problem of which patient has use PRN Norco in the past.   Reports only using medication 1-3 times per month.   Never had back surgery.   Reports he had had some imaging, this is not on file and old.   Reports he occasionally 'throws my back out.'   He did trial Tramadol which did not seem to work and made him drowsy.  Patient had a recent fall, he has had several falls actually in the last several months.  Patient admits that times he still needs to use medication for low back pain, he is also been in the hospital several times where pain medication has been provided.  He requests more pain medication today.        Acute renal failure superimposed on stage 3 chronic kidney disease (HCC)  Noted at his most recent hospitalizations.  He and his daughter here today to request referrals to nephrology and urology.    History of sepsis  Patient has been admitted to hospital 3-4 times over the last several months for possible sepsis related to an uncontrolled UTI.  He has been recently discharged in good condition is no longer taking any antibiotics.  Patient denies any fever or chills.  Patient reports he still feeling weak and tired at times.    Urinary tract infection, site not specified  Patient has a history of what appears to be a recurrent UTI.  At times he appears asymptomatic until he has some sort of ground-level fall due to dizziness.  He has been treated with several antibiotics inpatient.  Possible he related to stone?         Current medicines (including changes today)  Current Outpatient  Prescriptions   Medication Sig Dispense Refill   • HYDROcodone-acetaminophen (NORCO) 7.5-325 MG per tablet Take 1-2 Tabs by mouth every 6 hours as needed for up to 7 days. 20 Tab 0   • Cyanocobalamin (VITAMIN B12) 1000 MCG Tab CR Take 1,000 mg by mouth every day.     • ferrous sulfate 325 (65 Fe) MG tablet Take 325 mg by mouth every day.     • finasteride (PROSCAR) 5 MG Tab Take 5 mg by mouth every day.     • hydroCHLOROthiazide (HYDRODIURIL) 25 MG Tab Take 25 mg by mouth every 48 hours.     • lisinopril (PRINIVIL) 10 MG Tab Take 10 mg by mouth every day.     • sertraline (ZOLOFT) 25 MG tablet Take 25 mg by mouth every day.     • tamsulosin (FLOMAX) 0.4 MG capsule Take 0.4 mg by mouth every day.     • esomeprazole (NEXIUM) 40 MG delayed-release capsule Take 40 mg by mouth every morning before breakfast.     • Potassium (POTASSIMIN PO) Take 1 Tab by mouth every day. Unknown OTC Strength       No current facility-administered medications for this visit.        He  has a past medical history of BPH (benign prostatic hyperplasia); GERD (gastroesophageal reflux disease); HTN (hypertension); Hypertension; Insomnia; and SBO (small bowel obstruction) (HCC).    He  has a past surgical history that includes hernia repair and temporal artery biopsy (Right, 2/9/2018).     Social History     Social History   • Marital status:      Spouse name: N/A   • Number of children: N/A   • Years of education: N/A     Social History Main Topics   • Smoking status: Never Smoker   • Smokeless tobacco: Never Used   • Alcohol use No   • Drug use: No   • Sexual activity: No     Other Topics Concern   • Not on file     Social History Narrative   • No narrative on file       Family History   Problem Relation Age of Onset   • Lung Disease Father    • Alcohol/Drug Brother    • Lung Disease Brother          ROS  Positive for fatigue.  No fever, no chest pain, no shortness of breath, no abdominal pain, no rashes    All other systems reviewed  and are negative.        Objective:     Blood pressure 130/68, pulse 98, temperature 37 °C (98.6 °F), temperature source Temporal, height 1.829 m (6'), weight 109 kg (240 lb 3.2 oz), SpO2 96 %. Body mass index is 32.58 kg/m².    Physical Exam:   Constitutional: Alert, no distress.   Eye: Equal, round and reactive, conjunctiva clear, lids normal.   ENMT: Lips without lesions, oropharynx clear.   Neck: Trachea midline, no masses.  No cervical or supraclavicular lymphadenopathy  Respiratory: Unlabored respiratory effort, lungs clear to auscultation, no wheezes, no ronchi.  Cardiovascular: Normal S1, S2, no murmur, no edema.   Abdomen: Soft, non-tender, no masses, no hepatosplenomegaly. Normal bowel sounds.   Skin: Warm, dry, good turgor, no rashes in visible areas. He does have some tenderness near left wrist, reportedly from blood draws and IV site while in hospital, no s/s of infection or redness. Advised to apply ice to painful area.   Psych: Alert and oriented x3, normal affect and mood.        Assessment and Plan:   The following treatment plan was discussed    1. Frequent hospital admissions  I think patient would benefit from internal medicine, I have placed the patient on Dr. Walls scheduled for this Friday.    2. Urinary tract infection with hematuria, site unspecified  I have helped to process the patient's referral while in clinic today.  Provided him and his daughter with names and phone numbers to contact to make appointments.  Discussed signs or symptoms to seek emergent care.  - REFERRAL TO UROLOGY  - REFERRAL TO NEPHROLOGY    3. History of sepsis  - REFERRAL TO UROLOGY  - REFERRAL TO NEPHROLOGY    4. Acute renal failure superimposed on stage 3 chronic kidney disease, unspecified acute renal failure type (HCC)  - REFERRAL TO UROLOGY  - REFERRAL TO NEPHROLOGY    5. Moderate episode of recurrent major depressive disorder (HCC)  Appears stable, no SI, will continue to monitor.  He is currently taking low-dose  sertraline but at this time does not want to increase the dosing.    6. Fall, sequela  I will [place] back with Jana Mills, social work, perhaps they can help patient with hand rails.  - Patient identified as fall risk.  Appropriate orders and counseling given.  - HYDROcodone-acetaminophen (NORCO) 7.5-325 MG per tablet; Take 1-2 Tabs by mouth every 6 hours as needed for up to 7 days.  Dispense: 20 Tab; Refill: 0    7. Chronic left-sided low back pain without sciatica  Provided short course, advised patient of risks of long-term use, however, given his health, he may be a good candidate for pain clinic and chronic management.  I have placed a referral.  - REFERRAL TO PAIN CLINIC  - HYDROcodone-acetaminophen (NORCO) 7.5-325 MG per tablet; Take 1-2 Tabs by mouth every 6 hours as needed for up to 7 days.  Dispense: 20 Tab; Refill: 0      Reviewed indication, dosage, usage and potential adverse effects of prescribed medications. Patient appears to understand, verbalizes understanding and is willing to try medications as prescribed.      Reviewed risks and benefits of treatment plan. Patient verbally agrees to plan of care.       Followup: Return for keep appt for Dr. Walls this Friday, see me in 2 months .    CAROLINE MoyaRLIYAH.     PLEASE NOTE: This dictation was created using voice recognition software. I have made every reasonable attempt to correct obvious errors, but I expect that there may be errors of grammar and possibly content that I did not discover prior finalizing this note.

## 2019-01-15 NOTE — ASSESSMENT & PLAN NOTE
Patient has a history of several ground-level falls.  Typically it is due to some sort of dehydration?  Sepsis?    Patient's daughter reports today that she really is in need of some hand rales at home and would like to work with social work in regards to this.

## 2019-01-15 NOTE — ASSESSMENT & PLAN NOTE
Noted at his most recent hospitalizations.  He and his daughter here today to request referrals to nephrology and urology.

## 2019-01-15 NOTE — PATIENT INSTRUCTIONS
[facility]  [address]  [facility] [address]  Phone: [phone]     [facility] (Nephrology)   P: [phone]     [facility]  [address]   [place] [address]   Phone: [phone]         Your medical care was provided today by: REBECCA Pike    Thank You for the opportunity to serve you.    You may receive a brief survey in the mail shortly regarding your visit today. Please take a few moments to complete the survey and return it; no postage is necessary. We are working to serve our patient population better, improve customer service and our patients overall experience and your input can help us to accomplish this. We thank you for your help and for the opportunity to serve you today and in the future.     Special Instructions:  Always call 9-1-1 immediately if you develop a life threatening emergency.    Unless told otherwise please take all medications as directed and complete prescription therapies.     Watch for the following signs that require additional evaluation: progressive lethargy or unresponsiveness, localized pain (chest, abdomen), shortness of breath, painful breathing, progressive vomiting with weakness, bloody stools, or new rash.     If you are prescribed pain medication or any other medication that is sedating, do not take medication before or while operating a vehicle or heavy machinery or equipment due to potential side effects such as drowsiness and/or dizziness.

## 2019-01-15 NOTE — ASSESSMENT & PLAN NOTE
Patient has been admitted to hospital 3-4 times over the last several months for possible sepsis related to an uncontrolled UTI.  He has been recently discharged in good condition is no longer taking any antibiotics.  Patient denies any fever or chills.  Patient reports he still feeling weak and tired at times.

## 2019-01-15 NOTE — ASSESSMENT & PLAN NOTE
This is a chronic problem of which patient has use PRN Norco in the past.   Reports only using medication 1-3 times per month.   Never had back surgery.   Reports he had had some imaging, this is not on file and old.   Reports he occasionally 'throws my back out.'   He did trial Tramadol which did not seem to work and made him drowsy.  Patient had a recent fall, he has had several falls actually in the last several months.  Patient admits that times he still needs to use medication for low back pain, he is also been in the hospital several times where pain medication has been provided.  He requests more pain medication today.

## 2019-01-15 NOTE — ASSESSMENT & PLAN NOTE
Patient has a history of what appears to be a recurrent UTI.  At times he appears asymptomatic until he has some sort of ground-level fall due to dizziness.  He has been treated with several antibiotics inpatient.  Possible he related to stone?

## 2019-01-17 ENCOUNTER — PATIENT OUTREACH (OUTPATIENT)
Dept: HEALTH INFORMATION MANAGEMENT | Facility: OTHER | Age: 74
End: 2019-01-17

## 2019-01-17 NOTE — PROGRESS NOTES
[facility] reached out to patient's daughter to touch bases on handrails. Patient's daughter stated that she had [employer] and [employer] applications filled out and will submit them tomorrow after patient's appointment with Dr. Walls. She reported that patient was doing fine. She denied any needs at this time. There are no further updates.

## 2019-01-17 NOTE — PROGRESS NOTES
Outreach call to pt and daughter to follow up regarding resources and services mailed regarding care-giving services. Daughter confirmed she reviewed information and given pt's current independence with his ADLs will not qualify for in-home care services in which she could be a paid-care giver. However she reported she will keep information if needed in the future. Discussed additional resources provided by IHD advocate regarding [facility] and the [employer]. She reported she has filled out application and will be submitting to agencies. Pt denied any questions regarding application but reported she would follow up with MSW if additional assistance is needed.     Plan:  · MSW will follow up with pt and daughter for continued assistance with social needs/care plan.

## 2019-01-18 ENCOUNTER — OFFICE VISIT (OUTPATIENT)
Dept: MEDICAL GROUP | Facility: PHYSICIAN GROUP | Age: 74
End: 2019-01-18
Payer: MEDICARE

## 2019-01-18 ENCOUNTER — HOSPITAL ENCOUNTER (EMERGENCY)
Facility: MEDICAL CENTER | Age: 74
End: 2019-01-18
Attending: EMERGENCY MEDICINE
Payer: MEDICARE

## 2019-01-18 ENCOUNTER — APPOINTMENT (OUTPATIENT)
Dept: RADIOLOGY | Facility: MEDICAL CENTER | Age: 74
End: 2019-01-18
Attending: EMERGENCY MEDICINE
Payer: MEDICARE

## 2019-01-18 VITALS
HEART RATE: 87 BPM | SYSTOLIC BLOOD PRESSURE: 132 MMHG | WEIGHT: 238.1 LBS | TEMPERATURE: 98 F | BODY MASS INDEX: 32.25 KG/M2 | RESPIRATION RATE: 18 BRPM | HEIGHT: 72 IN | OXYGEN SATURATION: 96 % | DIASTOLIC BLOOD PRESSURE: 79 MMHG

## 2019-01-18 VITALS
RESPIRATION RATE: 16 BRPM | BODY MASS INDEX: 32.4 KG/M2 | OXYGEN SATURATION: 95 % | HEIGHT: 72 IN | DIASTOLIC BLOOD PRESSURE: 72 MMHG | SYSTOLIC BLOOD PRESSURE: 126 MMHG | HEART RATE: 95 BPM | WEIGHT: 239.2 LBS | TEMPERATURE: 98.4 F

## 2019-01-18 DIAGNOSIS — N13.30 BILATERAL HYDRONEPHROSIS: ICD-10-CM

## 2019-01-18 DIAGNOSIS — R26.81 UNSTEADY GAIT: ICD-10-CM

## 2019-01-18 DIAGNOSIS — Z12.11 SCREENING FOR COLON CANCER: ICD-10-CM

## 2019-01-18 DIAGNOSIS — R19.7 DIARRHEA, UNSPECIFIED TYPE: ICD-10-CM

## 2019-01-18 DIAGNOSIS — N18.30 STAGE 3 CHRONIC KIDNEY DISEASE (HCC): ICD-10-CM

## 2019-01-18 DIAGNOSIS — R10.31 RLQ ABDOMINAL PAIN: ICD-10-CM

## 2019-01-18 DIAGNOSIS — R31.9 HEMATURIA, UNSPECIFIED TYPE: ICD-10-CM

## 2019-01-18 DIAGNOSIS — N40.0 BENIGN PROSTATIC HYPERPLASIA WITHOUT LOWER URINARY TRACT SYMPTOMS: ICD-10-CM

## 2019-01-18 DIAGNOSIS — R10.31 RIGHT LOWER QUADRANT ABDOMINAL PAIN: ICD-10-CM
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
## 2019-01-18 PROBLEM — N39.0 URINARY TRACT INFECTION, SITE NOT SPECIFIED: Status: RESOLVED | Noted: 2018-07-07 | Resolved: 2019-01-18

## 2019-01-18 PROBLEM — N39.0 RECURRENT UTI (URINARY TRACT INFECTION): Status: RESOLVED | Noted: 2019-01-04 | Resolved: 2019-01-18

## 2019-01-18 PROBLEM — N17.9 ACUTE RENAL FAILURE SUPERIMPOSED ON STAGE 3 CHRONIC KIDNEY DISEASE (HCC): Status: RESOLVED | Noted: 2018-11-18 | Resolved: 2019-01-18

## 2019-01-18 LAB
ALBUMIN SERPL BCP-MCNC: 3.6 G/DL (ref 3.2–4.9)
ALBUMIN/GLOB SERPL: 0.8 G/DL
ALP SERPL-CCNC: 89 U/L (ref 30–99)
ALT SERPL-CCNC: 14 U/L (ref 2–50)
ANION GAP SERPL CALC-SCNC: 9 MMOL/L (ref 0–11.9)
APPEARANCE UR: CLEAR
AST SERPL-CCNC: 17 U/L (ref 12–45)
BACTERIA #/AREA URNS HPF: NEGATIVE /HPF
BASOPHILS # BLD AUTO: 0.4 % (ref 0–1.8)
BASOPHILS # BLD: 0.05 K/UL (ref 0–0.12)
BILIRUB SERPL-MCNC: 0.4 MG/DL (ref 0.1–1.5)
BILIRUB UR QL STRIP.AUTO: NEGATIVE
BUN SERPL-MCNC: 23 MG/DL (ref 8–22)
CALCIUM SERPL-MCNC: 8.9 MG/DL (ref 8.4–10.2)
CHLORIDE SERPL-SCNC: 106 MMOL/L (ref 96–112)
CO2 SERPL-SCNC: 18 MMOL/L (ref 20–33)
COLOR UR: YELLOW
CREAT SERPL-MCNC: 1.4 MG/DL (ref 0.5–1.4)
EOSINOPHIL # BLD AUTO: 0.61 K/UL (ref 0–0.51)
EOSINOPHIL NFR BLD: 5 % (ref 0–6.9)
EPI CELLS #/AREA URNS HPF: ABNORMAL /HPF
ERYTHROCYTE [DISTWIDTH] IN BLOOD BY AUTOMATED COUNT: 49.1 FL (ref 35.9–50)
GLOBULIN SER CALC-MCNC: 4.5 G/DL (ref 1.9–3.5)
GLUCOSE SERPL-MCNC: 106 MG/DL (ref 65–99)
GLUCOSE UR STRIP.AUTO-MCNC: NEGATIVE MG/DL
HCT VFR BLD AUTO: 36.3 % (ref 42–52)
HGB BLD-MCNC: 12 G/DL (ref 14–18)
IMM GRANULOCYTES # BLD AUTO: 0.06 K/UL (ref 0–0.11)
IMM GRANULOCYTES NFR BLD AUTO: 0.5 % (ref 0–0.9)
KETONES UR STRIP.AUTO-MCNC: NEGATIVE MG/DL
LEUKOCYTE ESTERASE UR QL STRIP.AUTO: NEGATIVE
LIPASE SERPL-CCNC: 27 U/L (ref 7–58)
LYMPHOCYTES # BLD AUTO: 3 K/UL (ref 1–4.8)
LYMPHOCYTES NFR BLD: 24.8 % (ref 22–41)
MCH RBC QN AUTO: 26.9 PG (ref 27–33)
MCHC RBC AUTO-ENTMCNC: 33.1 G/DL (ref 33.7–35.3)
MCV RBC AUTO: 81.4 FL (ref 81.4–97.8)
MICRO URNS: ABNORMAL
MONOCYTES # BLD AUTO: 0.86 K/UL (ref 0–0.85)
MONOCYTES NFR BLD AUTO: 7.1 % (ref 0–13.4)
MUCOUS THREADS #/AREA URNS HPF: ABNORMAL /HPF
NEUTROPHILS # BLD AUTO: 7.53 K/UL (ref 1.82–7.42)
NEUTROPHILS NFR BLD: 62.2 % (ref 44–72)
NITRITE UR QL STRIP.AUTO: NEGATIVE
NRBC # BLD AUTO: 0 K/UL
NRBC BLD-RTO: 0 /100 WBC
PH UR STRIP.AUTO: 5 [PH]
PLATELET # BLD AUTO: 294 K/UL (ref 164–446)
PMV BLD AUTO: 9.2 FL (ref 9–12.9)
POTASSIUM SERPL-SCNC: 3.8 MMOL/L (ref 3.6–5.5)
PROT SERPL-MCNC: 8.1 G/DL (ref 6–8.2)
PROT UR QL STRIP: NEGATIVE MG/DL
RBC # BLD AUTO: 4.46 M/UL (ref 4.7–6.1)
RBC # URNS HPF: ABNORMAL /HPF
RBC UR QL AUTO: ABNORMAL
SODIUM SERPL-SCNC: 133 MMOL/L (ref 135–145)
SP GR UR STRIP.AUTO: 1.02
WBC # BLD AUTO: 12.1 K/UL (ref 4.8–10.8)
WBC #/AREA URNS HPF: ABNORMAL /HPF

## 2019-01-18 PROCEDURE — 80053 COMPREHEN METABOLIC PANEL: CPT

## 2019-01-18 PROCEDURE — 99214 OFFICE O/P EST MOD 30 MIN: CPT | Performed by: INTERNAL MEDICINE

## 2019-01-18 PROCEDURE — 85025 COMPLETE CBC W/AUTO DIFF WBC: CPT

## 2019-01-18 PROCEDURE — G0378 HOSPITAL OBSERVATION PER HR: HCPCS

## 2019-01-18 PROCEDURE — 700117 HCHG RX CONTRAST REV CODE 255: Performed by: EMERGENCY MEDICINE

## 2019-01-18 PROCEDURE — 36415 COLL VENOUS BLD VENIPUNCTURE: CPT

## 2019-01-18 PROCEDURE — 83690 ASSAY OF LIPASE: CPT

## 2019-01-18 PROCEDURE — 99285 EMERGENCY DEPT VISIT HI MDM: CPT

## 2019-01-18 PROCEDURE — 81001 URINALYSIS AUTO W/SCOPE: CPT

## 2019-01-18 PROCEDURE — 74177 CT ABD & PELVIS W/CONTRAST: CPT

## 2019-01-18 RX ORDER — ESOMEPRAZOLE MAGNESIUM 40 MG/1
40 CAPSULE, DELAYED RELEASE ORAL
COMMUNITY

## 2019-01-18 RX ORDER — TAMSULOSIN HYDROCHLORIDE 0.4 MG/1
0.4 CAPSULE ORAL DAILY
Status: DISCONTINUED | OUTPATIENT
Start: 2019-01-18 | End: 2019-01-18

## 2019-01-18 RX ORDER — FINASTERIDE 5 MG/1
5 TABLET, FILM COATED ORAL DAILY
Status: DISCONTINUED | OUTPATIENT
Start: 2019-01-18 | End: 2019-01-18

## 2019-01-18 RX ADMIN — IOHEXOL 100 ML: 350 INJECTION, SOLUTION INTRAVENOUS at 13:50

## 2019-01-18 ASSESSMENT — PAIN DESCRIPTION - DESCRIPTORS: DESCRIPTORS: ACHING

## 2019-01-18 ASSESSMENT — PAIN SCALES - GENERAL: PAINLEVEL_OUTOF10: 4

## 2019-01-18 NOTE — ED NOTES
Pt given written and oral discharge instructions. Pt verbalized understanding of all instructions given. All questions answered. VSS. IV removed. Pt given f/u instructions and educated on s/s of when to return to the ER. Pt ambulating independently upon time of discharge in stable condition.

## 2019-01-18 NOTE — ASSESSMENT & PLAN NOTE
Hx of GLF, four times last year. Sometimes has unsteady gait. Orthostatic BP today within normal limits.

## 2019-01-18 NOTE — ED TRIAGE NOTES
Pt is referred to our facility from his PCP's office with a C/O persisting RUQ abdominal pain for the past 4 days.  He denies any associated symptoms.   Chief Complaint   Patient presents with   • RLQ Pain       /79   Pulse 94   Temp 36.6 °C (97.8 °F) (Temporal)   Resp 18   Ht 1.829 m (6')   Wt 108 kg (238 lb 1.6 oz)   SpO2 99%   BMI 32.29 kg/m²

## 2019-01-18 NOTE — ED PROVIDER NOTES
ED Provider Note    Chief Complaint:   Abdominal pain    HPI:  Chidi Tatum is a 73 y.o. male who presents with chief complaint of right lower quadrant abdominal pain.  His symptoms began 3-4 days ago, initially described as mild, progressively becoming more severe.  Pain has always been localized to the right lower quadrant without radiation.  It is described as dull and aching.  He is unable to identify any exacerbating or alleviating factors.  He has no associated fevers, no nausea, no vomiting, no loss of appetite.  He has had loose stools for the past 2-3 days.  Denies any abdominal distention or bloating.  Denies any testicular pain or testicular swelling, denies dysuria, denies hematuria.    No headaches, no neck or back pain, no abnormal rashes or lesions.  He has no history of impaired immunity, no abnormal bleeding or bruising.    Review of Systems:  See HPI for pertinent positives and negatives. All other systems negative.    Past Medical History:   has a past medical history of BPH (benign prostatic hyperplasia); GERD (gastroesophageal reflux disease); HTN (hypertension); Hypertension; Insomnia; and SBO (small bowel obstruction) (HCC).    Social History:  Social History     Social History Main Topics   • Smoking status: Never Smoker   • Smokeless tobacco: Never Used   • Alcohol use No   • Drug use: No   • Sexual activity: No       Surgical History:   has a past surgical history that includes hernia repair and temporal artery biopsy (Right, 2/9/2018).    Current Medications:  Home Medications     Reviewed by Balaji Moore (Pharmacy Tech) on 01/18/19 at 1300  Med List Status: Complete   Medication Last Dose Status   Cyanocobalamin (VITAMIN B12) 1000 MCG Tab CR 1/18/2019 Active   esomeprazole (NEXIUM) 20 MG capsule 1/18/2019 Active   finasteride (PROSCAR) 5 MG Tab 1/18/2019 Active   hydroCHLOROthiazide (HYDRODIURIL) 50 MG Tab 1/18/2019 Active   lisinopril (PRINIVIL) 10 MG Tab 1/18/2019 Active    Potassium 99 MG Tab 1/18/2019 Active   sertraline (ZOLOFT) 25 MG tablet 1/18/2019 Active   tamsulosin (FLOMAX) 0.4 MG capsule 1/18/2019 Active                Allergies:  Allergies   Allergen Reactions   • Celecoxib      Rash       Physical Exam:  Vital Signs: /79   Pulse 72   Temp 36.6 °C (97.8 °F) (Temporal)   Resp 18   Ht 1.829 m (6')   Wt 108 kg (238 lb 1.6 oz)   SpO2 94%   BMI 32.29 kg/m²   Constitutional: Alert, no acute distress  HENT: Moist mucus membranes, normal posterior pharynx, no intraoral lesions  Eyes: Pupils equal and reactive, normal conjunctiva  Neck: Supple, normal range of motion, no stridor  Cardiovascular: Extremities are warm and well perfused, no murmur appreciated, normal cardiac auscultation  Pulmonary: No respiratory distress, normal work of breathing, no accessory muscule usage, breath sounds clear and equal bilaterally  Abdomen: Soft, non-distended, localizable right lower quadrant tenderness to palpation without rebound or guarding, no overlying skin changes, no peritoneal signs  Skin: Warm, dry, no rashes or lesions  Musculoskeletal: Normal range of motion in all extremities, no swelling or deformity noted  Neurologic: Alert, oriented, normal speech, normal motor function  Psychiatric: Normal and appropriate mood and affect    Medical records reviewed for continuity of care.  Clinic note reviewed from earlier today.  Patient was seen for right lower quadrant pain worsening over the past 3-4 days.  Noted to have tenderness to palpation in right lower quadrant.  No prior history of appendectomy.  Referred to the emergency department for further evaluation.    Labs:  Labs Reviewed   CBC WITH DIFFERENTIAL - Abnormal; Notable for the following:        Result Value    WBC 12.1 (*)     RBC 4.46 (*)     Hemoglobin 12.0 (*)     Hematocrit 36.3 (*)     MCH 26.9 (*)     MCHC 33.1 (*)     Neutrophils (Absolute) 7.53 (*)     Monos (Absolute) 0.86 (*)     Eos (Absolute) 0.61 (*)      All other components within normal limits   COMP METABOLIC PANEL - Abnormal; Notable for the following:     Sodium 133 (*)     Co2 18 (*)     Glucose 106 (*)     Bun 23 (*)     Globulin 4.5 (*)     All other components within normal limits   URINALYSIS,CULTURE IF INDICATED - Abnormal; Notable for the following:     Occult Blood Moderate (*)     All other components within normal limits   ESTIMATED GFR - Abnormal; Notable for the following:     GFR If Non  50 (*)     All other components within normal limits   URINE MICROSCOPIC (W/UA) - Abnormal; Notable for the following:     WBC 0-2 (*)     RBC 10-20 (*)     All other components within normal limits   LIPASE       Radiology:  CT-ABDOMEN-PELVIS WITH   Final Result      No evidence of acute abdominal or pelvic pathology.      Fatty infiltration of the liver.      Bilateral cortical and parapelvic renal cysts.      Bilateral renal cortical thinning.      Persistent mild prominence of bilateral renal collecting systems           ED Medications Administered:  Medications   iohexol (OMNIPAQUE) 350 mg/mL (100 mL Intravenous Given 1/18/19 1350)       Differential diagnosis:  Appendicitis, diverticulitis, other intra-abdominal infection or abscess, AAA    MDM:  History and physical exam as documented above.  Patient presents with progressively worsening right lower quadrant pain and tenderness to palpation.    On laboratory evaluation his white blood count is elevated to 12.1, he has no fevers, no tachycardia, no hypotension, no evidence of systemic inflammatory response.  Lipase is within normal limits, less concerning for pancreatitis.  He does have a history of chronic kidney disease, creatinine is normal at this visit.  He has no significant electrolyte abnormalities, sodium slightly low at 133.  Urinalysis is negative for bacteria, positive for few red blood cells.    CT abdomen pelvis demonstrates no acute abdominal or pelvic pathology.  The appendix  is normal-appearing.     At this time, etiology of the patient's pain is unclear.  On reassessment, he has no new or worsening symptoms, repeat abdominal exam remains benign.  He remains afebrile with no vital sign abnormalities.  Due to painless hematuria, he is referred to urology for outpatient follow-up and further diagnostics if necessary.  He is counseled to take Tylenol for his pain as needed.  Return precautions discussed.  If his symptoms do not improve over 8-12 hours, he will return to the emergency department for further evaluation and abdominal recheck.  He will return if he develops new or worsening symptoms including fevers, vomiting, inability to tolerate fluids, worsening pain, or any further concerns.    Blood pressure today is greater than 120/80, patient is instructed to follow up with primary care provider for blood pressure recheck.    Disposition:  Discharge home in stable condition    Final Impression:  1. RLQ abdominal pain    2. Hematuria, unspecified type        Electronically signed by: Susan Iraheta, 1/18/2019 3:29 PM

## 2019-01-18 NOTE — ASSESSMENT & PLAN NOTE
New problem, uncontrolled. Patient has worsening RLQ abdominal pain, crampy in quality, intermittent, he has it all morning. No aggravating and alleviating factors. Denies fever, chills, nausea, vomiting.   He also has diarrhea.  Hx of upper abdominal hernia surgery long time ago and hx of RLQ inguinal surgery repair 3 years ago.

## 2019-01-18 NOTE — DISCHARGE INSTRUCTIONS
Please follow up with your primary care physician in 8-12 hours for abdominal recheck if your symptoms have not completely gone away. Call your primary care physician at the opening of business hours to let them know you were seen in the emergency department. Return immediately if your pain returns or worsens, if you develop any new symptoms, if you are not able to drink fluids, if you have persistent vomiting, if you develop fevers, or if you have any further concerns. Additionally, please return if your symptoms have not resolved and you are unable to follow up with your primary care physician for recheck.  Please contact your urologist, or the urology clinic listed above for a follow-up appointment for further evaluation of red blood cells in the urine.

## 2019-01-18 NOTE — PROGRESS NOTES
Established Patient    Chidi Tatum is a 73 y.o. male who presents today with the following:    CC:   Chief Complaint   Patient presents with   • RLQ Pain     4 days   • Diarrhea       HPI:   Right lower quadrant abdominal pain  New problem, uncontrolled. Patient has worsening RLQ abdominal pain, crampy in quality, intermittent, he has it all morning. No aggravating and alleviating factors. Denies fever, chills, nausea, vomiting.   He also has diarrhea.  Hx of upper abdominal hernia surgery long time ago and hx of RLQ inguinal surgery repair 3 years ago.    Diarrhea  New problem, uncontrolled. He has green watery to loose diarrhea 5-6 times a day.    Fall  Hx of GLF, four times last year. Sometimes has unsteady gait. Orthostatic BP today within normal limits.    Stage 3 chronic kidney disease (HCC)  Chronic, stable. Pending referral to nephrology. Avoid nephrotoxin, renally dose medications.    Bilateral hydronephrosis  1/6/19 CT: mild bilateral chronic hydronephrosis.  Pending urology and nephrology referral.    BPH (benign prostatic hyperplasia)  Chronic and stable. On Finasteride and tamsulosin.      Current Outpatient Prescriptions   Medication Sig Dispense Refill   • sertraline (ZOLOFT) 25 MG tablet Take 25 mg by mouth every day.     • esomeprazole (NEXIUM) 40 MG delayed-release capsule Take 40 mg by mouth every morning before breakfast.     • HYDROcodone-acetaminophen (NORCO) 7.5-325 MG per tablet Take 1-2 Tabs by mouth every 6 hours as needed for up to 7 days. 20 Tab 0   • Cyanocobalamin (VITAMIN B12) 1000 MCG Tab CR Take 1,000 mg by mouth every day.     • ferrous sulfate 325 (65 Fe) MG tablet Take 325 mg by mouth every day.     • finasteride (PROSCAR) 5 MG Tab Take 5 mg by mouth every day.     • hydroCHLOROthiazide (HYDRODIURIL) 25 MG Tab Take 25 mg by mouth every 48 hours.     • lisinopril (PRINIVIL) 10 MG Tab Take 10 mg by mouth every day.     • tamsulosin (FLOMAX) 0.4 MG capsule Take 0.4 mg by mouth  every day.     • Potassium (POTASSIMIN PO) Take 1 Tab by mouth every day. Unknown OTC Strength       No current facility-administered medications for this visit.        Allergies, past medical history, past surgical history, medications, family history, social history reviewed and updated.    ROS   Constitutional: Denies fevers or chills  Eyes: Denies changes in vision  Ears/Nose/Throat/Mouth: Denies nasal congestion or sore throat   Cardiovascular: Denies chest pain or palpitations   Respiratory: Denies shortness of breath , Denies cough  Gastrointestinal/Hepatic: per HPI  Genitourinary: Denies dysuria or frequency  Musculoskeletal/Rheum: chronic low back pain   Neurological: Denies headache  Psychiatric: Denies mood disorder   Endocrine: Denies hx of diabetes or thyroid dysfunction  Heme/Oncology/Lymph Nodes: Denies weight changes or enlarged LNs.    Physical Exam  Vitals: /72 (BP Location: Left arm, Patient Position: Standing, BP Cuff Size: Large adult)   Pulse 95   Temp 36.9 °C (98.4 °F) (Temporal)   Resp 16   Ht 1.829 m (6')   Wt 108.5 kg (239 lb 3.2 oz)   SpO2 95%   BMI 32.44 kg/m²   General: Alert, pleasant, NAD  HEENT: Normocephalic.  EOMI, no icterus or pallor.  Conjunctivae and lids normal. External ears normal. Oropharynx non-erythematous, mucous membranes moist.  Neck supple.  Lymph: No cervical or supraclavicular lymphadenopathy.  Cardiovascular: Regular rate and rhythm. No murmurs appreciated.  Respiratory: Normal respiratory effort.  Clear to auscultation bilaterally.  Abdomen: slight localized guarding on RLQ with palpation, tenderness of RLQ at the McBurney's point. Obese. Decreased bowel sound  Skin: Warm, dry, lian skin  Musculoskeletal: Gait is slow.  Moves all extremities well.  Extremities: No leg edema.    Psych:  Affect/mood is normal, judgement is good, memory is intact, grooming is appropriate.      Labs (1/6-1/7/18) were reviewed and discussed with patients.  Imaging (1/4/19)  was reviewed.  Consultant notes since last visit here were reviewed.     Assessment and Plan    Chidi was seen today for chronic kidney disease.    Diagnoses and all orders for this visit:    Right lower quadrant abdominal pain  Diarrhea, unspecified type  Hx of hernia repairs, still has his appendix. Worsening RLQ abdominal pain and tenderness.    Need emergent evaluation and management to rule out acute abdomen and C diff colitis given hx of recent hospitalization.   Discussed with ER physician at [facility].       Fall, sequela  Orthostatic BP negative.   -     REFERRAL TO PHYSICAL THERAPY Reason for Therapy: Eval/Treat/Report    Unsteady gait  -     REFERRAL TO PHYSICAL THERAPY Reason for Therapy: Eval/Treat/Report    Screening for colon cancer  -     REFERRAL TO GI FOR COLONOSCOPY    Stage 3 chronic kidney disease (HCC)  Bilateral hydronephrosis  Pending referral to nephrology and urology    Benign prostatic hyperplasia without lower urinary tract symptoms  Stable on tamsulosin and finasteride    Patient declined flu shot and pneumonia shot.    Follow-up:Return if symptoms worsen or fail to improve.    This note was created using voice recognition software. There may be unintended errors in spelling, grammar or content.

## 2019-01-24 ENCOUNTER — PATIENT OUTREACH (OUTPATIENT)
Dept: HEALTH INFORMATION MANAGEMENT | Facility: OTHER | Age: 74
End: 2019-01-24

## 2019-01-24 NOTE — PROGRESS NOTES
[facility] Patient Advocate reached out to patient's daughter, Kaela. Kaela wanted [facility] to coordinate a new Pain Management appointment with Dr. Hill. [facility] set up a new patient appointment for 1/28/19. Patient has an upcoming Urology appointment on 1/25/19. Kaela will submit forms to obtain handrails when she is in the [place] area. She denied and needs, issues or concerns. There are no further updates.

## 2019-02-01 ENCOUNTER — PATIENT OUTREACH (OUTPATIENT)
Dept: HEALTH INFORMATION MANAGEMENT | Facility: OTHER | Age: 74
End: 2019-02-01

## 2019-02-01 NOTE — PROGRESS NOTES
[facility] reached out to patient today and spoke with daughter. Patient is doing well, she denied any needs or symptoms. There are no further updates.

## 2019-02-07 ENCOUNTER — PATIENT OUTREACH (OUTPATIENT)
Dept: HEALTH INFORMATION MANAGEMENT | Facility: OTHER | Age: 74
End: 2019-02-07

## 2019-02-08 NOTE — PROGRESS NOTES
Chidi Tatum was admitted to [facility] on 12/7/18 for History of Sepsis, and was discharged on 12/9/18. Per discharge orders, patient had follow-up appointment with Bernardo ARMSTRONG (PCP) on 12/11/18. Patient is co-managed with Jana Mills MSW, LSW. Patient readmitted to [facility] on 1/4/19 for a Recurrent UTI, and was discharged on 1/8/19. [facility] Patient Advocate was able to successfully engage with patient post-discharge and many times throughout the life cycle. Per discharge orders, patient had a follow-up appointment with Bernardo ARMSTRONG (PCP) on 1/15/19, and with Dr. Walls (Internal Medicine) on 1/18/19. Patient visited [facility] ER on 1/18/19 for RLQ Abdominal Pain. [facility] assisted patient by rescheduling pain management appointment with Dr. Hill on 1/28/19, however patient’s daughter cancelled appointment, and will reschedule at her own time. [facility] also provided patient with [employer] and [facility] applications. Patient has a future appointment with Maurice Vilchis PA-C (Urology) on 2/14/19, with Dr. Lenz (Nephrology) on 2/22/19, and with PCP on 3/11/19. PPS screening was not conducted secondary to low LACE score upon hospital discharge.

## 2019-02-22 ENCOUNTER — TELEMEDICINE2 (OUTPATIENT)
Dept: NEPHROLOGY | Facility: MEDICAL CENTER | Age: 74
End: 2019-02-22
Payer: MEDICARE

## 2019-02-22 VITALS
TEMPERATURE: 98.5 F | HEIGHT: 72 IN | BODY MASS INDEX: 32.78 KG/M2 | RESPIRATION RATE: 18 BRPM | WEIGHT: 242 LBS | OXYGEN SATURATION: 96 % | DIASTOLIC BLOOD PRESSURE: 82 MMHG | HEART RATE: 100 BPM | SYSTOLIC BLOOD PRESSURE: 118 MMHG

## 2019-02-22 DIAGNOSIS — N13.39 OTHER HYDRONEPHROSIS: ICD-10-CM

## 2019-02-22 DIAGNOSIS — I10 ESSENTIAL HYPERTENSION: ICD-10-CM

## 2019-02-22 DIAGNOSIS — N18.30 STAGE 3 CHRONIC KIDNEY DISEASE (HCC): ICD-10-CM

## 2019-02-22 PROCEDURE — 99204 OFFICE O/P NEW MOD 45 MIN: CPT | Performed by: INTERNAL MEDICINE

## 2019-02-22 ASSESSMENT — ENCOUNTER SYMPTOMS
FEVER: 0
PALPITATIONS: 0
CHILLS: 0

## 2019-02-22 NOTE — PROGRESS NOTES
Subjective:      Chidi Tatum is a 73 y.o. male who presents with CKD 3  New Patient            HPI  Is a 73-year-old, the history is somewhat difficult but it appears that he has a long-standing history of hydronephrosis.  He also carries a diagnosis of a bladder tumor and is unclear how this was treated.  The patient apparently saw a urologist in [place], but has not followed up and is not able to follow-up currently even the fact that he has been noncompliant with therapy.  The patient was referred for chronic kidney disease.  He is on preload Flomax and Proscar.  He does appear that he still has some difficulty ending his bladder.  Recent CT scan noted worsening obstruction.  Blood pressure appears to be under control.    Review of Systems   Constitutional: Negative for chills and fever.   Cardiovascular: Negative for chest pain and palpitations.   All other systems reviewed and are negative.         Objective:     /82   Pulse 100   Temp 36.9 °C (98.5 °F)   Resp 18   Ht 1.829 m (6')   Wt 109.8 kg (242 lb)   SpO2 96%   BMI 32.82 kg/m²      Physical Exam   Constitutional: He is oriented to person, place, and time. He appears well-developed and well-nourished.   Cardiovascular: Normal rate and regular rhythm.    Pulmonary/Chest: Effort normal and breath sounds normal.   Neurological: He is alert and oriented to person, place, and time.               Assessment/Plan:     1. Stage 3 chronic kidney disease (HCC)  Patient with chronic kidney disease and elevated serum creatinine in the context of severe hydronephrosis.  At this point, the patient will need an urgent referral to urology.  If the patient has any symptoms however he may need to go to the emergency room.  - REFERRAL TO UROLOGY    2. Essential hypertension  Blood pressures under control currently.    3. Other hydronephrosis  This is apparently long-standing, and it is unclear how much improvement his chronic kidney disease will have  with treatment.  He is currently on treatment with Flomax and Proscar but still with severe hydronephrosis.  I suspect he will need a Barrios catheter or be trained for straight cath.    - REFERRAL TO UROLOGY        -At this point I think urology will have the most impact on this patient.  -Once his hydronephrosis has been addressed we are happy to follow up with the patient  -It seems like his major issue is noncompliance with therapy which is led to this untreated hydronephrosis.

## 2019-03-07 ENCOUNTER — APPOINTMENT (OUTPATIENT)
Dept: PHYSICAL THERAPY | Facility: REHABILITATION | Age: 74
End: 2019-03-07
Payer: MEDICARE

## 2019-03-13 ENCOUNTER — PATIENT OUTREACH (OUTPATIENT)
Dept: HEALTH INFORMATION MANAGEMENT | Facility: OTHER | Age: 74
End: 2019-03-13

## 2019-03-13 NOTE — PROGRESS NOTES
"Outreach call to pt and daughter to follow up on  care plan. Per last conversation daughter reported they completed the applications to [facility] and the [employer] and were pending submitting application. Per discussion with daughter today, she reported she has no submitted the applications to either agency. She reported her father/pt is doing much better and currently they are \"okay\". Discussed graduating pt from [facility] services which daughter is agreeable with. Daughter has MSW contact information and aware she can follow up with MSW if needed.     Plan:  Pt graduated from  care coordination.   "

## 2019-03-14 ENCOUNTER — APPOINTMENT (OUTPATIENT)
Dept: PHYSICAL THERAPY | Facility: REHABILITATION | Age: 74
End: 2019-03-14
Payer: MEDICARE

## 2019-03-18 ENCOUNTER — OFFICE VISIT (OUTPATIENT)
Dept: URGENT CARE | Facility: PHYSICIAN GROUP | Age: 74
End: 2019-03-18
Payer: MEDICARE

## 2019-03-18 VITALS
TEMPERATURE: 99.7 F | RESPIRATION RATE: 14 BRPM | HEART RATE: 100 BPM | DIASTOLIC BLOOD PRESSURE: 62 MMHG | OXYGEN SATURATION: 97 % | BODY MASS INDEX: 32.41 KG/M2 | SYSTOLIC BLOOD PRESSURE: 110 MMHG | WEIGHT: 239 LBS

## 2019-03-18 DIAGNOSIS — R19.7 DIARRHEA, UNSPECIFIED TYPE: ICD-10-CM

## 2019-03-18 DIAGNOSIS — J10.1 INFLUENZA A: ICD-10-CM

## 2019-03-18 LAB
FLUAV+FLUBV AG SPEC QL IA: POSITIVE
INT CON NEG: NORMAL
INT CON POS: NORMAL

## 2019-03-18 PROCEDURE — 87804 INFLUENZA ASSAY W/OPTIC: CPT | Performed by: PHYSICIAN ASSISTANT

## 2019-03-18 PROCEDURE — 99214 OFFICE O/P EST MOD 30 MIN: CPT | Performed by: PHYSICIAN ASSISTANT

## 2019-03-18 RX ORDER — OSELTAMIVIR PHOSPHATE 75 MG/1
75 CAPSULE ORAL 2 TIMES DAILY
Qty: 10 CAP | Refills: 0 | Status: SHIPPED | OUTPATIENT
Start: 2019-03-18 | End: 2019-04-23

## 2019-03-18 NOTE — PROGRESS NOTES
Chief Complaint   Patient presents with   • Fever   • Diarrhea   • Nasal Congestion   • Cough     making his stomach hurt        HISTORY OF PRESENT ILLNESS: Patient is a 73 y.o. male who presents today for the following:    Patient comes in with his daughter for evaluation of acute onset cough that started yesterday.  He reports associated fever, body aches, and loose stool.  He states he has been moving his bowels every 1-2 hours.  He denies any blood in his stool.  He denies localized abdominal pain but does complain of diffuse significant abdominal pain.  His daughter was positive for influenza A last week.  Patient denies recent travel, nausea, vomiting, and bad food or drink.    Patient Active Problem List    Diagnosis Date Noted   • History of sepsis 12/07/2018     Priority: High   • Nausea, vomiting, and diarrhea 01/04/2019     Priority: Medium   • Fall from ground level 12/07/2018     Priority: Medium   • Diarrhea 12/07/2018     Priority: Medium   • Iron deficiency anemia 07/07/2018     Priority: Low   • Pre-diabetes 05/18/2018     Priority: Low   • Stage 3 chronic kidney disease (HCC) 09/01/2017     Priority: Low   • Moderate episode of recurrent major depressive disorder (HCC) 05/19/2017     Priority: Low   • HTN (hypertension)      Priority: Low   • Unsteady gait 01/18/2019   • Right lower quadrant abdominal pain 01/18/2019   • Hydronephrosis 01/18/2019   • Persistent proteinuria 01/06/2019   • Fall 12/11/2018   • Neck pain 12/11/2018   • Blood bacterial culture positive 12/11/2018   • Hypokalemia 12/07/2018   • Hyponatremia 12/07/2018   • Anxiety 12/07/2018   • Cough 07/13/2018   • Penile rash 07/13/2018   • Small bowel obstruction (HCC) 07/07/2018   • Amputation finger, 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 10 Cap 0   • Hydrocod Polst-CPM Polst ER (TUSSIONEX PENNKINETIC ER) 10-8 MG/5ML Suspension Extended Release Take 5 mL by mouth every 12 hours for 10 days. 100 mL 0   • lisinopril (PRINIVIL) 10 MG Tab Take 1 Tab by mouth every day. 90 Tab 1   • esomeprazole (NEXIUM) 20 MG capsule Take 40 mg by mouth every morning before breakfast.     • Potassium 99 MG Tab Take 99 mg by mouth every day.     • Cyanocobalamin (VITAMIN B12) 1000 MCG Tab CR Take 1,000 mg by mouth every day.     • finasteride (PROSCAR) 5 MG Tab Take 5 mg by mouth every day.     • hydroCHLOROthiazide (HYDRODIURIL) 50 MG Tab Take 50 mg by mouth every day.     • lisinopril (PRINIVIL) 10 MG Tab Take 10 mg by mouth every day.     • sertraline (ZOLOFT) 25 MG tablet Take 25 mg by mouth every day.     • tamsulosin (FLOMAX) 0.4 MG capsule Take 0.4 mg by mouth every day.       No current [facility]-ordered facility-administered medications on file.        Past Medical History:   Diagnosis Date   • BPH (benign prostatic hyperplasia)    • GERD (gastroesophageal reflux disease)    • HTN (hypertension)    • Hypertension    • Insomnia    • SBO (small bowel obstruction) (HCC)        Social History   Substance Use Topics   • Smoking status: Never Smoker   • Smokeless tobacco: Never Used   • Alcohol use No       Family Status   Relation Status   • Mo    • Fa    • Sis Alive   • Bro    • Sis Alive   • Bro    • Bro    • Bro      Family History   Problem Relation Age of Onset   • Lung Disease Father    • Alcohol/Drug Brother    • Lung Disease Brother        Review of Systems:   Constitutional ROS: No unexpected change in weight, No weakness, No  fatigue  Eye ROS: No recent significant change in vision, No eye pain, redness, discharge  Ear ROS: No drainage, No tinnitus or vertigo, No recent change in hearing  Mouth/Throat ROS: No teeth or gum problems, No bleeding gums, No tongue complaints  Neck ROS: No swollen glands, No significant pain in neck  Pulmonary ROS: No chronic cough, sputum, or hemoptysis, No dyspnea on exertion, No wheezing  Cardiovascular ROS: No diaphoresis, No edema, No palpitations  Gastrointestinal ROS: Positive for loose stool and abdominal pain.  Musculoskeletal/Extremities ROS: No peripheral edema, No pain, redness or swelling on the joints  Hematologic/Lymphatic ROS: Positive for fever and body aches.  Skin/Integumentary ROS: No edema, No evidence of rash, No itching      Exam:  Blood pressure 110/62, pulse 100, temperature 37.6 °C (99.7 °F), temperature source Temporal, resp. rate 14, weight 108.4 kg (239 lb), SpO2 97 %.  General: Well developed, well nourished. No acute distress but clearly does not feel well.  Eye: PERRL/EOMI; conjunctivae clear, lids normal.  ENMT: Lips without lesions, MMM. Oropharynx is clear. Bilateral TMs are within normal limits.  Pulmonary: Unlabored respiratory effort. Lungs clear to auscultation, no wheezes, no rhonchi.  Cardiovascular: Regular rate and rhythm without murmur.    Abdomen: Soft,  nondistended. No hepatosplenomegaly.  No localized tenderness noted.  Neurologic: Grossly nonfocal. No facial asymmetry noted.  Skin: Warm, dry, good turgor. No rashes in visible areas.   Psych: Normal mood. Alert and oriented x3. Judgment and insight is normal.    Rapid influenza: Positive A    Assessment/Plan:  Discussed viral etiology. Discussed Tamiflu treatment recommendations per the CDC.  Patient would like to be treated.  [place] diet.  Drink plenty fluids.  Follow up for worsening or persistent symptoms.  1. Influenza A  oseltamivir (TAMIFLU) 75 MG Cap    Hydrocod Polst-CPM Polst ER (TUSSIONEX PENNKINETIC ER)  10-8 MG/5ML Suspension Extended Release   2. Diarrhea, unspecified type

## 2019-04-23 ENCOUNTER — OFFICE VISIT (OUTPATIENT)
Dept: MEDICAL GROUP | Facility: CLINIC | Age: 74
End: 2019-04-23
Payer: MEDICARE

## 2019-04-23 VITALS
SYSTOLIC BLOOD PRESSURE: 132 MMHG | OXYGEN SATURATION: 94 % | WEIGHT: 242 LBS | RESPIRATION RATE: 14 BRPM | DIASTOLIC BLOOD PRESSURE: 78 MMHG | TEMPERATURE: 99.3 F | BODY MASS INDEX: 32.78 KG/M2 | HEART RATE: 85 BPM | HEIGHT: 72 IN

## 2019-04-23 DIAGNOSIS — N40.1 BENIGN PROSTATIC HYPERPLASIA WITH POST-VOID DRIBBLING: ICD-10-CM

## 2019-04-23 DIAGNOSIS — N18.30 STAGE 3 CHRONIC KIDNEY DISEASE (HCC): ICD-10-CM

## 2019-04-23 DIAGNOSIS — F51.01 PRIMARY INSOMNIA: ICD-10-CM

## 2019-04-23 DIAGNOSIS — N39.43 BENIGN PROSTATIC HYPERPLASIA WITH POST-VOID DRIBBLING: ICD-10-CM

## 2019-04-23 DIAGNOSIS — G89.29 CHRONIC LEFT-SIDED LOW BACK PAIN WITHOUT SCIATICA: ICD-10-CM

## 2019-04-23 DIAGNOSIS — M54.50 CHRONIC LEFT-SIDED LOW BACK PAIN WITHOUT SCIATICA: ICD-10-CM

## 2019-04-23 PROCEDURE — 99214 OFFICE O/P EST MOD 30 MIN: CPT | Performed by: NURSE PRACTITIONER

## 2019-04-23 RX ORDER — AMLODIPINE BESYLATE 10 MG/1
10 TABLET ORAL DAILY
Refills: 0 | COMMUNITY
Start: 2019-03-23

## 2019-04-23 RX ORDER — SODIUM BICARBONATE 650 MG/1
TABLET ORAL
Refills: 0 | COMMUNITY
Start: 2019-03-23 | End: 2019-08-26

## 2019-04-23 NOTE — ASSESSMENT & PLAN NOTE
This is a chronic problem for which patient reports using Norco as needed friend 3 times per month.  He has never had back surgery.  Reports he did had some imaging in the past, this is not on file.  Reports he occasionally has increased pain.  He did trial tramadol which seemed to work at one time but made him more drowsy.  Denies any recent falls or new injury.  He was referred by Dr. Walls to physical therapy but he has not followed through as it is difficult for him to get to [place].

## 2019-04-23 NOTE — PROGRESS NOTES
Subjective:     Chidi aTtum is a 73 y.o. male here today for evaluation management the following:    Chidi was hospitalized approximately 1 month ago.  Reports since that time has been feeling much better.   He is here today with his daughter who helps to care for him.    BPH (benign prostatic hyperplasia)  This is a chronic problem for which patient reports has been well controlled in the past with tamsulosin 0.4 mg daily and finasteride.  He has been referred to urology by nephrology, reports he is still now having some dribbling.  He has not followed through with that referral yet.    Chronic left-sided low back pain without sciatica  This is a chronic problem for which patient reports using Norco as needed friend 3 times per month.  He has never had back surgery.  Reports he did had some imaging in the past, this is not on file.  Reports he occasionally has increased pain.  He did trial tramadol which seemed to work at one time but made him more drowsy.  Denies any recent falls or new injury.  He was referred by Dr. Walls to physical therapy but he has not followed through as it is difficult for him to get to [place].    Primary insomnia  This is a chronic problem which is well controlled with Ambien.  Patient trialed several other medications for sleep, none prevailed.  Patient requests a refill, it was refilled on 4/8/2019, it appears to be sent this to [facility] pharmacy, patient was unaware of that pharmacy.  He reports he will go pick it up.    Stage 3 chronic kidney disease (HCC)  This is a chronic problem, stable.  He is followed by nephrology.       Current medicines (including changes today)  Current Outpatient Prescriptions   Medication Sig Dispense Refill   • amLODIPine (NORVASC) 10 MG Tab   0   • zolpidem (AMBIEN) 5 MG Tab TAKE ONE TABLET BY MOUTH EVERY DAY AT BEDTIME AS NEEDED FOR SLEEP - 30 DAYS - F51.01 30 Tab 2   • esomeprazole (NEXIUM) 20 MG capsule Take 40 mg by mouth every morning before  breakfast.     • Potassium 99 MG Tab Take 99 mg by mouth every day.     • Cyanocobalamin (VITAMIN B12) 1000 MCG Tab CR Take 1,000 mg by mouth every day.     • finasteride (PROSCAR) 5 MG Tab Take 5 mg by mouth every day.     • hydroCHLOROthiazide (HYDRODIURIL) 50 MG Tab Take 50 mg by mouth every day.     • lisinopril (PRINIVIL) 10 MG Tab Take 10 mg by mouth every day.     • sertraline (ZOLOFT) 25 MG tablet Take 25 mg by mouth every day.     • tamsulosin (FLOMAX) 0.4 MG capsule Take 0.4 mg by mouth every day.     • sodium bicarbonate (SODIUM BICARBONATE) 650 MG Tab   0     No current facility-administered medications for this visit.        He  has a past medical history of BPH (benign prostatic hyperplasia); GERD (gastroesophageal reflux disease); HTN (hypertension); Hypertension; Insomnia; and SBO (small bowel obstruction) (HCC).    He  has a past surgical history that includes hernia repair and temporal artery biopsy (Right, 2/9/2018).     Social History     Social History   • Marital status:      Spouse name: N/A   • Number of children: N/A   • Years of education: N/A     Social History Main Topics   • Smoking status: Never Smoker   • Smokeless tobacco: Never Used   • Alcohol use No   • Drug use: No   • Sexual activity: No     Other Topics Concern   • Not on file     Social History Narrative   • No narrative on file       Family History   Problem Relation Age of Onset   • Lung Disease Father    • Alcohol/Drug Brother    • Lung Disease Brother          ROS  Positive for urinary dribbling.  Positive for low back pain, chronic, unchanged, no new injury.  Denies dysuria, no blood in urine.  No fever, no chest pain, no shortness of breath, no abdominal pain, no rashes    All other systems reviewed and are negative.        Objective:     /78 (BP Location: Left arm, Patient Position: Sitting, BP Cuff Size: Adult)   Pulse 85   Temp 37.4 °C (99.3 °F)   Resp 14   Ht 1.829 m (6')   Wt 109.8 kg (242 lb)    SpO2 94%  Body mass index is 32.82 kg/m².    Physical Exam:   Constitutional: Alert, no distress.  Eye: Equal, round and reactive, conjunctiva clear, lids normal.   ENMT: Lips without lesions, oropharynx clear.   Neck: Trachea midline, no masses, no thyromegaly. No cervical or supraclavicular lymphadenopathy  Respiratory: Unlabored respiratory effort, lungs clear to auscultation, no wheezes, no ronchi.  Cardiovascular: Normal S1, S2, no murmur, no edema.   Abdomen: Soft, non-tender, no masses, no hepatosplenomegaly. Normal bowel sounds.   Skin: Warm, dry, good turgor, no rashes in visible areas.  Psych: Alert and oriented x3, normal affect and mood.        Assessment and Plan:   The following treatment plan was discussed    1. Benign prostatic hyperplasia with post-void dribbling  Encourage patient to make appointment with urology, I provided him and his daughter with the contact information.    2. Stage 3 chronic kidney disease (HCC)  Stable    3. Primary insomnia  Encourage patient to  prescription which is already been sent to [facility] pharmacy.  Advised patient to make an appointment to follow-up with Dr. Walls, in 2 months.  Discussed with patient my pending departure from the [facility], advised patient to continue follow-up with Dr. Walls as his new PCP, internal medicine.  He and his daughter verbalized understanding    4. Chronic left-sided low back pain without sciatica  Advised patient of risks of chronic Norco use.  At this time advised patient to follow through with physical therapy referral, I have placed a new referral for [facility] as this may be more reasonable for patient to make appointments.  Patient verbalized understanding.  - REFERRAL TO PHYSICAL THERAPY Reason for Therapy: Eval/Treat/Report      Reviewed indication, dosage, usage and potential adverse effects of prescribed medications. Patient appears to understand, verbalizes understanding and is willing to try  medications as prescribed.      Reviewed risks and benefits of treatment plan. Patient verbally agrees to plan of care.       Followup: Return in about 2 months (around 6/23/2019) for with Dr. Walls, your new PCP in [place] .    FRANCISCO Moya.     PLEASE NOTE: This dictation was created using voice recognition software. I have made every reasonable attempt to correct obvious errors, but I expect that there may be errors of grammar and possibly content that I did not discover prior finalizing this note.

## 2019-04-23 NOTE — ASSESSMENT & PLAN NOTE
This is a chronic problem which is well controlled with Ambien.  Patient trialed several other medications for sleep, none prevailed.  Patient requests a refill, it was refilled on 4/8/2019, it appears to be sent this to [facility] pharmacy, patient was unaware of that pharmacy.  He reports he will go pick it up.

## 2019-04-23 NOTE — PATIENT INSTRUCTIONS
UROLOGY [facility]EVADA  [address] [facility] [place]  [address]  Phone: [phone]    Call [phone] to make a follow up appointment with Dr. Walls, he will be your new primary care provider in [place]. Make an appointment for June 2019!    Your medical care was provided today by: REBECCA Pike    Thank You for the opportunity to serve you.    You may receive a brief survey in the mail shortly regarding your visit today. Please take a few moments to complete the survey and return it; no postage is necessary. We are working to serve our patient population better, improve customer service and our patients overall experience and your input can help us to accomplish this. We thank you for your help and for the opportunity to serve you today and in the future.     Labs and Diagnostic Testing   Please note that if we have ordered labs or diagnostic testing, those results may be released to you on Collectat prior to my review. While we do our best to review your results as soon as possible, there are times when you may see your results prior to provider review. Of course, if you ever have any questions or concerns about your results, please contact our clinic.     Special Instructions:  Always call 9-1-1 immediately if you develop a life threatening emergency.    Unless told otherwise please take all medications as directed and complete prescription therapies.     Watch for the following signs that require additional evaluation: progressive lethargy or unresponsiveness, localized pain (chest, abdomen), shortness of breath, painful breathing, progressive vomiting with weakness, bloody stools, or new rash.     If you are prescribed pain medication or any other medication that is sedating, do not take medication before or while operating a vehicle or heavy machinery or equipment due to potential side effects such as drowsiness and/or dizziness.

## 2019-05-04 ENCOUNTER — OFFICE VISIT (OUTPATIENT)
Dept: URGENT CARE | Facility: PHYSICIAN GROUP | Age: 74
End: 2019-05-04
Payer: MEDICARE

## 2019-05-04 VITALS
TEMPERATURE: 98.4 F | BODY MASS INDEX: 32.41 KG/M2 | DIASTOLIC BLOOD PRESSURE: 68 MMHG | HEART RATE: 88 BPM | SYSTOLIC BLOOD PRESSURE: 130 MMHG | OXYGEN SATURATION: 97 % | WEIGHT: 239 LBS | RESPIRATION RATE: 14 BRPM

## 2019-05-04 DIAGNOSIS — B37.0 THRUSH: ICD-10-CM

## 2019-05-04 DIAGNOSIS — K04.7 DENTAL INFECTION: ICD-10-CM

## 2019-05-04 PROCEDURE — 99214 OFFICE O/P EST MOD 30 MIN: CPT | Performed by: FAMILY MEDICINE

## 2019-05-04 RX ORDER — AMOXICILLIN 875 MG/1
TABLET, COATED ORAL
Qty: 20 TAB | Refills: 0 | Status: SHIPPED | OUTPATIENT
Start: 2019-05-04 | End: 2019-07-10

## 2019-05-04 RX ORDER — CLOTRIMAZOLE 10 MG/1
10 LOZENGE ORAL; TOPICAL
Qty: 35 TROCHE | Refills: 0 | Status: SHIPPED | OUTPATIENT
Start: 2019-05-04 | End: 2019-07-10 | Stop reason: SDUPTHER

## 2019-05-04 RX ORDER — HYDROCODONE BITARTRATE AND ACETAMINOPHEN 5; 325 MG/1; MG/1
TABLET ORAL
Qty: 15 TAB | Refills: 0 | Status: SHIPPED | OUTPATIENT
Start: 2019-05-04 | End: 2019-05-10

## 2019-05-04 NOTE — PROGRESS NOTES
Chief Complaint:    Chief Complaint   Patient presents with   • Tooth Abscess     R lower side        History of Present Illness:    He has pain in right lower tooth. This is an ongoing thing, he has been seen for this before, but has not seen Dentist, because he says he cannot afford it. It has flared up again for a few weeks and Amoxil and Hydrocodone-APAP has worked/tolerated in past for similar previous problem. He also feels he has thrush as his tongue is irritated and has been treated with Mycelex troches in past with help.      Review of Systems:    Constitutional: Negative for fever, chills, and diaphoresis.   Eyes: Negative for change in vision, photophobia, pain, redness, and discharge.  ENT: See HPI.  Respiratory: Negative for cough, hemoptysis, sputum production, shortness of breath, wheezing, and stridor.    Cardiovascular: Negative for chest pain, palpitations, orthopnea, claudication, leg swelling, and PND.   Gastrointestinal: Negative for abdominal pain, nausea, vomiting, diarrhea, constipation, blood in stool, and melena.   Genitourinary: No new symptoms.   Musculoskeletal: Negative for myalgias, joint pain, neck pain, and back pain.   Skin: Negative for rash and itching.   Neurological: Negative for dizziness, tingling, tremors, sensory change, speech change, focal weakness, seizures, loss of consciousness, and headaches.   Endo: Negative for polydipsia.   Heme: Does not bruise/bleed easily.   Psychiatric/Behavioral: No new symptoms.      Past Medical History:    Past Medical History:   Diagnosis Date   • BPH (benign prostatic hyperplasia)    • GERD (gastroesophageal reflux disease)    • HTN (hypertension)    • Hypertension    • Insomnia    • SBO (small bowel obstruction) (HCC)      Past Surgical History:    Past Surgical History:   Procedure Laterality Date   • TEMPORAL ARTERY BIOPSY Right 2/9/2018    Procedure: TEMPORAL ARTERY BIOPSY;  Surgeon: Bryant Corey M.D.;  Location: SURGERY [facility];  Service: Vascular   • HERNIA REPAIR      umbilical, groin      Social History:    Social History     Social History   • Marital status:      Spouse name: N/A   • Number of children: N/A   • Years of education: N/A     Occupational History   • Not on file.     Social History Main Topics   • Smoking status: Never Smoker   • Smokeless tobacco: Never Used   • Alcohol use No   • Drug use: No   • Sexual activity: No     Other Topics Concern   • Not on file     Social History Narrative   • No narrative on file     Family History:    Family History   Problem Relation Age of Onset   • Lung Disease Father    • Alcohol/Drug Brother    • Lung Disease Brother      Medications:    Current Outpatient Prescriptions on File Prior to Visit   Medication Sig Dispense Refill   • amLODIPine (NORVASC) 10 MG Tab   0   • sodium bicarbonate (SODIUM BICARBONATE) 650 MG Tab   0   • zolpidem (AMBIEN) 5 MG Tab TAKE ONE TABLET BY MOUTH EVERY DAY AT BEDTIME AS NEEDED FOR SLEEP - 30 DAYS - F51.01 30 Tab 2   • esomeprazole (NEXIUM) 20 MG capsule Take 40 mg by mouth every morning before breakfast.     • Potassium 99 MG Tab Take 99 mg by mouth every day.     • Cyanocobalamin (VITAMIN B12) 1000 MCG Tab CR Take 1,000 mg by mouth every day.     • finasteride (PROSCAR) 5 MG Tab Take 5 mg by mouth every day.     • hydroCHLOROthiazide (HYDRODIURIL) 50 MG Tab Take 50 mg by mouth every day.     • lisinopril (PRINIVIL) 10 MG Tab Take 10 mg by mouth every day.     • sertraline (ZOLOFT) 25 MG tablet Take 25 mg by mouth every day.     • tamsulosin (FLOMAX) 0.4 MG capsule Take 0.4 mg by mouth every day.       No current facility-administered medications on file prior to visit.      Allergies:    Allergies   Allergen Reactions   • Celecoxib      Rash       Vitals:    Vitals:    05/04/19 1238   BP: 130/68   Pulse: 88   Resp: 14   Temp: 36.9 °C (98.4 °F)   TempSrc: Temporal   SpO2: 97%   Weight: 108.4 kg (239 lb)       Physical  Exam:    Constitutional: Vital signs reviewed. Appears well-developed and well-nourished. No acute distress.   Eyes: Sclera white, conjunctivae clear.   ENT: Multiple decayed lower teeth. Right lower incisor top is broken off and obviously decayed. External ears normal. Hearing normal. Lips are normal. Tongue looks normal.  Neck: Neck supple.   Pulmonary/Chest: Respirations non-labored.   Musculoskeletal: Normal gait. No muscular atrophy or weakness.  Neurological: Alert and oriented to person, place, and time. Muscle tone normal. Coordination normal.   Skin: No rashes or lesions. Warm, dry, normal turgor.  Psychiatric: Normal mood and affect. Behavior is normal. Judgment and thought content normal.       Assessment / Plan:    1. Dental infection  - amoxicillin (AMOXIL) 875 MG tablet; 1 TAB TWICE A DAY X 10 DAYS.  Dispense: 20 Tab; Refill: 0  - HYDROcodone-acetaminophen (NORCO) 5-325 MG Tab per tablet; 1 TAB EVERY 6 HOURS ONLY IF NEEDED FOR PAIN FOR UP TO 4 DAYS. MAY CAUSE DROWSINESS.  Dispense: 15 Tab; Refill: 0  - Consent for Opiate Prescription    2. Thrush  - clotrimazole (MYCELEX) 10 MG Cristofer; Take 1 Cristofer by mouth 5 Times a Day for 7 days.  Dispense: 35 Cristofer; Refill: 0      Discussed with him DDX, management options, and risks, benefits, and alternatives to treatment plan agreed upon.    Agreeable to medications prescribed.  report checked - last Rx was Zolpidem 5 mg #30 on 4/8/19 and Tussionex #100 ml on 3/18/19.    In my professional judgment, after considering each of the factors set forth in , the CS is medically necessary and appropriate.    Advised to see Dentist for definitive treatment.    Discussed expected course of duration, time for improvement, and to seek follow-up in Emergency Room, urgent care, or with PCP if getting worse at any time or not improving within expected time frame.

## 2019-05-09 ENCOUNTER — HOSPITAL ENCOUNTER (OUTPATIENT)
Dept: LAB | Facility: MEDICAL CENTER | Age: 74
End: 2019-05-09
Attending: UROLOGY
Payer: MEDICARE

## 2019-05-09 PROCEDURE — 80048 BASIC METABOLIC PNL TOTAL CA: CPT

## 2019-05-10 LAB
ANION GAP SERPL CALC-SCNC: 10 MMOL/L (ref 0–11.9)
BUN SERPL-MCNC: 24 MG/DL (ref 8–22)
CALCIUM SERPL-MCNC: 9.4 MG/DL (ref 8.5–10.5)
CHLORIDE SERPL-SCNC: 106 MMOL/L (ref 96–112)
CO2 SERPL-SCNC: 21 MMOL/L (ref 20–33)
CREAT SERPL-MCNC: 1.52 MG/DL (ref 0.5–1.4)
GLUCOSE SERPL-MCNC: 97 MG/DL (ref 65–99)
POTASSIUM SERPL-SCNC: 4 MMOL/L (ref 3.6–5.5)
SODIUM SERPL-SCNC: 137 MMOL/L (ref 135–145)

## 2019-06-03 ENCOUNTER — OFFICE VISIT (OUTPATIENT)
Dept: URGENT CARE | Facility: PHYSICIAN GROUP | Age: 74
End: 2019-06-03
Payer: MEDICARE

## 2019-06-03 VITALS
HEART RATE: 78 BPM | BODY MASS INDEX: 33.36 KG/M2 | SYSTOLIC BLOOD PRESSURE: 126 MMHG | WEIGHT: 246 LBS | OXYGEN SATURATION: 95 % | TEMPERATURE: 98.4 F | DIASTOLIC BLOOD PRESSURE: 64 MMHG | RESPIRATION RATE: 14 BRPM

## 2019-06-03 DIAGNOSIS — B37.0 THRUSH: ICD-10-CM

## 2019-06-03 DIAGNOSIS — K04.7 DENTAL INFECTION: ICD-10-CM

## 2019-06-03 DIAGNOSIS — R22.0 RIGHT FACIAL SWELLING: ICD-10-CM

## 2019-06-03 PROCEDURE — 99214 OFFICE O/P EST MOD 30 MIN: CPT | Performed by: PHYSICIAN ASSISTANT

## 2019-06-03 RX ORDER — AMOXICILLIN 875 MG/1
875 TABLET, COATED ORAL 2 TIMES DAILY
Qty: 20 TAB | Refills: 0 | Status: SHIPPED | OUTPATIENT
Start: 2019-06-03 | End: 2019-07-10 | Stop reason: SDUPTHER

## 2019-06-03 RX ORDER — HYDROCODONE BITARTRATE AND ACETAMINOPHEN 5; 325 MG/1; MG/1
1 TABLET ORAL EVERY 6 HOURS PRN
Qty: 12 TAB | Refills: 0 | Status: SHIPPED | OUTPATIENT
Start: 2019-06-03 | End: 2019-07-10 | Stop reason: SDUPTHER

## 2019-06-04 NOTE — PROGRESS NOTES
Chief Complaint   Patient presents with   • Dental Pain       HISTORY OF PRESENT ILLNESS: Patient is a 73 y.o. male who presents today for the following:    Patient comes in for evaluation of dental pain and facial swelling that started a few days ago.  Patient reports a history of same.  He states he cannot afford to have his tooth pulled.  He wears dentures on the top but has multiple caries on the bottom.  He was treated for thrush about 2 weeks ago.  He denies overt pain in the mouth besides the area of swelling on the bottom right.  He denies any fever.  Over-the-counter medication has not been helping with pain.  He is asking for pain medication.    Patient Active Problem List    Diagnosis Date Noted   • History of sepsis 12/07/2018     Priority: High   • Nausea, vomiting, and diarrhea 01/04/2019     Priority: Medium   • Fall from ground level 12/07/2018     Priority: Medium   • Diarrhea 12/07/2018     Priority: Medium   • Iron deficiency anemia 07/07/2018     Priority: Low   • Pre-diabetes 05/18/2018     Priority: Low   • Stage 3 chronic kidney disease (HCC) 09/01/2017     Priority: Low   • Moderate episode of recurrent major depressive disorder (HCC) 05/19/2017     Priority: Low   • HTN (hypertension)      Priority: Low   • Unsteady gait 01/18/2019   • Right lower quadrant abdominal pain 01/18/2019   • Hydronephrosis 01/18/2019   • Persistent proteinuria 01/06/2019   • Fall 12/11/2018   • Neck pain 12/11/2018   • Blood bacterial culture positive 12/11/2018   • Hypokalemia 12/07/2018   • Hyponatremia 12/07/2018   • Anxiety 12/07/2018   • Cough 07/13/2018   • Penile rash 07/13/2018   • Small bowel obstruction (HCC) 07/07/2018   • Amputation finger, 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 20 Tab 0   • nystatin (MYCOSTATIN) 779955 UNIT/ML Suspension Take 5 mL by mouth 4 times a day for 14 days. 280 mL 0   • HYDROcodone-acetaminophen (NORCO) 5-325 MG Tab per tablet Take 1 Tab by mouth every 6 hours as needed (pain) for up to 3 days. 12 Tab 0   • zolpidem (AMBIEN) 5 MG Tab TAKE 1 TABLET BY MOUTH ONCE DAILY AT BEDTIME AS NEEDED FOR SLEEP FOR 30 DAYS 30 Tab 2   • sertraline (ZOLOFT) 25 MG tablet TAKE 1 TABLET BY MOUTH ONCE DAILY 90 Tab 2   • amoxicillin (AMOXIL) 875 MG tablet 1 TAB TWICE A DAY X 10 DAYS. 20 Tab 0   • amLODIPine (NORVASC) 10 MG Tab   0   • sodium bicarbonate (SODIUM BICARBONATE) 650 MG Tab   0   • esomeprazole (NEXIUM) 20 MG capsule Take 40 mg by mouth every morning before breakfast.     • Potassium 99 MG Tab Take 99 mg by mouth every day.     • Cyanocobalamin (VITAMIN B12) 1000 MCG Tab CR Take 1,000 mg by mouth every day.     • finasteride (PROSCAR) 5 MG Tab Take 5 mg by mouth every day.     • hydroCHLOROthiazide (HYDRODIURIL) 50 MG Tab Take 50 mg by mouth every day.     • lisinopril (PRINIVIL) 10 MG Tab Take 10 mg by mouth every day.     • sertraline (ZOLOFT) 25 MG tablet Take 25 mg by mouth every day.     • tamsulosin (FLOMAX) 0.4 MG capsule Take 0.4 mg by mouth every day.       No current [facility]-ordered facility-administered medications on file.        Past Medical History:   Diagnosis Date   • BPH (benign prostatic hyperplasia)    • GERD (gastroesophageal reflux disease)    • HTN (hypertension)    • Hypertension    • Insomnia    • SBO (small bowel obstruction) ([facility])        Social History   Substance Use Topics   • Smoking status: Never Smoker   • Smokeless tobacco: Never Used   • Alcohol use No       Family Status    Relation Status   • Mo    • Fa    • Sis Alive   • Bro    • Sis Alive   • Bro    • Bro    • Bro      Family History   Problem Relation Age of Onset   • Lung Disease Father    • Alcohol/Drug Brother    • Lung Disease Brother        Review of Systems:    Constitutional ROS: No unexpected change in weight, No weakness, No fatigue  Eye ROS: No recent significant change in vision, No eye pain, redness, discharge  Ear ROS: No drainage, No tinnitus or vertigo, No recent change in hearing  Mouth/Throat ROS: No teeth or gum problems, No bleeding gums, No tongue complaints  Neck ROS: No swollen glands, No significant pain in neck  Pulmonary ROS: No chronic cough, sputum, or hemoptysis, No dyspnea on exertion, No wheezing  Cardiovascular ROS: No diaphoresis, No edema, No palpitations  Gastrointestinal ROS: No change in bowel habits, No significant change in appetite, No nausea, vomiting, diarrhea, or constipation  Musculoskeletal/Extremities ROS: No peripheral edema, No pain, redness or swelling on the joints  Hematologic/Lymphatic ROS: No chills, No night sweats, No weight loss  Skin/Integumentary ROS: No edema, No evidence of rash, No itching      Exam:  /64   Pulse 78   Temp 36.9 °C (98.4 °F) (Temporal)   Resp 14   Wt 111.6 kg (246 lb)   SpO2 95%   General: Well developed, well nourished. No distress.  HEENT: Facial swelling noted along the mandible on the right.  Poor dentition is noted on the bottom, full plate is noted on the top.  Scattered white plaques are noted on the buccal mucosa and posterior palate into the posterior oropharynx.  Pulmonary: Unlabored respiratory effort.    Neurologic: Grossly nonfocal. No facial asymmetry noted.  Skin: Warm, dry, good turgor. No rashes in visible areas.   Psych: Normal mood. Alert and oriented x3. Judgment and insight is normal.    Assessment/Plan:  Use all medication as directed.  Follow-up with a dentist as soon as  possible.  Follow-up for worsening or persistent symptoms.      Prescription Monitoring Program report was reviewed. No evidence of medication abuse or misuse. Discussed the Controlled Substance Use Informed Consent which includes risks and benefits, proper use, storage and disposal, refills, minors, opioids and narcotics, and alternatives. I have assessed the patient’s risk for abuse, dependency, and addiction using the validated Opioid Risk Tool available at https://www.NEHP.com/oajacq-iyii-zody-ort-narcotic-abuse. All questions answered.   1. Dental infection  amoxicillin (AMOXIL) 875 MG tablet    HYDROcodone-acetaminophen (NORCO) 5-325 MG Tab per tablet    Consent for Opiate Prescription   2. Right facial swelling  HYDROcodone-acetaminophen (NORCO) 5-325 MG Tab per tablet    Consent for Opiate Prescription   3. Thrush  nystatin (MYCOSTATIN) 780146 UNIT/ML Suspension

## 2019-07-10 ENCOUNTER — OFFICE VISIT (OUTPATIENT)
Dept: URGENT CARE | Facility: PHYSICIAN GROUP | Age: 74
End: 2019-07-10
Payer: MEDICARE

## 2019-07-10 VITALS
HEART RATE: 89 BPM | WEIGHT: 247 LBS | OXYGEN SATURATION: 96 % | SYSTOLIC BLOOD PRESSURE: 144 MMHG | DIASTOLIC BLOOD PRESSURE: 82 MMHG | RESPIRATION RATE: 16 BRPM | BODY MASS INDEX: 33.5 KG/M2 | TEMPERATURE: 98.4 F

## 2019-07-10 DIAGNOSIS — B37.0 THRUSH: ICD-10-CM

## 2019-07-10 DIAGNOSIS — K04.7 DENTAL INFECTION: ICD-10-CM

## 2019-07-10 PROCEDURE — 99214 OFFICE O/P EST MOD 30 MIN: CPT | Performed by: PHYSICIAN ASSISTANT

## 2019-07-10 RX ORDER — AMOXICILLIN 875 MG/1
875 TABLET, COATED ORAL 2 TIMES DAILY
Qty: 20 TAB | Refills: 0 | Status: SHIPPED | OUTPATIENT
Start: 2019-07-10 | End: 2019-07-20

## 2019-07-10 RX ORDER — CLOTRIMAZOLE 10 MG/1
10 LOZENGE ORAL; TOPICAL
Qty: 35 TROCHE | Refills: 0 | Status: SHIPPED | OUTPATIENT
Start: 2019-07-10 | End: 2019-07-17

## 2019-07-10 RX ORDER — HYDROCODONE BITARTRATE AND ACETAMINOPHEN 5; 325 MG/1; MG/1
1 TABLET ORAL EVERY 6 HOURS PRN
Qty: 12 TAB | Refills: 0 | Status: SHIPPED | OUTPATIENT
Start: 2019-07-10 | End: 2019-07-13

## 2019-07-11 NOTE — PROGRESS NOTES
"    Chief Complaint   Patient presents with   • Thrush     x 2 months    • Hand Pain     Left hand cramping  off anf on x 6 months        HISTORY OF PRESENT ILLNESS: Patient is a 73 y.o. male who presents today for the following:    Patient comes in for evaluation of dental pain and facial swelling that started about a week ago.  Patient reports a history of same 6/3/19. He states he cannot afford to have his tooth pulled.  He wears dentures on the top but has multiple caries on the bottom.  He was treated for thrush about 2 weeks ago.  He denies overt pain in the mouth besides the area of swelling on the bottom right.  He denies any fever.  Over-the-counter medication has not been helping with pain.  He is asking for pain medication. He states the nystatin liquid doesn't work as well as the \"wafers.\"  He states he knows he needs to go to the dentist but is unable to afford it at this time.    Patient Active Problem List    Diagnosis Date Noted   • History of sepsis 12/07/2018     Priority: High   • Nausea, vomiting, and diarrhea 01/04/2019     Priority: Medium   • Fall from ground level 12/07/2018     Priority: Medium   • Diarrhea 12/07/2018     Priority: Medium   • Iron deficiency anemia 07/07/2018     Priority: Low   • Pre-diabetes 05/18/2018     Priority: Low   • Stage 3 chronic kidney disease (HCC) 09/01/2017     Priority: Low   • Moderate episode of recurrent major depressive disorder (HCC) 05/19/2017     Priority: Low   • HTN (hypertension)      Priority: Low   • Unsteady gait 01/18/2019   • Right lower quadrant abdominal pain 01/18/2019   • Hydronephrosis 01/18/2019   • Persistent proteinuria 01/06/2019   • Fall 12/11/2018   • Neck pain 12/11/2018   • Blood bacterial culture positive 12/11/2018   • Hypokalemia 12/07/2018   • Hyponatremia 12/07/2018   • Anxiety 12/07/2018   • Cough 07/13/2018   • Penile rash 07/13/2018   • Small bowel obstruction (HCC) 07/07/2018   • Amputation finger, sequela (HCC) " 05/18/2018   • Gross hematuria 05/18/2018   • Bilateral lower extremity edema 03/16/2018   • Psoriasis 05/19/2017   • Grief 03/31/2017   • Primary insomnia 02/19/2016   • BPH (benign prostatic hyperplasia) 01/20/2015   • Chronic left-sided low back pain without sciatica 01/20/2015   • GERD (gastroesophageal reflux disease)        Allergies:Celecoxib    Current Outpatient Prescriptions Ordered in [facility]   Medication Sig Dispense Refill   • clotrimazole (MYCELEX) 10 MG Cristofer Take 1 Cristofer by mouth 5 Times a Day for 7 days. 35 Cristofer 0   • amoxicillin (AMOXIL) 875 MG tablet Take 1 Tab by mouth 2 times a day for 10 days. 20 Tab 0   • HYDROcodone-acetaminophen (NORCO) 5-325 MG Tab per tablet Take 1 Tab by mouth every 6 hours as needed (pain) for up to 3 days. 12 Tab 0   • zolpidem (AMBIEN) 5 MG Tab TAKE 1 TABLET BY MOUTH ONCE DAILY AT BEDTIME AS NEEDED FOR SLEEP FOR 30 DAYS 30 Tab 2   • sertraline (ZOLOFT) 25 MG tablet TAKE 1 TABLET BY MOUTH ONCE DAILY 90 Tab 2   • amLODIPine (NORVASC) 10 MG Tab   0   • esomeprazole (NEXIUM) 20 MG capsule Take 40 mg by mouth every morning before breakfast.     • Potassium 99 MG Tab Take 99 mg by mouth every day.     • Cyanocobalamin (VITAMIN B12) 1000 MCG Tab CR Take 1,000 mg by mouth every day.     • finasteride (PROSCAR) 5 MG Tab Take 5 mg by mouth every day.     • hydroCHLOROthiazide (HYDRODIURIL) 50 MG Tab Take 50 mg by mouth every day.     • lisinopril (PRINIVIL) 10 MG Tab Take 10 mg by mouth every day.     • tamsulosin (FLOMAX) 0.4 MG capsule Take 0.4 mg by mouth every day.     • sodium bicarbonate (SODIUM BICARBONATE) 650 MG Tab   0   • sertraline (ZOLOFT) 25 MG tablet Take 25 mg by mouth every day.       No current [facility]-ordered facility-administered medications on file.        Past Medical History:   Diagnosis Date   • BPH (benign prostatic hyperplasia)    • GERD (gastroesophageal reflux disease)    • HTN (hypertension)    • Hypertension    • Insomnia    • SBO (small bowel  obstruction) (HCC)        Social History   Substance Use Topics   • Smoking status: Never Smoker   • Smokeless tobacco: Never Used   • Alcohol use No       Family Status   Relation Status   • Mo    • Fa    • Sis Alive   • Bro    • Sis Alive   • Bro    • Bro    • Bro      Family History   Problem Relation Age of Onset   • Lung Disease Father    • Alcohol/Drug Brother    • Lung Disease Brother        Review of Systems:    Constitutional ROS: No unexpected change in weight, No weakness, No fatigue  Eye ROS: No recent significant change in vision, No eye pain, redness, discharge  Ear ROS: No drainage, No tinnitus or vertigo, No recent change in hearing  Mouth/Throat ROS: No teeth or gum problems, No bleeding gums, No tongue complaints  Neck ROS: No swollen glands, No significant pain in neck  Pulmonary ROS: No chronic cough, sputum, or hemoptysis, No dyspnea on exertion, No wheezing  Cardiovascular ROS: No diaphoresis, No edema, No palpitations  Gastrointestinal ROS: No change in bowel habits, No significant change in appetite, No nausea, vomiting, diarrhea, or constipation  Musculoskeletal/Extremities ROS: No peripheral edema, No pain, redness or swelling on the joints  Hematologic/Lymphatic ROS: No chills, No night sweats, No weight loss  Skin/Integumentary ROS: No edema, No evidence of rash, No itching      Exam:  /82   Pulse 89   Temp 36.9 °C (98.4 °F) (Temporal)   Resp 16   Wt 112 kg (247 lb)   SpO2 96%   General: Well developed, well nourished. No distress.  HEENT: Head is grossly normal.  No facial swelling noted.  Top plate is noted in the mouth.  Poor dentition is noted on the bottom.  No obvious erythema, edema, or drainage is noted.  Pulmonary: Unlabored respiratory effort.    Neurologic: Grossly nonfocal. No facial asymmetry noted.  Skin: Warm, dry, good turgor. No rashes in visible areas.   Psych: Normal mood. Alert and oriented x3. Judgment and  insight is normal.    Assessment/Plan:  Use all medication as directed.  Follow-up for worsening or persistent symptoms.    Prescription Monitoring Program report was reviewed. No evidence of medication abuse or misuse. Discussed the Controlled Substance Use Informed Consent which includes risks and benefits, proper use, storage and disposal, refills, minors, opioids and narcotics, and alternatives. I have assessed the patient’s risk for abuse, dependency, and addiction using the validated Opioid Risk Tool available at https://www.mdcalc.com/uyhghl-rtsn-fjyb-ort-narcotic-abuse. All questions answered.     1. Thrush  clotrimazole (MYCELEX) 10 MG Cristofer   2. Dental infection  amoxicillin (AMOXIL) 875 MG tablet    HYDROcodone-acetaminophen (NORCO) 5-325 MG Tab per tablet    Consent for Opiate Prescription

## 2019-08-13 ENCOUNTER — OFFICE VISIT (OUTPATIENT)
Dept: MEDICAL GROUP | Facility: CLINIC | Age: 74
End: 2019-08-13
Payer: MEDICARE

## 2019-08-13 VITALS
DIASTOLIC BLOOD PRESSURE: 68 MMHG | SYSTOLIC BLOOD PRESSURE: 132 MMHG | BODY MASS INDEX: 34.44 KG/M2 | HEIGHT: 71 IN | WEIGHT: 246 LBS | TEMPERATURE: 98.3 F | OXYGEN SATURATION: 97 % | HEART RATE: 102 BPM | RESPIRATION RATE: 14 BRPM

## 2019-08-13 DIAGNOSIS — G89.29 CHRONIC LEFT-SIDED LOW BACK PAIN WITHOUT SCIATICA: ICD-10-CM

## 2019-08-13 DIAGNOSIS — R22.1 NECK MASS: ICD-10-CM

## 2019-08-13 DIAGNOSIS — B37.0 ORAL THRUSH: ICD-10-CM

## 2019-08-13 DIAGNOSIS — E66.9 OBESITY (BMI 30-39.9): ICD-10-CM

## 2019-08-13 DIAGNOSIS — Z53.20 COLONOSCOPY REFUSED: ICD-10-CM

## 2019-08-13 DIAGNOSIS — F51.01 PRIMARY INSOMNIA: ICD-10-CM

## 2019-08-13 DIAGNOSIS — M54.50 CHRONIC LEFT-SIDED LOW BACK PAIN WITHOUT SCIATICA: ICD-10-CM

## 2019-08-13 DIAGNOSIS — Z28.20 VACCINE REFUSED BY PATIENT: ICD-10-CM

## 2019-08-13 PROBLEM — R78.81 BLOOD BACTERIAL CULTURE POSITIVE: Status: RESOLVED | Noted: 2018-12-11 | Resolved: 2019-08-13
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
## 2019-08-13 PROBLEM — R21 PENILE RASH: Status: RESOLVED | Noted: 2018-07-13 | Resolved: 2019-08-13

## 2019-08-13 PROBLEM — R19.7 DIARRHEA: Status: RESOLVED | Noted: 2018-12-07 | Resolved: 2019-08-13

## 2019-08-13 PROBLEM — R10.31 RIGHT LOWER QUADRANT ABDOMINAL PAIN: Status: RESOLVED | Noted: 2019-01-18 | Resolved: 2019-08-13

## 2019-08-13 PROBLEM — E87.6 HYPOKALEMIA: Status: RESOLVED | Noted: 2018-12-07 | Resolved: 2019-08-13
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
## 2019-08-13 PROBLEM — R05.9 COUGH: Status: RESOLVED | Noted: 2018-07-13 | Resolved: 2019-08-13

## 2019-08-13 PROCEDURE — 99214 OFFICE O/P EST MOD 30 MIN: CPT | Performed by: NURSE PRACTITIONER

## 2019-08-13 RX ORDER — CLOTRIMAZOLE 10 MG/1
10 LOZENGE ORAL; TOPICAL
Qty: 150 TROCHE | Refills: 0 | Status: SHIPPED | OUTPATIENT
Start: 2019-08-13 | End: 2019-08-13 | Stop reason: SDUPTHER

## 2019-08-13 RX ORDER — CLOTRIMAZOLE 10 MG/1
10 LOZENGE ORAL; TOPICAL
Qty: 150 TROCHE | Refills: 0 | Status: SHIPPED | OUTPATIENT
Start: 2019-08-13 | End: 2019-09-12

## 2019-08-13 RX ORDER — HYDROCHLOROTHIAZIDE 50 MG/1
50 TABLET ORAL DAILY
Qty: 90 TAB | Refills: 1 | Status: SHIPPED | OUTPATIENT
Start: 2019-08-13 | End: 2019-08-26

## 2019-08-13 RX ORDER — ZOLPIDEM TARTRATE 5 MG/1
5 TABLET ORAL NIGHTLY PRN
Qty: 30 TAB | Refills: 2 | Status: SHIPPED | OUTPATIENT
Start: 2019-08-13 | End: 2019-09-12

## 2019-08-13 RX ORDER — HYDROCODONE BITARTRATE AND ACETAMINOPHEN 5; 325 MG/1; MG/1
1 TABLET ORAL EVERY 4 HOURS PRN
Qty: 20 TAB | Refills: 0 | Status: SHIPPED | OUTPATIENT
Start: 2019-08-13 | End: 2019-08-20

## 2019-08-13 NOTE — PROGRESS NOTES
Subjective:     Chidi Tatum is a 73 y.o. male here today for evaluation and management of the following:    Neck mass  This is a new problem, noted on exam.  Patient does report he is been having some trouble swallowing over the last 1 to 2 weeks.    Chronic left-sided low back pain without sciatica  This is a chronic problem for which patient reports using Norco as needed up to 3 times per month.  He has never had back surgery.  Reports he did had some imaging in the past, this is not on file.  Reports he occasionally has increased pain.  He did trial tramadol which seemed to work at one time but made him more drowsy.  Denies any recent falls or new injury.  He was referred by Dr. Walls to physical therapy but he has not followed through as it is difficult for him to get to [place]. He reports that he really tries not to take pain medication but admits that this is the only thing that will control his pain with severely exacerbated.     Oral thrush  This is a new problem.  Patient has intermittently been treated with clotrimazole atrocious over the last several months.  He reports that it seemed to have resolved over the last month but unfortunately has returned.  Denies any recent antibiotic use.    Primary insomnia  This is a chronic problem which is well controlled with Ambien.  Patient reports he needs a refill today.  Patient has been taking medication consistently for several years, reports he is well aware of the risks considering his age.  Without medication he is unable to sleep.  Denies any adverse side effects of use.        Current medicines (including changes today)  Current Outpatient Medications   Medication Sig Dispense Refill   • hydroCHLOROthiazide (HYDRODIURIL) 50 MG Tab Take 1 Tab by mouth every day. 90 Tab 1   • zolpidem (AMBIEN) 5 MG Tab Take 1 Tab by mouth at bedtime as needed for Sleep for up to 30 days. 30 Tab 2   • HYDROcodone-acetaminophen (NORCO) 5-325 MG Tab per tablet Take 1 Tab by  mouth every four hours as needed for up to 7 days. 20 Tab 0   • clotrimazole (MYCELEX) 10 MG Cristofer Take 1 Cristofer by mouth 5 Times a Day for 30 days. 150 Cristofer 0   • sertraline (ZOLOFT) 25 MG tablet TAKE 1 TABLET BY MOUTH ONCE DAILY 90 Tab 2   • amLODIPine (NORVASC) 10 MG Tab   0   • sodium bicarbonate (SODIUM BICARBONATE) 650 MG Tab   0   • esomeprazole (NEXIUM) 20 MG capsule Take 40 mg by mouth every morning before breakfast.     • Potassium 99 MG Tab Take 99 mg by mouth every day.     • Cyanocobalamin (VITAMIN B12) 1000 MCG Tab CR Take 1,000 mg by mouth every day.     • lisinopril (PRINIVIL) 10 MG Tab Take 10 mg by mouth every day.     • tamsulosin (FLOMAX) 0.4 MG capsule Take 0.4 mg by mouth every day.     • finasteride (PROSCAR) 5 MG Tab Take 5 mg by mouth every day.       No current facility-administered medications for this visit.        He  has a past medical history of BPH (benign prostatic hyperplasia), GERD (gastroesophageal reflux disease), HTN (hypertension), Hypertension, Insomnia, and SBO (small bowel obstruction) (HCC).    He  has a past surgical history that includes hernia repair and temporal artery biopsy (Right, 2/9/2018).     Social History     Socioeconomic History   • Marital status:      Spouse name: Not on file   • Number of children: Not on file   • Years of education: Not on file   • Highest education level: Not on file   Occupational History   • Not on file   Social Needs   • Financial resource strain: Not on file   • Food insecurity:     Worry: Not on file     Inability: Not on file   • Transportation needs:     Medical: Not on file     Non-medical: Not on file   Tobacco Use   • Smoking status: Never Smoker   • Smokeless tobacco: Never Used   Substance and Sexual Activity   • Alcohol use: No     Alcohol/week: 0.0 oz   • Drug use: No   • Sexual activity: Never   Lifestyle   • Physical activity:     Days per week: Not on file     Minutes per session: Not on file   • Stress: Not on  "file   Relationships   • Social connections:     Talks on phone: Not on file     Gets together: Not on file     Attends Buddhism service: Not on file     Active member of club or organization: Not on file     Attends meetings of clubs or organizations: Not on file     Relationship status: Not on file   • Intimate partner violence:     Fear of current or ex partner: Not on file     Emotionally abused: Not on file     Physically abused: Not on file     Forced sexual activity: Not on file   Other Topics Concern   • Not on file   Social History Narrative   • Not on file       Family History   Problem Relation Age of Onset   • Lung Disease Father    • Alcohol/Drug Brother    • Lung Disease Brother          ROS  Positive for low back pain exacerbation.  Positive for thrush in mouth.  Positive for insomnia, well controlled with Ambien.  Positive for trouble swallowing.  No fever, no chest pain, no shortness of breath, no abdominal pain, no rashes    All other systems reviewed and are negative.        Objective:     /68 (BP Location: Left arm, Patient Position: Sitting, BP Cuff Size: Large adult)   Pulse (!) 102   Temp 36.8 °C (98.3 °F) (Temporal)   Resp 14   Ht 1.803 m (5' 11\")   Wt 111.6 kg (246 lb)   SpO2 97%  Body mass index is 34.31 kg/m².    Physical Exam:   Constitutional: Alert, no distress.  Eye: Equal, round and reactive, conjunctiva clear, lids normal.   ENMT: Lips without lesions, thrush apparent and mouth.  No signs or symptoms of dental infection.  Neck: Trachea midline, appearance of mass on neck, no signs or symptoms of infection.  No cervical or supraclavicular lymphadenopathy  Respiratory: Unlabored respiratory effort, lungs clear to auscultation, no wheezes, no ronchi.  Patient speaking in full sentences.  Cardiovascular: Normal S1, S2, no murmur, no edema.   Abdomen: Soft, non-tender, no masses, no hepatosplenomegaly. Normal bowel sounds.   Skin: Warm, dry, good turgor, no rashes in visible " areas.  Psych: Alert and oriented x3, normal affect and mood.        Assessment and Plan:   The following treatment plan was discussed    1. Primary insomnia  Stable, discussed again the risks and benefits of use given his age and possible side effects, patient will continue with medication as prescribed.  - zolpidem (AMBIEN) 5 MG Tab; Take 1 Tab by mouth at bedtime as needed for Sleep for up to 30 days.  Dispense: 30 Tab; Refill: 2    2. Chronic left-sided low back pain without sciatica  Lengthy discussion with patient in regards to risks of long-term narcotic use for pain.  Per , he is never given more than 10 to 20 tablets at a time, typically a 7-day course which he does not ask for early refills.  Discussed with patient risks and regarding his age, patient unfortunately has been unable to attend therapy.  He has limited access to transportation.  Also limited access to [place].  Patient appears well aware of risks, advised he may follow-up as needed for severe.  Controlled substance agreement reviewed, opiate risk score 4.  - HYDROcodone-acetaminophen (NORCO) 5-325 MG Tab per tablet; Take 1 Tab by mouth every four hours as needed for up to 7 days.  Dispense: 20 Tab; Refill: 0    3. Oral thrush  Will retreat with clotrimazole atrocious.  Discussed with patient signs or symptoms to seek emergent care.    4. Obesity (BMI 30-39.9)  - Patient identified as having weight management issue.  Appropriate orders and counseling given.    5. Vaccine refused by patient    6. Colonoscopy refused    7. Neck mass  Encourage patient to complete ultrasound.  He admittedly does not know when he may have transportation to complete.  We have completed information and forms for [employer] imaging.  Advised patient to discuss with his daughter getting ultrasound completed.  He verbalized understanding.  - US-SOFT TISSUES OF HEAD - NECK; Future      Reviewed indication, dosage, usage and potential adverse effects of prescribed  medications. Patient appears to understand, verbalizes understanding and is willing to try medications as prescribed.      Reviewed risks and benefits of treatment plan. Patient verbally agrees to plan of care.       Followup: Return if symptoms worsen or fail to improve, for make appt after US completed .    FRANCISCO Moya.     PLEASE NOTE: This dictation was created using voice recognition software. I have made every reasonable attempt to correct obvious errors, but I expect that there may be errors of grammar and possibly content that I did not discover prior finalizing this note.

## 2019-08-13 NOTE — ASSESSMENT & PLAN NOTE
This is a new problem, noted on exam.  Patient does report he is been having some trouble swallowing over the last 1 to 2 weeks.

## 2019-08-13 NOTE — ASSESSMENT & PLAN NOTE
This is a chronic problem which is well controlled with Ambien.  Patient reports he needs a refill today.  Patient has been taking medication consistently for several years, reports he is well aware of the risks considering his age.  Without medication he is unable to sleep.  Denies any adverse side effects of use.

## 2019-08-13 NOTE — PATIENT INSTRUCTIONS
Your medical care was provided today by: REBECCA Pike    Thank You for the opportunity to serve you.    You may receive a brief survey in the mail shortly regarding your visit today. Please take a few moments to complete the survey and return it; no postage is necessary. We are working to serve our patient population better, improve customer service and our patients overall experience and your input can help us to accomplish this. We thank you for your help and for the opportunity to serve you today and in the future.     Labs and Diagnostic Testing   Please note that if we have ordered labs or diagnostic testing, those results may be released to you on [employer] prior to my review. While we do our best to review your results as soon as possible, there are times when you may see your results prior to provider review. Of course, if you ever have any questions or concerns about your results, please contact our clinic.     Special Instructions:  Always call 9-1-1 immediately if you develop a life threatening emergency.    Unless told otherwise please take all medications as directed and complete prescription therapies.     Watch for the following signs that require additional evaluation: progressive lethargy or unresponsiveness, localized pain (chest, abdomen), shortness of breath, painful breathing, progressive vomiting with weakness, bloody stools, or new rash.     If you are prescribed pain medication or any other medication that is sedating, do not take medication before or while operating a vehicle or heavy machinery or equipment due to potential side effects such as drowsiness and/or dizziness.

## 2019-08-13 NOTE — ASSESSMENT & PLAN NOTE
This is a chronic problem for which patient reports using Norco as needed up to 3 times per month.  He has never had back surgery.  Reports he did had some imaging in the past, this is not on file.  Reports he occasionally has increased pain.  He did trial tramadol which seemed to work at one time but made him more drowsy.  Denies any recent falls or new injury.  He was referred by Dr. Walls to physical therapy but he has not followed through as it is difficult for him to get to [place]. He reports that he really tries not to take pain medication but admits that this is the only thing that will control his pain with severely exacerbated.

## 2019-08-13 NOTE — ASSESSMENT & PLAN NOTE
This is a new problem.  Patient has intermittently been treated with clotrimazole atrocious over the last several months.  He reports that it seemed to have resolved over the last month but unfortunately has returned.  Denies any recent antibiotic use.

## 2019-08-26 ENCOUNTER — HOSPITAL ENCOUNTER (INPATIENT)
Facility: MEDICAL CENTER | Age: 74
LOS: 2 days | DRG: 871 | End: 2019-08-28
Attending: EMERGENCY MEDICINE | Admitting: HOSPITALIST
Payer: MEDICARE

## 2019-08-26 ENCOUNTER — APPOINTMENT (OUTPATIENT)
Dept: RADIOLOGY | Facility: MEDICAL CENTER | Age: 74
DRG: 871 | End: 2019-08-26
Attending: EMERGENCY MEDICINE
Payer: MEDICARE

## 2019-08-26 ENCOUNTER — APPOINTMENT (OUTPATIENT)
Dept: RADIOLOGY | Facility: MEDICAL CENTER | Age: 74
DRG: 871 | End: 2019-08-26
Attending: FAMILY MEDICINE
Payer: MEDICARE

## 2019-08-26 DIAGNOSIS — A41.9 SEPSIS, DUE TO UNSPECIFIED ORGANISM: ICD-10-CM

## 2019-08-26 DIAGNOSIS — R41.82 ALTERED MENTAL STATUS, UNSPECIFIED ALTERED MENTAL STATUS TYPE: ICD-10-CM

## 2019-08-26 DIAGNOSIS — N39.0 URINARY TRACT INFECTION WITHOUT HEMATURIA, SITE UNSPECIFIED: ICD-10-CM

## 2019-08-26 PROBLEM — N18.9 ACUTE ON CHRONIC KIDNEY FAILURE (HCC): Status: ACTIVE | Noted: 2019-08-26

## 2019-08-26 PROBLEM — N30.00 ACUTE CYSTITIS WITHOUT HEMATURIA: Status: ACTIVE | Noted: 2019-08-26

## 2019-08-26 PROBLEM — N17.9 ACUTE ON CHRONIC KIDNEY FAILURE (HCC): Status: ACTIVE | Noted: 2019-08-26

## 2019-08-26 PROBLEM — D64.9 NORMOCYTIC ANEMIA: Status: ACTIVE | Noted: 2019-08-26

## 2019-08-26 PROBLEM — G93.40 ACUTE ENCEPHALOPATHY: Status: ACTIVE | Noted: 2019-08-26

## 2019-08-26 LAB
ALBUMIN SERPL BCP-MCNC: 3.2 G/DL (ref 3.2–4.9)
ALBUMIN/GLOB SERPL: 0.9 G/DL
ALP SERPL-CCNC: 61 U/L (ref 30–99)
ALT SERPL-CCNC: 17 U/L (ref 2–50)
ANION GAP SERPL CALC-SCNC: 10 MMOL/L (ref 0–11.9)
APPEARANCE UR: ABNORMAL
AST SERPL-CCNC: 14 U/L (ref 12–45)
BACTERIA #/AREA URNS HPF: ABNORMAL /HPF
BASOPHILS # BLD AUTO: 0.2 % (ref 0–1.8)
BASOPHILS # BLD: 0.03 K/UL (ref 0–0.12)
BILIRUB SERPL-MCNC: 0.5 MG/DL (ref 0.1–1.5)
BILIRUB UR QL STRIP.AUTO: NEGATIVE
BUN SERPL-MCNC: 23 MG/DL (ref 8–22)
CALCIUM SERPL-MCNC: 9 MG/DL (ref 8.5–10.5)
CHLORIDE SERPL-SCNC: 107 MMOL/L (ref 96–112)
CO2 SERPL-SCNC: 17 MMOL/L (ref 20–33)
COLOR UR: YELLOW
CREAT SERPL-MCNC: 1.76 MG/DL (ref 0.5–1.4)
EKG IMPRESSION: NORMAL
EOSINOPHIL # BLD AUTO: 0.02 K/UL (ref 0–0.51)
EOSINOPHIL NFR BLD: 0.1 % (ref 0–6.9)
EPI CELLS #/AREA URNS HPF: NEGATIVE /HPF
ERYTHROCYTE [DISTWIDTH] IN BLOOD BY AUTOMATED COUNT: 52.2 FL (ref 35.9–50)
GLOBULIN SER CALC-MCNC: 3.6 G/DL (ref 1.9–3.5)
GLUCOSE SERPL-MCNC: 153 MG/DL (ref 65–99)
GLUCOSE UR STRIP.AUTO-MCNC: NEGATIVE MG/DL
HCT VFR BLD AUTO: 37.6 % (ref 42–52)
HGB BLD-MCNC: 12.6 G/DL (ref 14–18)
HYALINE CASTS #/AREA URNS LPF: ABNORMAL /LPF
IMM GRANULOCYTES # BLD AUTO: 0.31 K/UL (ref 0–0.11)
IMM GRANULOCYTES NFR BLD AUTO: 1.9 % (ref 0–0.9)
KETONES UR STRIP.AUTO-MCNC: NEGATIVE MG/DL
LACTATE BLD-SCNC: 0.9 MMOL/L (ref 0.5–2)
LACTATE BLD-SCNC: 1.5 MMOL/L (ref 0.5–2)
LACTATE BLD-SCNC: 2 MMOL/L (ref 0.5–2)
LACTATE BLD-SCNC: 2 MMOL/L (ref 0.5–2)
LACTATE BLD-SCNC: 2.1 MMOL/L (ref 0.5–2)
LEUKOCYTE ESTERASE UR QL STRIP.AUTO: ABNORMAL
LYMPHOCYTES # BLD AUTO: 1.05 K/UL (ref 1–4.8)
LYMPHOCYTES NFR BLD: 6.5 % (ref 22–41)
MCH RBC QN AUTO: 28.6 PG (ref 27–33)
MCHC RBC AUTO-ENTMCNC: 33.5 G/DL (ref 33.7–35.3)
MCV RBC AUTO: 85.3 FL (ref 81.4–97.8)
MICRO URNS: ABNORMAL
MONOCYTES # BLD AUTO: 0.96 K/UL (ref 0–0.85)
MONOCYTES NFR BLD AUTO: 6 % (ref 0–13.4)
NEUTROPHILS # BLD AUTO: 13.71 K/UL (ref 1.82–7.42)
NEUTROPHILS NFR BLD: 85.3 % (ref 44–72)
NITRITE UR QL STRIP.AUTO: POSITIVE
NRBC # BLD AUTO: 0 K/UL
NRBC BLD-RTO: 0 /100 WBC
PH UR STRIP.AUTO: 5.5 [PH] (ref 5–8)
PLATELET # BLD AUTO: 193 K/UL (ref 164–446)
PMV BLD AUTO: 9.2 FL (ref 9–12.9)
POTASSIUM SERPL-SCNC: 3.5 MMOL/L (ref 3.6–5.5)
PROT SERPL-MCNC: 6.8 G/DL (ref 6–8.2)
PROT UR QL STRIP: 30 MG/DL
RBC # BLD AUTO: 4.41 M/UL (ref 4.7–6.1)
RBC # URNS HPF: ABNORMAL /HPF
RBC UR QL AUTO: ABNORMAL
SODIUM SERPL-SCNC: 134 MMOL/L (ref 135–145)
SP GR UR STRIP.AUTO: 1.01
T4 FREE SERPL-MCNC: 0.94 NG/DL (ref 0.53–1.43)
TSH SERPL DL<=0.005 MIU/L-ACNC: 1.07 UIU/ML (ref 0.38–5.33)
UROBILINOGEN UR STRIP.AUTO-MCNC: 0.2 MG/DL
WBC # BLD AUTO: 16.1 K/UL (ref 4.8–10.8)
WBC #/AREA URNS HPF: ABNORMAL /HPF

## 2019-08-26 PROCEDURE — 700101 HCHG RX REV CODE 250: Performed by: FAMILY MEDICINE

## 2019-08-26 PROCEDURE — 99285 EMERGENCY DEPT VISIT HI MDM: CPT

## 2019-08-26 PROCEDURE — 80053 COMPREHEN METABOLIC PANEL: CPT

## 2019-08-26 PROCEDURE — 85025 COMPLETE CBC W/AUTO DIFF WBC: CPT

## 2019-08-26 PROCEDURE — 84439 ASSAY OF FREE THYROXINE: CPT

## 2019-08-26 PROCEDURE — 96365 THER/PROPH/DIAG IV INF INIT: CPT

## 2019-08-26 PROCEDURE — 700111 HCHG RX REV CODE 636 W/ 250 OVERRIDE (IP): Performed by: EMERGENCY MEDICINE

## 2019-08-26 PROCEDURE — 700102 HCHG RX REV CODE 250 W/ 637 OVERRIDE(OP): Performed by: HOSPITALIST

## 2019-08-26 PROCEDURE — 99223 1ST HOSP IP/OBS HIGH 75: CPT | Mod: AI | Performed by: HOSPITALIST

## 2019-08-26 PROCEDURE — A9270 NON-COVERED ITEM OR SERVICE: HCPCS | Performed by: FAMILY MEDICINE

## 2019-08-26 PROCEDURE — 94760 N-INVAS EAR/PLS OXIMETRY 1: CPT

## 2019-08-26 PROCEDURE — 81001 URINALYSIS AUTO W/SCOPE: CPT

## 2019-08-26 PROCEDURE — 87086 URINE CULTURE/COLONY COUNT: CPT

## 2019-08-26 PROCEDURE — 87040 BLOOD CULTURE FOR BACTERIA: CPT

## 2019-08-26 PROCEDURE — 770020 HCHG ROOM/CARE - TELE (206)

## 2019-08-26 PROCEDURE — 51798 US URINE CAPACITY MEASURE: CPT

## 2019-08-26 PROCEDURE — 700105 HCHG RX REV CODE 258: Performed by: FAMILY MEDICINE

## 2019-08-26 PROCEDURE — 70450 CT HEAD/BRAIN W/O DYE: CPT

## 2019-08-26 PROCEDURE — 36415 COLL VENOUS BLD VENIPUNCTURE: CPT

## 2019-08-26 PROCEDURE — 700105 HCHG RX REV CODE 258: Performed by: HOSPITALIST

## 2019-08-26 PROCEDURE — 83605 ASSAY OF LACTIC ACID: CPT | Mod: 91

## 2019-08-26 PROCEDURE — 87186 SC STD MICRODIL/AGAR DIL: CPT

## 2019-08-26 PROCEDURE — 700102 HCHG RX REV CODE 250 W/ 637 OVERRIDE(OP): Performed by: FAMILY MEDICINE

## 2019-08-26 PROCEDURE — 700111 HCHG RX REV CODE 636 W/ 250 OVERRIDE (IP): Performed by: HOSPITALIST

## 2019-08-26 PROCEDURE — 93005 ELECTROCARDIOGRAM TRACING: CPT | Performed by: EMERGENCY MEDICINE

## 2019-08-26 PROCEDURE — 84443 ASSAY THYROID STIM HORMONE: CPT

## 2019-08-26 PROCEDURE — A9270 NON-COVERED ITEM OR SERVICE: HCPCS | Performed by: HOSPITALIST

## 2019-08-26 PROCEDURE — 87077 CULTURE AEROBIC IDENTIFY: CPT | Mod: 91

## 2019-08-26 PROCEDURE — 71045 X-RAY EXAM CHEST 1 VIEW: CPT

## 2019-08-26 PROCEDURE — 700105 HCHG RX REV CODE 258: Performed by: EMERGENCY MEDICINE

## 2019-08-26 RX ORDER — POLYETHYLENE GLYCOL 3350 17 G/17G
1 POWDER, FOR SOLUTION ORAL
Status: DISCONTINUED | OUTPATIENT
Start: 2019-08-26 | End: 2019-08-28 | Stop reason: HOSPADM

## 2019-08-26 RX ORDER — LIDOCAINE HYDROCHLORIDE 20 MG/ML
5 SOLUTION OROPHARYNGEAL
Refills: 0 | COMMUNITY
Start: 2019-08-15 | End: 2019-10-10

## 2019-08-26 RX ORDER — LIDOCAINE 50 MG/G
2 PATCH TOPICAL EVERY 24 HOURS
Status: DISCONTINUED | OUTPATIENT
Start: 2019-08-26 | End: 2019-08-28 | Stop reason: HOSPADM

## 2019-08-26 RX ORDER — SODIUM CHLORIDE, SODIUM LACTATE, POTASSIUM CHLORIDE, CALCIUM CHLORIDE 600; 310; 30; 20 MG/100ML; MG/100ML; MG/100ML; MG/100ML
INJECTION, SOLUTION INTRAVENOUS CONTINUOUS
Status: DISCONTINUED | OUTPATIENT
Start: 2019-08-26 | End: 2019-08-26

## 2019-08-26 RX ORDER — CLOTRIMAZOLE 10 MG/1
10 LOZENGE ORAL; TOPICAL
Status: DISCONTINUED | OUTPATIENT
Start: 2019-08-26 | End: 2019-08-28 | Stop reason: HOSPADM

## 2019-08-26 RX ORDER — LISINOPRIL 10 MG/1
10 TABLET ORAL DAILY
Status: DISCONTINUED | OUTPATIENT
Start: 2019-08-26 | End: 2019-08-27

## 2019-08-26 RX ORDER — ONDANSETRON 2 MG/ML
4 INJECTION INTRAMUSCULAR; INTRAVENOUS EVERY 4 HOURS PRN
Status: DISCONTINUED | OUTPATIENT
Start: 2019-08-26 | End: 2019-08-28 | Stop reason: HOSPADM

## 2019-08-26 RX ORDER — IBUPROFEN 600 MG/1
600 TABLET ORAL 3 TIMES DAILY PRN
Refills: 0 | COMMUNITY
Start: 2019-08-15 | End: 2019-10-10

## 2019-08-26 RX ORDER — AMLODIPINE BESYLATE 10 MG/1
10 TABLET ORAL
Status: DISCONTINUED | OUTPATIENT
Start: 2019-08-26 | End: 2019-08-28 | Stop reason: HOSPADM

## 2019-08-26 RX ORDER — ONDANSETRON 4 MG/1
4 TABLET, ORALLY DISINTEGRATING ORAL EVERY 4 HOURS PRN
Status: DISCONTINUED | OUTPATIENT
Start: 2019-08-26 | End: 2019-08-28 | Stop reason: HOSPADM

## 2019-08-26 RX ORDER — BISACODYL 10 MG
10 SUPPOSITORY, RECTAL RECTAL
Status: DISCONTINUED | OUTPATIENT
Start: 2019-08-26 | End: 2019-08-28 | Stop reason: HOSPADM

## 2019-08-26 RX ORDER — HYDROCHLOROTHIAZIDE 25 MG/1
50 TABLET ORAL DAILY
Status: DISCONTINUED | OUTPATIENT
Start: 2019-08-26 | End: 2019-08-26

## 2019-08-26 RX ORDER — FINASTERIDE 5 MG/1
5 TABLET, FILM COATED ORAL DAILY
Status: DISCONTINUED | OUTPATIENT
Start: 2019-08-26 | End: 2019-08-28 | Stop reason: HOSPADM

## 2019-08-26 RX ORDER — IBUPROFEN 600 MG/1
600 TABLET ORAL 3 TIMES DAILY PRN
Status: DISCONTINUED | OUTPATIENT
Start: 2019-08-26 | End: 2019-08-26

## 2019-08-26 RX ORDER — SODIUM CHLORIDE 9 MG/ML
1000 INJECTION, SOLUTION INTRAVENOUS ONCE
Status: COMPLETED | OUTPATIENT
Start: 2019-08-26 | End: 2019-08-26

## 2019-08-26 RX ORDER — OXYCODONE HYDROCHLORIDE 5 MG/1
5 TABLET ORAL EVERY 4 HOURS PRN
Status: DISCONTINUED | OUTPATIENT
Start: 2019-08-26 | End: 2019-08-28 | Stop reason: HOSPADM

## 2019-08-26 RX ORDER — ACETAMINOPHEN 325 MG/1
650 TABLET ORAL EVERY 6 HOURS PRN
Status: DISCONTINUED | OUTPATIENT
Start: 2019-08-26 | End: 2019-08-28 | Stop reason: HOSPADM

## 2019-08-26 RX ORDER — SODIUM CHLORIDE, SODIUM LACTATE, POTASSIUM CHLORIDE, AND CALCIUM CHLORIDE .6; .31; .03; .02 G/100ML; G/100ML; G/100ML; G/100ML
1000 INJECTION, SOLUTION INTRAVENOUS
Status: DISCONTINUED | OUTPATIENT
Start: 2019-08-26 | End: 2019-08-28 | Stop reason: HOSPADM

## 2019-08-26 RX ORDER — OMEPRAZOLE 20 MG/1
20 CAPSULE, DELAYED RELEASE ORAL
Status: DISCONTINUED | OUTPATIENT
Start: 2019-08-26 | End: 2019-08-28 | Stop reason: HOSPADM

## 2019-08-26 RX ORDER — TAMSULOSIN HYDROCHLORIDE 0.4 MG/1
0.4 CAPSULE ORAL DAILY
Status: DISCONTINUED | OUTPATIENT
Start: 2019-08-26 | End: 2019-08-28 | Stop reason: HOSPADM

## 2019-08-26 RX ORDER — AMOXICILLIN 250 MG
2 CAPSULE ORAL 2 TIMES DAILY
Status: DISCONTINUED | OUTPATIENT
Start: 2019-08-26 | End: 2019-08-28 | Stop reason: HOSPADM

## 2019-08-26 RX ORDER — SODIUM CHLORIDE 9 MG/ML
INJECTION, SOLUTION INTRAVENOUS CONTINUOUS
Status: DISCONTINUED | OUTPATIENT
Start: 2019-08-26 | End: 2019-08-28 | Stop reason: HOSPADM

## 2019-08-26 RX ADMIN — ENOXAPARIN SODIUM 40 MG: 100 INJECTION SUBCUTANEOUS at 08:49

## 2019-08-26 RX ADMIN — IBUPROFEN 600 MG: 600 TABLET ORAL at 08:01

## 2019-08-26 RX ADMIN — SODIUM CHLORIDE: 9 INJECTION, SOLUTION INTRAVENOUS at 09:00

## 2019-08-26 RX ADMIN — OMEPRAZOLE 20 MG: 20 CAPSULE, DELAYED RELEASE ORAL at 08:48

## 2019-08-26 RX ADMIN — CLOTRIMAZOLE 10 MG: 10 LOZENGE ORAL; TOPICAL at 12:14

## 2019-08-26 RX ADMIN — SERTRALINE HYDROCHLORIDE 25 MG: 50 TABLET ORAL at 08:48

## 2019-08-26 RX ADMIN — CLOTRIMAZOLE 10 MG: 10 LOZENGE ORAL; TOPICAL at 22:03

## 2019-08-26 RX ADMIN — CLOTRIMAZOLE 10 MG: 10 LOZENGE ORAL; TOPICAL at 19:30

## 2019-08-26 RX ADMIN — TAMSULOSIN HYDROCHLORIDE 0.4 MG: 0.4 CAPSULE ORAL at 08:48

## 2019-08-26 RX ADMIN — FINASTERIDE 5 MG: 5 TABLET, FILM COATED ORAL at 08:48

## 2019-08-26 RX ADMIN — AMLODIPINE BESYLATE 10 MG: 10 TABLET ORAL at 08:48

## 2019-08-26 RX ADMIN — LIDOCAINE 2 PATCH: 50 PATCH TOPICAL at 17:37

## 2019-08-26 RX ADMIN — CLOTRIMAZOLE 10 MG: 10 LOZENGE ORAL; TOPICAL at 14:57

## 2019-08-26 RX ADMIN — SODIUM CHLORIDE: 9 INJECTION, SOLUTION INTRAVENOUS at 17:37

## 2019-08-26 RX ADMIN — CEFTRIAXONE SODIUM 2 G: 2 INJECTION, POWDER, FOR SOLUTION INTRAMUSCULAR; INTRAVENOUS at 08:50

## 2019-08-26 RX ADMIN — ACETAMINOPHEN 650 MG: 325 TABLET, FILM COATED ORAL at 14:56

## 2019-08-26 RX ADMIN — CEFEPIME 2 G: 2 INJECTION, POWDER, FOR SOLUTION INTRAVENOUS at 05:40

## 2019-08-26 RX ADMIN — LISINOPRIL 10 MG: 10 TABLET ORAL at 08:53

## 2019-08-26 RX ADMIN — SODIUM CHLORIDE 1000 ML: 9 INJECTION, SOLUTION INTRAVENOUS at 05:07

## 2019-08-26 ASSESSMENT — LIFESTYLE VARIABLES
HAVE YOU EVER FELT YOU SHOULD CUT DOWN ON YOUR DRINKING: NO
TOTAL SCORE: 0
DO YOU DRINK ALCOHOL: NO
ALCOHOL_USE: NO
AVERAGE NUMBER OF DAYS PER WEEK YOU HAVE A DRINK CONTAINING ALCOHOL: 0
ON A TYPICAL DAY WHEN YOU DRINK ALCOHOL HOW MANY DRINKS DO YOU HAVE: 0
TOTAL SCORE: 0
EVER_SMOKED: NEVER
EVER HAD A DRINK FIRST THING IN THE MORNING TO STEADY YOUR NERVES TO GET RID OF A HANGOVER: NO
CONSUMPTION TOTAL: NEGATIVE
TOTAL SCORE: 0
HAVE PEOPLE ANNOYED YOU BY CRITICIZING YOUR DRINKING: NO
EVER FELT BAD OR GUILTY ABOUT YOUR DRINKING: NO
HOW MANY TIMES IN THE PAST YEAR HAVE YOU HAD 5 OR MORE DRINKS IN A DAY: 0

## 2019-08-26 ASSESSMENT — COPD QUESTIONNAIRES
COPD SCREENING SCORE: 2
DURING THE PAST 4 WEEKS HOW MUCH DID YOU FEEL SHORT OF BREATH: NONE/LITTLE OF THE TIME
HAVE YOU SMOKED AT LEAST 100 CIGARETTES IN YOUR ENTIRE LIFE: NO/DON'T KNOW
IN THE PAST 12 MONTHS DO YOU DO LESS THAN YOU USED TO BECAUSE OF YOUR BREATHING PROBLEMS: DISAGREE/UNSURE
DO YOU EVER COUGH UP ANY MUCUS OR PHLEGM?: NO/ONLY WITH OCCASIONAL COLDS OR INFECTIONS

## 2019-08-26 ASSESSMENT — PATIENT HEALTH QUESTIONNAIRE - PHQ9
3. TROUBLE FALLING OR STAYING ASLEEP OR SLEEPING TOO MUCH: MORE THAN HALF THE DAYS
6. FEELING BAD ABOUT YOURSELF - OR THAT YOU ARE A FAILURE OR HAVE LET YOURSELF OR YOUR FAMILY DOWN: MORE THAN HALF THE DAYS
SUM OF ALL RESPONSES TO PHQ9 QUESTIONS 1 AND 2: 4
4. FEELING TIRED OR HAVING LITTLE ENERGY: MORE THAN HALF THE DAYS
8. MOVING OR SPEAKING SO SLOWLY THAT OTHER PEOPLE COULD HAVE NOTICED. OR THE OPPOSITE, BEING SO FIGETY OR RESTLESS THAT YOU HAVE BEEN MOVING AROUND A LOT MORE THAN USUAL: NOT AT ALL
2. FEELING DOWN, DEPRESSED, IRRITABLE, OR HOPELESS: MORE THAN HALF THE DAYS
7. TROUBLE CONCENTRATING ON THINGS, SUCH AS READING THE NEWSPAPER OR WATCHING TELEVISION: MORE THAN HALF THE DAYS
SUM OF ALL RESPONSES TO PHQ QUESTIONS 1-9: 12
5. POOR APPETITE OR OVEREATING: NOT AT ALL
1. LITTLE INTEREST OR PLEASURE IN DOING THINGS: MORE THAN HALF THE DAYS
9. THOUGHTS THAT YOU WOULD BE BETTER OFF DEAD, OR OF HURTING YOURSELF: NOT AT ALL

## 2019-08-26 ASSESSMENT — COGNITIVE AND FUNCTIONAL STATUS - GENERAL
DAILY ACTIVITIY SCORE: 24
SUGGESTED CMS G CODE MODIFIER MOBILITY: CH
MOBILITY SCORE: 24
SUGGESTED CMS G CODE MODIFIER DAILY ACTIVITY: CH

## 2019-08-26 ASSESSMENT — ENCOUNTER SYMPTOMS
PSYCHIATRIC NEGATIVE: 1
CHILLS: 1
CARDIOVASCULAR NEGATIVE: 1
FEVER: 1
RESPIRATORY NEGATIVE: 1
NEUROLOGICAL NEGATIVE: 1
MUSCULOSKELETAL NEGATIVE: 1
GASTROINTESTINAL NEGATIVE: 1
EYES NEGATIVE: 1

## 2019-08-26 NOTE — ED TRIAGE NOTES
Pt BIB EMS from home in [place].  Pt was found on floor by EMS.  Report from EMS that he got up for an upset stomach and had a syncopal episode in [place].  Pt was disoriented upon EMS arrival.  Pt with c/o weakness and chills times 24 hours with nausea and diarrhea.  Pt with fever of 101.9 per EMS.  1 gm of tylenol given, 750 cc of fluid, and 4 zofran PTA.  .  Pt awake and alert but slow to respond to questions.  Waiting for family to arrive.  Pt denies hitting his head.  Denies being on blood thinners.  No trauma noted.

## 2019-08-26 NOTE — ASSESSMENT & PLAN NOTE
This is sepsis (without associated acute organ dysfunction).  Continue fluids, antibiotics and monitor culture results.    -resolving

## 2019-08-26 NOTE — CARE PLAN
Problem: Safety  Goal: Will remain free from injury  Outcome: PROGRESSING AS EXPECTED  Goal: Will remain free from falls  Outcome: PROGRESSING AS EXPECTED  Note:   Non skid socks in place, call bell and personal items within reach, patient calls appropriately for assistance to the bathroom.      Problem: Infection  Goal: Will remain free from infection  Outcome: PROGRESSING AS EXPECTED  Intervention: Assess signs and symptoms of infection  Note:   Monitor lab work, encouraged hand washing, Urine color and clarity assessed for changes.

## 2019-08-26 NOTE — ED PROVIDER NOTES
ED Provider Note    Scribed for Pawel Cedillo M.D. by Consuelo Pena. 8/26/2019  4:42 AM    Primary care provider: SAM Moya  Means of arrival: EMS  History obtained from: Patient  History limited by: None    CHIEF COMPLAINT  Chief Complaint   Patient presents with   • Syncope     went to [facility]; found on floor by family    • Weakness     times 24 hours   • Fever     was given tylenol 1 gm en route.  temp was 101.9       HPI  Chidi Tatum is a 73 y.o. male who presents to the Emergency Department for evaluation of acute weakness onset one day ago. The patient has been experiencing a fever and weakness for the past day. He went to the bathroom while he was experiencing abdominal pain and experienced a syncopal episode where he was found on the floor by his family. Negative trauma to the head or to other areas of the body. His family reports that he woke up and was acting disoriented which prompted them to call EMS. En route, the patient was administered Tylenol for his fever. Endorses recent finding mass on the anterior neck, nausea and vomiting for the past day. He experienced one episode of emesis yesterday. Denies recent generalized body aches, chills, cough or shortness of breath. No medical or surgical history was reported. He is allergic to Celecoxib.       REVIEW OF SYSTEMS  Pertinent positives include syncope, fevers, nausea, vomiting and recent finding of a mass on the neck.   Pertinent negatives include no generalized body aches, chills, cough, shortness of breath, trauma to the head or other areas of the body    All other systems reviewed and negative. See HPI for further details.       PAST MEDICAL HISTORY   has a past medical history of BPH (benign prostatic hyperplasia), GERD (gastroesophageal reflux disease), HTN (hypertension), Hypertension, Insomnia, and SBO (small bowel obstruction) ([facility]).    SURGICAL HISTORY   has a past surgical history that includes hernia repair and temporal  artery biopsy (Right, 2/9/2018).    SOCIAL HISTORY  Social History     Tobacco Use   • Smoking status: Never Smoker   • Smokeless tobacco: Never Used   Substance Use Topics   • Alcohol use: No     Alcohol/week: 0.0 oz   • Drug use: No      Social History     Substance and Sexual Activity   Drug Use No       FAMILY HISTORY  Family History   Problem Relation Age of Onset   • Lung Disease Father    • Alcohol/Drug Brother    • Lung Disease Brother        CURRENT MEDICATIONS  Current Outpatient Medications:   •  hydroCHLOROthiazide (HYDRODIURIL) 50 MG Tab, Take 1 Tab by mouth every day., Disp: 90 Tab, Rfl: 1  •  zolpidem (AMBIEN) 5 MG Tab, Take 1 Tab by mouth at bedtime as needed for Sleep for up to 30 days., Disp: 30 Tab, Rfl: 2  •  clotrimazole (MYCELEX) 10 MG Cristofer, Take 1 Cristofer by mouth 5 Times a Day for 30 days., Disp: 150 Cristofer, Rfl: 0  •  sertraline (ZOLOFT) 25 MG tablet, TAKE 1 TABLET BY MOUTH ONCE DAILY, Disp: 90 Tab, Rfl: 2  •  amLODIPine (NORVASC) 10 MG Tab, , Disp: , Rfl: 0  •  sodium bicarbonate (SODIUM BICARBONATE) 650 MG Tab, , Disp: , Rfl: 0  •  esomeprazole (NEXIUM) 20 MG capsule, Take 40 mg by mouth every morning before breakfast., Disp: , Rfl:   •  Potassium 99 MG Tab, Take 99 mg by mouth every day., Disp: , Rfl:   •  Cyanocobalamin (VITAMIN B12) 1000 MCG Tab CR, Take 1,000 mg by mouth every day., Disp: , Rfl:   •  finasteride (PROSCAR) 5 MG Tab, Take 5 mg by mouth every day., Disp: , Rfl:   •  lisinopril (PRINIVIL) 10 MG Tab, Take 10 mg by mouth every day., Disp: , Rfl:   •  tamsulosin (FLOMAX) 0.4 MG capsule, Take 0.4 mg by mouth every day., Disp: , Rfl:       ALLERGIES  Allergies   Allergen Reactions   • Celecoxib      Rash       PHYSICAL EXAM  VITAL SIGNS: /76   Pulse (!) 119   Temp 37.3 °C (99.2 °F)   Resp (!) 24   Ht 1.829 m (6')   Wt 108.4 kg (239 lb)   BMI 32.41 kg/m²     Nursing note and vitals reviewed.  Constitutional: Well-developed and well-nourished. Seems confused  HENT:  Head is normocephalic and atraumatic. Oropharynx is clear and moist without exudate or erythema. Lemon-sized anterior neck mass  Eyes: Pupils are equal, round, and reactive to light. Conjunctiva are normal.   Cardiovascular: Tachycardic and regular rhythm. No murmur heard. Normal radial pulses.  Pulmonary/Chest: Breath sounds normal. No wheezes or rales.   Abdominal: Soft and non-tender. No distention    Musculoskeletal: Extremities exhibit normal range of motion without edema or tenderness.   Neurological: Awake, alert and oriented to person, place, and time. No focal deficits noted.  Skin: Skin is warm and dry. No rash.   Psychiatric: Normal mood and affect. See constitutional for further details.       DIAGNOSTIC STUDIES / PROCEDURES    EKG Interpretation  Interpreted by me as below    LABS  Results for orders placed or performed during the hospital encounter of 08/26/19   Lactic acid (lactate)   Result Value Ref Range    Lactic Acid 2.1 (H) 0.5 - 2.0 mmol/L   Lactic acid (lactate): Repeat if initial lactic acid result is greater than 2   Result Value Ref Range    Lactic Acid 0.9 0.5 - 2.0 mmol/L   CBC WITH DIFFERENTIAL   Result Value Ref Range    WBC 16.1 (H) 4.8 - 10.8 K/uL    RBC 4.41 (L) 4.70 - 6.10 M/uL    Hemoglobin 12.6 (L) 14.0 - 18.0 g/dL    Hematocrit 37.6 (L) 42.0 - 52.0 %    MCV 85.3 81.4 - 97.8 fL    MCH 28.6 27.0 - 33.0 pg    MCHC 33.5 (L) 33.7 - 35.3 g/dL    RDW 52.2 (H) 35.9 - 50.0 fL    Platelet Count 193 164 - 446 K/uL    MPV 9.2 9.0 - 12.9 fL    Neutrophils-Polys 85.30 (H) 44.00 - 72.00 %    Lymphocytes 6.50 (L) 22.00 - 41.00 %    Monocytes 6.00 0.00 - 13.40 %    Eosinophils 0.10 0.00 - 6.90 %    Basophils 0.20 0.00 - 1.80 %    Immature Granulocytes 1.90 (H) 0.00 - 0.90 %    Nucleated RBC 0.00 /100 WBC    Neutrophils (Absolute) 13.71 (H) 1.82 - 7.42 K/uL    Lymphs (Absolute) 1.05 1.00 - 4.80 K/uL    Monos (Absolute) 0.96 (H) 0.00 - 0.85 K/uL    Eos (Absolute) 0.02 0.00 - 0.51 K/uL    Baso  (Absolute) 0.03 0.00 - 0.12 K/uL    Immature Granulocytes (abs) 0.31 (H) 0.00 - 0.11 K/uL    NRBC (Absolute) 0.00 K/uL   COMP METABOLIC PANEL   Result Value Ref Range    Sodium 134 (L) 135 - 145 mmol/L    Potassium 3.5 (L) 3.6 - 5.5 mmol/L    Chloride 107 96 - 112 mmol/L    Co2 17 (L) 20 - 33 mmol/L    Anion Gap 10.0 0.0 - 11.9    Glucose 153 (H) 65 - 99 mg/dL    Bun 23 (H) 8 - 22 mg/dL    Creatinine 1.76 (H) 0.50 - 1.40 mg/dL    Calcium 9.0 8.5 - 10.5 mg/dL    AST(SGOT) 14 12 - 45 U/L    ALT(SGPT) 17 2 - 50 U/L    Alkaline Phosphatase 61 30 - 99 U/L    Total Bilirubin 0.5 0.1 - 1.5 mg/dL    Albumin 3.2 3.2 - 4.9 g/dL    Total Protein 6.8 6.0 - 8.2 g/dL    Globulin 3.6 (H) 1.9 - 3.5 g/dL    A-G Ratio 0.9 g/dL   URINALYSIS   Result Value Ref Range    Color Yellow     Character Turbid (A)     Specific Gravity 1.013 <1.035    Ph 5.5 5.0 - 8.0    Glucose Negative Negative mg/dL    Ketones Negative Negative mg/dL    Protein 30 (A) Negative mg/dL    Bilirubin Negative Negative    Urobilinogen, Urine 0.2 Negative    Nitrite Positive (A) Negative    Leukocyte Esterase Large (A) Negative    Occult Blood Moderate (A) Negative    Micro Urine Req Microscopic    TSH   Result Value Ref Range    TSH 1.070 0.380 - 5.330 uIU/mL   FREE THYROXINE   Result Value Ref Range    Free T-4 0.94 0.53 - 1.43 ng/dL   ESTIMATED GFR   Result Value Ref Range    GFR If  46 (A) >60 mL/min/1.73 m 2    GFR If Non  38 (A) >60 mL/min/1.73 m 2   URINE MICROSCOPIC (W/UA)   Result Value Ref Range    WBC Packed (A) /hpf    RBC 5-10 (A) /hpf    Bacteria Many (A) None /hpf    Epithelial Cells Negative /hpf    Hyaline Cast 0-2 /lpf   EKG   Result Value Ref Range    Report       [facility] Emergency Dept.    Test Date:  2019  Pt Name:    RENATA CLEMENT                 Department: ER  MRN:        [id number]                      Room:       RD 10  Gender:     Male                         Technician: [id number]  :         1945                   Requested By:ER TRIAGE PROTOCOL  Order #:    [id number]                    Reading MD: ELSIE GRAY MD    Measurements  Intervals                                Axis  Rate:       110                          P:          56  NH:         136                          QRS:        -24  QRSD:       75                           T:          66  QT:         292  QTc:        396    Interpretive Statements  Sinus tachycardia  Borderline left axis deviation  Baseline wander in lead(s) II,aVF,V1,V3,V4  Compared to ECG 12/07/2018 13:30:36  Sinus rhythm no longer present    Electronically Signed On 8- 6:20:32 PDT by ELSIE GRAY MD         All labs and 12 Lead EKG reviewed by me.    RADIOLOGY  CT-HEAD W/O   Final Result      1.  No acute intracranial abnormality.   2.  Atrophy and chronic white matter changes, stable.   3.  Bilateral maxillary sinus disease.               INTERPRETING LOCATION:  [address] [facility], [place], [address]      DX-CHEST-PORTABLE (1 VIEW)   Final Result      No acute cardiopulmonary abnormality.        The radiologist's interpretation of all radiological studies have been reviewed by me.    COURSE & MEDICAL DECISION MAKING  Nursing notes, VS, PMSFHx reviewed in chart.     Review of past medical records shows the patient was admitted tot his facility in December of 2018 for similar symptoms. He was diagnosed with a urinary tract infection that showed a partial resistant organism that is sensitive to cefepime.      4:42 AM - Patient seen and examined at bedside. Plan of care was dicussed with patient which includes obtaining lab work and imaging to further evaluate his condition. He will also be treated with IV fluids and Maxipime. Patient verbalizes his understanding and agreement to the plan of care. Patient will be treated with Maxipime 2 g in  mL and IV fluids 1,000 mL. Intravenous fluids were administered for tachycardia and syncope. Ordered CT-head,  Dx-chest, blood culture, urine culture, UA, CMP, CBC with differential, lactic acid, free thyroxine, TSH, EKG to evaluate his symptoms. The differential diagnoses include but are not limited to: sepsis, urinary tract infection, electrolyte abnormality, intracranial hemorrhage, thyroid dysfunction.    HYDRATION: Based on the patient's presentation of Tachycardia and syncopal episode the patient was given IV fluids. IV Hydration was used because oral hydration was not adequate alone. Upon recheck following hydration, the patient was improved, reduced tachycardia.    4:57 AM Upon reviewing past medical records, the patient had similar symptoms during his last hospital admission and was diagnosed with a urinary tract infection. He will be treated with Cefepime 2 g in  mL    5:30 AM Ordered urine microscopic    6:52 AM Paged Dr. Hartman (Hospitalist) at this time.     6:59 AM I discussed the patient's case and the above findings with Dr. Hartman (Hospitalist) who agrees to admit the patient.       DISPOSITION:  Patient will be admitted to Dr. Hartman (Hospitalist)  in guarded condition.    FINAL IMPRESSION  1. Sepsis, due to unspecified organism (HCC)    2. Urinary tract infection without hematuria, site unspecified    3. Altered mental status, unspecified altered mental status type          I, Consuelo Pena (Osvaldo), am scribing for, and in the presence of, Pawel Cedillo M.D..    Electronically signed by: Consuelo Pena (Scribe), 8/26/2019    IPawel M.D. personally performed the services described in this documentation, as scribed by Consuelo Pena in my presence, and it is both accurate and complete.    C    The note accurately reflects work and decisions made by me.  Pawel Cedillo  8/26/2019  11:24 AM

## 2019-08-26 NOTE — ED NOTES
Med Rec completed per patient's family   Allergies reviewed  No ORAL antibiotics in last 14 days

## 2019-08-26 NOTE — PROGRESS NOTES
2 RN skin check complete with Braxton Navarro.   Devices in place: None.  Skin assessed under devices: N/A.  Confirmed pressure ulcers found on N/A.  New potential pressure ulcers noted on N/A. Wound consult placed N/A.  Sacrum pink, blanchable.   The following interventions in place Pillows and extra blankets for extra support, moisturizer at bedside.

## 2019-08-26 NOTE — ASSESSMENT & PLAN NOTE
Suspect due to chronic disease combined with iron deficiency.  No current bleed. Transfuse if needed for hemoglobin less than 7 gm/dL  -Hb levels remain stable, daily CBC

## 2019-08-26 NOTE — ASSESSMENT & PLAN NOTE
Suspect due to underlying sepsis.  Continue current management for sepsis and monitor.Clinically improving and nearly back at his baseline

## 2019-08-26 NOTE — ED NOTES
Attending Hospitalist is Dr Walker starting at 0700. Please contact this physician for orders, updates or questions today.

## 2019-08-26 NOTE — H&P
Hospital Medicine History & Physical Note    Date of Service  8/26/2019    Primary Care Physician  SAM Moya    Consultants  None    Code Status  Full code    Chief Complaint  Altered    History of Presenting Illness  73 y.o. male with history of essential hypertension which is controlled, benign prostatic hypertrophy on medication regimen, and underlying chronic kidney disease which is stable, was in his usual state of health until several days prior to admission.  He began to have increased urinary frequency.  On the day prior to admission he was noted by family members to be altered, along with fever and chills.  He was not making very much sense.  He was subsequently brought to the emergency department for further evaluation.  He currently denies any headache or vision changes he has no chest pain or shortness of breath, no abdominal pain, nausea vomiting, diarrhea or constipation.  He has difficulty remembering basic facts but can come up with them after a few minutes.  He has no other complaints.    Review of Systems  Review of Systems   Constitutional: Positive for chills and fever.   HENT: Negative.    Eyes: Negative.    Respiratory: Negative.    Cardiovascular: Negative.    Gastrointestinal: Negative.    Genitourinary: Positive for frequency.   Musculoskeletal: Negative.    Skin: Negative.    Neurological: Negative.    Endo/Heme/Allergies: Negative.    Psychiatric/Behavioral: Negative.        Past Medical History   has a past medical history of BPH (benign prostatic hyperplasia), GERD (gastroesophageal reflux disease), HTN (hypertension), Hypertension, Insomnia, and SBO (small bowel obstruction) (HCC).    Surgical History   has a past surgical history that includes hernia repair and temporal artery biopsy (Right, 2/9/2018).     Family History  family history includes Alcohol/Drug in his brother; Lung Disease in his brother and father.     Social History   reports that he has never smoked.  He has never used smokeless tobacco. He reports that he does not drink alcohol or use drugs.    Allergies  Allergies   Allergen Reactions   • Celecoxib      Rash       Medications  Prior to Admission Medications   Prescriptions Last Dose Informant Patient Reported? Taking?   Cyanocobalamin (VITAMIN B12) 1000 MCG Tab CR  Patient Yes No   Sig: Take 1,000 mg by mouth every day.   Potassium 99 MG Tab  Patient Yes No   Sig: Take 99 mg by mouth every day.   amLODIPine (NORVASC) 10 MG Tab   Yes No   clotrimazole (MYCELEX) 10 MG Cristofer   No No   Sig: Take 1 Cristofer by mouth 5 Times a Day for 30 days.   esomeprazole (NEXIUM) 20 MG capsule  Patient Yes No   Sig: Take 40 mg by mouth every morning before breakfast.   finasteride (PROSCAR) 5 MG Tab  Patient Yes No   Sig: Take 5 mg by mouth every day.   hydroCHLOROthiazide (HYDRODIURIL) 50 MG Tab   No No   Sig: Take 1 Tab by mouth every day.   ibuprofen (MOTRIN) 600 MG Tab   Yes No   Sig: Take 600 mg by mouth 3 times a day as needed.   lidocaine (XYLOCAINE) 2 % Solution   Yes No   Sig: Take 5 mL by mouth 3 times a day before meals.   lisinopril (PRINIVIL) 10 MG Tab  Patient Yes No   Sig: Take 10 mg by mouth every day.   nystatin (MYCOSTATIN) 615123 UNIT/ML Suspension   Yes No   Sig: Take 4 mL by mouth 4 times a day.   sertraline (ZOLOFT) 25 MG tablet   No No   Sig: TAKE 1 TABLET BY MOUTH ONCE DAILY   sodium bicarbonate (SODIUM BICARBONATE) 650 MG Tab   Yes No   tamsulosin (FLOMAX) 0.4 MG capsule  Patient Yes No   Sig: Take 0.4 mg by mouth every day.   zolpidem (AMBIEN) 5 MG Tab   No No   Sig: Take 1 Tab by mouth at bedtime as needed for Sleep for up to 30 days.      Facility-Administered Medications: None       Physical Exam  Temp:  [37.3 °C (99.2 °F)] 37.3 °C (99.2 °F)  Pulse:  [] 100  Resp:  [24] 24  BP: (126-150)/(66-82) 135/82  SpO2:  [93 %-95 %] 94 %    Physical Exam   Constitutional: He appears well-developed and well-nourished. No distress.   HENT:   Head:  Normocephalic and atraumatic.   Eyes: Pupils are equal, round, and reactive to light. Conjunctivae are normal.   Neck: Normal range of motion. Neck supple. No tracheal deviation present. No thyromegaly present.   Cardiovascular: Normal rate, regular rhythm and normal heart sounds. Exam reveals no gallop and no friction rub.   No murmur heard.  Pulmonary/Chest: Effort normal and breath sounds normal. No respiratory distress. He has no wheezes.   Abdominal: Soft. Bowel sounds are normal. He exhibits no distension and no mass. There is no tenderness. There is no rebound and no guarding.   Musculoskeletal: Normal range of motion. He exhibits no edema.   Lymphadenopathy:     He has no cervical adenopathy.   Neurological: He is alert. No cranial nerve deficit.   Reports that but she is the president, comes up with the year after several minutes of thought.   Skin: Skin is warm and dry. He is not diaphoretic.   Psychiatric: He has a normal mood and affect.   Nursing note and vitals reviewed.      Laboratory:  Recent Labs     08/26/19  0435   WBC 16.1*   RBC 4.41*   HEMOGLOBIN 12.6*   HEMATOCRIT 37.6*   MCV 85.3   MCH 28.6   MCHC 33.5*   RDW 52.2*   PLATELETCT 193   MPV 9.2     Recent Labs     08/26/19  0435   SODIUM 134*   POTASSIUM 3.5*   CHLORIDE 107   CO2 17*   GLUCOSE 153*   BUN 23*   CREATININE 1.76*   CALCIUM 9.0     Recent Labs     08/26/19  0435   ALTSGPT 17   ASTSGOT 14   ALKPHOSPHAT 61   TBILIRUBIN 0.5   GLUCOSE 153*         No results for input(s): NTPROBNP in the last 72 hours.      No results for input(s): TROPONINT in the last 72 hours.    Urinalysis:    Recent Labs     08/26/19  0530   SPECGRAVITY 1.013   GLUCOSEUR Negative   KETONES Negative   NITRITE Positive*   LEUKESTERAS Large*   WBCURINE Packed*   RBCURINE 5-10*   BACTERIA Many*   EPITHELCELL Negative        Imaging:  CT-HEAD W/O   Final Result      1.  No acute intracranial abnormality.   2.  Atrophy and chronic white matter changes, stable.   3.   Bilateral maxillary sinus disease.               INTERPRETING LOCATION:  [address] [place] [address], [place], [address]      DX-CHEST-PORTABLE (1 VIEW)   Final Result      No acute cardiopulmonary abnormality.            Assessment/Plan:  I anticipate this patient will require at least two midnights for appropriate medical management, necessitating inpatient admission.    * Acute cystitis without hematuria  Assessment & Plan  Start IV antibiotics and monitor culture results.      Sepsis (HCC)  Assessment & Plan  This is sepsis (without associated acute organ dysfunction).  Continue fluids, antibiotics and monitor culture results.      Acute encephalopathy  Assessment & Plan  Suspect due to underlying sepsis.  Continue current management for sepsis and monitor.     Normocytic anemia  Assessment & Plan  Suspect due to chronic disease combined with iron deficiency.  No current bleed. Transfuse if needed for hemoglobin less than 7 gm/dL      Acute on chronic kidney failure (HCC)  Assessment & Plan  Suspect due to volume depletion.  Monitor.  Avoid nephrotoxins.     BPH (benign prostatic hyperplasia)- (present on admission)  Assessment & Plan  Controlled with current medication regimen.  Monitor.     HTN (hypertension)- (present on admission)  Assessment & Plan  Controlled with current medication regimen.  MOnitor.       VTE prophylaxis: SCD, lovenox

## 2019-08-26 NOTE — ASSESSMENT & PLAN NOTE
Suspect due to volume depletion.  Monitor.  Avoid nephrotoxins.   _Cr improving, approaching his baseline, no change to IVF's at this time, daily CR

## 2019-08-27 LAB
ANION GAP SERPL CALC-SCNC: 10 MMOL/L (ref 0–11.9)
BASOPHILS # BLD AUTO: 0.2 % (ref 0–1.8)
BASOPHILS # BLD: 0.02 K/UL (ref 0–0.12)
BUN SERPL-MCNC: 20 MG/DL (ref 8–22)
CALCIUM SERPL-MCNC: 8.8 MG/DL (ref 8.5–10.5)
CHLORIDE SERPL-SCNC: 110 MMOL/L (ref 96–112)
CO2 SERPL-SCNC: 17 MMOL/L (ref 20–33)
CREAT SERPL-MCNC: 1.69 MG/DL (ref 0.5–1.4)
EOSINOPHIL # BLD AUTO: 0.04 K/UL (ref 0–0.51)
EOSINOPHIL NFR BLD: 0.3 % (ref 0–6.9)
ERYTHROCYTE [DISTWIDTH] IN BLOOD BY AUTOMATED COUNT: 52.7 FL (ref 35.9–50)
GLUCOSE SERPL-MCNC: 137 MG/DL (ref 65–99)
HCT VFR BLD AUTO: 33.8 % (ref 42–52)
HGB BLD-MCNC: 11 G/DL (ref 14–18)
IMM GRANULOCYTES # BLD AUTO: 0.2 K/UL (ref 0–0.11)
IMM GRANULOCYTES NFR BLD AUTO: 1.6 % (ref 0–0.9)
LYMPHOCYTES # BLD AUTO: 1.4 K/UL (ref 1–4.8)
LYMPHOCYTES NFR BLD: 10.9 % (ref 22–41)
MAGNESIUM SERPL-MCNC: 2 MG/DL (ref 1.5–2.5)
MCH RBC QN AUTO: 27.8 PG (ref 27–33)
MCHC RBC AUTO-ENTMCNC: 32.5 G/DL (ref 33.7–35.3)
MCV RBC AUTO: 85.4 FL (ref 81.4–97.8)
MONOCYTES # BLD AUTO: 0.68 K/UL (ref 0–0.85)
MONOCYTES NFR BLD AUTO: 5.3 % (ref 0–13.4)
NEUTROPHILS # BLD AUTO: 10.52 K/UL (ref 1.82–7.42)
NEUTROPHILS NFR BLD: 81.7 % (ref 44–72)
NRBC # BLD AUTO: 0 K/UL
NRBC BLD-RTO: 0 /100 WBC
PHOSPHATE SERPL-MCNC: 3.2 MG/DL (ref 2.5–4.5)
PLATELET # BLD AUTO: 157 K/UL (ref 164–446)
PMV BLD AUTO: 9.2 FL (ref 9–12.9)
POTASSIUM SERPL-SCNC: 3.4 MMOL/L (ref 3.6–5.5)
RBC # BLD AUTO: 3.96 M/UL (ref 4.7–6.1)
SODIUM SERPL-SCNC: 137 MMOL/L (ref 135–145)
WBC # BLD AUTO: 12.9 K/UL (ref 4.8–10.8)

## 2019-08-27 PROCEDURE — 85025 COMPLETE CBC W/AUTO DIFF WBC: CPT

## 2019-08-27 PROCEDURE — 700101 HCHG RX REV CODE 250: Performed by: FAMILY MEDICINE

## 2019-08-27 PROCEDURE — A9270 NON-COVERED ITEM OR SERVICE: HCPCS | Performed by: HOSPITALIST

## 2019-08-27 PROCEDURE — 700105 HCHG RX REV CODE 258: Performed by: HOSPITALIST

## 2019-08-27 PROCEDURE — 83735 ASSAY OF MAGNESIUM: CPT

## 2019-08-27 PROCEDURE — 92610 EVALUATE SWALLOWING FUNCTION: CPT

## 2019-08-27 PROCEDURE — 36415 COLL VENOUS BLD VENIPUNCTURE: CPT

## 2019-08-27 PROCEDURE — 700102 HCHG RX REV CODE 250 W/ 637 OVERRIDE(OP): Performed by: HOSPITALIST

## 2019-08-27 PROCEDURE — 700105 HCHG RX REV CODE 258: Performed by: FAMILY MEDICINE

## 2019-08-27 PROCEDURE — 94760 N-INVAS EAR/PLS OXIMETRY 1: CPT

## 2019-08-27 PROCEDURE — 80048 BASIC METABOLIC PNL TOTAL CA: CPT

## 2019-08-27 PROCEDURE — A9270 NON-COVERED ITEM OR SERVICE: HCPCS | Performed by: INTERNAL MEDICINE

## 2019-08-27 PROCEDURE — 84100 ASSAY OF PHOSPHORUS: CPT

## 2019-08-27 PROCEDURE — 99232 SBSQ HOSP IP/OBS MODERATE 35: CPT | Performed by: INTERNAL MEDICINE

## 2019-08-27 PROCEDURE — 770020 HCHG ROOM/CARE - TELE (206)

## 2019-08-27 PROCEDURE — 70490 CT SOFT TISSUE NECK W/O DYE: CPT

## 2019-08-27 PROCEDURE — 700102 HCHG RX REV CODE 250 W/ 637 OVERRIDE(OP): Performed by: INTERNAL MEDICINE

## 2019-08-27 PROCEDURE — 700111 HCHG RX REV CODE 636 W/ 250 OVERRIDE (IP): Performed by: HOSPITALIST

## 2019-08-27 RX ORDER — LOPERAMIDE HYDROCHLORIDE 2 MG/1
2 CAPSULE ORAL 4 TIMES DAILY PRN
Status: DISCONTINUED | OUTPATIENT
Start: 2019-08-27 | End: 2019-08-28 | Stop reason: HOSPADM

## 2019-08-27 RX ADMIN — CLOTRIMAZOLE 10 MG: 10 LOZENGE ORAL; TOPICAL at 23:10

## 2019-08-27 RX ADMIN — CEFTRIAXONE SODIUM 2 G: 2 INJECTION, POWDER, FOR SOLUTION INTRAMUSCULAR; INTRAVENOUS at 05:31

## 2019-08-27 RX ADMIN — TAMSULOSIN HYDROCHLORIDE 0.4 MG: 0.4 CAPSULE ORAL at 05:30

## 2019-08-27 RX ADMIN — OMEPRAZOLE 20 MG: 20 CAPSULE, DELAYED RELEASE ORAL at 05:30

## 2019-08-27 RX ADMIN — LOPERAMIDE HYDROCHLORIDE 2 MG: 2 CAPSULE ORAL at 12:07

## 2019-08-27 RX ADMIN — CLOTRIMAZOLE 10 MG: 10 LOZENGE ORAL; TOPICAL at 15:40

## 2019-08-27 RX ADMIN — SODIUM CHLORIDE: 9 INJECTION, SOLUTION INTRAVENOUS at 05:31

## 2019-08-27 RX ADMIN — SERTRALINE HYDROCHLORIDE 25 MG: 50 TABLET ORAL at 05:30

## 2019-08-27 RX ADMIN — ACETAMINOPHEN 650 MG: 325 TABLET, FILM COATED ORAL at 00:16

## 2019-08-27 RX ADMIN — LISINOPRIL 10 MG: 10 TABLET ORAL at 05:30

## 2019-08-27 RX ADMIN — CLOTRIMAZOLE 10 MG: 10 LOZENGE ORAL; TOPICAL at 11:12

## 2019-08-27 RX ADMIN — ENOXAPARIN SODIUM 40 MG: 100 INJECTION SUBCUTANEOUS at 06:04

## 2019-08-27 RX ADMIN — ACETAMINOPHEN 650 MG: 325 TABLET, FILM COATED ORAL at 09:38

## 2019-08-27 RX ADMIN — CLOTRIMAZOLE 10 MG: 10 LOZENGE ORAL; TOPICAL at 05:30

## 2019-08-27 RX ADMIN — FINASTERIDE 5 MG: 5 TABLET, FILM COATED ORAL at 05:30

## 2019-08-27 RX ADMIN — CLOTRIMAZOLE 10 MG: 10 LOZENGE ORAL; TOPICAL at 20:29

## 2019-08-27 RX ADMIN — AMLODIPINE BESYLATE 10 MG: 10 TABLET ORAL at 05:30

## 2019-08-27 RX ADMIN — ACETAMINOPHEN 650 MG: 325 TABLET, FILM COATED ORAL at 16:54

## 2019-08-27 RX ADMIN — LIDOCAINE 2 PATCH: 50 PATCH TOPICAL at 16:44

## 2019-08-27 ASSESSMENT — PATIENT HEALTH QUESTIONNAIRE - PHQ9
5. POOR APPETITE OR OVEREATING: NOT AT ALL
8. MOVING OR SPEAKING SO SLOWLY THAT OTHER PEOPLE COULD HAVE NOTICED. OR THE OPPOSITE, BEING SO FIGETY OR RESTLESS THAT YOU HAVE BEEN MOVING AROUND A LOT MORE THAN USUAL: NOT AT ALL
6. FEELING BAD ABOUT YOURSELF - OR THAT YOU ARE A FAILURE OR HAVE LET YOURSELF OR YOUR FAMILY DOWN: NOT AL ALL
SUM OF ALL RESPONSES TO PHQ QUESTIONS 1-9: 6
4. FEELING TIRED OR HAVING LITTLE ENERGY: SEVERAL DAYS
3. TROUBLE FALLING OR STAYING ASLEEP OR SLEEPING TOO MUCH: SEVERAL DAYS
1. LITTLE INTEREST OR PLEASURE IN DOING THINGS: SEVERAL DAYS
9. THOUGHTS THAT YOU WOULD BE BETTER OFF DEAD, OR OF HURTING YOURSELF: NOT AT ALL
SUM OF ALL RESPONSES TO PHQ9 QUESTIONS 1 AND 2: 2
7. TROUBLE CONCENTRATING ON THINGS, SUCH AS READING THE NEWSPAPER OR WATCHING TELEVISION: MORE THAN HALF THE DAYS
2. FEELING DOWN, DEPRESSED, IRRITABLE, OR HOPELESS: SEVERAL DAYS

## 2019-08-27 ASSESSMENT — ENCOUNTER SYMPTOMS
WEAKNESS: 1
PALPITATIONS: 0
SHORTNESS OF BREATH: 0
BLOOD IN STOOL: 0
COUGH: 0
CHILLS: 0
DIARRHEA: 1
BACK PAIN: 1
DIZZINESS: 0
HEADACHES: 0
NAUSEA: 0
VOMITING: 0
FEVER: 0
ABDOMINAL PAIN: 0

## 2019-08-27 NOTE — PROGRESS NOTES
[facility] Medicine Daily Progress Note    Date of Service  8/27/2019    Chief Complaint  73 y.o. male admitted 8/26/2019 with UTI and sepsis    Hospital Course    SEE below      Interval Problem Update  UTI-symptoms doing better. No culture data back yet. Tolerating IV abx's without issue.     Encephalopathy improving    Diarrhea-mild, likely related to abx's. No abdominal pain.    Consultants/Specialty  NONE    Code Status  fcfc    Disposition  TELE    Review of Systems  Review of Systems   Constitutional: Negative for chills and fever.   HENT: Negative for hearing loss.    Respiratory: Negative for cough and shortness of breath.    Cardiovascular: Negative for chest pain and palpitations.   Gastrointestinal: Positive for diarrhea. Negative for abdominal pain, blood in stool, nausea and vomiting.   Musculoskeletal: Positive for back pain.   Skin: Negative for rash.   Neurological: Positive for weakness. Negative for dizziness and headaches.   All other systems reviewed and are negative.       Physical Exam  Temp:  [36.7 °C (98 °F)-38.3 °C (101 °F)] 36.9 °C (98.4 °F)  Pulse:  [] 98  Resp:  [18-20] 18  BP: (122-153)/(56-86) 122/74  SpO2:  [92 %-96 %] 93 %    Physical Exam   Constitutional: He is oriented to person, place, and time. He appears well-developed and well-nourished. No distress.   HENT:   Head: Normocephalic and atraumatic.   Mouth/Throat: No oropharyngeal exudate.   Eyes: Pupils are equal, round, and reactive to light. No scleral icterus.   Neck: Normal range of motion. Neck supple. No thyromegaly present.   Cardiovascular: Normal rate, regular rhythm, normal heart sounds and intact distal pulses.   No murmur heard.  Pulmonary/Chest: Effort normal and breath sounds normal. No respiratory distress. He has no wheezes.   Abdominal: Soft. Bowel sounds are normal. He exhibits no distension. There is no tenderness.   Musculoskeletal: Normal range of motion. He exhibits no edema or tenderness.    Neurological: He is alert and oriented to person, place, and time. No cranial nerve deficit.   Skin: Skin is warm and dry. No rash noted.   Psychiatric: He has a normal mood and affect.   Nursing note and vitals reviewed.      Fluids    Intake/Output Summary (Last 24 hours) at 8/27/2019 1311  Last data filed at 8/27/2019 0732  Gross per 24 hour   Intake 1100 ml   Output 1050 ml   Net 50 ml       Laboratory  Recent Labs     08/26/19  0435 08/27/19  0259   WBC 16.1* 12.9*   RBC 4.41* 3.96*   HEMOGLOBIN 12.6* 11.0*   HEMATOCRIT 37.6* 33.8*   MCV 85.3 85.4   MCH 28.6 27.8   MCHC 33.5* 32.5*   RDW 52.2* 52.7*   PLATELETCT 193 157*   MPV 9.2 9.2     Recent Labs     08/26/19 0435 08/27/19  0259   SODIUM 134* 137   POTASSIUM 3.5* 3.4*   CHLORIDE 107 110   CO2 17* 17*   GLUCOSE 153* 137*   BUN 23* 20   CREATININE 1.76* 1.69*   CALCIUM 9.0 8.8                   Imaging  CT-SOFT TISSUE NECK W/O   Final Result      1.  No definite neck mass is identified.   2.  Maxillary sinusitis.      CT-HEAD W/O   Final Result      1.  No acute intracranial abnormality.   2.  Atrophy and chronic white matter changes, stable.   3.  Bilateral maxillary sinus disease.               INTERPRETING LOCATION:  [address] [place] [address], [facility]      DX-CHEST-PORTABLE (1 VIEW)   Final Result      No acute cardiopulmonary abnormality.           Assessment/Plan  * Acute cystitis without hematuria- (present on admission)  Assessment & Plan  Continue empiric IV CTx day#2, down-trending WBC, no symptoms, afebrile, F/U final urine culture    Sepsis (HCC)- (present on admission)  Assessment & Plan  This is sepsis (without associated acute organ dysfunction).  Continue fluids, antibiotics and monitor culture results.    -resolving    Acute encephalopathy- (present on admission)  Assessment & Plan  Suspect due to underlying sepsis.  Continue current management for sepsis and monitor.Clinically improving and nearly back at his baseline    Normocytic anemia-  (present on admission)  Assessment & Plan  Suspect due to chronic disease combined with iron deficiency.  No current bleed. Transfuse if needed for hemoglobin less than 7 gm/dL  -Hb levels remain stable, daily CBC      Acute on chronic kidney failure (HCC)- (present on admission)  Assessment & Plan  Suspect due to volume depletion.  Monitor.  Avoid nephrotoxins.   _Cr improving, approaching his baseline, no change to IVF's at this time, daily CR    BPH (benign prostatic hyperplasia)- (present on admission)  Assessment & Plan  Controlled with current medication regimen.  Monitor.     HTN (hypertension)- (present on admission)  Assessment & Plan  Controlled with current medication regimen.  MOnitor.        VTE prophylaxis: lovenox

## 2019-08-27 NOTE — PROGRESS NOTES
Admitted today for Sepsis and UTI. Complains of some difficulty swallowing, family states he was diagnosed with thrush 2 weeks ago. Check CT neck, SLP evaluation.

## 2019-08-27 NOTE — THERAPY
"Speech Language Therapy Clinical Swallow Evaluation completed.  Functional Status: Pt is alert, cooperative, oriented x4. He reports some difficulty swallowing over the last 2 weeks, was diagnosed with thrush and has been using medicated lozenges. He reports the difficulty swallowing seems to have resolved, though he does feel like he has a mass or a \"knot\" in his neck, pointing to the hyolaryngeal region. Upon digital neck palpation, this area does feel thick and dense, possibly swollen, though pt achieves complete hyolaryngeal excursion during a dry swallow. Neck CT showed no abnormalities. Otherwise, [facility]. Pt has full set of dentures. Swallow function was assessed in the context of a breakfast meal with regular solids, soft solids, thin liquids via cup/straw with consecutive sips. Oropharyngeal swallow function appears WFL with no overt s/sx of aspiration identified. Pt denies odynophagia, globus sensation or any other symptoms of dysphagia. No further skilled SLP intervention is indicated at this time as swallow function is WFL for continuation of a regular solid/thin liquid diet. Should swallow function worsen, please reconsult.     Recommendations - Diet: Regular solids/thin liquids                          Strategies: Head of Bed at 90 Degrees                          Medication Administration: whole with thin liquid  Plan of Care: Patient with no further skilled SLP needs in the acute care setting at this time  Post-Acute Therapy: Currently anticipate no further skilled therapy needs once patient is discharged from the inpatient setting.    See \"Rehab Therapy-Acute\" Patient Summary Report for complete documentation.   "

## 2019-08-27 NOTE — PROGRESS NOTES
Bedside report received. Patient awake sitting up in bed. Patient getting up to bathroom, steady with assist x1. Call light within reach.

## 2019-08-27 NOTE — PROGRESS NOTES
Received bedside report from RN, pt care assumed, VSS, pt assessment complete. Pt AAOx4, but admits he can be forgetful, On RA. No signs of acute distress noted at this time. POC discussed with pt and verbalizes no questions. Pt denies any additional needs at this time. Bed in lowest position, pt educated on fall risk and verbalized understanding, call light within reach, hourly rounding initiated.

## 2019-08-27 NOTE — PROGRESS NOTES
Pt c/o headache and chills. Temp was 99.6 F. Gave Tylenol to see if that will help pain. Pt A&Ox4. On RA. Will monitor.

## 2019-08-27 NOTE — CARE PLAN
Problem: Communication  Goal: The ability to communicate needs accurately and effectively will improve  Outcome: PROGRESSING AS EXPECTED  Note:   Pt educated to communicate needs with staff. Call light within reach. Hourly rounding in place.   Intervention: Educate patient and significant other/support system about the plan of care, procedures, treatments, medications and allow for questions  Note:   POC discussed and all questions answered. Pt educated to communicate needs with staff. Call light within reach. Hourly rounding in place.      Problem: Safety  Goal: Will remain free from injury  Outcome: PROGRESSING AS EXPECTED  Intervention: Provide assistance with mobility  Flowsheets (Taken 8/26/2019 2037)  Assistance: Standby Assist  Note:   Pt is a standby assist to restroom due to IV fluids and sepsis. Pt steady on feet and tolerates well. Pt educated to call for assistance. Call light within reach. Bed locked and in lowest position.

## 2019-08-27 NOTE — CARE PLAN
Problem: Safety  Goal: Will remain free from injury  Outcome: PROGRESSING AS EXPECTED  Note:   Pt remains free from falls at this time. Safety precautions in place. Pt educated on calling for assistance when needed.        Problem: Knowledge Deficit  Goal: Knowledge of disease process/condition, treatment plan, diagnostic tests, and medications will improve  Outcome: PROGRESSING AS EXPECTED  Note:   Pt updated on POC, tests, and medications. Pt verbalizes understanding and has no further questions at this time. Pt educated on calling for any more questions.

## 2019-08-27 NOTE — DISCHARGE PLANNING
"Care Transition Team Assessment    LSW met with Pt at bedside to provide a depression screening consult and complete an assessment.  Pt reported he was not depressed and did not have suicidal ideation.  Pt reported he has a strong support system with his children and grandchildren.  Pt did report he has been on depression medication for a year and reports he is \"feeling okay.\"  LSW requested for Pt to follow up with his PCP if he feels his depression is getting worse.  Pt reported he would.      Pt reported he lives in a mobile home with his daughter and grandchild in [place].  Pt reported he was independent with all ADL/IADLs prior to admission and does not have or use any DME's.  Pt does not report a Hx of chemical dependence.  Pt does not report to be depressed; however, he does take medication for depression.  Pt stated, \"I would never kill myself because I need to stay here for my grandson.\"  Pts pharmacy is [facility] in [place].  LSW will continue to follow and assist as needed.         Information Source  Orientation : Oriented x 4  Information Given By: Patient  Who is responsible for making decisions for patient? : Patient         Elopement Risk  Legal Hold: No  Ambulatory or Self Mobile in Wheelchair: Yes  Disoriented: No  Psychiatric Symptoms: None  History of Wandering: No  Elopement this Admit: No  Vocalizing Wanting to Leave: No  Displays Behaviors, Body Language Wanting to Leave: No-Not at Risk for Elopement  Elopement Risk: Not at Risk for Elopement    Interdisciplinary Discharge Planning  Does Admitting Nurse Feel This Could be a Complex Discharge?: No  Primary Care Physician: Bernardo Dasilva  Lives with - Patient's Self Care Capacity: Adult Children  Patient or legal guardian wants to designate a caregiver (see row info): No  Support Systems: Friends / Neighbors, Family Member(s)  Housing / Facility: 1 Story House  Do You Take your Prescribed Medications Regularly: Yes  Able to Return to " Previous ADL's: Yes  Mobility Issues: No  Prior Services: Home-Independent  Patient Expects to be Discharged to:: Home  Assistance Needed: No  Durable Medical Equipment: Not Applicable    Discharge Preparedness  What is your plan after discharge?: Home with help  What are your discharge supports?: Child  Prior Functional Level: Ambulatory, Bowel Incontinence, Independent with Activities of Daily Living, Independent with Medication Management  Difficulity with ADLs: None  Difficulity with IADLs: None    Functional Assesment  Prior Functional Level: Ambulatory, Bowel Incontinence, Independent with Activities of Daily Living, Independent with Medication Management    Finances  Financial Barriers to Discharge: No  Prescription Coverage: Yes([employer])    Vision / Hearing Impairment  Vision Impairment : Yes  Right Eye Vision: Impaired, Wears Glasses  Left Eye Vision: Impaired, Wears Glasses  Hearing Impairment : No              Domestic Abuse  Have you ever been the victim of abuse or violence?: No  Physical Abuse or Sexual Abuse: No  Verbal Abuse or Emotional Abuse: No  Possible Abuse Reported to:: Not Applicable    Psychological Assessment  History of Substance Abuse: None  History of Psychiatric Problems: Yes(Depression )    Discharge Risks or Barriers  Discharge risks or barriers?: No  Patient risk factors: Vulnerable adult    Anticipated Discharge Information  Anticipated discharge disposition: Home

## 2019-08-28 ENCOUNTER — PATIENT OUTREACH (OUTPATIENT)
Dept: HEALTH INFORMATION MANAGEMENT | Facility: OTHER | Age: 74
End: 2019-08-28

## 2019-08-28 VITALS
BODY MASS INDEX: 33.18 KG/M2 | HEIGHT: 72 IN | RESPIRATION RATE: 18 BRPM | OXYGEN SATURATION: 96 % | HEART RATE: 101 BPM | DIASTOLIC BLOOD PRESSURE: 77 MMHG | TEMPERATURE: 97.6 F | SYSTOLIC BLOOD PRESSURE: 138 MMHG | WEIGHT: 244.93 LBS

## 2019-08-28 PROBLEM — N17.9 ACUTE ON CHRONIC KIDNEY FAILURE (HCC): Status: RESOLVED | Noted: 2019-08-26 | Resolved: 2019-08-28

## 2019-08-28 PROBLEM — N30.00 ACUTE CYSTITIS WITHOUT HEMATURIA: Status: RESOLVED | Noted: 2019-08-26 | Resolved: 2019-08-28

## 2019-08-28 PROBLEM — G93.40 ACUTE ENCEPHALOPATHY: Status: RESOLVED | Noted: 2019-08-26 | Resolved: 2019-08-28

## 2019-08-28 PROBLEM — A41.9 SEPSIS (HCC): Status: RESOLVED | Noted: 2019-08-26 | Resolved: 2019-08-28

## 2019-08-28 PROBLEM — N18.9 ACUTE ON CHRONIC KIDNEY FAILURE (HCC): Status: RESOLVED | Noted: 2019-08-26 | Resolved: 2019-08-28

## 2019-08-28 LAB
ANION GAP SERPL CALC-SCNC: 10 MMOL/L (ref 0–11.9)
BACTERIA UR CULT: ABNORMAL
BASOPHILS # BLD AUTO: 0.2 % (ref 0–1.8)
BASOPHILS # BLD: 0.02 K/UL (ref 0–0.12)
BUN SERPL-MCNC: 15 MG/DL (ref 8–22)
CALCIUM SERPL-MCNC: 8.7 MG/DL (ref 8.5–10.5)
CHLORIDE SERPL-SCNC: 108 MMOL/L (ref 96–112)
CO2 SERPL-SCNC: 21 MMOL/L (ref 20–33)
CREAT SERPL-MCNC: 1.64 MG/DL (ref 0.5–1.4)
EOSINOPHIL # BLD AUTO: 0.08 K/UL (ref 0–0.51)
EOSINOPHIL NFR BLD: 0.9 % (ref 0–6.9)
ERYTHROCYTE [DISTWIDTH] IN BLOOD BY AUTOMATED COUNT: 51.7 FL (ref 35.9–50)
GLUCOSE SERPL-MCNC: 109 MG/DL (ref 65–99)
HCT VFR BLD AUTO: 34 % (ref 42–52)
HGB BLD-MCNC: 11.1 G/DL (ref 14–18)
IMM GRANULOCYTES # BLD AUTO: 0.17 K/UL (ref 0–0.11)
IMM GRANULOCYTES NFR BLD AUTO: 1.9 % (ref 0–0.9)
LYMPHOCYTES # BLD AUTO: 1.39 K/UL (ref 1–4.8)
LYMPHOCYTES NFR BLD: 15.7 % (ref 22–41)
MCH RBC QN AUTO: 27.9 PG (ref 27–33)
MCHC RBC AUTO-ENTMCNC: 32.6 G/DL (ref 33.7–35.3)
MCV RBC AUTO: 85.4 FL (ref 81.4–97.8)
MONOCYTES # BLD AUTO: 0.44 K/UL (ref 0–0.85)
MONOCYTES NFR BLD AUTO: 5 % (ref 0–13.4)
NEUTROPHILS # BLD AUTO: 6.74 K/UL (ref 1.82–7.42)
NEUTROPHILS NFR BLD: 76.3 % (ref 44–72)
NRBC # BLD AUTO: 0 K/UL
NRBC BLD-RTO: 0 /100 WBC
PLATELET # BLD AUTO: 180 K/UL (ref 164–446)
PMV BLD AUTO: 9 FL (ref 9–12.9)
POTASSIUM SERPL-SCNC: 3.8 MMOL/L (ref 3.6–5.5)
RBC # BLD AUTO: 3.98 M/UL (ref 4.7–6.1)
SIGNIFICANT IND 70042: ABNORMAL
SITE SITE: ABNORMAL
SODIUM SERPL-SCNC: 139 MMOL/L (ref 135–145)
SOURCE SOURCE: ABNORMAL
WBC # BLD AUTO: 8.8 K/UL (ref 4.8–10.8)

## 2019-08-28 PROCEDURE — 99239 HOSP IP/OBS DSCHRG MGMT >30: CPT | Performed by: INTERNAL MEDICINE

## 2019-08-28 PROCEDURE — A9270 NON-COVERED ITEM OR SERVICE: HCPCS | Performed by: HOSPITALIST

## 2019-08-28 PROCEDURE — 700105 HCHG RX REV CODE 258: Performed by: FAMILY MEDICINE

## 2019-08-28 PROCEDURE — 700111 HCHG RX REV CODE 636 W/ 250 OVERRIDE (IP): Performed by: HOSPITALIST

## 2019-08-28 PROCEDURE — 85025 COMPLETE CBC W/AUTO DIFF WBC: CPT

## 2019-08-28 PROCEDURE — 36415 COLL VENOUS BLD VENIPUNCTURE: CPT

## 2019-08-28 PROCEDURE — 700102 HCHG RX REV CODE 250 W/ 637 OVERRIDE(OP): Performed by: HOSPITALIST

## 2019-08-28 PROCEDURE — 700105 HCHG RX REV CODE 258: Performed by: HOSPITALIST

## 2019-08-28 PROCEDURE — 80048 BASIC METABOLIC PNL TOTAL CA: CPT

## 2019-08-28 RX ORDER — CEFDINIR 300 MG/1
300 CAPSULE ORAL 2 TIMES DAILY
Qty: 20 CAP | Refills: 0 | Status: SHIPPED | OUTPATIENT
Start: 2019-08-28 | End: 2019-09-01

## 2019-08-28 RX ADMIN — ACETAMINOPHEN 650 MG: 325 TABLET, FILM COATED ORAL at 11:16

## 2019-08-28 RX ADMIN — CLOTRIMAZOLE 10 MG: 10 LOZENGE ORAL; TOPICAL at 11:16

## 2019-08-28 RX ADMIN — CLOTRIMAZOLE 10 MG: 10 LOZENGE ORAL; TOPICAL at 08:34

## 2019-08-28 RX ADMIN — AMLODIPINE BESYLATE 10 MG: 10 TABLET ORAL at 05:49

## 2019-08-28 RX ADMIN — SERTRALINE HYDROCHLORIDE 25 MG: 50 TABLET ORAL at 05:49

## 2019-08-28 RX ADMIN — FINASTERIDE 5 MG: 5 TABLET, FILM COATED ORAL at 05:49

## 2019-08-28 RX ADMIN — TAMSULOSIN HYDROCHLORIDE 0.4 MG: 0.4 CAPSULE ORAL at 05:49

## 2019-08-28 RX ADMIN — SODIUM CHLORIDE: 9 INJECTION, SOLUTION INTRAVENOUS at 11:21

## 2019-08-28 RX ADMIN — CEFTRIAXONE SODIUM 2 G: 2 INJECTION, POWDER, FOR SOLUTION INTRAMUSCULAR; INTRAVENOUS at 05:50

## 2019-08-28 RX ADMIN — ACETAMINOPHEN 650 MG: 325 TABLET, FILM COATED ORAL at 02:55

## 2019-08-28 RX ADMIN — OMEPRAZOLE 20 MG: 20 CAPSULE, DELAYED RELEASE ORAL at 08:34

## 2019-08-28 RX ADMIN — CLOTRIMAZOLE 10 MG: 10 LOZENGE ORAL; TOPICAL at 16:02

## 2019-08-28 NOTE — CARE PLAN
Problem: Safety  Goal: Will remain free from injury  8/28/2019 1021 by Jozef Muller R.N.  Outcome: PROGRESSING AS EXPECTED  Note:   Pt remains free from falls at this time. Safety precautions in place. Pt educated on calling for assistance when needed.        Problem: Knowledge Deficit  Goal: Knowledge of disease process/condition, treatment plan, diagnostic tests, and medications will improve  8/28/2019 1021 by Jozef Muller R.N.  Outcome: PROGRESSING AS EXPECTED  Note:   Pt updated on POC, tests, and medications. Pt verbalizes understanding and has no further questions at this time. Pt educated on calling for any more questions.

## 2019-08-28 NOTE — CARE PLAN
Problem: Communication  Goal: The ability to communicate needs accurately and effectively will improve  Intervention: Educate patient and significant other/support system about the plan of care, procedures, treatments, medications and allow for questions  Note:   Educated patient to voice questions and concerns regarding plan of care, patient verbalizes understanding.     Problem: Safety  Goal: Will remain free from falls  Intervention: Implement fall precautions  Note:   Safety precautions and fall prevention in place. Fall prevention education provided. Patient verbalized understanding. Bed in low locked position, bed alarm on, treaded socks on patient, call bell within reach. Patient calls appropriately as needed.

## 2019-08-28 NOTE — DISCHARGE SUMMARY
Discharge Summary    CHIEF COMPLAINT ON ADMISSION  Chief Complaint   Patient presents with   • Syncope     went to BR; found on floor by family    • Weakness     times 24 hours   • Fever     was given tylenol 1 gm en route.  temp was 101.9       Reason for Admission  EMS     Admission Date  8/26/2019    CODE STATUS  Full Code    HPI & HOSPITAL COURSE  This is a 73 y.o. male here with above medical issues. He was found to be in sepsis with complicated UTI. He ws admitted to the telemetry floor and medically stabilized. He was placed on empiric IV ceftriaxone. Labs normalized and he remained afebrile. Urine culture grew Klebsiella pneumoniae. He will be discharged home. His creatinine returned to his baseline. He will complete antibiotic course as outlined below. His mental status returned to baseline as well.          SEE below     Therefore, he is discharged in fair and stable condition to home with close outpatient follow-up.    The patient met 2-midnight criteria for an inpatient stay at the time of discharge.    Discharge Date  8/28/19    FOLLOW UP ITEMS POST DISCHARGE  F/U with PCP in 1 week    DISCHARGE DIAGNOSES  Principal Problem (Resolved):    Acute cystitis without hematuria POA: Yes  Active Problems:    BPH (benign prostatic hyperplasia) POA: Yes    Normocytic anemia POA: Yes    HTN (hypertension) POA: Yes  Resolved Problems:    Sepsis (HCC) POA: Yes    Acute on chronic kidney failure (HCC) POA: Yes    Acute encephalopathy POA: Yes      FOLLOW UP  No future appointments.  No follow-up provider specified.    MEDICATIONS ON DISCHARGE     Medication List      START taking these medications      Instructions   cefdinir 300 MG Caps  Commonly known as:  OMNICEF   Take 1 Cap by mouth 2 times a day for 4 days.  Dose:  300 mg        CONTINUE taking these medications      Instructions   amLODIPine 10 MG Tabs  Commonly known as:  NORVASC   Take 10 mg by mouth every day.  Dose:  10 mg     clotrimazole 10 MG  Troc  Commonly known as:  MYCELEX   Take 1 Cristofer by mouth 5 Times a Day for 30 days.  Dose:  10 mg     finasteride 5 MG Tabs  Commonly known as:  PROSCAR   Take 5 mg by mouth every day.  Dose:  5 mg     ibuprofen 600 MG Tabs  Commonly known as:  MOTRIN   Take 600 mg by mouth 3 times a day as needed.  Dose:  600 mg     lidocaine 2 % Soln  Commonly known as:  XYLOCAINE   Take 5 mL by mouth 3 times a day before meals.  Dose:  5 mL     lisinopril 10 MG Tabs  Commonly known as:  PRINIVIL   Take 10 mg by mouth every day.  Dose:  10 mg     NEXIUM 20 MG capsule  Generic drug:  esomeprazole   Take 40 mg by mouth every morning before breakfast.  Dose:  40 mg     nystatin 295606 UNIT/ML Susp  Commonly known as:  MYCOSTATIN   Take 4 mL by mouth 4 times a day.  Dose:  4 mL     Potassium 99 MG Tabs   Take 99 mg by mouth every day.  Dose:  99 mg     sertraline 25 MG tablet  Commonly known as:  ZOLOFT   TAKE 1 TABLET BY MOUTH ONCE DAILY     tamsulosin 0.4 MG capsule  Commonly known as:  FLOMAX   Take 0.4 mg by mouth every day.  Dose:  0.4 mg     Vitamin B12 1000 MCG Tbcr   Take 1,000 mg by mouth every day.  Dose:  1,000 mg     zolpidem 5 MG Tabs  Commonly known as:  AMBIEN   Doctor's comments:  Ok to fill 8/13/2019  Take 1 Tab by mouth at bedtime as needed for Sleep for up to 30 days.  Dose:  5 mg            Allergies  Allergies   Allergen Reactions   • Celecoxib      Rash       DIET  Orders Placed This Encounter   Procedures   • Diet Order Regular     Standing Status:   Standing     Number of Occurrences:   1     Order Specific Question:   Diet:     Answer:   Regular [1]       ACTIVITY  As tolerated.  Weight bearing as tolerated    CONSULTATIONS  NONE    PROCEDURES  CT-SOFT TISSUE NECK W/O   Final Result      1.  No definite neck mass is identified.   2.  Maxillary sinusitis.      CT-HEAD W/O   Final Result      1.  No acute intracranial abnormality.   2.  Atrophy and chronic white matter changes, stable.   3.  Bilateral  maxillary sinus disease.               INTERPRETING LOCATION:  [address] [place], [place] [address], [id number]      DX-CHEST-PORTABLE (1 VIEW)   Final Result      No acute cardiopulmonary abnormality.            LABORATORY  Lab Results   Component Value Date    SODIUM 139 08/28/2019    POTASSIUM 3.8 08/28/2019    CHLORIDE 108 08/28/2019    CO2 21 08/28/2019    GLUCOSE 109 (H) 08/28/2019    BUN 15 08/28/2019    CREATININE 1.64 (H) 08/28/2019    CREATININE 0.97 03/06/2013        Lab Results   Component Value Date    WBC 8.8 08/28/2019    WBC 8.9 03/06/2013    HEMOGLOBIN 11.1 (L) 08/28/2019    HEMATOCRIT 34.0 (L) 08/28/2019    PLATELETCT 180 08/28/2019        Total time of the discharge process exceeds 45 minutes.

## 2019-08-28 NOTE — DISCHARGE INSTRUCTIONS
Discharge Instructions per Alden Cleaning M.D.    Discharge patient Home  Diet regular with 9-13 servings of fruits and vegetables  Activities as tolerated  Follow ups with PCP in 7-10 days, call for appointment  Meds per med rec sheet  No smoking, no alcohol, no caffeine  Wear seat belt in motorized vehicle  Take medications as perscribed  Keep appointments  If symptoms worsen call PCP, 911 or urgent care.    DIET: as noted above    ACTIVITY: as tolerated    DIAGNOSIS: urinary tract infection    Return to ER if fevers greater than 101.5, inability to urinate.  Discharge Instructions    Discharged to home by car with relative. Discharged via wheelchair, hospital escort: Yes.  Special equipment needed: Not Applicable    Be sure to schedule a follow-up appointment with your primary care doctor or any specialists as instructed.     Discharge Plan:   Diet Plan: Discussed  Activity Level: Discussed  Confirmed Follow up Appointment: Appointment Scheduled  Confirmed Symptoms Management: Discussed  Medication Reconciliation Updated: Yes  Influenza Vaccine Indication: Indicated: Not available from distributor/    I understand that a diet low in cholesterol, fat, and sodium is recommended for good health. Unless I have been given specific instructions below for another diet, I accept this instruction as my diet prescription.   Other diet: Reg with 9-13 fruits/vegetables    Special Instructions: None    · Is patient discharged on Warfarin / Coumadin?   No     Urinary Tract Infection, Adult  Introduction  A urinary tract infection (UTI) is an infection of any part of the urinary tract. The urinary tract includes the:  · Kidneys.  · Ureters.  · Bladder.  · Urethra.  These organs make, store, and get rid of pee (urine) in the body.  Follow these instructions at home:  · Take over-the-counter and prescription medicines only as told by your doctor.  · If you were prescribed an antibiotic medicine, take it as told  by your doctor. Do not stop taking the antibiotic even if you start to feel better.  · Avoid the following drinks:  ¨ Alcohol.  ¨ Caffeine.  ¨ Tea.  ¨ Carbonated drinks.  · Drink enough fluid to keep your pee clear or pale yellow.  · Keep all follow-up visits as told by your doctor. This is important.  · Make sure to:  ¨ Empty your bladder often and completely. Do not to hold pee for long periods of time.  ¨ Empty your bladder before and after sex.  ¨ Wipe from front to back after a bowel movement if you are female. Use each tissue one time when you wipe.  Contact a doctor if:  · You have back pain.  · You have a fever.  · You feel sick to your stomach (nauseous).  · You throw up (vomit).  · Your symptoms do not get better after 3 days.  · Your symptoms go away and then come back.  Get help right away if:  · You have very bad back pain.  · You have very bad lower belly (abdominal) pain.  · You are throwing up and cannot keep down any medicines or water.  This information is not intended to replace advice given to you by your health care provider. Make sure you discuss any questions you have with your health care provider.  Document Released: 06/05/2009 Document Revised: 05/25/2017 Document Reviewed: 11/07/2016  © 2017 [employer]      Depression / Suicide Risk    As you are discharged from this [facility] facility, it is important to learn how to keep safe from harming yourself.    Recognize the warning signs:  · Abrupt changes in personality, positive or negative- including increase in energy   · Giving away possessions  · Change in eating patterns- significant weight changes-  positive or negative  · Change in sleeping patterns- unable to sleep or sleeping all the time   · Unwillingness or inability to communicate  · Depression  · Unusual sadness, discouragement and loneliness  · Talk of wanting to die  · Neglect of personal appearance   · Rebelliousness- reckless behavior  · Withdrawal from people/activities they  love  · Confusion- inability to concentrate     If you or a loved one observes any of these behaviors or has concerns about self-harm, here's what you can do:  · Talk about it- your feelings and reasons for harming yourself  · Remove any means that you might use to hurt yourself (examples: pills, rope, extension cords, firearm)  · Get professional help from the community (Mental Health, Substance Abuse, psychological counseling)  · Do not be alone:Call your Safe Contact- someone whom you trust who will be there for you.  · Call your local CRISIS HOTLINE 209-1081 or 478-299-9953  · Call your local Children's Mobile Crisis Response Team [place] (819) 305-4096 or www.[employer]Sav[employer]  · Call the toll free National Suicide Prevention Hotlines   · National Suicide Prevention Lifeline 572-272-MJUZ (5627)  · National Hope Line Network 800-SUICIDE (284-0399)

## 2019-08-28 NOTE — PROGRESS NOTES
Bedside report received. Patient asleep in bed. RN assessed pain; nonverbal none. Call light within reach. Need for bed alarm assessed; off.

## 2019-08-28 NOTE — PROGRESS NOTES
Received report from day shift RN. Patient is A&Ox4, no complaints of pain, all questions and concerns answered. Bed in lowest and locked position, call light in reach, will continue to monitor.

## 2019-08-28 NOTE — PROGRESS NOTES
Patient discharged home with significant other. Discharged via wheelchair to [facility]. Patient was A&Ox4. All lines removed. Discharge plan discussed. Patient educated when to call MD for worsening symptoms. Patient also educated on when to call 911. Patient belongings discharged with patient. Tele box removed. Monitor room notified.

## 2019-09-27 ENCOUNTER — OFFICE VISIT (OUTPATIENT)
Dept: URGENT CARE | Facility: PHYSICIAN GROUP | Age: 74
End: 2019-09-27
Payer: MEDICARE

## 2019-09-27 VITALS
SYSTOLIC BLOOD PRESSURE: 172 MMHG | DIASTOLIC BLOOD PRESSURE: 84 MMHG | TEMPERATURE: 97.8 F | WEIGHT: 233 LBS | RESPIRATION RATE: 20 BRPM | BODY MASS INDEX: 31.6 KG/M2 | OXYGEN SATURATION: 94 % | HEART RATE: 96 BPM

## 2019-09-27 DIAGNOSIS — G47.9 SLEEP DISORDER, UNSPECIFIED: ICD-10-CM

## 2019-09-27 DIAGNOSIS — F43.81 COMPLEX GRIEF DISORDER LASTING LONGER THAN 12 MONTHS: ICD-10-CM

## 2019-09-27 DIAGNOSIS — F40.298 FEAR OF DEATH: ICD-10-CM

## 2019-09-27 DIAGNOSIS — G44.209 ACUTE NON INTRACTABLE TENSION-TYPE HEADACHE: ICD-10-CM

## 2019-09-27 DIAGNOSIS — L98.9 SCALP LESION: ICD-10-CM

## 2019-09-27 DIAGNOSIS — R00.0 TACHYCARDIA: ICD-10-CM

## 2019-09-27 DIAGNOSIS — R11.0 NAUSEA: ICD-10-CM

## 2019-09-27 DIAGNOSIS — I10 ESSENTIAL HYPERTENSION: Primary | ICD-10-CM

## 2019-09-27 PROCEDURE — 99214 OFFICE O/P EST MOD 30 MIN: CPT | Performed by: PHYSICIAN ASSISTANT

## 2019-09-27 RX ORDER — ZOLPIDEM TARTRATE 5 MG/1
5 TABLET ORAL NIGHTLY PRN
Refills: 2 | COMMUNITY
End: 2020-02-05

## 2019-09-27 RX ORDER — GABAPENTIN 100 MG/1
CAPSULE ORAL
Refills: 0 | COMMUNITY
Start: 2019-09-18 | End: 2019-10-10

## 2019-09-27 RX ORDER — CEFDINIR 300 MG/1
CAPSULE ORAL
Refills: 0 | COMMUNITY
Start: 2019-09-18 | End: 2019-10-10

## 2019-09-27 RX ORDER — GABAPENTIN 300 MG/1
CAPSULE ORAL
Refills: 0 | COMMUNITY
Start: 2019-09-18 | End: 2019-10-10

## 2019-09-28 ENCOUNTER — HOSPITAL ENCOUNTER (EMERGENCY)
Facility: MEDICAL CENTER | Age: 74
End: 2019-09-28
Attending: EMERGENCY MEDICINE
Payer: MEDICARE

## 2019-09-28 ENCOUNTER — APPOINTMENT (OUTPATIENT)
Dept: RADIOLOGY | Facility: MEDICAL CENTER | Age: 74
End: 2019-09-28
Attending: EMERGENCY MEDICINE
Payer: MEDICARE

## 2019-09-28 VITALS
DIASTOLIC BLOOD PRESSURE: 86 MMHG | RESPIRATION RATE: 28 BRPM | SYSTOLIC BLOOD PRESSURE: 151 MMHG | BODY MASS INDEX: 31.99 KG/M2 | OXYGEN SATURATION: 95 % | TEMPERATURE: 98 F | HEART RATE: 93 BPM | WEIGHT: 235.89 LBS

## 2019-09-28 DIAGNOSIS — R22.1 NECK MASS: ICD-10-CM

## 2019-09-28 DIAGNOSIS — R53.83 FATIGUE, UNSPECIFIED TYPE: ICD-10-CM

## 2019-09-28 LAB — EKG IMPRESSION: NORMAL

## 2019-09-28 PROCEDURE — 93005 ELECTROCARDIOGRAM TRACING: CPT

## 2019-09-28 PROCEDURE — 93005 ELECTROCARDIOGRAM TRACING: CPT | Performed by: EMERGENCY MEDICINE

## 2019-09-28 PROCEDURE — 99283 EMERGENCY DEPT VISIT LOW MDM: CPT

## 2019-09-28 NOTE — DISCHARGE INSTRUCTIONS
If you change your mind about evaluation or have new or worsening symptoms return here for recheck.  Call your primary care doctor Monday morning and arrange office recheck during the week and you will need a follow-up visit with a dermatologist.  Also if you do not want a CT scan with contrast your doctor can order an ultrasound of your neck.  Also talk to your doctor about your sleeping difficulty as you may benefit from a sleep study.

## 2019-09-28 NOTE — ED NOTES
"Pt presents with \"lump\" to anterior throat x1 month. Pt states no difficulty swallowing but difficulty breathing at times- pt speaking full sentences, O2 WNL on RA. Pt denies hx of thyroid issues. No fevers, no pain to site with palpation. Pt also states was dx with \"broken heart syndrome\" yesterday and was sent for a cardiac work up via urgent care  "

## 2019-09-28 NOTE — ED TRIAGE NOTES
".Chidi Tatum  .  Chief Complaint   Patient presents with   • Lump     patient c/o lump to anterior neck x 1 month, patient reports he was seen at urgent care yesterday and told \"it as nothing, but it's something cause it's damn well bothering me!\"   • Fatigue     x 2 weeks.    • Nausea     Patient ambulatory to triage with above complaint. Patient was seen at urgent care yesterday and was told to come to the ER for a \"cardiac work-up, but I didn't want to\". Patient appears fatigued in triage. aao x 4, neuro intact. ./95   Pulse 98   Temp 36.7 °C (98 °F) (Temporal)   Resp 20   Wt 107 kg (235 lb 14.3 oz)   SpO2 97%   BMI 31.99 kg/m²     EKG completed prior to triage.   Patient to lobby with family member (granddaughter) and instructed to inform staff of any needs.  "

## 2019-09-29 NOTE — PROGRESS NOTES
"Subjective:      Pt is a 73 y.o. male who presents with Headache (nausea, \"afraid to sleep\", will fall asleep and wake up suddenly, scab on top of head )            HPI  This is a new problem. PT bought in by his daughter today. He notes his wife  2 years ago around this time of year and this causes him great distress. PT notes 3 days of fear of sleep, with racing heart and SOB, headaches, dizziness,  nausea, and also a new atraumatic head wound x 2 weeks. Pt has not taken any Rx medications for this condition. PT notes afraid of dying. Pt states the pain is a 7/10, aching in nature and worse at night. Pt denies paresthesias,  change in vision, hives, or other joint pain. The pt's medication list, problem list, and allergies have been evaluated and reviewed during today's visit.    PMH:  Past Medical History:   Diagnosis Date   • BPH (benign prostatic hyperplasia)    • GERD (gastroesophageal reflux disease)    • HTN (hypertension)    • Hypertension    • Insomnia    • SBO (small bowel obstruction) (HCC)        PSH:  Past Surgical History:   Procedure Laterality Date   • TEMPORAL ARTERY BIOPSY Right 2018    Procedure: TEMPORAL ARTERY BIOPSY;  Surgeon: Bryant Corey M.D.;  Location: SURGERY [facility];  Service: Vascular   • HERNIA REPAIR      umbilical, groin        Fam Hx:    family history includes Alcohol/Drug in his brother; Lung Disease in his brother and father.  Family Status   Relation Name Status   • Mo     • Fa     • Sis  Alive   • Bro     • Sis  Alive   • Bro     • Bro     • Bro         Soc HX:  Social History     Socioeconomic History   • Marital status:      Spouse name: Not on file   • Number of children: Not on file   • Years of education: Not on file   • Highest education level: Not on file   Occupational History   • Not on file   Social Needs   • Financial resource strain: Not on file   • Food insecurity:     Worry: Not on file "     Inability: Not on file   • Transportation needs:     Medical: Not on file     Non-medical: Not on file   Tobacco Use   • Smoking status: Never Smoker   • Smokeless tobacco: Never Used   Substance and Sexual Activity   • Alcohol use: No     Alcohol/week: 0.0 oz   • Drug use: No   • Sexual activity: Never   Lifestyle   • Physical activity:     Days per week: Not on file     Minutes per session: Not on file   • Stress: Not on file   Relationships   • Social connections:     Talks on phone: Not on file     Gets together: Not on file     Attends Islam service: Not on file     Active member of club or organization: Not on file     Attends meetings of clubs or organizations: Not on file     Relationship status: Not on file   • Intimate partner violence:     Fear of current or ex partner: Not on file     Emotionally abused: Not on file     Physically abused: Not on file     Forced sexual activity: Not on file   Other Topics Concern   • Not on file   Social History Narrative   • Not on file         Medications:    Current Outpatient Medications:   •  zolpidem (AMBIEN) 5 MG Tab, , Disp: , Rfl: 2  •  lisinopril (PRINIVIL) 10 MG Tab, TAKE 1 TABLET BY MOUTH ONCE DAILY, Disp: 90 Tab, Rfl: 2  •  finasteride (PROSCAR) 5 MG Tab, TAKE 1 TABLET BY MOUTH ONCE DAILY, Disp: 90 Tab, Rfl: 2  •  sertraline (ZOLOFT) 25 MG tablet, TAKE 1 TABLET BY MOUTH ONCE DAILY, Disp: 90 Tab, Rfl: 2  •  amLODIPine (NORVASC) 10 MG Tab, Take 10 mg by mouth every day., Disp: , Rfl: 0  •  esomeprazole (NEXIUM) 20 MG capsule, Take 40 mg by mouth every morning before breakfast., Disp: , Rfl:   •  Potassium 99 MG Tab, Take 99 mg by mouth every day., Disp: , Rfl:   •  Cyanocobalamin (VITAMIN B12) 1000 MCG Tab CR, Take 1,000 mg by mouth every day., Disp: , Rfl:   •  finasteride (PROSCAR) 5 MG Tab, Take 5 mg by mouth every day., Disp: , Rfl:   •  lisinopril (PRINIVIL) 10 MG Tab, Take 10 mg by mouth every day., Disp: , Rfl:   •  tamsulosin (FLOMAX) 0.4 MG  capsule, Take 0.4 mg by mouth every day., Disp: , Rfl:   •  cefdinir (OMNICEF) 300 MG Cap, TAKE 1 CAPSULE BY MOUTH EVERY 12 HOURS FOR 7 DAYS, Disp: , Rfl: 0  •  gabapentin (NEURONTIN) 100 MG Cap, TAKE 1 CAPSULE BY MOUTH TWICE DAILY FOR 3 DAYS (TAKE IN THE MORNING AND AFTERNOON), Disp: , Rfl: 0  •  gabapentin (NEURONTIN) 300 MG Cap, TAKE 1 CAPSULE BY MOUTH AT BEDTIME FOR 5 DAYS, Disp: , Rfl: 0  •  ibuprofen (MOTRIN) 600 MG Tab, Take 600 mg by mouth 3 times a day as needed., Disp: , Rfl: 0  •  lidocaine (XYLOCAINE) 2 % Solution, Take 5 mL by mouth 3 times a day before meals., Disp: , Rfl: 0  •  nystatin (MYCOSTATIN) 260747 UNIT/ML Suspension, Take 4 mL by mouth 4 times a day., Disp: , Rfl: 0      Allergies:  Celecoxib    ROS  Constitutional: Negative for fever, chills and POS malaise/fatigue.   HENT: Negative for congestion and sore throat.    Eyes: Negative for blurred vision, double vision and photophobia.   Respiratory: POS shortness of breath.  Cardiovascular: POS for chest pain and palpitations.   Gastrointestinal: POS nausea, NEG vomiting, abdominal pain, diarrhea and constipation.   Genitourinary: Negative for dysuria and flank pain.   Musculoskeletal: Negative for joint pain and myalgias.   Skin: Negative for itching and rash.   Neurological: POS for dizziness, tingling and headaches.   Endo/Heme/Allergies: Does not bruise/bleed easily.   Psychiatric/Behavioral: POS for depression and anxiety       Objective:     BP (!) 172/84   Pulse 96   Temp 36.6 °C (97.8 °F)   Resp 20   Wt 105.7 kg (233 lb)   SpO2 94%   BMI 31.60 kg/m²      Physical Exam   HENT:   Head:       Skin: Skin is warm. Capillary refill takes less than 2 seconds. Lesion noted. No rash noted. No cyanosis. Nails show no clubbing.              Constitutional: PT is oriented to person, place, and time. PT appears well-developed and well-nourished. No distress.   HENT:   Mouth/Throat: Oropharynx is clear and moist. No oropharyngeal exudate.    Eyes: Conjunctivae normal and EOM are normal. Pupils are equal, round, and reactive to light.   Neck: Normal range of motion. Neck supple. No thyromegaly present.   Cardiovascular: Tachycardia.  Exam reveals no gallop and no friction rub.    No murmur heard.  Pulmonary/Chest: Effort normal and breath sounds normal. No respiratory distress. PT has no wheezes. PT has no rales. Pt exhibits no tenderness.   Abdominal: Soft. Bowel sounds are normal. PT exhibits no distension and no mass. There is no tenderness. There is no rebound and no guarding.   Musculoskeletal: Normal range of motion. PT exhibits no edema and no tenderness.   Neurological: PT is alert and oriented to person, place, and time. PT has normal reflexes. No cranial nerve deficit.   Skin: Skin is warm and dry. No rash noted. PT is not diaphoretic. No erythema.       Psychiatric: PT has a normal mood and affect. PT behavior is normal. Judgment and thought content normal.          Assessment/Plan:     1. Essential hypertension      2. Tachycardia      3. Sleep disorder, unspecified      4. Nausea      5. Scalp lesion    - REFERRAL TO DERMATOLOGY    6. Acute non intractable tension-type headache    - EKG    7. Fear of death      8. 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 Pt declines [facility] Reiterated to patient that although a provider to provider transfer was made this will not necessarily expedite the ER process.  Rest, fluids encouraged.  AVS with medical info given.  Pt was in full understanding and agreement with the plan.  Differential diagnosis, natural history, supportive care, and indications for immediate follow-up discussed. All questions answered. Patient agrees with the plan of care.  Follow-up as needed if symptoms worsen or fail to improve.

## 2019-09-29 NOTE — ED PROVIDER NOTES
"ED Provider Note    CHIEF COMPLAINT  Chief Complaint   Patient presents with   • Lump     patient c/o lump to anterior neck x 1 month, patient reports he was seen at urgent care yesterday and told \"it as nothing, but it's something cause it's damn well bothering me!\"   • Fatigue     x 2 weeks.    • Nausea       HPI  Chidi Tatum is a 73 y.o. male who presents to the emergency department with multiple complaints.  Primarily the patient expresses concern regarding a lump noted in the subcutaneous fat in the anterior aspect of his neck.  He says this has been present for more than a month.  He was seen at an urgent care earlier this week and had a CT scan of his neck and was told that it was nothing to worry about but he says that it is bothering him and he initially is requesting further evaluation.  In addition to this the patient has multiple additional complaints expressed by him and by his family.  His wife  several months ago and he has been struggling with this and very despondent.  He has not been sleeping well he says that when he tries to fall asleep he jolts and feels like he is not breathing properly.  He is also been very fatigued and is complaining of nausea occasionally.  His family also tells me that he was at an urgent care and was told that he had Takasubo cardiomyopathy.  The patient did not have an echocardiogram or cardiac catheterization.  The patient has not had any chest pain.    REVIEW OF SYSTEMS no fever or chills no vomiting no chest pain no hemoptysis he is not dyspneic with exertion.  All other systems negative    PAST MEDICAL HISTORY  Past Medical History:   Diagnosis Date   • BPH (benign prostatic hyperplasia)    • GERD (gastroesophageal reflux disease)    • HTN (hypertension)    • Hypertension    • Insomnia    • SBO (small bowel obstruction) ([facility])        FAMILY HISTORY  Family History   Problem Relation Age of Onset   • Lung Disease Father    • Alcohol/Drug Brother    • Lung " Disease Brother        SOCIAL HISTORY  Social History     Socioeconomic History   • Marital status:      Spouse name: Not on file   • Number of children: Not on file   • Years of education: Not on file   • Highest education level: Not on file   Occupational History   • Not on file   Social Needs   • Financial resource strain: Not on file   • Food insecurity:     Worry: Not on file     Inability: Not on file   • Transportation needs:     Medical: Not on file     Non-medical: Not on file   Tobacco Use   • Smoking status: Never Smoker   • Smokeless tobacco: Never Used   Substance and Sexual Activity   • Alcohol use: No     Alcohol/week: 0.0 oz   • Drug use: No   • Sexual activity: Never   Lifestyle   • Physical activity:     Days per week: Not on file     Minutes per session: Not on file   • Stress: Not on file   Relationships   • Social connections:     Talks on phone: Not on file     Gets together: Not on file     Attends [employer] service: Not on file     Active member of club or organization: Not on file     Attends meetings of clubs or organizations: Not on file     Relationship status: Not on file   • Intimate partner violence:     Fear of current or ex partner: Not on file     Emotionally abused: Not on file     Physically abused: Not on file     Forced sexual activity: Not on file   Other Topics Concern   • Not on file   Social History Narrative   • Not on file       SURGICAL HISTORY  Past Surgical History:   Procedure Laterality Date   • TEMPORAL ARTERY BIOPSY Right 2/9/2018    Procedure: TEMPORAL ARTERY BIOPSY;  Surgeon: Bryant Corey M.D.;  Location: SURGERY [facility];  Service: Vascular   • HERNIA REPAIR      umbilical, groin        CURRENT MEDICATIONS  Home Medications     Reviewed by Sanjiv Rome R.N. (Registered Nurse) on 09/28/19 at 1334  Med List Status: Not Addressed   Medication Last Dose Status   amLODIPine (NORVASC) 10 MG Tab  Active   cefdinir (OMNICEF) 300 MG Cap  Active    Cyanocobalamin (VITAMIN B12) 1000 MCG Tab CR  Active   esomeprazole (NEXIUM) 20 MG capsule  Active   finasteride (PROSCAR) 5 MG Tab  Active   finasteride (PROSCAR) 5 MG Tab  Active   gabapentin (NEURONTIN) 100 MG Cap  Active   gabapentin (NEURONTIN) 300 MG Cap  Active   ibuprofen (MOTRIN) 600 MG Tab  Active   lidocaine (XYLOCAINE) 2 % Solution  Active   lisinopril (PRINIVIL) 10 MG Tab  Active   lisinopril (PRINIVIL) 10 MG Tab  Active   nystatin (MYCOSTATIN) 293516 UNIT/ML Suspension  Active   Potassium 99 MG Tab  Active   sertraline (ZOLOFT) 25 MG tablet  Active   tamsulosin (FLOMAX) 0.4 MG capsule  Active   zolpidem (AMBIEN) 5 MG Tab  Active                ALLERGIES  Allergies   Allergen Reactions   • Celecoxib      Rash       PHYSICAL EXAM  VITAL SIGNS: /86   Pulse 93   Temp 36.7 °C (98 °F) (Temporal)   Resp (!) 28   Wt 107 kg (235 lb 14.3 oz)   SpO2 95%   BMI 31.99 kg/m²    Oxygen saturation is interpreted as adequate  Constitutional: Awake verbal he appears to have a very flat affect and is not a great historian his family is helpful.  HENT: Mucous membranes are moist throat clear  Eyes: No erythema discharge or jaundice  Neck: The patient has a lot of subcutaneous adipose tissue over the entire anterior aspect of the neck within that adipose tissue I can palpate a mass that is freely mobile, not particularly tender and reminiscent of a lipoma.  It is approximately 1-1/2 to 2 cm in diameter.  There are no skin changes overlying this area  Cardiovascular: Regular rate and rhythm  Lungs: Clear and equal bilaterally with no apparent difficulty breathing  Abdomen/Back: Obese soft nontender no peritoneal findings  Skin: Warm and dry  Musculoskeletal: No acute bony deformity  Neurologic: Awake verbal moving all extremities    CHART REVIEW  I reviewed an urgent care note and CT scan done recently with no contrast showing no definitive neck mass according to the report.  I also reviewed laboratory testing  done approximately 1 month ago and the patient is noted to have renal insufficiency and his GFR has been in the 30s and low 40s    Laboratory  The patient declined laboratory testing    EKG interpretation  A twelve-lead EKG shows sinus rhythm 93 bpm there is no ST elevation or depression or ectopy the DE interval is 152 ms the QTc interval is 423 ms findings are similar to the cardiogram from August 26, 2019    Radiology  The patient declined CT scan with contrast of soft tissues of the neck    MEDICAL DECISION MAKING and DISPOSITION  I reviewed all of the historical data with the patient and his family including the results of the noncontrast CT and his recent lab work and his cardiogram today.  I discussed the risks and benefits of performing a IV contrast CT of the neck to try and further delineate this palpable mass.  I advised the patient that would we would need to check his renal function before proceeding with the CT because he had a history of renal insufficiency and I wanted to be certain that his kidneys could handle the contrast load.  Following this discussion the patient decided he did not want a IV contrast CT scan.  Chart review had also revealed that an outpatient physician had ordered an ultrasound of the patient's neck but he said he never went for the study.  I discussed all of the patient's symptoms with him and his family I offered to recheck his labs and cardiac enzymes although my suspicion for acute coronary syndrome or acute cardiomyopathy is low.  I have discussed with the patient and his family that stress and severe depression or anxiety have been linked with Takasubo cardiomyopathy but merely experiencing stress anxiety or depression does not assure that you have this condition and much further and more invasive evaluation would be necessary to delineate this.  After extensive discussion with the patient and his family he has decided that he would follow-up with his primary care doctor  I have advised him that an ultrasound of his neck was ordered and he should pursue this and he is to speak to his doctor about his symptoms he apparently has not spoken to his primary care doctor about this.  Also given his sleep difficulty he may want to speak to his doctor about whether or not to pursue a sleep study.  Finally I have very clearly advised the patient and his family that if he changes his mind and would like further evaluation or feels that he needs emergency medical care he is to return here at once for recheck    IMPRESSION  1.  Anterior neck mass of unknown etiology  2.  Fatigue  3.  Situational depression  4.  Sleep difficulty  5.  History of renal insufficiency         Electronically signed by: Tej Oliveira, 9/29/2019 9:33 AM

## 2019-10-03 ENCOUNTER — SUPERVISING PHYSICIAN REVIEW (OUTPATIENT)
Dept: MEDICAL GROUP | Facility: PHYSICIAN GROUP | Age: 74
End: 2019-10-03

## 2019-10-03 ENCOUNTER — OFFICE VISIT (OUTPATIENT)
Dept: MEDICAL GROUP | Facility: CLINIC | Age: 74
End: 2019-10-03
Payer: MEDICARE

## 2019-10-03 VITALS
OXYGEN SATURATION: 96 % | SYSTOLIC BLOOD PRESSURE: 146 MMHG | BODY MASS INDEX: 32.62 KG/M2 | TEMPERATURE: 98 F | HEART RATE: 110 BPM | RESPIRATION RATE: 14 BRPM | WEIGHT: 233 LBS | HEIGHT: 71 IN | DIASTOLIC BLOOD PRESSURE: 74 MMHG

## 2019-10-03 DIAGNOSIS — Z28.20 VACCINE REFUSED BY PATIENT: ICD-10-CM

## 2019-10-03 DIAGNOSIS — J32.0 CHRONIC MAXILLARY SINUSITIS: ICD-10-CM

## 2019-10-03 DIAGNOSIS — F33.1 MODERATE EPISODE OF RECURRENT MAJOR DEPRESSIVE DISORDER (HCC): ICD-10-CM

## 2019-10-03 DIAGNOSIS — Z78.9 FREQUENT HOSPITAL ADMISSIONS: ICD-10-CM

## 2019-10-03 PROCEDURE — 99214 OFFICE O/P EST MOD 30 MIN: CPT | Performed by: NURSE PRACTITIONER

## 2019-10-03 NOTE — PROGRESS NOTES
Subjective:     Chidi Tatum is a 73 y.o. male here today for evaluation and management of the following:    Chronic maxillary sinusitis  This is a chronic problem which appears to be of most concern to patient in regards to frequent hospitalizations most recently.  Has not been consulted with ENT.  Appears to have been on several antibiotics in the past without avail.    Moderate episode of recurrent major depressive disorder (HCC)  This is a chronic problem which is not well controlled.  Patient utilizes Ambien for sleep.  Low-dose sertraline 25 mg daily for depression.  Patient's health decline certainly began after the death of his wife.  He has limited access to transportation.  He does have a daughter who helps to care for him.  He declines to see therapist.  Denies suicidal ideation.       Current medicines (including changes today)  Current Outpatient Medications   Medication Sig Dispense Refill   • tamsulosin (FLOMAX) 0.4 MG capsule TAKE 1 CAPSULE BY MOUTH ONCE DAILY ONE-HALF  HOUR  AFTER  BREAKFAST 90 Cap 2   • cefdinir (OMNICEF) 300 MG Cap TAKE 1 CAPSULE BY MOUTH EVERY 12 HOURS FOR 7 DAYS  0   • gabapentin (NEURONTIN) 100 MG Cap TAKE 1 CAPSULE BY MOUTH TWICE DAILY FOR 3 DAYS (TAKE IN THE MORNING AND AFTERNOON)  0   • gabapentin (NEURONTIN) 300 MG Cap TAKE 1 CAPSULE BY MOUTH AT BEDTIME FOR 5 DAYS  0   • zolpidem (AMBIEN) 5 MG Tab   2   • lisinopril (PRINIVIL) 10 MG Tab TAKE 1 TABLET BY MOUTH ONCE DAILY 90 Tab 2   • finasteride (PROSCAR) 5 MG Tab TAKE 1 TABLET BY MOUTH ONCE DAILY 90 Tab 2   • ibuprofen (MOTRIN) 600 MG Tab Take 600 mg by mouth 3 times a day as needed.  0   • lidocaine (XYLOCAINE) 2 % Solution Take 5 mL by mouth 3 times a day before meals.  0   • nystatin (MYCOSTATIN) 697038 UNIT/ML Suspension Take 4 mL by mouth 4 times a day.  0   • sertraline (ZOLOFT) 25 MG tablet TAKE 1 TABLET BY MOUTH ONCE DAILY 90 Tab 2   • amLODIPine (NORVASC) 10 MG Tab Take 10 mg by mouth every day.  0   •  esomeprazole (NEXIUM) 20 MG capsule Take 40 mg by mouth every morning before breakfast.     • Potassium 99 MG Tab Take 99 mg by mouth every day.     • Cyanocobalamin (VITAMIN B12) 1000 MCG Tab CR Take 1,000 mg by mouth every day.     • finasteride (PROSCAR) 5 MG Tab Take 5 mg by mouth every day.     • lisinopril (PRINIVIL) 10 MG Tab Take 10 mg by mouth every day.     • tamsulosin (FLOMAX) 0.4 MG capsule Take 0.4 mg by mouth every day.       No current facility-administered medications for this visit.        He  has a past medical history of BPH (benign prostatic hyperplasia), GERD (gastroesophageal reflux disease), HTN (hypertension), Hypertension, Insomnia, and SBO (small bowel obstruction) ([facility]).    He  has a past surgical history that includes hernia repair and temporal artery biopsy (Right, 2/9/2018).     Social History     Socioeconomic History   • Marital status:      Spouse name: Not on file   • Number of children: Not on file   • Years of education: Not on file   • Highest education level: Not on file   Occupational History   • Not on file   Social Needs   • Financial resource strain: Not on file   • Food insecurity:     Worry: Not on file     Inability: Not on file   • Transportation needs:     Medical: Not on file     Non-medical: Not on file   Tobacco Use   • Smoking status: Never Smoker   • Smokeless tobacco: Never Used   Substance and Sexual Activity   • Alcohol use: No     Alcohol/week: 0.0 oz   • Drug use: No   • Sexual activity: Never   Lifestyle   • Physical activity:     Days per week: Not on file     Minutes per session: Not on file   • Stress: Not on file   Relationships   • Social connections:     Talks on phone: Not on file     Gets together: Not on file     Attends Yazidi service: Not on file     Active member of club or organization: Not on file     Attends meetings of clubs or organizations: Not on file     Relationship status: Not on file   • Intimate partner violence:     Fear of  "current or ex partner: Not on file     Emotionally abused: Not on file     Physically abused: Not on file     Forced sexual activity: Not on file   Other Topics Concern   • Not on file   Social History Narrative   • Not on file       Family History   Problem Relation Age of Onset   • Lung Disease Father    • Alcohol/Drug Brother    • Lung Disease Brother          ROS  Positive for cough, generalized pain.  No fever, no chest pain, no shortness of breath, no abdominal pain, no rashes    All other systems reviewed and are negative.        Objective:     /74 (BP Location: Right arm, Patient Position: Sitting, BP Cuff Size: Large adult)   Pulse (!) 110   Temp 36.7 °C (98 °F)   Resp 14   Ht 1.803 m (5' 11\")   Wt 105.7 kg (233 lb)   SpO2 96%  Body mass index is 32.5 kg/m².    Physical Exam:   Constitutional: Alert, no distress. Appears tired.   Eye: Equal, round and reactive, conjunctiva clear, lids normal.   ENMT: Lips without lesions, oropharynx clear.   Neck: Trachea midline, no masses, no thyromegaly.  Respiratory: Unlabored respiratory effort, lungs clear to auscultation, no wheezes, no ronchi. Diminished bases.   Cardiovascular: Normal S1, S2, no murmur, no edema.   Abdomen: Soft, non-tender, no masses, no hepatosplenomegaly. Normal bowel sounds.   Skin: Warm, dry, good turgor, no rashes in visible areas.  Psych: Alert and oriented x3, appears flat.         Assessment and Plan:   The following treatment plan was discussed    1. Chronic maxillary sinusitis  - REFERRAL TO ENT    2. Frequent hospital admissions  I do think patient would be a good candidate for hospice care.  He has had several hospitalizations in the last year.  I have seen his health decline over the last year.  I have discussed with him and his daughter possible use of hospice care and also advanced directives and POLST form.  I have discussed with [place] [facility] whom will do a chart review.      3. Moderate episode of recurrent major " depressive disorder (HCC)  Patient would like to continue with same dosing.  Declines other services.    4. Vaccine refused by patient  Declines any recommended vaccinations.      Reviewed indication, dosage, usage and potential adverse effects of prescribed medications. Patient appears to understand, verbalizes understanding and is willing to try medications as prescribed.      Reviewed risks and benefits of treatment plan. Patient verbally agrees to plan of care.       Followup: Return in about 1 month (around 11/3/2019).    FRANCISCO Moya.     PLEASE NOTE: This dictation was created using voice recognition software. I have made every reasonable attempt to correct obvious errors, but I expect that there may be errors of grammar and possibly content that I did not discover prior finalizing this note.     I have reviewed and agree with history, assessment and plan for office encounter on 10/3/19 with Advanced Practice Provider: Aniya FERNANDEZ.  Face to face encounter/direct observation: no  Suggested changes to plan or follow-up: none  Jesus Rivera M.D.

## 2019-10-03 NOTE — PATIENT INSTRUCTIONS
Dr. Zackery Vela   [address] [place], [place] [address] [address] [place] [address]  Phone: [phone]  Fax: [phone]    Your medical care was provided today by: REBECCA Pike    Thank You for the opportunity to serve you.    You may receive a brief survey in the mail shortly regarding your visit today. Please take a few moments to complete the survey and return it; no postage is necessary. We are working to serve our patient population better, improve customer service and our patients overall experience and your input can help us to accomplish this. We thank you for your help and for the opportunity to serve you today and in the future.     Labs and Diagnostic Testing   Please note that if we have ordered labs or diagnostic testing, those results may be released to you on [employer] prior to my review. While we do our best to review your results as soon as possible, there are times when you may see your results prior to provider review. Of course, if you ever have any questions or concerns about your results, please contact our clinic.     Special Instructions:  Always call 9-1-1 immediately if you develop a life threatening emergency.    Unless told otherwise please take all medications as directed and complete prescription therapies.     Watch for the following signs that require additional evaluation: progressive lethargy or unresponsiveness, localized pain (chest, abdomen), shortness of breath, painful breathing, progressive vomiting with weakness, bloody stools, or new rash.     If you are prescribed pain medication or any other medication that is sedating, do not take medication before or while operating a vehicle or heavy machinery or equipment due to potential side effects such as drowsiness and/or dizziness.

## 2019-10-03 NOTE — ASSESSMENT & PLAN NOTE
This is a chronic problem which appears to be of most concern to patient in regards to frequent hospitalizations most recently.  Has not been consulted with ENT.  Appears to have been on several antibiotics in the past without avail.

## 2019-10-03 NOTE — ASSESSMENT & PLAN NOTE
This is a chronic problem which is not well controlled.  Patient utilizes Ambien for sleep.  Low-dose sertraline 25 mg daily for depression.  Patient's health decline certainly began after the death of his wife.  He has limited access to transportation.  He does have a daughter who helps to care for him.  He declines to see therapist.  Denies suicidal ideation.

## 2019-10-10 ENCOUNTER — APPOINTMENT (OUTPATIENT)
Dept: RADIOLOGY | Facility: MEDICAL CENTER | Age: 74
DRG: 392 | End: 2019-10-10
Attending: EMERGENCY MEDICINE
Payer: MEDICARE

## 2019-10-10 ENCOUNTER — HOSPITAL ENCOUNTER (INPATIENT)
Facility: MEDICAL CENTER | Age: 74
LOS: 2 days | DRG: 392 | End: 2019-10-12
Attending: EMERGENCY MEDICINE | Admitting: INTERNAL MEDICINE
Payer: MEDICARE

## 2019-10-10 DIAGNOSIS — N39.0 ACUTE UTI: ICD-10-CM

## 2019-10-10 PROBLEM — E87.20 METABOLIC ACIDOSIS: Status: ACTIVE | Noted: 2019-10-10

## 2019-10-10 PROBLEM — R62.7 FAILURE TO THRIVE IN ADULT: Status: ACTIVE | Noted: 2019-10-10

## 2019-10-10 LAB
ALBUMIN SERPL BCP-MCNC: 3.4 G/DL (ref 3.2–4.9)
ALBUMIN/GLOB SERPL: 0.9 G/DL
ALP SERPL-CCNC: 75 U/L (ref 30–99)
ALT SERPL-CCNC: 7 U/L (ref 2–50)
ANION GAP SERPL CALC-SCNC: 9 MMOL/L (ref 0–11.9)
APPEARANCE UR: ABNORMAL
AST SERPL-CCNC: 12 U/L (ref 12–45)
BACTERIA #/AREA URNS HPF: ABNORMAL /HPF
BASOPHILS # BLD AUTO: 0.2 % (ref 0–1.8)
BASOPHILS # BLD: 0.03 K/UL (ref 0–0.12)
BILIRUB SERPL-MCNC: 0.6 MG/DL (ref 0.1–1.5)
BILIRUB UR QL STRIP.AUTO: NEGATIVE
BUN SERPL-MCNC: 12 MG/DL (ref 8–22)
CALCIUM SERPL-MCNC: 9.2 MG/DL (ref 8.5–10.5)
CHLORIDE SERPL-SCNC: 107 MMOL/L (ref 96–112)
CO2 SERPL-SCNC: 19 MMOL/L (ref 20–33)
COLOR UR: YELLOW
CREAT SERPL-MCNC: 1.63 MG/DL (ref 0.5–1.4)
EOSINOPHIL # BLD AUTO: 0.06 K/UL (ref 0–0.51)
EOSINOPHIL NFR BLD: 0.4 % (ref 0–6.9)
EPI CELLS #/AREA URNS HPF: ABNORMAL /HPF
ERYTHROCYTE [DISTWIDTH] IN BLOOD BY AUTOMATED COUNT: 50.3 FL (ref 35.9–50)
GLOBULIN SER CALC-MCNC: 3.9 G/DL (ref 1.9–3.5)
GLUCOSE SERPL-MCNC: 118 MG/DL (ref 65–99)
GLUCOSE UR STRIP.AUTO-MCNC: NEGATIVE MG/DL
HCT VFR BLD AUTO: 36.5 % (ref 42–52)
HGB BLD-MCNC: 12.3 G/DL (ref 14–18)
IMM GRANULOCYTES # BLD AUTO: 0.1 K/UL (ref 0–0.11)
IMM GRANULOCYTES NFR BLD AUTO: 0.6 % (ref 0–0.9)
KETONES UR STRIP.AUTO-MCNC: NEGATIVE MG/DL
LACTATE BLD-SCNC: 1.1 MMOL/L (ref 0.5–2)
LEUKOCYTE ESTERASE UR QL STRIP.AUTO: ABNORMAL
LIPASE SERPL-CCNC: 12 U/L (ref 11–82)
LYMPHOCYTES # BLD AUTO: 1.77 K/UL (ref 1–4.8)
LYMPHOCYTES NFR BLD: 10.7 % (ref 22–41)
MAGNESIUM SERPL-MCNC: 1.8 MG/DL (ref 1.5–2.5)
MCH RBC QN AUTO: 29.4 PG (ref 27–33)
MCHC RBC AUTO-ENTMCNC: 33.7 G/DL (ref 33.7–35.3)
MCV RBC AUTO: 87.1 FL (ref 81.4–97.8)
MICRO URNS: ABNORMAL
MONOCYTES # BLD AUTO: 0.96 K/UL (ref 0–0.85)
MONOCYTES NFR BLD AUTO: 5.8 % (ref 0–13.4)
NEUTROPHILS # BLD AUTO: 13.6 K/UL (ref 1.82–7.42)
NEUTROPHILS NFR BLD: 82.3 % (ref 44–72)
NITRITE UR QL STRIP.AUTO: NEGATIVE
NRBC # BLD AUTO: 0 K/UL
NRBC BLD-RTO: 0 /100 WBC
PH UR STRIP.AUTO: 6 [PH] (ref 5–8)
PLATELET # BLD AUTO: 336 K/UL (ref 164–446)
PMV BLD AUTO: 9 FL (ref 9–12.9)
POTASSIUM SERPL-SCNC: 3.1 MMOL/L (ref 3.6–5.5)
PROT SERPL-MCNC: 7.3 G/DL (ref 6–8.2)
PROT UR QL STRIP: 100 MG/DL
RBC # BLD AUTO: 4.19 M/UL (ref 4.7–6.1)
RBC # URNS HPF: ABNORMAL /HPF
RBC UR QL AUTO: ABNORMAL
SODIUM SERPL-SCNC: 135 MMOL/L (ref 135–145)
SP GR UR STRIP.AUTO: 1.01
UROBILINOGEN UR STRIP.AUTO-MCNC: 0.2 MG/DL
WBC # BLD AUTO: 16.5 K/UL (ref 4.8–10.8)
WBC #/AREA URNS HPF: ABNORMAL /HPF

## 2019-10-10 PROCEDURE — 93005 ELECTROCARDIOGRAM TRACING: CPT | Performed by: STUDENT IN AN ORGANIZED HEALTH CARE EDUCATION/TRAINING PROGRAM

## 2019-10-10 PROCEDURE — 770020 HCHG ROOM/CARE - TELE (206)

## 2019-10-10 PROCEDURE — 83690 ASSAY OF LIPASE: CPT

## 2019-10-10 PROCEDURE — 700102 HCHG RX REV CODE 250 W/ 637 OVERRIDE(OP): Performed by: STUDENT IN AN ORGANIZED HEALTH CARE EDUCATION/TRAINING PROGRAM

## 2019-10-10 PROCEDURE — 85025 COMPLETE CBC W/AUTO DIFF WBC: CPT

## 2019-10-10 PROCEDURE — 93010 ELECTROCARDIOGRAM REPORT: CPT | Performed by: INTERNAL MEDICINE

## 2019-10-10 PROCEDURE — 87086 URINE CULTURE/COLONY COUNT: CPT

## 2019-10-10 PROCEDURE — 700105 HCHG RX REV CODE 258: Performed by: EMERGENCY MEDICINE

## 2019-10-10 PROCEDURE — 81001 URINALYSIS AUTO W/SCOPE: CPT

## 2019-10-10 PROCEDURE — 83605 ASSAY OF LACTIC ACID: CPT

## 2019-10-10 PROCEDURE — A9270 NON-COVERED ITEM OR SERVICE: HCPCS | Performed by: STUDENT IN AN ORGANIZED HEALTH CARE EDUCATION/TRAINING PROGRAM

## 2019-10-10 PROCEDURE — 71045 X-RAY EXAM CHEST 1 VIEW: CPT

## 2019-10-10 PROCEDURE — 83735 ASSAY OF MAGNESIUM: CPT

## 2019-10-10 PROCEDURE — 99285 EMERGENCY DEPT VISIT HI MDM: CPT

## 2019-10-10 PROCEDURE — 700111 HCHG RX REV CODE 636 W/ 250 OVERRIDE (IP): Performed by: STUDENT IN AN ORGANIZED HEALTH CARE EDUCATION/TRAINING PROGRAM

## 2019-10-10 PROCEDURE — 99223 1ST HOSP IP/OBS HIGH 75: CPT | Mod: AI,GC | Performed by: INTERNAL MEDICINE

## 2019-10-10 PROCEDURE — 700101 HCHG RX REV CODE 250: Performed by: STUDENT IN AN ORGANIZED HEALTH CARE EDUCATION/TRAINING PROGRAM

## 2019-10-10 PROCEDURE — 96365 THER/PROPH/DIAG IV INF INIT: CPT

## 2019-10-10 PROCEDURE — 36415 COLL VENOUS BLD VENIPUNCTURE: CPT

## 2019-10-10 PROCEDURE — 87077 CULTURE AEROBIC IDENTIFY: CPT

## 2019-10-10 PROCEDURE — 700111 HCHG RX REV CODE 636 W/ 250 OVERRIDE (IP): Performed by: EMERGENCY MEDICINE

## 2019-10-10 PROCEDURE — 80053 COMPREHEN METABOLIC PANEL: CPT

## 2019-10-10 PROCEDURE — 87186 SC STD MICRODIL/AGAR DIL: CPT

## 2019-10-10 RX ORDER — POLYETHYLENE GLYCOL 3350 17 G/17G
1 POWDER, FOR SOLUTION ORAL
Status: DISCONTINUED | OUTPATIENT
Start: 2019-10-10 | End: 2019-10-12 | Stop reason: HOSPADM

## 2019-10-10 RX ORDER — FINASTERIDE 5 MG/1
5 TABLET, FILM COATED ORAL DAILY
Status: DISCONTINUED | OUTPATIENT
Start: 2019-10-11 | End: 2019-10-12 | Stop reason: HOSPADM

## 2019-10-10 RX ORDER — AMLODIPINE BESYLATE 10 MG/1
10 TABLET ORAL DAILY
Status: DISCONTINUED | OUTPATIENT
Start: 2019-10-11 | End: 2019-10-12 | Stop reason: HOSPADM

## 2019-10-10 RX ORDER — SODIUM CHLORIDE 9 MG/ML
INJECTION, SOLUTION INTRAVENOUS CONTINUOUS
Status: DISCONTINUED | OUTPATIENT
Start: 2019-10-10 | End: 2019-10-10

## 2019-10-10 RX ORDER — AMOXICILLIN 250 MG
2 CAPSULE ORAL 2 TIMES DAILY
Status: DISCONTINUED | OUTPATIENT
Start: 2019-10-10 | End: 2019-10-12 | Stop reason: HOSPADM

## 2019-10-10 RX ORDER — SODIUM CHLORIDE AND POTASSIUM CHLORIDE 300; 900 MG/100ML; MG/100ML
INJECTION, SOLUTION INTRAVENOUS CONTINUOUS
Status: DISCONTINUED | OUTPATIENT
Start: 2019-10-10 | End: 2019-10-11

## 2019-10-10 RX ORDER — SODIUM CHLORIDE 9 MG/ML
1000 INJECTION, SOLUTION INTRAVENOUS ONCE
Status: COMPLETED | OUTPATIENT
Start: 2019-10-10 | End: 2019-10-10

## 2019-10-10 RX ORDER — LISINOPRIL 10 MG/1
10 TABLET ORAL DAILY
Status: DISCONTINUED | OUTPATIENT
Start: 2019-10-11 | End: 2019-10-12 | Stop reason: HOSPADM

## 2019-10-10 RX ORDER — OMEPRAZOLE 20 MG/1
20 CAPSULE, DELAYED RELEASE ORAL
Status: DISCONTINUED | OUTPATIENT
Start: 2019-10-11 | End: 2019-10-12 | Stop reason: HOSPADM

## 2019-10-10 RX ORDER — TAMSULOSIN HYDROCHLORIDE 0.4 MG/1
0.4 CAPSULE ORAL DAILY
Status: DISCONTINUED | OUTPATIENT
Start: 2019-10-11 | End: 2019-10-12 | Stop reason: HOSPADM

## 2019-10-10 RX ORDER — POTASSIUM CHLORIDE 20 MEQ/1
40 TABLET, EXTENDED RELEASE ORAL
Status: COMPLETED | OUTPATIENT
Start: 2019-10-10 | End: 2019-10-11

## 2019-10-10 RX ORDER — BISACODYL 10 MG
10 SUPPOSITORY, RECTAL RECTAL
Status: DISCONTINUED | OUTPATIENT
Start: 2019-10-10 | End: 2019-10-12 | Stop reason: HOSPADM

## 2019-10-10 RX ORDER — HEPARIN SODIUM 5000 [USP'U]/ML
5000 INJECTION, SOLUTION INTRAVENOUS; SUBCUTANEOUS EVERY 8 HOURS
Status: DISCONTINUED | OUTPATIENT
Start: 2019-10-10 | End: 2019-10-12 | Stop reason: HOSPADM

## 2019-10-10 RX ADMIN — POTASSIUM CHLORIDE AND SODIUM CHLORIDE: 900; 300 INJECTION, SOLUTION INTRAVENOUS at 22:25

## 2019-10-10 RX ADMIN — POTASSIUM CHLORIDE 40 MEQ: 1500 TABLET, EXTENDED RELEASE ORAL at 22:25

## 2019-10-10 RX ADMIN — CEFTRIAXONE SODIUM 2 G: 2 INJECTION, POWDER, FOR SOLUTION INTRAMUSCULAR; INTRAVENOUS at 19:36

## 2019-10-10 RX ADMIN — HEPARIN SODIUM 5000 UNITS: 5000 INJECTION, SOLUTION INTRAVENOUS; SUBCUTANEOUS at 22:25

## 2019-10-10 RX ADMIN — SODIUM CHLORIDE 1000 ML: 9 INJECTION, SOLUTION INTRAVENOUS at 18:00

## 2019-10-10 ASSESSMENT — COGNITIVE AND FUNCTIONAL STATUS - GENERAL
SUGGESTED CMS G CODE MODIFIER MOBILITY: CH
MOBILITY SCORE: 24
SUGGESTED CMS G CODE MODIFIER DAILY ACTIVITY: CH
DAILY ACTIVITIY SCORE: 24

## 2019-10-10 ASSESSMENT — LIFESTYLE VARIABLES
EVER FELT BAD OR GUILTY ABOUT YOUR DRINKING: NO
HAVE PEOPLE ANNOYED YOU BY CRITICIZING YOUR DRINKING: NO
EVER_SMOKED: NEVER
DOES PATIENT WANT TO STOP DRINKING: NO
AVERAGE NUMBER OF DAYS PER WEEK YOU HAVE A DRINK CONTAINING ALCOHOL: 0
TOTAL SCORE: 0
EVER HAD A DRINK FIRST THING IN THE MORNING TO STEADY YOUR NERVES TO GET RID OF A HANGOVER: NO
TOTAL SCORE: 0
HAVE YOU EVER FELT YOU SHOULD CUT DOWN ON YOUR DRINKING: NO
CONSUMPTION TOTAL: NEGATIVE
ALCOHOL_USE: NO
HOW MANY TIMES IN THE PAST YEAR HAVE YOU HAD 5 OR MORE DRINKS IN A DAY: 0
TOTAL SCORE: 0
ON A TYPICAL DAY WHEN YOU DRINK ALCOHOL HOW MANY DRINKS DO YOU HAVE: 0

## 2019-10-10 ASSESSMENT — COPD QUESTIONNAIRES
DURING THE PAST 4 WEEKS HOW MUCH DID YOU FEEL SHORT OF BREATH: NONE/LITTLE OF THE TIME
COPD SCREENING SCORE: 2
IN THE PAST 12 MONTHS DO YOU DO LESS THAN YOU USED TO BECAUSE OF YOUR BREATHING PROBLEMS: DISAGREE/UNSURE
HAVE YOU SMOKED AT LEAST 100 CIGARETTES IN YOUR ENTIRE LIFE: NO/DON'T KNOW
DO YOU EVER COUGH UP ANY MUCUS OR PHLEGM?: NO/ONLY WITH OCCASIONAL COLDS OR INFECTIONS

## 2019-10-10 ASSESSMENT — PATIENT HEALTH QUESTIONNAIRE - PHQ9
2. FEELING DOWN, DEPRESSED, IRRITABLE, OR HOPELESS: SEVERAL DAYS
4. FEELING TIRED OR HAVING LITTLE ENERGY: SEVERAL DAYS
1. LITTLE INTEREST OR PLEASURE IN DOING THINGS: SEVERAL DAYS
SUM OF ALL RESPONSES TO PHQ QUESTIONS 1-9: 6
6. FEELING BAD ABOUT YOURSELF - OR THAT YOU ARE A FAILURE OR HAVE LET YOURSELF OR YOUR FAMILY DOWN: NOT AL ALL
SUM OF ALL RESPONSES TO PHQ9 QUESTIONS 1 AND 2: 2
9. THOUGHTS THAT YOU WOULD BE BETTER OFF DEAD, OR OF HURTING YOURSELF: NOT AT ALL
8. MOVING OR SPEAKING SO SLOWLY THAT OTHER PEOPLE COULD HAVE NOTICED. OR THE OPPOSITE, BEING SO FIGETY OR RESTLESS THAT YOU HAVE BEEN MOVING AROUND A LOT MORE THAN USUAL: NOT AT ALL
7. TROUBLE CONCENTRATING ON THINGS, SUCH AS READING THE NEWSPAPER OR WATCHING TELEVISION: SEVERAL DAYS
5. POOR APPETITE OR OVEREATING: SEVERAL DAYS
3. TROUBLE FALLING OR STAYING ASLEEP OR SLEEPING TOO MUCH: SEVERAL DAYS

## 2019-10-10 NOTE — ED PROVIDER NOTES
ED Provider Note    Scribed for Davide Bridges M.D. by Rosie Diaz. 10/10/2019  4:11 PM    Primary care provider: SAM Moya  Means of arrival: Wheel Chair  History obtained from: Patient  History limited by: None     CHIEF COMPLAINT  Chief Complaint   Patient presents with   • Weakness   • Failure to Thrive   • Back Pain       HPI  Chidi Tatum is a 73 y.o. male who presents to the Emergency Department for evaluation of generalized weakness that started 3 days ago. The patient complains of additional symptoms of back pain, decreased appetite, nausea, productive cough, and pallor.  Per daughter, the patient has not been eating for the past three days but has been drinking liquids. He denies any abdominal pain or dysuria.  The patient endorses a history of renal insufficiency, urinary incontinence, and reoccurrent urinary tract infections.  Patient weak enough today that he is really not able to get around even uses his walker as he is just generally too weak.  Patient has a history of urinary tract infection as well as renal insufficiency he is complaining some back pain and urinary symptoms    REVIEW OF SYSTEMS  Pertinent positives include weakness, back pain, decreased appetite, nausea, productive cough, and pallor. Pertinent negatives include no abdominal pain, dysuria.  All other systems reviewed and negative.    PAST MEDICAL HISTORY   has a past medical history of BPH (benign prostatic hyperplasia), GERD (gastroesophageal reflux disease), HTN (hypertension), Hypertension, Insomnia, and SBO (small bowel obstruction) (HCC).    SURGICAL HISTORY   has a past surgical history that includes hernia repair and temporal artery biopsy (Right, 2/9/2018).    SOCIAL HISTORY  Social History     Tobacco Use   • Smoking status: Never Smoker   • Smokeless tobacco: Never Used   Substance Use Topics   • Alcohol use: No     Alcohol/week: 0.0 oz   • Drug use: No      Social History     Substance and Sexual  Activity   Drug Use No       FAMILY HISTORY  Family History   Problem Relation Age of Onset   • Lung Disease Father    • Alcohol/Drug Brother    • Lung Disease Brother        CURRENT MEDICATIONS  No current facility-administered medications on file prior to encounter.      Current Outpatient Medications on File Prior to Encounter   Medication Sig Dispense Refill   • zolpidem (AMBIEN) 5 MG Tab Take 5 mg by mouth at bedtime as needed.  2   • sertraline (ZOLOFT) 25 MG tablet TAKE 1 TABLET BY MOUTH ONCE DAILY 90 Tab 2   • amLODIPine (NORVASC) 10 MG Tab Take 10 mg by mouth every day.  0   • esomeprazole (NEXIUM) 40 MG delayed-release capsule Take 40 mg by mouth every morning before breakfast.     • Potassium 99 MG Tab Take 99 mg by mouth every day.     • finasteride (PROSCAR) 5 MG Tab Take 5 mg by mouth every day.     • lisinopril (PRINIVIL) 10 MG Tab Take 10 mg by mouth every day.     • tamsulosin (FLOMAX) 0.4 MG capsule Take 0.4 mg by mouth every day.       ALLERGIES  Allergies   Allergen Reactions   • Celecoxib      Rash     PHYSICAL EXAM  VITAL SIGNS: /73   Pulse (!) 106   Temp 37 °C (98.6 °F) (Temporal)   Resp 12   Ht 1.829 m (6')   Wt 105.7 kg (233 lb 0.4 oz)   SpO2 95%   BMI 31.60 kg/m²     Constitutional: Well developed, Well nourished, No distress, Non-toxic appearance.   HENT: Normocephalic, Atraumatic, Bilateral external ears normal, dry mucus membranes, No oral exudates.   Eyes: PERRLA, EOMI, Conjunctiva normal, No discharge.   Neck: No tenderness, Supple, No stridor.   Lymphatic: No lymphadenopathy noted.   Cardiovascular: Normal heart rate, Normal rhythm.   Thorax & Lungs: Clear to auscultation bilaterally, No respiratory distress, No wheezing, No crackles.   Abdomen: Soft, No tenderness, No masses, No pulsatile masses.   Back: right lower back tenderness questionable CVA tenderness  Skin: Warm, Dry, No erythema, No rash.   Extremities:, No edema No cyanosis.   Musculoskeletal: No tenderness to  palpation or major deformities noted.  Intact distal pulses  Neurologic: Awake, alert. Moves all extremities spontaneously.  Psychiatric: Affect normal, Judgment normal, Mood normal.      LABS  Results for orders placed or performed during the hospital encounter of 10/10/19   CBC WITH DIFFERENTIAL   Result Value Ref Range    WBC 16.5 (H) 4.8 - 10.8 K/uL    RBC 4.19 (L) 4.70 - 6.10 M/uL    Hemoglobin 12.3 (L) 14.0 - 18.0 g/dL    Hematocrit 36.5 (L) 42.0 - 52.0 %    MCV 87.1 81.4 - 97.8 fL    MCH 29.4 27.0 - 33.0 pg    MCHC 33.7 33.7 - 35.3 g/dL    RDW 50.3 (H) 35.9 - 50.0 fL    Platelet Count 336 164 - 446 K/uL    MPV 9.0 9.0 - 12.9 fL    Neutrophils-Polys 82.30 (H) 44.00 - 72.00 %    Lymphocytes 10.70 (L) 22.00 - 41.00 %    Monocytes 5.80 0.00 - 13.40 %    Eosinophils 0.40 0.00 - 6.90 %    Basophils 0.20 0.00 - 1.80 %    Immature Granulocytes 0.60 0.00 - 0.90 %    Nucleated RBC 0.00 /100 WBC    Neutrophils (Absolute) 13.60 (H) 1.82 - 7.42 K/uL    Lymphs (Absolute) 1.77 1.00 - 4.80 K/uL    Monos (Absolute) 0.96 (H) 0.00 - 0.85 K/uL    Eos (Absolute) 0.06 0.00 - 0.51 K/uL    Baso (Absolute) 0.03 0.00 - 0.12 K/uL    Immature Granulocytes (abs) 0.10 0.00 - 0.11 K/uL    NRBC (Absolute) 0.00 K/uL   COMP METABOLIC PANEL   Result Value Ref Range    Sodium 135 135 - 145 mmol/L    Potassium 3.1 (L) 3.6 - 5.5 mmol/L    Chloride 107 96 - 112 mmol/L    Co2 19 (L) 20 - 33 mmol/L    Anion Gap 9.0 0.0 - 11.9    Glucose 118 (H) 65 - 99 mg/dL    Bun 12 8 - 22 mg/dL    Creatinine 1.63 (H) 0.50 - 1.40 mg/dL    Calcium 9.2 8.5 - 10.5 mg/dL    AST(SGOT) 12 12 - 45 U/L    ALT(SGPT) 7 2 - 50 U/L    Alkaline Phosphatase 75 30 - 99 U/L    Total Bilirubin 0.6 0.1 - 1.5 mg/dL    Albumin 3.4 3.2 - 4.9 g/dL    Total Protein 7.3 6.0 - 8.2 g/dL    Globulin 3.9 (H) 1.9 - 3.5 g/dL    A-G Ratio 0.9 g/dL   LIPASE   Result Value Ref Range    Lipase 12 11 - 82 U/L   URINALYSIS,CULTURE IF INDICATED   Result Value Ref Range    Color Yellow     Character  Turbid (A)     Specific Gravity 1.011 <1.035    Ph 6.0 5.0 - 8.0    Glucose Negative Negative mg/dL    Ketones Negative Negative mg/dL    Protein 100 (A) Negative mg/dL    Bilirubin Negative Negative    Urobilinogen, Urine 0.2 Negative    Nitrite Negative Negative    Leukocyte Esterase Large (A) Negative    Occult Blood Moderate (A) Negative    Micro Urine Req Microscopic    ESTIMATED GFR   Result Value Ref Range    GFR If  51 (A) >60 mL/min/1.73 m 2    GFR If Non  42 (A) >60 mL/min/1.73 m 2   LACTIC ACID   Result Value Ref Range    Lactic Acid 1.1 0.5 - 2.0 mmol/L   URINE MICROSCOPIC (W/UA)   Result Value Ref Range    WBC Packed (A) /hpf    RBC 5-10 (A) /hpf    Bacteria Few (A) None /hpf    Epithelial Cells Rare /hpf   All labs reviewed by me.    RADIOLOGY  DX-CHEST-PORTABLE (1 VIEW)   Final Result      No acute cardiopulmonary disease.        The radiologist's interpretation of all radiological studies have been reviewed by me.    COURSE & MEDICAL DECISION MAKING  Pertinent Labs & Imaging studies reviewed. (See chart for details) The patient's [facility] and past medical  records were reviewed    4:11 PM - Patient seen and examined at bedside. I explained to the patient and daughter that I will use labs and imaging to evaluate his symptoms. The patient is agreeable to the plan of care. Ordered DX-Chest, Lactic Acid, CBC w/ differentials, CMP, lipase, urinalysis to evaluate his symptoms. The differential diagnoses include but are not limited to: sepsis, UTI, dehydration     6:04 PM - Patient was reevaluated at bedside. Discussed lab and radiology results with the patient and informed them that there was no indication of acute medical issues.     7:35 PM - I discussed the patient's case and the above findings with [facility] who agreed to admit the patient.   Patient hydrated with IV fluids, as he is appears dry.  He has nausea and so oral fluids are not indicated.  He  does appear to have urinary tract infection, he has probable fever, he has elevated white blood cell count.  I think the patient's severe weakness as well as urinary tract infection and he should receive IV antibiotics at least for the short-term.  Case discussed with R and patient will be admitted given a dose of Rocephin in the ER  DISPOSITION:  Patient will be admitted to [facility] in guarded condition.    FINAL IMPRESSION  Acute UTI     Rosie DYE (Osvaldo), am scribing for, and in the presence of, Davide Bridges M.D..    Electronically signed by: Rosie Diaz (Osvaldo), 10/10/2019    IDavide M.D. personally performed the services described in this documentation, as scribed by Rosie Diaz in my presence, and it is both accurate and complete.    C    The note accurately reflects work and decisions made by me.  Davide Bridges  10/10/2019  8:46 PM

## 2019-10-10 NOTE — ED TRIAGE NOTES
"Pt bib daughter. Pt c/o generalized weakness, \"can't walk anymore\", daughter states pt not eating, x3 days. Pt c/o back pain and shaking.   "

## 2019-10-10 NOTE — ED NOTES
Patient reports feeling under the weather x 3 days. No recent sick contacts but daughter reports that patient gave her his illness. Patient remains afebrile at this time. Heart rate is WNL.     Updated patient on POC, no other needs at this time.

## 2019-10-11 ENCOUNTER — APPOINTMENT (OUTPATIENT)
Dept: RADIOLOGY | Facility: MEDICAL CENTER | Age: 74
DRG: 392 | End: 2019-10-11
Attending: STUDENT IN AN ORGANIZED HEALTH CARE EDUCATION/TRAINING PROGRAM
Payer: MEDICARE

## 2019-10-11 PROBLEM — K52.9 CHRONIC DIARRHEA: Status: ACTIVE | Noted: 2019-10-11

## 2019-10-11 PROBLEM — A41.9 SEPSIS (HCC): Status: ACTIVE | Noted: 2019-10-11

## 2019-10-11 LAB
ALBUMIN SERPL BCP-MCNC: 3.1 G/DL (ref 3.2–4.9)
ALBUMIN/GLOB SERPL: 0.9 G/DL
ALP SERPL-CCNC: 62 U/L (ref 30–99)
ALT SERPL-CCNC: 6 U/L (ref 2–50)
ANION GAP SERPL CALC-SCNC: 10 MMOL/L (ref 0–11.9)
ANION GAP SERPL CALC-SCNC: 10 MMOL/L (ref 0–11.9)
AST SERPL-CCNC: 9 U/L (ref 12–45)
B-OH-BUTYR SERPL-MCNC: 0.08 MMOL/L (ref 0.02–0.27)
BASOPHILS # BLD AUTO: 0.4 % (ref 0–1.8)
BASOPHILS # BLD: 0.05 K/UL (ref 0–0.12)
BILIRUB SERPL-MCNC: 0.4 MG/DL (ref 0.1–1.5)
BUN SERPL-MCNC: 12 MG/DL (ref 8–22)
BUN SERPL-MCNC: 12 MG/DL (ref 8–22)
CALCIUM SERPL-MCNC: 8.2 MG/DL (ref 8.5–10.5)
CALCIUM SERPL-MCNC: 9 MG/DL (ref 8.5–10.5)
CHLORIDE SERPL-SCNC: 109 MMOL/L (ref 96–112)
CHLORIDE SERPL-SCNC: 114 MMOL/L (ref 96–112)
CO2 SERPL-SCNC: 14 MMOL/L (ref 20–33)
CO2 SERPL-SCNC: 18 MMOL/L (ref 20–33)
CREAT SERPL-MCNC: 1.47 MG/DL (ref 0.5–1.4)
CREAT SERPL-MCNC: 1.58 MG/DL (ref 0.5–1.4)
EKG IMPRESSION: NORMAL
EOSINOPHIL # BLD AUTO: 0.17 K/UL (ref 0–0.51)
EOSINOPHIL NFR BLD: 1.3 % (ref 0–6.9)
ERYTHROCYTE [DISTWIDTH] IN BLOOD BY AUTOMATED COUNT: 51.7 FL (ref 35.9–50)
ERYTHROCYTE [SEDIMENTATION RATE] IN BLOOD BY WESTERGREN METHOD: 55 MM/HOUR (ref 0–20)
FERRITIN SERPL-MCNC: 255.3 NG/ML (ref 22–322)
FOLATE SERPL-MCNC: 21 NG/ML
GLOBULIN SER CALC-MCNC: 3.5 G/DL (ref 1.9–3.5)
GLUCOSE SERPL-MCNC: 105 MG/DL (ref 65–99)
GLUCOSE SERPL-MCNC: 133 MG/DL (ref 65–99)
HCT VFR BLD AUTO: 32.8 % (ref 42–52)
HGB BLD-MCNC: 10.5 G/DL (ref 14–18)
IMM GRANULOCYTES # BLD AUTO: 0.05 K/UL (ref 0–0.11)
IMM GRANULOCYTES NFR BLD AUTO: 0.4 % (ref 0–0.9)
IRON SATN MFR SERPL: 13 % (ref 15–55)
IRON SERPL-MCNC: 23 UG/DL (ref 50–180)
LYMPHOCYTES # BLD AUTO: 2.48 K/UL (ref 1–4.8)
LYMPHOCYTES NFR BLD: 19.3 % (ref 22–41)
MAGNESIUM SERPL-MCNC: 1.8 MG/DL (ref 1.5–2.5)
MCH RBC QN AUTO: 28.4 PG (ref 27–33)
MCHC RBC AUTO-ENTMCNC: 32 G/DL (ref 33.7–35.3)
MCV RBC AUTO: 88.6 FL (ref 81.4–97.8)
MONOCYTES # BLD AUTO: 0.93 K/UL (ref 0–0.85)
MONOCYTES NFR BLD AUTO: 7.3 % (ref 0–13.4)
NEUTROPHILS # BLD AUTO: 9.14 K/UL (ref 1.82–7.42)
NEUTROPHILS NFR BLD: 71.3 % (ref 44–72)
NRBC # BLD AUTO: 0 K/UL
NRBC BLD-RTO: 0 /100 WBC
PLATELET # BLD AUTO: 305 K/UL (ref 164–446)
PMV BLD AUTO: 8.9 FL (ref 9–12.9)
POTASSIUM SERPL-SCNC: 3.3 MMOL/L (ref 3.6–5.5)
POTASSIUM SERPL-SCNC: 3.8 MMOL/L (ref 3.6–5.5)
PROCALCITONIN SERPL-MCNC: 1.55 NG/ML
PROT SERPL-MCNC: 6.6 G/DL (ref 6–8.2)
RBC # BLD AUTO: 3.7 M/UL (ref 4.7–6.1)
SODIUM SERPL-SCNC: 137 MMOL/L (ref 135–145)
SODIUM SERPL-SCNC: 138 MMOL/L (ref 135–145)
TIBC SERPL-MCNC: 183 UG/DL (ref 250–450)
VIT B12 SERPL-MCNC: 715 PG/ML (ref 211–911)
WBC # BLD AUTO: 12.8 K/UL (ref 4.8–10.8)

## 2019-10-11 PROCEDURE — 700102 HCHG RX REV CODE 250 W/ 637 OVERRIDE(OP): Performed by: INTERNAL MEDICINE

## 2019-10-11 PROCEDURE — 80048 BASIC METABOLIC PNL TOTAL CA: CPT

## 2019-10-11 PROCEDURE — 97161 PT EVAL LOW COMPLEX 20 MIN: CPT

## 2019-10-11 PROCEDURE — 99233 SBSQ HOSP IP/OBS HIGH 50: CPT | Mod: GC | Performed by: INTERNAL MEDICINE

## 2019-10-11 PROCEDURE — 85652 RBC SED RATE AUTOMATED: CPT

## 2019-10-11 PROCEDURE — 700102 HCHG RX REV CODE 250 W/ 637 OVERRIDE(OP): Performed by: STUDENT IN AN ORGANIZED HEALTH CARE EDUCATION/TRAINING PROGRAM

## 2019-10-11 PROCEDURE — 87045 FECES CULTURE AEROBIC BACT: CPT

## 2019-10-11 PROCEDURE — 85025 COMPLETE CBC W/AUTO DIFF WBC: CPT

## 2019-10-11 PROCEDURE — 74018 RADEX ABDOMEN 1 VIEW: CPT

## 2019-10-11 PROCEDURE — 84145 PROCALCITONIN (PCT): CPT

## 2019-10-11 PROCEDURE — 83550 IRON BINDING TEST: CPT

## 2019-10-11 PROCEDURE — 83735 ASSAY OF MAGNESIUM: CPT

## 2019-10-11 PROCEDURE — 700111 HCHG RX REV CODE 636 W/ 250 OVERRIDE (IP): Performed by: STUDENT IN AN ORGANIZED HEALTH CARE EDUCATION/TRAINING PROGRAM

## 2019-10-11 PROCEDURE — 36415 COLL VENOUS BLD VENIPUNCTURE: CPT

## 2019-10-11 PROCEDURE — A9270 NON-COVERED ITEM OR SERVICE: HCPCS | Performed by: STUDENT IN AN ORGANIZED HEALTH CARE EDUCATION/TRAINING PROGRAM

## 2019-10-11 PROCEDURE — 51798 US URINE CAPACITY MEASURE: CPT

## 2019-10-11 PROCEDURE — 82746 ASSAY OF FOLIC ACID SERUM: CPT

## 2019-10-11 PROCEDURE — 82010 KETONE BODYS QUAN: CPT

## 2019-10-11 PROCEDURE — A9270 NON-COVERED ITEM OR SERVICE: HCPCS | Performed by: INTERNAL MEDICINE

## 2019-10-11 PROCEDURE — 87040 BLOOD CULTURE FOR BACTERIA: CPT | Mod: 91

## 2019-10-11 PROCEDURE — 80053 COMPREHEN METABOLIC PANEL: CPT

## 2019-10-11 PROCEDURE — 82728 ASSAY OF FERRITIN: CPT

## 2019-10-11 PROCEDURE — 700101 HCHG RX REV CODE 250: Performed by: STUDENT IN AN ORGANIZED HEALTH CARE EDUCATION/TRAINING PROGRAM

## 2019-10-11 PROCEDURE — 82607 VITAMIN B-12: CPT

## 2019-10-11 PROCEDURE — 83540 ASSAY OF IRON: CPT

## 2019-10-11 PROCEDURE — 770006 HCHG ROOM/CARE - MED/SURG/GYN SEMI*

## 2019-10-11 PROCEDURE — 87046 STOOL CULTR AEROBIC BACT EA: CPT

## 2019-10-11 RX ORDER — POTASSIUM CHLORIDE 20 MEQ/1
40 TABLET, EXTENDED RELEASE ORAL 2 TIMES DAILY
Status: DISCONTINUED | OUTPATIENT
Start: 2019-10-11 | End: 2019-10-11

## 2019-10-11 RX ORDER — ACETAMINOPHEN 500 MG
1000 TABLET ORAL 3 TIMES DAILY
Status: DISCONTINUED | OUTPATIENT
Start: 2019-10-11 | End: 2019-10-12 | Stop reason: HOSPADM

## 2019-10-11 RX ORDER — POTASSIUM CHLORIDE 20 MEQ/1
40 TABLET, EXTENDED RELEASE ORAL ONCE
Status: COMPLETED | OUTPATIENT
Start: 2019-10-11 | End: 2019-10-11

## 2019-10-11 RX ADMIN — POTASSIUM CHLORIDE AND SODIUM CHLORIDE: 900; 300 INJECTION, SOLUTION INTRAVENOUS at 09:19

## 2019-10-11 RX ADMIN — ACETAMINOPHEN 1000 MG: 500 TABLET ORAL at 17:20

## 2019-10-11 RX ADMIN — LISINOPRIL 10 MG: 5 TABLET ORAL at 05:28

## 2019-10-11 RX ADMIN — HEPARIN SODIUM 5000 UNITS: 5000 INJECTION, SOLUTION INTRAVENOUS; SUBCUTANEOUS at 15:18

## 2019-10-11 RX ADMIN — SERTRALINE HYDROCHLORIDE 25 MG: 50 TABLET ORAL at 05:29

## 2019-10-11 RX ADMIN — HEPARIN SODIUM 5000 UNITS: 5000 INJECTION, SOLUTION INTRAVENOUS; SUBCUTANEOUS at 05:30

## 2019-10-11 RX ADMIN — POTASSIUM CHLORIDE 40 MEQ: 1500 TABLET, EXTENDED RELEASE ORAL at 05:29

## 2019-10-11 RX ADMIN — OMEPRAZOLE 20 MG: 20 CAPSULE, DELAYED RELEASE ORAL at 05:29

## 2019-10-11 RX ADMIN — ACETAMINOPHEN 1000 MG: 500 TABLET ORAL at 10:54

## 2019-10-11 RX ADMIN — FINASTERIDE 5 MG: 5 TABLET, FILM COATED ORAL at 05:28

## 2019-10-11 RX ADMIN — AMLODIPINE BESYLATE 10 MG: 10 TABLET ORAL at 05:28

## 2019-10-11 RX ADMIN — ACETAMINOPHEN 1000 MG: 500 TABLET ORAL at 05:28

## 2019-10-11 RX ADMIN — ACETAMINOPHEN 1000 MG: 500 TABLET ORAL at 00:42

## 2019-10-11 RX ADMIN — TAMSULOSIN HYDROCHLORIDE 0.4 MG: 0.4 CAPSULE ORAL at 05:29

## 2019-10-11 RX ADMIN — POTASSIUM CHLORIDE 40 MEQ: 1500 TABLET, EXTENDED RELEASE ORAL at 09:18

## 2019-10-11 ASSESSMENT — ENCOUNTER SYMPTOMS
NAUSEA: 0
SEIZURES: 0
WEAKNESS: 0
FEVER: 0
PND: 0
NERVOUS/ANXIOUS: 0
FEVER: 1
PHOTOPHOBIA: 0
FALLS: 0
SPUTUM PRODUCTION: 1
CLAUDICATION: 0
CHILLS: 0
MYALGIAS: 0
FLANK PAIN: 0
SPEECH CHANGE: 0
ORTHOPNEA: 0
DIAPHORESIS: 0
DIZZINESS: 0
DEPRESSION: 1
HEMOPTYSIS: 0
SORE THROAT: 0
EYE REDNESS: 0
NECK PAIN: 0
PALPITATIONS: 0
BRUISES/BLEEDS EASILY: 0
BLOOD IN STOOL: 0
HALLUCINATIONS: 0
TREMORS: 1
SHORTNESS OF BREATH: 0
COUGH: 1
VOMITING: 0
HEADACHES: 0
DOUBLE VISION: 0
TINGLING: 0
SPUTUM PRODUCTION: 0
STRIDOR: 0
CONSTIPATION: 0
LOSS OF CONSCIOUSNESS: 0
COUGH: 0
HEARTBURN: 0
BLURRED VISION: 0
SINUS PAIN: 0
BACK PAIN: 1
WEIGHT LOSS: 0
DIARRHEA: 1
SENSORY CHANGE: 0
WHEEZING: 0
ABDOMINAL PAIN: 1
ABDOMINAL PAIN: 0

## 2019-10-11 ASSESSMENT — COGNITIVE AND FUNCTIONAL STATUS - GENERAL
MOBILITY SCORE: 24
SUGGESTED CMS G CODE MODIFIER MOBILITY: CH

## 2019-10-11 ASSESSMENT — LIFESTYLE VARIABLES: SUBSTANCE_ABUSE: 0

## 2019-10-11 ASSESSMENT — GAIT ASSESSMENTS
DISTANCE (FEET): 500
GAIT LEVEL OF ASSIST: SUPERVISED

## 2019-10-11 NOTE — PROGRESS NOTES
Bedside report received from previous nurse regarding prior 12 hours.  Pt denies pain.  Sitting up in the bed.  White board updated.  Call light within reach.

## 2019-10-11 NOTE — SENIOR ADMIT NOTE
Senior Admit Note    Pertinent History  Chidi Tatum is a 73 y.o. male with a history of BPH, insomnia, hypertension and CKD III who presents to the hospital accompanied by his daughters and granddaughter who presents with generalized weakness, decreased oral intake, body shakes and fever at home of 101F for the past two days. Patient has a history of recurrent UTIs. Of note, patient was admitted from 8/26-28 with sepsis secondary to UTI where urine culture was positive for Klebsiella pneumoniae.     Review of systems: Notable for chronic diarrhea for the past four months with about four episodes per day.       In the ED, patient was noted to be hemodynamically stable with low grade tachycardia in the 100s . He was noted to have leukocytosis of 16.5 with neutrophilic predominance. Potassium of 3.1, metabolic acidosis with creatinine at baseline. UA significant for large leukocyte esterase without nitrites, packed WBCs and few bacteria. Chest X-ray is unremarkable.     Pertinent Physical exam:            General: Laying in bed in no acute distress.   Back: No CVA tenderness   Abdominal: no suprapubic tenderness   Extremities: no lower extremity edema     Assessment & Plan  # Sepsis   # Urinary Tract Infection   # Hypokalemia   # Metabolic Acidosis   # Chronic Diarrhea     Patient presents with history of fever at home, leukocytosis and tachycardia, meeting for sepsis criteria. Although physical exam is not very convincing of urinary tract infection, the UA has large leukocyte esterase and pyuria concerning for urinary infection. Patient has been having chronic diarrhea which is likely contributing to the hypokalemia. Patient has previously undergone diarrhea workup which has been negative, suspect microscopic colitis. The metabolic acidosis is likely secondary to poor oral intake and dehydration.   - Admit to Telemetry   - Obtain blood cultures   - Follow urine cultures   - Check procalcitonin   - Ceftriaxone  (previous history of Klebsiella sensitive to C3)   - Potassium Repletion   - IV Fluids   - Stool studies and low threshold to check for C. Diff if diarrhea persists   - Bladder scan     Please see Dr. Montgomery' H&P for full details

## 2019-10-11 NOTE — PROGRESS NOTES
2 RN skin check completed with HERNAN Brand.   Devices in place N/A.  Skin assessed under devices N/A.  Confirmed pressure ulcers found on N/A.  New potential pressure ulcers noted on N/A. Wound consult placed N/A.  The following interventions in place N/A    Skin intact  Ears red and blanching

## 2019-10-11 NOTE — CARE PLAN
Problem: Safety  Goal: Will remain free from injury  Outcome: PROGRESSING AS EXPECTED  Note:   Fall precautions in place. Bed in lowest position. Non-skid socks in place. Personal possessions within reach. Mobility sign on door. Bed-alarm on. Call light within reach. Pt educated regarding fall prevention and states understanding.        Problem: Knowledge Deficit  Goal: Knowledge of disease process/condition, treatment plan, diagnostic tests, and medications will improve  Outcome: PROGRESSING AS EXPECTED  Note:   Pt educated regarding plan of care and medications. All questions answered.

## 2019-10-11 NOTE — ASSESSMENT & PLAN NOTE
Patient presented to the ED with significant Leukocytosis, fever at home 48 hours ago, and Tachycardia meeting sepsis criteria with a possible source as UTI  Has History of UTI and BPH, chronic diarrhea for about 4 months and chronic sinusitis.   Multiple hospitalizations in the past.                 Plan:  - Blood cultures  - IV antibiotics (ceftriaxone)( given on 10/11- Discontinued on 10/11 as patient showed improvement in labs and clinically stable.  - IV fluids discontinued as patient tolerating well orally  - Telemetry  - Cont monitoring  - Routine Labs

## 2019-10-11 NOTE — ASSESSMENT & PLAN NOTE
History of CKD3  Labs: Creatinine: 1.63, previous hospitalizations and past records in the 0.92.-1.76 range.  Patient states making enough urine, but has had nocturia and frequent urination due to BPH     Plan:  - Observation  - IV Fluids discontinued a patient tolerating diet well.  - Avoid Nephrotoxic agents  - Keep lytes in NL ranges

## 2019-10-11 NOTE — CARE PLAN
Problem: Communication  Goal: The ability to communicate needs accurately and effectively will improve  Outcome: PROGRESSING AS EXPECTED  Intervention: [place] patient and significant other/support system to call light to alert staff of needs  Note:   Patient oriented to call light system and has been able to communicate needs accurately and effectively.      Problem: Safety  Goal: Will remain free from falls  Outcome: PROGRESSING AS EXPECTED  Intervention: Assess risk factors for falls  Note:   Patient has been assessed for fall risks and fall precautions have been put in place.

## 2019-10-11 NOTE — ASSESSMENT & PLAN NOTE
Patient has not had appetite and declines eating for the past weeks  According to daughter last meal was 3-4 days ago.  Recent weight loss of 5-6 lbs.          Plan:  - Assess Depression- outpatient  - Encourage oral intake

## 2019-10-11 NOTE — DIETARY
Nutrition services: Day 1 of admit.  Chidi Tatum is a 73 y.o. male with admitting DX of FTT.  Consult received for poor PO intake.     Spoke with pt at bedside. Pt stated normal appetite prior to admission but has decreased since being admitted. Pt said he did eat most of his breakfast this morning. Stated his UBW is approx. 239lb and was surprised when told his most recent charted weight was 227lb.     Assessment:  Height: 182.9 cm (6')  Weight: 103.3 kg (227 lb 11.8 oz)  Body mass index is 30.89 kg/m²., BMI classification: Class I Obese   Diet/Intake: Renal, no lactose; no PO intake documented in chart    Evaluation:   1. Chart review indicates 8% weight loss in past 3 mo (significant)  2. Hx: Metabolic acidosis, hypokalemia, iron deficiency anemia, stage 3 CKD, GERD, pre-diabetes, HTN  3. Labs: glucose 133, creatinine 1.47, cholesterol 85, HDL 25  4. Meds: Prilosec  5. GI: Three loose stools noted in chart 10/11 7667-4642     Malnutrition Risk: Unable to diagnose at this time.     Recommendations/Plan:  1. Ask RN to weight the pt again to ensure accurate weight in chart.   2. Encourage intake of >50% of meals.   3. Document intake of all meals  as % taken in ADL's to provide interdisciplinary communication across all shifts.   4. Monitor weight.  5. Nutrition rep will continue to see patient for ongoing meal and snack preferences.     RD to follow.

## 2019-10-11 NOTE — CARE PLAN
Problem: Nutritional:  Goal: Achieve adequate nutritional intake  Description  Patient will consume 50% of meals   Outcome: PROGRESSING AS EXPECTED    See RD note.

## 2019-10-11 NOTE — PROGRESS NOTES
Internal Medicine Interval Note  Note Author: Bartolo Wasserman M.D.     Name Chidi Tatum Ruiz     1945   Age/Sex 73 y.o. male   MRN [id number]   Code Status Full     After 5PM or if no immediate response to page, please call for cross-coverage  Attending/Team: Dr. Hdez/Lior See Patient List for primary contact information  Call [phone] to page    1st Call - Day Intern (R1):   Dr. Wasserman 2nd Call - Day Sr. Resident (R2/R3):   Dr. Wasserman         Reason for interval visit  (Principal Problem)   Generalized weakness  Loss of appetite  Fever , tachycardia -sepsis with possible UTI as source  Chronic diarrhea       Interval Problem Daily Status Update  (24 hours, problem oriented, brief subjective history, new lab/imaging data pertinent to that problem)   Patient states that he has not felt febrile/no chills. He denies any nausea. States that he has some post nasal drip from chronic sinusitis that sometimes produces a productive cough and nausea. He states that his weakness has improved and that he was able to walk around the floors without feeling weak. He is able to eat. He however complains of ongoing watery diarrhea with associated abdominal cramps that are intermittent and get relieved with a bowel movement. He has had 3 bowel movements that were watery overnight.     Objective : he has been afebrile since admission. His tachycardia on admission improved with a pulse rate 67 now. He wbc count improved from 16.5 on admission to 12.8 now. His hypokalemia improved with replacement to 3.8. His creatinine has been within his baseline range with the current value being 1.58 and his baseline being 0.92-1.76.     Review of Systems   Constitutional: Negative for chills, diaphoresis, fever, malaise/fatigue and weight loss.   HENT: Positive for congestion. Negative for ear discharge, ear pain, hearing loss, nosebleeds, sinus pain, sore throat and tinnitus.    Eyes: Negative for blurred vision, double  vision and redness.   Respiratory: Positive for cough and sputum production. Negative for shortness of breath, wheezing and stridor.    Cardiovascular: Negative for chest pain, palpitations, orthopnea, claudication, leg swelling and PND.   Gastrointestinal: Positive for abdominal pain and diarrhea. Negative for blood in stool, constipation, heartburn, nausea and vomiting.   Genitourinary: Negative for dysuria, flank pain, frequency, hematuria and urgency.   Musculoskeletal: Positive for back pain. Negative for falls, joint pain, myalgias and neck pain.   Skin: Negative for itching and rash.   Neurological: Positive for tremors. Negative for dizziness, tingling, sensory change, speech change, seizures, loss of consciousness, weakness and headaches.   Endo/Heme/Allergies: Does not bruise/bleed easily.   Psychiatric/Behavioral: Negative for substance abuse. The patient is not nervous/anxious.        Disposition/Barriers to discharge:   Clinical improvement    Consultants/Specialty    PCP: SAM Moya      Quality Measures  Quality-Core Measures   Reviewed items::  Labs reviewed, Medications reviewed and EKG reviewed          Physical Exam       Vitals:    10/11/19 0431 10/11/19 0737 10/11/19 1540 10/11/19 1649   BP: 142/84 134/81 125/80    Pulse: 67 84 81    Resp: 18 16 18    Temp: 36.5 °C (97.7 °F) 36.7 °C (98 °F) 36.4 °C (97.6 °F)    TempSrc: Temporal Temporal Temporal    SpO2: 94% 96% 98%    Weight:    103.4 kg (227 lb 15.3 oz)   Height:         Body mass index is 30.92 kg/m². Weight: 103.4 kg (227 lb 15.3 oz)  Oxygen Therapy:  Pulse Oximetry: 98 %, O2 (LPM): 0, O2 Delivery: None (Room Air)    Physical Exam   Constitutional: He is oriented to person, place, and time and well-developed, well-nourished, and in no distress. No distress.   HENT:   Head: Normocephalic and atraumatic.   Mouth/Throat: Oropharynx is clear and moist. No oropharyngeal exudate.   Eyes: Pupils are equal, round, and reactive to  light. Conjunctivae and EOM are normal. Right eye exhibits no discharge. Left eye exhibits no discharge. No scleral icterus.   Neck: Normal range of motion. Neck supple. No tracheal deviation present.   Cardiovascular: Normal rate, regular rhythm, normal heart sounds and intact distal pulses.   No murmur heard.  Pulmonary/Chest: Effort normal and breath sounds normal. No stridor. No respiratory distress. He has no wheezes. He has no rales.   Abdominal: Soft. Bowel sounds are normal. He exhibits no distension. There is no tenderness. There is no rebound.   Musculoskeletal: Normal range of motion. He exhibits edema.   1+ edema B/l legs   Neurological: He is alert and oriented to person, place, and time. He displays normal reflexes. No cranial nerve deficit. He exhibits normal muscle tone. Coordination normal.   Skin: Skin is warm and dry. He is not diaphoretic. No erythema.   Psychiatric: Affect normal.             Assessment/Plan     * Sepsis (HCC)- (present on admission)  Assessment & Plan  Patient presented to the ED with significant Leukocytosis, fever at home 48 hours ago, and Tachycardia meeting sepsis criteria with a possible source as UTI  Has History of UTI and BPH, chronic diarrhea for about 4 months and chronic sinusitis.   Multiple hospitalizations in the past.                 Plan:  - Blood cultures  - IV antibiotics (ceftriaxone)( given on 10/11- Discontinued on 10/11 as patient showed improvement in labs and clinically stable.  - IV fluids discontinued as patient tolerating well orally  - Telemetry  - Cont monitoring  - Routine Labs      Failure to thrive in adult- (present on admission)  Assessment & Plan  Patient has not had appetite and declines eating for the past weeks  According to daughter last meal was 3-4 days ago.  Recent weight loss of 5-6 lbs.          Plan:  - Assess Depression- outpatient  - Encourage oral intake        UTI (urinary tract infection)  Assessment & Plan  History of  UTI  History of BPH, symptomatic (nocturia, frequency)  Abnormal UA  Leukocytosis       Plan:  - Ceftriaxone given on 10/11. Will discontinue as patient has asymptomatic bacteruria  - IV fluids  - Encourage Oral Intake  - Trend Leukocytosis  - Routine labs    Chronic diarrhea- (present on admission)  Assessment & Plan  Patient reports diarrhea on going for 4 months with at least 3 watery BMs every day. Patient endorses to drinking a lot of milk daily.     Plan :  Lactose -free diet  Will calculate stool osmolar gap to differentiate between osmotic and  diarrhea.   Fecal fat to investigate malabsorption  Fecal lactoferrin     Metabolic acidosis- (present on admission)  Assessment & Plan  Patient unable to eat for several days ( no appetite)  Bicarb 19      Plan:  - Continue Monitoring  - Replacing Lytes  - Routine Labs  - Encourage PO and food intake    Hypokalemia  Assessment & Plan  Patient has not eaten well for the past several days and nothing at all in the past 2-3 days  Potassium in the ED was 3.1                     Plan:  - Potassium replacement  - Routine Labs  - Trend Potassium  - Encourage food intake    Iron deficiency anemia- (present on admission)  Assessment & Plan  Asymptomatic chronic microcytic anemia  Denies any SOB or palpitations, melena or rectal bleed.  Hgb: 12.3  Likely  due poor diet       Plan:  - Consider Iron   - Patient refused rectal exam  - Colonoscopy less than 10 years ago      Stage 3 chronic kidney disease (HCC)- (present on admission)  Assessment & Plan  History of CKD3  Labs: Creatinine: 1.63, previous hospitalizations and past records in the 0.92.-1.76 range.  Patient states making enough urine, but has had nocturia and frequent urination due to BPH     Plan:  - Observation  - IV Fluids  - Avoid Nephrotoxic agents  - Keep lytes in NL ranges

## 2019-10-11 NOTE — ASSESSMENT & PLAN NOTE
Asymptomatic chronic microcytic anemia  Denies any SOB or palpitations, melena or rectal bleed.  Hgb: 12.3  Likely  due poor diet       Plan:  - Consider Iron   - Patient refused rectal exam  - Colonoscopy less than 10 years ago

## 2019-10-11 NOTE — ASSESSMENT & PLAN NOTE
Patient unable to eat for several days ( no appetite) and possibly from diarrhea.   Bicarb 19 >18>14>16 today.       Plan:  - Continue Monitoring  - Replacing Lytes  - Routine Labs  - Encourage PO and food intake

## 2019-10-11 NOTE — H&P
Internal Medicine Admitting History and Physical    Note Author: Kai Montgomery M.D.       Name Chidi Tatum Ruiz     1945   Age/Sex 73 y.o. male   MRN [id number]   Code Status Full Code     After 5PM or if no immediate response to page, please call for cross-coverage  Attending/Team: Dr Hdez/Lior See Patient List for primary contact information  Call [phone] to page    1st Call - Day Intern (R1):   Dr Wasserman 2nd Call - Day Sr. Resident (R2/R3):   Dr Owen       Chief Complaint:   Failure to Thrive    HPI:  Mr Tatum is a 72 y/o male, presented to the ED in company of his two daughters for the CC of Asthenia, adynamia and loss of appetite.  He has a past medical history of HTN, Depression, BPH, SBO, GERD, Fatty Liver, Chronic lumbar pain, CKD3, Skin Cancer.  Mr Tatum was brought to the ED by his Daughter for concerns of poor food intake for the past weeks, his daughter states that he has not have a full meal in 4-5 days, and today 10/10 patient only drank water and had zero calorie intake. Patient states that he does not have appetite, that he just does not feel like eating any kind of food at all. Patient just lost his wife 2 years ago, and apparently patient has been depressed ever since. His daughter also mentions that the patient never leaves the house and he does not enjoy his previous hobbies.   According to daughter patient had a fever of 101 2 days ago.  Patient also mentions that he has had loose stools for the past 3-4 months going to the restroom 3 times a day and sometimes 4 loose stools even lately that he has not been having any food at all, he denies bloody stools, melena or abdominal pain.  Patient is alert and oriented times 4, answering questions appropriately, he is not in acute distress, he is very quit and lets daughter do much of the talk for him.  Patient denies fevers, chills, SOB, palpitations, abdominal pain or chest pain.  He does have a mild lower back  pain, referred to the lumbar area.  Patient also states that he has nocturia secondary to BPH awakening 6 times at night.  At arrival to the ED patient was afebrile, HR was 106, BP WNL.  Labs:  WBC:16.5; Hgb:12.3, GFR:42; Lac acid:1.1; Potassium:3.1; Bicarb:19; Creat:1.63; Gluc:118.  UA: Occult blood moderate;  Protein:100; Leuk esterase:large; WBC:packed; RBC:5-10; Bact:few.  CXR: No acute pulmonary process.  EKG: Sinus Rhythm; Bordeline Left axis dev; Borderline T abnormalities.    Review of Systems   Constitutional: Positive for fever and malaise/fatigue. Negative for chills and weight loss.   HENT: Negative for ear pain, hearing loss, nosebleeds and tinnitus.    Eyes: Negative for blurred vision, double vision and photophobia.   Respiratory: Negative for cough, hemoptysis, sputum production and shortness of breath.    Cardiovascular: Negative for chest pain, palpitations, orthopnea, claudication and leg swelling.   Gastrointestinal: Positive for diarrhea. Negative for abdominal pain, blood in stool, heartburn, nausea and vomiting.   Genitourinary: Positive for frequency. Negative for dysuria, flank pain, hematuria and urgency.   Musculoskeletal: Positive for back pain. Negative for joint pain, myalgias and neck pain.   Skin: Negative for itching and rash.   Neurological: Negative for dizziness, tingling and headaches.   Endo/Heme/Allergies: Does not bruise/bleed easily.   Psychiatric/Behavioral: Positive for depression. Negative for hallucinations, substance abuse and suicidal ideas.             Past Medical History (Chronic medical problem, known complications and current treatment)  HTN; BPH; Depression; GERD; SBO; Insomnia; CKD3; Chronic Back Pain; Skin Cancer; Fatty Liver.      Past Surgical History:  Past Surgical History:   Procedure Laterality Date   • TEMPORAL ARTERY BIOPSY Right 2/9/2018    Procedure: TEMPORAL ARTERY BIOPSY;  Surgeon: Bryant Corey M.D.;  Location: SURGERY [facility];   Service: Vascular   • HERNIA REPAIR      umbilical, groin        Current Outpatient Medications:  Home Medications     Reviewed by Balaji Moore (Pharmacy Tech) on 10/10/19 at 1635  Med List Status: Complete   Medication Last Dose Status   amLODIPine (NORVASC) 10 MG Tab 10/9/2019 Active   esomeprazole (NEXIUM) 40 MG delayed-release capsule 10/9/2019 Active   finasteride (PROSCAR) 5 MG Tab 10/9/2019 Active   lisinopril (PRINIVIL) 10 MG Tab 10/9/2019 Active   Potassium 99 MG Tab 10/9/2019 Active   sertraline (ZOLOFT) 25 MG tablet 10/9/2019 Active   tamsulosin (FLOMAX) 0.4 MG capsule 10/9/2019 Active   zolpidem (AMBIEN) 5 MG Tab ABOUT 1 WEEK AGO Active                Medication Allergy/Sensitivities:  Allergies   Allergen Reactions   • Celecoxib      Rash         Family History (mandatory)   Family History   Problem Relation Age of Onset   • Lung Disease Father    • Alcohol/Drug Brother    • Lung Disease Brother        Social History (mandatory)   Social History     Socioeconomic History   • Marital status:      Spouse name: Not on file   • Number of children: Not on file   • Years of education: Not on file   • Highest education level: Not on file   Occupational History   • Not on file   Social Needs   • Financial resource strain: Not on file   • Food insecurity:     Worry: Not on file     Inability: Not on file   • Transportation needs:     Medical: Not on file     Non-medical: Not on file   Tobacco Use   • Smoking status: Never Smoker   • Smokeless tobacco: Never Used   Substance and Sexual Activity   • Alcohol use: No     Alcohol/week: 0.0 oz   • Drug use: No   • Sexual activity: Never   Lifestyle   • Physical activity:     Days per week: Not on file     Minutes per session: Not on file   • Stress: Not on file   Relationships   • Social connections:     Talks on phone: Not on file     Gets together: Not on file     Attends Jainism service: Not on file     Active member of club or organization: Not on  file     Attends meetings of clubs or organizations: Not on file     Relationship status: Not on file   • Intimate partner violence:     Fear of current or ex partner: Not on file     Emotionally abused: Not on file     Physically abused: Not on file     Forced sexual activity: Not on file   Other Topics Concern   • Not on file   Social History Narrative   • Not on file     Living situation: Lives in a house with one daughter    PCP : SAM Moya    Physical Exam     Vitals:    10/10/19 1913 10/10/19 2000 10/10/19 2100 10/10/19 2224   BP: 143/74 136/63 146/89 159/82   Pulse: 87 86 92 88   Resp: 16 16 16 18   Temp:    37.3 °C (99.1 °F)   TempSrc:    Temporal   SpO2: 97% 96% 99% 94%   Weight:    103.3 kg (227 lb 11.8 oz)   Height:         Body mass index is 30.89 kg/m².  O2 therapy: Pulse Oximetry: 94 %, O2 (LPM): 0, O2 Delivery: None (Room Air)    Physical Exam   Constitutional: He is oriented to person, place, and time and well-developed, well-nourished, and in no distress. No distress.   HENT:   Head: Normocephalic and atraumatic.   Mouth/Throat: Oropharynx is clear and moist. No oropharyngeal exudate.   Mallampati score 3   Eyes: Pupils are equal, round, and reactive to light. Conjunctivae and EOM are normal. No scleral icterus.   Neck: Normal range of motion. Neck supple. No JVD present. No tracheal deviation present.   Cardiovascular: Normal rate, regular rhythm, normal heart sounds and intact distal pulses. Exam reveals no gallop and no friction rub.   No murmur heard.  Pulmonary/Chest: Effort normal and breath sounds normal. No respiratory distress. He has no wheezes. He has no rales. He exhibits no tenderness.   Abdominal: Soft. Bowel sounds are normal. He exhibits no distension and no mass. There is no tenderness. There is no rebound and no guarding.   Genitourinary:   Genitourinary Comments: Patient refused  Rectal exam   Musculoskeletal: Normal range of motion. He exhibits no edema or  deformity.   Neurological: He is alert and oriented to person, place, and time. He has normal reflexes. He displays normal reflexes. No cranial nerve deficit. Gait normal. GCS score is 15.   Skin: Skin is warm. No rash noted. He is not diaphoretic. No erythema.   Psychiatric: Mood, memory and judgment normal.         Data Review       Old Records Request:   Completed  Current Records review/summary: Completed    Lab Data Review:  Recent Results (from the past 24 hour(s))   CBC WITH DIFFERENTIAL    Collection Time: 10/10/19  3:15 PM   Result Value Ref Range    WBC 16.5 (H) 4.8 - 10.8 K/uL    RBC 4.19 (L) 4.70 - 6.10 M/uL    Hemoglobin 12.3 (L) 14.0 - 18.0 g/dL    Hematocrit 36.5 (L) 42.0 - 52.0 %    MCV 87.1 81.4 - 97.8 fL    MCH 29.4 27.0 - 33.0 pg    MCHC 33.7 33.7 - 35.3 g/dL    RDW 50.3 (H) 35.9 - 50.0 fL    Platelet Count 336 164 - 446 K/uL    MPV 9.0 9.0 - 12.9 fL    Neutrophils-Polys 82.30 (H) 44.00 - 72.00 %    Lymphocytes 10.70 (L) 22.00 - 41.00 %    Monocytes 5.80 0.00 - 13.40 %    Eosinophils 0.40 0.00 - 6.90 %    Basophils 0.20 0.00 - 1.80 %    Immature Granulocytes 0.60 0.00 - 0.90 %    Nucleated RBC 0.00 /100 WBC    Neutrophils (Absolute) 13.60 (H) 1.82 - 7.42 K/uL    Lymphs (Absolute) 1.77 1.00 - 4.80 K/uL    Monos (Absolute) 0.96 (H) 0.00 - 0.85 K/uL    Eos (Absolute) 0.06 0.00 - 0.51 K/uL    Baso (Absolute) 0.03 0.00 - 0.12 K/uL    Immature Granulocytes (abs) 0.10 0.00 - 0.11 K/uL    NRBC (Absolute) 0.00 K/uL   COMP METABOLIC PANEL    Collection Time: 10/10/19  3:15 PM   Result Value Ref Range    Sodium 135 135 - 145 mmol/L    Potassium 3.1 (L) 3.6 - 5.5 mmol/L    Chloride 107 96 - 112 mmol/L    Co2 19 (L) 20 - 33 mmol/L    Anion Gap 9.0 0.0 - 11.9    Glucose 118 (H) 65 - 99 mg/dL    Bun 12 8 - 22 mg/dL    Creatinine 1.63 (H) 0.50 - 1.40 mg/dL    Calcium 9.2 8.5 - 10.5 mg/dL    AST(SGOT) 12 12 - 45 U/L    ALT(SGPT) 7 2 - 50 U/L    Alkaline Phosphatase 75 30 - 99 U/L    Total Bilirubin 0.6 0.1 - 1.5  mg/dL    Albumin 3.4 3.2 - 4.9 g/dL    Total Protein 7.3 6.0 - 8.2 g/dL    Globulin 3.9 (H) 1.9 - 3.5 g/dL    A-G Ratio 0.9 g/dL   LIPASE    Collection Time: 10/10/19  3:15 PM   Result Value Ref Range    Lipase 12 11 - 82 U/L   ESTIMATED GFR    Collection Time: 10/10/19  3:15 PM   Result Value Ref Range    GFR If  51 (A) >60 mL/min/1.73 m 2    GFR If Non  42 (A) >60 mL/min/1.73 m 2   LACTIC ACID    Collection Time: 10/10/19  4:24 PM   Result Value Ref Range    Lactic Acid 1.1 0.5 - 2.0 mmol/L   MAGNESIUM    Collection Time: 10/10/19  4:24 PM   Result Value Ref Range    Magnesium 1.8 1.5 - 2.5 mg/dL   URINALYSIS,CULTURE IF INDICATED    Collection Time: 10/10/19  6:37 PM   Result Value Ref Range    Color Yellow     Character Turbid (A)     Specific Gravity 1.011 <1.035    Ph 6.0 5.0 - 8.0    Glucose Negative Negative mg/dL    Ketones Negative Negative mg/dL    Protein 100 (A) Negative mg/dL    Bilirubin Negative Negative    Urobilinogen, Urine 0.2 Negative    Nitrite Negative Negative    Leukocyte Esterase Large (A) Negative    Occult Blood Moderate (A) Negative    Micro Urine Req Microscopic    URINE MICROSCOPIC (W/UA)    Collection Time: 10/10/19  6:37 PM   Result Value Ref Range    WBC Packed (A) /hpf    RBC 5-10 (A) /hpf    Bacteria Few (A) None /hpf    Epithelial Cells Rare /hpf   EKG    Collection Time: 10/10/19 11:20 PM   Result Value Ref Range    Report       [facility]    Test Date:  2019-10-10  Pt Name:    RENATA CLEMENT                 Department:   MRN:        [id number]                      Room:       T815  Gender:     Male                         Technician: SIOMARA  :        1945                   Requested By:CASSI MOMIN  Order #:    [id number]                    Reading MD:    Measurements  Intervals                                Axis  Rate:       87                           P:          71  DC:         156                          QRS:        -20  QRSD:        84                           T:          48  QT:         360  QTc:        433    Interpretive Statements  SINUS RHYTHM  BORDERLINE LEFT AXIS DEVIATION  BORDERLINE T ABNORMALITIES, ANTERIOR LEADS  ARTIFACT IN LEAD(S) I,II,III,aVR,aVL,aVF,V1,V2,V3,V4,V5,V6  Compared to ECG 09/28/2019 13:24:48  T-wave abnormality now present         Imaging/Procedures Review:    Independant Imaging Review: Completed  DX-CHEST-PORTABLE (1 VIEW)   Final Result      No acute cardiopulmonary disease.      [id number] VIEW    (Results Pending)            EKG:   EKG Independent Review: Completed  QTc:433, HR: 87, Normal Sinus Rhythm, no ST/T changes     Records reviewed and summarized in current documentation :  Yes  [employer] teaching service handout given to patient:  No         Assessment/Plan     * Sepsis (HCC)- (present on admission)  Assessment & Plan  Patient presented to the ED with significant Leukocytosis, fever at home 48 hours ago, and Tachycardia.  History of UTI and Sepsis  Multiple hospitalizations                Plan:  - Blood cultures  - IV antibiotics (ceftriaxone)  - IV fluids  - Telemetry  - Cont monitoring  - Routine Labs      Failure to thrive in adult- (present on admission)  Assessment & Plan  Patient has not had appetite and declines eating for the past weeks  According to daughter last meal was 3-4 days ago.  Recent weight loss of 5-6 lbs.          Plan:  - Assess Depression  - Encourage oral intake  - Consider Behavioral consult      UTI (urinary tract infection)  Assessment & Plan  History of UTI  History of BPH, symptomatic (nocturia, frequency)  Abnormal UA  Leukocytosis       Plan:  - Ceftriaxone  - IV fluids  - Encourage Oral Intake  - Trend Leukocytosis  - Routine labs    Metabolic acidosis- (present on admission)  Assessment & Plan  Patient unable to eat for several days ( no appetite)  Bicarb 19      Plan:  - Contin Monitoring  - Replacing Lytes  - Routine Labs  - Encourage PO and food  intake    Hypokalemia  Assessment & Plan  Patient has not eaten well for the past several days and nothing at all in the past 2-3 days  Potassium in the ED was 3.1                     Plan:  - Potassium replacement  - Routine Labs  - Trend Potassium  - Encourage food intake    Iron deficiency anemia- (present on admission)  Assessment & Plan  Asymptomatic chronic microcytic anemia  Denies any SOB or palpitations, melena or rectal bleed.  Hgb: 12.3  Likely  due poor diet       Plan:  - Consider Iron   - Patient refused rectal exam  - Colonoscopy less than 10 years ago      Stage 3 chronic kidney disease (HCC)- (present on admission)  Assessment & Plan  History of CKD3  Labs: Creatinine: 1.63, previous hospitalizations and past records in the 1.5.-1.7 range.  Patient states making enough urine, but has had nocturia and frequent urination due to BPH     Plan:  - Observation  - IV Fluids  - Avoid Nephrotoxic agents  - Keep lytes in NL ranges        Anticipated Hospital stay:  >2 midnights        Quality Measures  Quality-Core Measures   Reviewed items::  EKG reviewed, Labs reviewed, Medications reviewed and Radiology images reviewed  Barrios catheter::  No Barrios  DVT prophylaxis pharmacological::  Heparin  Ulcer Prophylaxis::  Yes    PCP: SAM Moya

## 2019-10-11 NOTE — PROGRESS NOTES
Assumed care at 2150, bedside report received from ED RN. Pt on the monitor. Initial assessment completed, orders reviewed, call light within reach, bed alarm in use, and hourly rounding in place. POC addressed with patient, no additional questions at this time.

## 2019-10-11 NOTE — ED NOTES
Patient awake alert and oriented x 4, Glascow 15, bed in low position, call light within reach, on room air, attached to cardiac monitor, unlabored breathing noted, no cough noted, interacts with staff, interactions noted as appropriate, speaking with admitting provider at this time, family at bedside.

## 2019-10-11 NOTE — ASSESSMENT & PLAN NOTE
Patient has not eaten well for the past several days and nothing at all in the past 2-3 days  Potassium in the ED was 3.1                     Plan:  - Potassium replacement  - Routine Labs  - Trend Potassium  - Encourage food intake

## 2019-10-11 NOTE — ED NOTES
Patient given water with ERP permission. Reminded about giving urine sample. No other needs at this time

## 2019-10-11 NOTE — THERAPY
"Physical Therapy Evaluation completed.   Bed Mobility:  Supine to Sit: (NT, up with RN prior to entry, appears capable)  Transfers: Sit to Stand: Supervised  Gait: Level Of Assist: Supervised with No Equipment Needed       Plan of Care: Patient with no further skilled PT needs in the acute care setting at this time  Discharge Recommendations: Equipment: No Equipment Needed. Post-acute therapy: Currently anticipate no further skilled therapy needs once patient is discharged from the inpatient setting.    See \"Rehab Therapy-Acute\" Patient Summary Report for complete documentation.    Patient is a 74 YO male that was admitted with failure to thrive. He ambulated approximately 500ft without AD with supervision and ascended/descended 3 steps with reciprocal gait, rail, and supervision. He appears to be near baseline functional mobility and reported no concern regarding return home following medical DC. He is at safe functional level to return home. Patient will not be actively followed for physical therapy services at this time, however may be seen if requested by physician for 1 more visit within 30 days to address any discharge or equipment needs.  "

## 2019-10-11 NOTE — ED NOTES
Patient awake alert and oriented x 4, Glascow 15, bed in low position, call light within reach, on room air, attached to cardiac monitor, unlabored breathing noted, no cough noted, interacts with staff, interactions noted as appropriate, IV fluids infusing, family at bedside.

## 2019-10-11 NOTE — ED NOTES
Patient awake alert and oriented x 4, Glascow 15, bed in low position, call light within reach, on room air, attached to cardiac monitor, unlabored breathing noted, no cough noted, interacts with staff, interactions noted as appropriate, family at bedside, IV antibiotics completed at this time.

## 2019-10-11 NOTE — ED NOTES
Patient given warm blanket. Monitors remain attached. Patient encouraged to give urine sample.     Waiting for ERP update.

## 2019-10-11 NOTE — ASSESSMENT & PLAN NOTE
History of UTI  History of BPH, symptomatic (nocturia, frequency)  Abnormal UA  Leukocytosis       Plan:  - Ceftriaxone given on 10/11. Will discontinue as patient has asymptomatic bacteruria  - IV fluids  - Encourage Oral Intake  - Trend Leukocytosis  - Routine labs

## 2019-10-12 VITALS
TEMPERATURE: 97.5 F | OXYGEN SATURATION: 97 % | SYSTOLIC BLOOD PRESSURE: 130 MMHG | BODY MASS INDEX: 30.88 KG/M2 | DIASTOLIC BLOOD PRESSURE: 77 MMHG | RESPIRATION RATE: 16 BRPM | WEIGHT: 227.96 LBS | HEART RATE: 91 BPM | HEIGHT: 72 IN

## 2019-10-12 LAB
ALBUMIN SERPL BCP-MCNC: 3 G/DL (ref 3.2–4.9)
ALBUMIN/GLOB SERPL: 0.8 G/DL
ALP SERPL-CCNC: 61 U/L (ref 30–99)
ALT SERPL-CCNC: 6 U/L (ref 2–50)
ANION GAP SERPL CALC-SCNC: 6 MMOL/L (ref 0–11.9)
AST SERPL-CCNC: 19 U/L (ref 12–45)
BACTERIA UR CULT: ABNORMAL
BACTERIA UR CULT: ABNORMAL
BASOPHILS # BLD AUTO: 0.6 % (ref 0–1.8)
BASOPHILS # BLD: 0.06 K/UL (ref 0–0.12)
BILIRUB SERPL-MCNC: 0.3 MG/DL (ref 0.1–1.5)
BUN SERPL-MCNC: 12 MG/DL (ref 8–22)
CALCIUM SERPL-MCNC: 8.8 MG/DL (ref 8.5–10.5)
CHLORIDE SERPL-SCNC: 114 MMOL/L (ref 96–112)
CO2 SERPL-SCNC: 16 MMOL/L (ref 20–33)
CREAT SERPL-MCNC: 1.48 MG/DL (ref 0.5–1.4)
EOSINOPHIL # BLD AUTO: 0.31 K/UL (ref 0–0.51)
EOSINOPHIL NFR BLD: 3.4 % (ref 0–6.9)
ERYTHROCYTE [DISTWIDTH] IN BLOOD BY AUTOMATED COUNT: 53.5 FL (ref 35.9–50)
GLOBULIN SER CALC-MCNC: 3.9 G/DL (ref 1.9–3.5)
GLUCOSE SERPL-MCNC: 96 MG/DL (ref 65–99)
HCT VFR BLD AUTO: 33.6 % (ref 42–52)
HGB BLD-MCNC: 10.4 G/DL (ref 14–18)
IMM GRANULOCYTES # BLD AUTO: 0.05 K/UL (ref 0–0.11)
IMM GRANULOCYTES NFR BLD AUTO: 0.5 % (ref 0–0.9)
LYMPHOCYTES # BLD AUTO: 2.04 K/UL (ref 1–4.8)
LYMPHOCYTES NFR BLD: 22.1 % (ref 22–41)
MCH RBC QN AUTO: 28.3 PG (ref 27–33)
MCHC RBC AUTO-ENTMCNC: 31 G/DL (ref 33.7–35.3)
MCV RBC AUTO: 91.3 FL (ref 81.4–97.8)
MONOCYTES # BLD AUTO: 0.54 K/UL (ref 0–0.85)
MONOCYTES NFR BLD AUTO: 5.8 % (ref 0–13.4)
NEUTROPHILS # BLD AUTO: 6.24 K/UL (ref 1.82–7.42)
NEUTROPHILS NFR BLD: 67.6 % (ref 44–72)
NRBC # BLD AUTO: 0 K/UL
NRBC BLD-RTO: 0 /100 WBC
PLATELET # BLD AUTO: 301 K/UL (ref 164–446)
PMV BLD AUTO: 8.9 FL (ref 9–12.9)
POTASSIUM SERPL-SCNC: 3.9 MMOL/L (ref 3.6–5.5)
PROT SERPL-MCNC: 6.9 G/DL (ref 6–8.2)
RBC # BLD AUTO: 3.68 M/UL (ref 4.7–6.1)
SIGNIFICANT IND 70042: ABNORMAL
SITE SITE: ABNORMAL
SODIUM SERPL-SCNC: 136 MMOL/L (ref 135–145)
SOURCE SOURCE: ABNORMAL
WBC # BLD AUTO: 9.2 K/UL (ref 4.8–10.8)

## 2019-10-12 PROCEDURE — 85025 COMPLETE CBC W/AUTO DIFF WBC: CPT

## 2019-10-12 PROCEDURE — 36415 COLL VENOUS BLD VENIPUNCTURE: CPT

## 2019-10-12 PROCEDURE — 99238 HOSP IP/OBS DSCHRG MGMT 30/<: CPT | Mod: GC | Performed by: INTERNAL MEDICINE

## 2019-10-12 PROCEDURE — 700102 HCHG RX REV CODE 250 W/ 637 OVERRIDE(OP): Performed by: STUDENT IN AN ORGANIZED HEALTH CARE EDUCATION/TRAINING PROGRAM

## 2019-10-12 PROCEDURE — 700111 HCHG RX REV CODE 636 W/ 250 OVERRIDE (IP): Performed by: STUDENT IN AN ORGANIZED HEALTH CARE EDUCATION/TRAINING PROGRAM

## 2019-10-12 PROCEDURE — A9270 NON-COVERED ITEM OR SERVICE: HCPCS | Performed by: STUDENT IN AN ORGANIZED HEALTH CARE EDUCATION/TRAINING PROGRAM

## 2019-10-12 PROCEDURE — 80053 COMPREHEN METABOLIC PANEL: CPT

## 2019-10-12 RX ADMIN — TAMSULOSIN HYDROCHLORIDE 0.4 MG: 0.4 CAPSULE ORAL at 05:04

## 2019-10-12 RX ADMIN — HEPARIN SODIUM 5000 UNITS: 5000 INJECTION, SOLUTION INTRAVENOUS; SUBCUTANEOUS at 05:03

## 2019-10-12 RX ADMIN — OMEPRAZOLE 20 MG: 20 CAPSULE, DELAYED RELEASE ORAL at 05:04

## 2019-10-12 RX ADMIN — SERTRALINE HYDROCHLORIDE 25 MG: 50 TABLET ORAL at 05:04

## 2019-10-12 RX ADMIN — LISINOPRIL 10 MG: 5 TABLET ORAL at 05:04

## 2019-10-12 RX ADMIN — ACETAMINOPHEN 1000 MG: 500 TABLET ORAL at 05:04

## 2019-10-12 RX ADMIN — AMLODIPINE BESYLATE 10 MG: 10 TABLET ORAL at 05:04

## 2019-10-12 RX ADMIN — FINASTERIDE 5 MG: 5 TABLET, FILM COATED ORAL at 05:04

## 2019-10-12 ASSESSMENT — ENCOUNTER SYMPTOMS
BLURRED VISION: 0
HEADACHES: 0
INSOMNIA: 0
COUGH: 1
CONSTIPATION: 0
DOUBLE VISION: 0
WEAKNESS: 0
SINUS PAIN: 0
NERVOUS/ANXIOUS: 0
LOSS OF CONSCIOUSNESS: 0
DIARRHEA: 0
HEARTBURN: 0
ORTHOPNEA: 0
SPUTUM PRODUCTION: 1
BRUISES/BLEEDS EASILY: 0
ABDOMINAL PAIN: 0
PND: 0
BLOOD IN STOOL: 0
WHEEZING: 0
FOCAL WEAKNESS: 0
PALPITATIONS: 0
CHILLS: 0
STRIDOR: 0
NAUSEA: 0
SHORTNESS OF BREATH: 0
SORE THROAT: 0
FEVER: 0
DIZZINESS: 0
MYALGIAS: 0
VOMITING: 0

## 2019-10-12 ASSESSMENT — LIFESTYLE VARIABLES: SUBSTANCE_ABUSE: 0

## 2019-10-12 NOTE — PROGRESS NOTES
Assumed care of patient at 1040. Report received from HERNAN Willams. Patient oriented to room and call bell. Will continue to monitor.

## 2019-10-12 NOTE — PROGRESS NOTES
Patient discharged home via personal transportation. IV removed. AVS reviewed and understood by patient. All belongings with patient. All questions answered.

## 2019-10-12 NOTE — PROGRESS NOTES
Internal Medicine Interval Note  Note Author: Bartolo Wasserman M.D.     Name Chidi Tatum Ruiz     1945   Age/Sex 73 y.o. male   MRN [id number]   Code Status Full     After 5PM or if no immediate response to page, please call for cross-coverage  Attending/Team: Dr. Hdez/Lior See Patient List for primary contact information  Call [phone] to page    1st Call - Day Intern (R1):   Dr. Wasserman 2nd Call - Day Sr. Resident (R2/R3):   Dr. Owen         Reason for interval visit  (Principal Problem)   Generalized weakness  Loss of appetite  Asymptomatic bacteruria  Chronic diarrhea       Interval Problem Daily Status Update  (24 hours, problem oriented, brief subjective history, new lab/imaging data pertinent to that problem)   Patient states that he has not had any diarrhea all day yesterday. Denies any abdominal pain yesterday during the day or overnight. He states that the abdominal pain was present only following breakfast yesterday morning.Continue to have postnasal drip. Denies any fever/chills/nausea/vomiting. States that he is feeling stronger and that he was able to walk around the floors.     Update 10;00am : had 2 episodes of diarrhea. No abdominal pain.     Objective:  Patients vitals are WNL. He continues to have stable anemia. He continues to have non anion gap metabolic acidosis, with bicarbonate of 16 today compared to 14 yesterday . His creatinine today is 1.48 compared to 1.52 yesterday. His creatinine continues to be within his baseline range of 0.92-1.76. With a GFr of 47. Stool cultures pending.       Review of Systems   Constitutional: Negative for chills, fever and malaise/fatigue.   HENT: Negative for congestion, sinus pain and sore throat.    Eyes: Negative for blurred vision and double vision.   Respiratory: Positive for cough and sputum production. Negative for shortness of breath, wheezing and stridor.         Associated with post nasal drip from chronic sinusiitis     Cardiovascular: Negative for chest pain, palpitations, orthopnea, leg swelling and PND.   Gastrointestinal: Negative for abdominal pain, blood in stool, constipation, diarrhea, heartburn, nausea and vomiting.   Genitourinary: Negative for dysuria, frequency and urgency.   Musculoskeletal: Negative for myalgias.   Skin: Negative for itching and rash.   Neurological: Negative for dizziness, focal weakness, loss of consciousness, weakness and headaches.   Endo/Heme/Allergies: Does not bruise/bleed easily.   Psychiatric/Behavioral: Negative for substance abuse. The patient is not nervous/anxious and does not have insomnia.        Disposition/Barriers to discharge:   Patient can be discharged today with outpatient follow up of chronic diarrhea    Consultants/Specialty  PCP: SAM Moya      Quality Measures  Quality-Core Measures   Reviewed items::  Labs reviewed and Medications reviewed          Physical Exam       Vitals:    10/11/19 1649 10/11/19 1905 10/12/19 0454 10/12/19 0830   BP:  121/68 135/66 130/77   Pulse:  89 89 91   Resp:  17 17 16   Temp:  36.6 °C (97.8 °F) 36.5 °C (97.7 °F) 36.4 °C (97.5 °F)   TempSrc:  Temporal Temporal Temporal   SpO2:  98% 97% 97%   Weight: 103.4 kg (227 lb 15.3 oz) 103.4 kg (227 lb 15.3 oz)     Height:         Body mass index is 30.92 kg/m². Weight: 103.4 kg (227 lb 15.3 oz)  Oxygen Therapy:  Pulse Oximetry: 97 %, O2 (LPM): 0, O2 Delivery: None (Room Air)    Physical Exam   Constitutional: He is oriented to person, place, and time and well-developed, well-nourished, and in no distress. No distress.   HENT:   Head: Normocephalic and atraumatic.   Mouth/Throat: Oropharynx is clear and moist. No oropharyngeal exudate.   Eyes: Pupils are equal, round, and reactive to light. Conjunctivae and EOM are normal. Right eye exhibits no discharge. Left eye exhibits no discharge. No scleral icterus.   Neck: Normal range of motion. Neck supple. No tracheal deviation present.    Cardiovascular: Normal rate, regular rhythm, normal heart sounds and intact distal pulses.   No murmur heard.  Pulmonary/Chest: Effort normal and breath sounds normal. No stridor. No respiratory distress. He has no wheezes. He has no rales.   Abdominal: Soft. Bowel sounds are normal. He exhibits no distension. There is no tenderness. There is no rebound.   Musculoskeletal: Normal range of motion. He exhibits no edema.   Neurological: He is alert and oriented to person, place, and time. No cranial nerve deficit. He exhibits normal muscle tone. Coordination normal.   Skin: Skin is warm and dry. He is not diaphoretic. No erythema.   Psychiatric: Affect normal.             Assessment/Plan     * Sepsis (HCC)- (present on admission)  Assessment & Plan  Patient presented to the ED with significant Leukocytosis, fever at home 48 hours ago, and Tachycardia meeting sepsis criteria with a possible source as UTI  Has History of UTI and BPH, chronic diarrhea for about 4 months and chronic sinusitis.   Multiple hospitalizations in the past.                 Plan:  - Blood cultures  - IV antibiotics (ceftriaxone)( given on 10/11- Discontinued on 10/11 as patient showed improvement in labs and clinically stable.  - IV fluids discontinued as patient tolerating well orally  - Telemetry  - Cont monitoring  - Routine Labs      Failure to thrive in adult- (present on admission)  Assessment & Plan  Patient has not had appetite and declines eating for the past weeks  According to daughter last meal was 3-4 days ago.  Recent weight loss of 5-6 lbs.          Plan:  - Assess Depression- outpatient  - Encourage oral intake        UTI (urinary tract infection)  Assessment & Plan  History of UTI  History of BPH, symptomatic (nocturia, frequency)  Abnormal UA  Leukocytosis       Plan:  - Ceftriaxone given on 10/11. Will discontinue as patient has asymptomatic bacteruria  - IV fluids  - Encourage Oral Intake  - Trend Leukocytosis  - Routine  labs    Chronic diarrhea- (present on admission)  Assessment & Plan  Patient reports diarrhea on going for 4 months with at least 3 watery BMs every day. Patient endorses to drinking a lot of milk daily.     Plan :  Lactose -free diet- Had not had diarrhea since lactose free diet implemented.  calculate stool osmolar gap to differentiate between osmotic and  diarrhea-  outpatient  Fecal fat to investigate malabsorption outpatient  Fecal lactoferrin outpatient   Continue lactose free diet.    Metabolic acidosis- (present on admission)  Assessment & Plan  Patient unable to eat for several days ( no appetite) and possibly from diarrhea.   Bicarb 19 >18>14>16 today.       Plan:  - Continue Monitoring  - Replacing Lytes  - Routine Labs  - Encourage PO and food intake    Hypokalemia  Assessment & Plan  Patient has not eaten well for the past several days and nothing at all in the past 2-3 days  Potassium in the ED was 3.1                     Plan:  - Potassium replacement  - Routine Labs  - Trend Potassium  - Encourage food intake    Iron deficiency anemia- (present on admission)  Assessment & Plan  Asymptomatic chronic microcytic anemia  Denies any SOB or palpitations, melena or rectal bleed.  Hgb: 12.3  Likely  due poor diet       Plan:  - Consider Iron   - Patient refused rectal exam  - Colonoscopy less than 10 years ago      Stage 3 chronic kidney disease (HCC)- (present on admission)  Assessment & Plan  History of CKD3  Labs: Creatinine: 1.63, previous hospitalizations and past records in the 0.92.-1.76 range.  Patient states making enough urine, but has had nocturia and frequent urination due to BPH     Plan:  - Observation  - IV Fluids discontinued a patient tolerating diet well.  - Avoid Nephrotoxic agents  - Keep lytes in NL ranges

## 2019-10-12 NOTE — CARE PLAN
Problem: Safety  Goal: Will remain free from falls  Outcome: PROGRESSING AS EXPECTED  Note:   Fall precautions in place. Bed in lowest position. Non-skid socks in place. Personal possessions within reach. Mobility sign on door. Bed-alarm on. Call light within reach. Pt educated regarding fall prevention and states understanding.        Problem: Knowledge Deficit  Goal: Knowledge of disease process/condition, treatment plan, diagnostic tests, and medications will improve  Outcome: PROGRESSING AS EXPECTED  Note:   Pt educated regarding plan of care and medications. All questions answered.

## 2019-10-12 NOTE — CARE PLAN
Problem: Communication  Goal: The ability to communicate needs accurately and effectively will improve  Outcome: MET     Problem: Safety  Goal: Will remain free from injury  Outcome: MET  Goal: Will remain free from falls  Outcome: MET     Problem: Infection  Goal: Will remain free from infection  Outcome: MET     Problem: Venous Thromboembolism (VTW)/Deep Vein Thrombosis (DVT) Prevention:  Goal: Patient will participate in Venous Thrombosis (VTE)/Deep Vein Thrombosis (DVT)Prevention Measures  Outcome: MET     Problem: Bowel/Gastric:  Goal: Normal bowel function is maintained or improved  Outcome: MET  Goal: Will not experience complications related to bowel motility  Outcome: MET     Problem: Knowledge Deficit  Goal: Knowledge of disease process/condition, treatment plan, diagnostic tests, and medications will improve  Outcome: MET  Goal: Knowledge of the prescribed therapeutic regimen will improve  Outcome: MET     Problem: Discharge Barriers/Planning  Goal: Patient's continuum of care needs will be met  Outcome: MET     Problem: Pain Management  Goal: Pain level will decrease to patient's comfort goal  Outcome: MET     Problem: Urinary Elimination:  Goal: Ability to reestablish a normal urinary elimination pattern will improve  Outcome: MET

## 2019-10-12 NOTE — DISCHARGE SUMMARY
Internal Medicine Discharge Summary     Date of Admission: 10/10/2019  Date of Discharge: 10/12/19  Service: Internal Medicine  Attending Physician: Dr. Hdez  Senior Resident: Kareem Owen M.D.  Intern: Dr. Owen    Discharge Diagnoses:   Diarrhea, non-anion gap metabolic acidosis    Procedures and Imaging:   DG-GREALPE-4 VIEW   Final Result      No significant abnormality.      DX-CHEST-PORTABLE (1 VIEW)   Final Result      No acute cardiopulmonary disease.          Consultants:   None    History of Present Illness:   Mr. Tatum is a 73 year old gentleman with a past medical history significant for hypertension, depression, BPH, GERD, and CKD 3 who presented 10/10/2019 with failure to thrive, generalized weakness, and non-bloody diarrhea with initial suspicion for UTI and sepsis. He was admitted and placed on a lactose-free diet due to suspicion for lactose intolerance and given intravenous fluids for his non-anion gap metabolic acidosis secondary to diarrhea. His ceftriaxone was stopped when it became clear he was asymptomatic without systemic manifestations of infection. His appetite improved and his diarrhea improved from 5 loose bowel movements per day to 2. Stool studies were ordered but were still pending at the time of discharge. By 10/12/2019 he was tolerating a regular diet, replacing all oral losses, and feeling well enough to return home, and was discharged in stable condition to home with recommended primary care follow-up.    Physical Exam on Day of Discharge:   Vitals:    10/11/19 1649 10/11/19 1905 10/12/19 0454 10/12/19 0830   BP:  121/68 135/66 130/77   Pulse:  89 89 91   Resp:  17 17 16   Temp:  36.6 °C (97.8 °F) 36.5 °C (97.7 °F) 36.4 °C (97.5 °F)   TempSrc:  Temporal Temporal Temporal   SpO2:  98% 97% 97%   Weight: 103.4 kg (227 lb 15.3 oz) 103.4 kg (227 lb 15.3 oz)     Height:         Weight/BMI: Body mass index is 30.92 kg/m².  Pulse Oximetry: 97 %, O2 (LPM): 0, O2 Delivery: None  (Room Air)    General: No acute distress  CVS: RRR no m/r/g  PULM: CTAB  ABD: NT, ND, +BS  EXTR: no edema  NEURO: ANOx4    Most Recent Labs:    Lab Results   Component Value Date/Time    WBC 9.2 10/12/2019 02:54 AM    WBC 8.9 03/06/2013 12:00 AM    RBC 3.68 (L) 10/12/2019 02:54 AM    RBC 5.49 03/06/2013 12:00 AM    HEMOGLOBIN 10.4 (L) 10/12/2019 02:54 AM    HEMATOCRIT 33.6 (L) 10/12/2019 02:54 AM    MCV 91.3 10/12/2019 02:54 AM    MCV 85 03/06/2013 12:00 AM    MCH 28.3 10/12/2019 02:54 AM    MCH 29.9 03/06/2013 12:00 AM    MCHC 31.0 (L) 10/12/2019 02:54 AM    MPV 8.9 (L) 10/12/2019 02:54 AM    NEUTSPOLYS 67.60 10/12/2019 02:54 AM    LYMPHOCYTES 22.10 10/12/2019 02:54 AM    MONOCYTES 5.80 10/12/2019 02:54 AM    EOSINOPHILS 3.40 10/12/2019 02:54 AM    BASOPHILS 0.60 10/12/2019 02:54 AM      Lab Results   Component Value Date/Time    SODIUM 136 10/12/2019 02:54 AM    POTASSIUM 3.9 10/12/2019 02:54 AM    CHLORIDE 114 (H) 10/12/2019 02:54 AM    CO2 16 (L) 10/12/2019 02:54 AM    GLUCOSE 96 10/12/2019 02:54 AM    BUN 12 10/12/2019 02:54 AM    CREATININE 1.48 (H) 10/12/2019 02:54 AM    CREATININE 0.97 03/06/2013 12:00 AM    BUNCREATRAT 22 03/06/2013 12:00 AM      Lab Results   Component Value Date/Time    ALTSGPT 6 10/12/2019 02:54 AM    ASTSGOT 19 10/12/2019 02:54 AM    ALKPHOSPHAT 61 10/12/2019 02:54 AM    TBILIRUBIN 0.3 10/12/2019 02:54 AM    LIPASE 12 10/10/2019 03:15 PM    ALBUMIN 3.0 (L) 10/12/2019 02:54 AM    GLOBULIN 3.9 (H) 10/12/2019 02:54 AM    INR 1.13 04/12/2018 12:37 PM     Lab Results   Component Value Date/Time    PROTHROMBTM 14.2 04/12/2018 12:37 PM    INR 1.13 04/12/2018 12:37 PM        Condition at Discharge:   Stable    Disposition:    Home    Discharge Medications:      Medication List      CONTINUE taking these medications      Instructions   amLODIPine 10 MG Tabs  Commonly known as:  NORVASC   Take 10 mg by mouth every day.  Dose:  10 mg     finasteride 5 MG Tabs  Commonly known as:  PROSCAR   Take 5  mg by mouth every day.  Dose:  5 mg     lisinopril 10 MG Tabs  Commonly known as:  PRINIVIL   Take 10 mg by mouth every day.  Dose:  10 mg     NEXIUM 40 MG delayed-release capsule  Generic drug:  esomeprazole   Take 40 mg by mouth every morning before breakfast.  Dose:  40 mg     Potassium 99 MG Tabs   Take 99 mg by mouth every day.  Dose:  99 mg     sertraline 25 MG tablet  Commonly known as:  ZOLOFT   TAKE 1 TABLET BY MOUTH ONCE DAILY     tamsulosin 0.4 MG capsule  Commonly known as:  FLOMAX   Take 0.4 mg by mouth every day.  Dose:  0.4 mg     zolpidem 5 MG Tabs  Commonly known as:  AMBIEN   Take 5 mg by mouth at bedtime as needed.  Dose:  5 mg            If patient going to [facility] then use .DISCHARGEMEDSLIST    Instructions:   Follow-up with your primary care doctor in 1 week  Avoid dairy until you follow up with your PCP  Drink plenty of water     The patient was instructed to return to the ER in the event of worsening symptoms. I have counseled the patient on the importance of compliance and the patient has agreed to proceed with all medical recommendations and follow up plan indicated above.   The patient understands that all medications come with benefits and risks. Risks may include permanent injury or death and these risks can be minimized with close reassessment and monitoring.        Follow-up:   Primary care - follow-up on stool studies + stool osmolar gap and iron deficiency anemia, further evaluation for diarrhea as needed. Recommend outpatient psychiatry evaluation for depression.     Patient also has neck swelling which he would like followed by PCP    Time Spent on Discharge: 35 minutes

## 2019-10-12 NOTE — DISCHARGE INSTRUCTIONS
Discharge Instructions    Discharged to home by car with relative. Discharged via walking, hospital escort: Yes.  Special equipment needed: Not Applicable    Be sure to schedule a follow-up appointment with your primary care doctor or any specialists as instructed.     Discharge Plan:   Diet Plan: Discussed  Activity Level: Discussed  Confirmed Follow up Appointment: Patient to Call and Schedule Appointment  Confirmed Symptoms Management: Discussed  Medication Reconciliation Updated: Yes  Influenza Vaccine Indication: Patient Refuses    I understand that a diet low in cholesterol, fat, and sodium is recommended for good health. Unless I have been given specific instructions below for another diet, I accept this instruction as my diet prescription.   Other diet: renal/lactose free    Special Instructions: None    · Is patient discharged on Warfarin / Coumadin?   No     Depression / Suicide Risk  Failure to Thrive, Adult  Introduction  Failure to thrive is a group of symptoms that affect elderly adults. These symptoms include loss of appetite and weight loss. People who have this condition may do fewer and fewer activities over time. They may lose interest in being with friends or they may not want to eat or drink. This condition is not a normal part of aging.  What are the causes?  This condition may be caused by:  · A disease, such dementia, diabetes, cancer, or lung disease.  · A health problem, such as a vitamin deficiency or a heart problem.  · A disorder, such as depression.  · A disability.  · Medicines.  · Mistreatment or neglect.  In some cases, the cause may not be known.  What are the signs or symptoms?  Symptoms of this condition include:  · Loss of more than 5% of your body weight.  · Being more tired than normal after an activity.  · Having trouble getting up after sitting.  · Loss of appetite.  · Not getting out of bed.  · Not wanting to do usual activities.  · Depression.  · Getting infections  often.  · Bedsores.  · Taking a long time to recover after an injury or a surgery.  · Weakness.  How is this diagnosed?  This condition may be diagnosed with a physical exam. Your health care provider will ask questions about your health, behavior, and mood, such as:  · Has your activity changed?  · Do you seem sad?  · Are your eating habits different?  Tests may also be done. They may include:  · Blood tests.  · Urine tests.  · Imaging tests, such as X-rays, a CT scan, or MRI.  · Hearing tests.  · Vision tests.  · Tests to check thinking ability (cognitive tests).  · Activity tests to see if you can do tasks such as bathing and dressing and to see if you can move around safely.  You may be referred to a specialist.  How is this treated?  Treatment for this condition depends on the cause. It may involve:  · Treating the cause.  · Talk therapy or medicine to treat depression.  · Improving diet, such as by eating more often or taking nutritional supplements.  · Changing or stopping a medicine.  · Physical therapy.  It often takes a team of health care providers to find the right treatment.  Follow these instructions at home:  · Take over-the-counter and prescription medicines only as told by your health care provider.  · Eat a healthy, well-balanced diet. Make sure to get enough calories in each meal.  · Be physically active. Include strength training as part of your exercise routine. A physical therapist can help to set up an exercise program that fits you.  · Make sure that you are safe at home.  · Make sure that you have a plan for what to do if you become unable to make decisions for yourself.  Contact a health care provider if:  · You are not able to eat well.  · You are not able to move around.  · You feel very sad or hopeless.  Get help right away if:  · You have thoughts of ending your life.  · You cannot eat or drink.  · You do not get out of bed.  · Staying at home is no longer safe.  · You have a  fever.  This information is not intended to replace advice given to you by your health care provider. Make sure you discuss any questions you have with your health care provider.  Document Released: 03/11/2013 Document Revised: 05/25/2017 Document Reviewed: 03/14/2016  © 2017 [employer]    As you are discharged from this [facility] facility, it is important to learn how to keep safe from harming yourself.    Recognize the warning signs:  · Abrupt changes in personality, positive or negative- including increase in energy   · Giving away possessions  · Change in eating patterns- significant weight changes-  positive or negative  · Change in sleeping patterns- unable to sleep or sleeping all the time   · Unwillingness or inability to communicate  · Depression  · Unusual sadness, discouragement and loneliness  · Talk of wanting to die  · Neglect of personal appearance   · Rebelliousness- reckless behavior  · Withdrawal from people/activities they love  · Confusion- inability to concentrate     If you or a loved one observes any of these behaviors or has concerns about self-harm, here's what you can do:  · Talk about it- your feelings and reasons for harming yourself  · Remove any means that you might use to hurt yourself (examples: pills, rope, extension cords, firearm)  · Get professional help from the community (Mental Health, Substance Abuse, psychological counseling)  · Do not be alone:Call your Safe Contact- someone whom you trust who will be there for you.  · Call your local CRISIS HOTLINE 662-8728 or 698-134-9569  · Call your local Children's Mobile Crisis Response Team [place] (973) 614-7123 or www.FlockTAG  · Call the toll free National Suicide Prevention Hotlines   · National Suicide Prevention Lifeline 382-582-QSWM (6322)  · National Hope Line Network 800-SUICIDE (915-4310)

## 2019-10-12 NOTE — CARE PLAN
Problem: Safety  Goal: Will remain free from falls  Outcome: PROGRESSING AS EXPECTED  Intervention: Assess risk factors for falls  Flowsheets (Taken 10/11/2019 2200)  Pt Calls for Assistance: Yes  Fall Risk: High Risk to Fall - 2 or more points   History of fall: 0  Mobility Status Assessment: 1-1 Healthcare Provider Required for Assistance with Ambulation & Transfer  Risk for Injury-Any positive answers results in the pt being at high risk for fall related injury: Not Applicable  Note:   Fall precautions in place. Bed in lowest position. Non-skid socks in place. Personal possessions within reach. Mobility sign on door. Bed-alarm on. Call light within reach. Pt educated regarding fall prevention and states understanding.       Problem: Knowledge Deficit  Goal: Knowledge of disease process/condition, treatment plan, diagnostic tests, and medications will improve  Outcome: PROGRESSING AS EXPECTED  Intervention: Assess knowledge level of disease process/condition, treatment plan, diagnostic tests, and medications  Note:   Pt educated regarding plan of care and medications. All questions answered.

## 2019-10-12 NOTE — ASSESSMENT & PLAN NOTE
Patient reports diarrhea on going for 4 months with at least 3 watery BMs every day. Patient endorses to drinking a lot of milk daily.     Plan :  Lactose -free diet- Had not had diarrhea since lactose free diet implemented.  calculate stool osmolar gap to differentiate between osmotic and  diarrhea-  outpatient  Fecal fat to investigate malabsorption outpatient  Fecal lactoferrin outpatient   Continue lactose free diet.

## 2019-10-13 NOTE — DOCUMENTATION QUERY
[facility]                                                                       Query Response Note      PATIENT:               RENATA CLEMENT  ACCT #:                  [id number]  MRN:                     [id number]  :                      1945  ADMIT DATE:       10/10/2019 3:38 PM  DISCH DATE:        10/12/2019 12:00 PM  RESPONDING  PROVIDER #:        [id number]           QUERY TEXT:    Depression is documented in the Medical Record.  Please specify the type.    NOTE:  If an appropriate response is not listed below, please respond with a new note.              The patient's Clinical Indicators include:  H&P: Depression, lost wife 2 years ago, does not leave the house or enjoy hobbies anymore  Treatment: Zoloft, Consider behavioral consult  Risk Factors: Recent death of wife, Advanced age, Failure to thrive, CKD, Insomnia  Options provided:   -- MDD, recurrent, in full remission   -- MDD, single episode, in full remission   -- MDD, recurrent, in partial remission   -- MDD, single episode, in partial remission   -- MDD, mild, single episode   -- MDD, mild, recurrent, current episode   -- MDD, moderate, single episode   -- MDD, moderate, recurrent, current episode   -- MDD, severe, single episode, without psychotic features   -- MDD, severe, single episode, with psychotic features   -- MDD, severe, recurrent, current episode, without psychotic features   -- MDD, severe, recurrent, current episode, with psychotic features   -- Situational/Grief Reaction depression   -- Unable to determine      Query created by: Phi Flores on 10/11/2019 1:14 PM    RESPONSE TEXT:    MDD, recurrent, in partial remission          Electronically signed by:  NOHELIA LOPEZ 10/13/2019 4:02 PM

## 2019-10-14 LAB
BACTERIA STL CULT: NORMAL
SIGNIFICANT IND 70042: NORMAL
SITE SITE: NORMAL
SOURCE SOURCE: NORMAL

## 2019-10-18 DIAGNOSIS — R62.7 FAILURE TO THRIVE IN ADULT: ICD-10-CM

## 2019-10-18 DIAGNOSIS — A41.9 SEPSIS, DUE TO UNSPECIFIED ORGANISM, UNSPECIFIED WHETHER ACUTE ORGAN DYSFUNCTION PRESENT (HCC): ICD-10-CM

## 2019-10-22 ENCOUNTER — TELEPHONE (OUTPATIENT)
Dept: MEDICAL GROUP | Facility: PHYSICIAN GROUP | Age: 74
End: 2019-10-22

## 2019-10-22 ENCOUNTER — TELEPHONE (OUTPATIENT)
Dept: MEDICAL GROUP | Facility: CLINIC | Age: 74
End: 2019-10-22

## 2019-10-22 DIAGNOSIS — R29.6 FREQUENT FALLS: ICD-10-CM

## 2019-10-22 NOTE — TELEPHONE ENCOUNTER
The home health nurse from [facility] calledAshley. She is requesting an Order for PT. [phone] is Ashley's #. Please advise

## 2019-10-22 NOTE — TELEPHONE ENCOUNTER
Pts home health nurse called in to inform pt has started home health as of today, 10/22/19    Thank you.    No

## 2019-10-23 NOTE — TELEPHONE ENCOUNTER
Called and spoke with sAhley bhatt Moody. Discussed plan, PT. She has been discussing with patient his diet; may have a sensitivity to dairy.   Referral placed for PT.   REBECCA Pike

## 2019-11-03 ENCOUNTER — APPOINTMENT (OUTPATIENT)
Dept: RADIOLOGY | Facility: MEDICAL CENTER | Age: 74
End: 2019-11-03
Attending: EMERGENCY MEDICINE
Payer: MEDICARE

## 2019-11-03 ENCOUNTER — HOSPITAL ENCOUNTER (EMERGENCY)
Facility: MEDICAL CENTER | Age: 74
End: 2019-11-03
Attending: EMERGENCY MEDICINE
Payer: MEDICARE

## 2019-11-03 VITALS
HEART RATE: 90 BPM | SYSTOLIC BLOOD PRESSURE: 148 MMHG | BODY MASS INDEX: 31.42 KG/M2 | RESPIRATION RATE: 16 BRPM | OXYGEN SATURATION: 96 % | TEMPERATURE: 97.9 F | DIASTOLIC BLOOD PRESSURE: 81 MMHG | WEIGHT: 232 LBS | HEIGHT: 72 IN
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
## 2019-11-03 LAB
ALBUMIN SERPL BCP-MCNC: 3.9 G/DL (ref 3.2–4.9)
ALBUMIN/GLOB SERPL: 1.1 G/DL
ALP SERPL-CCNC: 84 U/L (ref 30–99)
ALT SERPL-CCNC: 13 U/L (ref 2–50)
ANION GAP SERPL CALC-SCNC: 7 MMOL/L (ref 0–11.9)
APTT PPP: 24.2 SEC (ref 24.7–36)
AST SERPL-CCNC: 21 U/L (ref 12–45)
BASOPHILS # BLD AUTO: 0.5 % (ref 0–1.8)
BASOPHILS # BLD: 0.06 K/UL (ref 0–0.12)
BILIRUB SERPL-MCNC: 0.4 MG/DL (ref 0.1–1.5)
BUN SERPL-MCNC: 14 MG/DL (ref 8–22)
CALCIUM SERPL-MCNC: 9.4 MG/DL (ref 8.5–10.5)
CHLORIDE SERPL-SCNC: 110 MMOL/L (ref 96–112)
CO2 SERPL-SCNC: 21 MMOL/L (ref 20–33)
CREAT SERPL-MCNC: 1.39 MG/DL (ref 0.5–1.4)
EOSINOPHIL # BLD AUTO: 0.41 K/UL (ref 0–0.51)
EOSINOPHIL NFR BLD: 3.3 % (ref 0–6.9)
ERYTHROCYTE [DISTWIDTH] IN BLOOD BY AUTOMATED COUNT: 50.4 FL (ref 35.9–50)
GLOBULIN SER CALC-MCNC: 3.7 G/DL (ref 1.9–3.5)
GLUCOSE SERPL-MCNC: 131 MG/DL (ref 65–99)
HCT VFR BLD AUTO: 38.5 % (ref 42–52)
HGB BLD-MCNC: 12.3 G/DL (ref 14–18)
IMM GRANULOCYTES # BLD AUTO: 0.07 K/UL (ref 0–0.11)
IMM GRANULOCYTES NFR BLD AUTO: 0.6 % (ref 0–0.9)
INR PPP: 1.1 (ref 0.87–1.13)
LYMPHOCYTES # BLD AUTO: 2.41 K/UL (ref 1–4.8)
LYMPHOCYTES NFR BLD: 19.3 % (ref 22–41)
MCH RBC QN AUTO: 29.2 PG (ref 27–33)
MCHC RBC AUTO-ENTMCNC: 31.9 G/DL (ref 33.7–35.3)
MCV RBC AUTO: 91.4 FL (ref 81.4–97.8)
MONOCYTES # BLD AUTO: 0.73 K/UL (ref 0–0.85)
MONOCYTES NFR BLD AUTO: 5.8 % (ref 0–13.4)
NEUTROPHILS # BLD AUTO: 8.8 K/UL (ref 1.82–7.42)
NEUTROPHILS NFR BLD: 70.5 % (ref 44–72)
NRBC # BLD AUTO: 0 K/UL
NRBC BLD-RTO: 0 /100 WBC
PLATELET # BLD AUTO: 266 K/UL (ref 164–446)
PMV BLD AUTO: 9.2 FL (ref 9–12.9)
POTASSIUM SERPL-SCNC: 3.7 MMOL/L (ref 3.6–5.5)
PROT SERPL-MCNC: 7.6 G/DL (ref 6–8.2)
PROTHROMBIN TIME: 14.4 SEC (ref 12–14.6)
RBC # BLD AUTO: 4.21 M/UL (ref 4.7–6.1)
SODIUM SERPL-SCNC: 138 MMOL/L (ref 135–145)
WBC # BLD AUTO: 12.5 K/UL (ref 4.8–10.8)

## 2019-11-03 PROCEDURE — 72125 CT NECK SPINE W/O DYE: CPT

## 2019-11-03 PROCEDURE — 72131 CT LUMBAR SPINE W/O DYE: CPT

## 2019-11-03 PROCEDURE — 70450 CT HEAD/BRAIN W/O DYE: CPT

## 2019-11-03 PROCEDURE — 80053 COMPREHEN METABOLIC PANEL: CPT

## 2019-11-03 PROCEDURE — 99285 EMERGENCY DEPT VISIT HI MDM: CPT

## 2019-11-03 PROCEDURE — 85025 COMPLETE CBC W/AUTO DIFF WBC: CPT

## 2019-11-03 PROCEDURE — 96374 THER/PROPH/DIAG INJ IV PUSH: CPT

## 2019-11-03 PROCEDURE — 74176 CT ABD & PELVIS W/O CONTRAST: CPT

## 2019-11-03 PROCEDURE — 73090 X-RAY EXAM OF FOREARM: CPT | Mod: RT

## 2019-11-03 PROCEDURE — 700111 HCHG RX REV CODE 636 W/ 250 OVERRIDE (IP): Performed by: EMERGENCY MEDICINE

## 2019-11-03 PROCEDURE — 85610 PROTHROMBIN TIME: CPT

## 2019-11-03 PROCEDURE — 72128 CT CHEST SPINE W/O DYE: CPT

## 2019-11-03 PROCEDURE — 85730 THROMBOPLASTIN TIME PARTIAL: CPT

## 2019-11-03 RX ADMIN — FENTANYL CITRATE 50 MCG: 50 INJECTION, SOLUTION INTRAMUSCULAR; INTRAVENOUS at 13:34

## 2019-11-03 NOTE — ED TRIAGE NOTES
Chief Complaint   Patient presents with   • Rib Pain     right    • Head Injury   • Fall     fell off edge of stairs   • Neck Pain   • Shoulder Pain   • Back Pain     mid to lower back     Pt wheeled to triage , going down stairs and fell off landed on his head. Denies loc.

## 2019-11-03 NOTE — DISCHARGE INSTRUCTIONS
Take Motrin and Tylenol as needed for pain control    Return if you are acutely worse    Follow-up with your primary care doctor in 7 to 10 days

## 2019-11-03 NOTE — ED NOTES
Pt in wheelchair to yellow pod.  Pt ambulatory to bathroom with steady gait.  Pt presents in c collar.

## 2019-11-03 NOTE — ED PROVIDER NOTES
ED Provider Note    CHIEF COMPLAINT  Chief Complaint   Patient presents with   • Rib Pain     right    • Head Injury   • Fall     fell off edge of stairs   • Neck Pain   • Shoulder Pain   • Back Pain     mid to lower back       HPI  Chidi Tatum is a 74 y.o. male who presents today can neck pain after mechanical fall.  The patient fell down a couple of stairs prior to arrival.  The patient presents with a headache that is diffusely located.  He states is not on anticoagulants.  He also has some diffuse neck pain.  He states he also struck the right side of his chest where he has right-sided chest pain as well as right abdominal pain.  He also some pain to the low back.  The patient does not have any paresthesias or functional loss of his extremities.  He does have some pain to the right forearm he also has an abrasion to the left lateral aspect the elbow but no significant discomfort in this region.  He also has a contusion to the right anterior knee but is able to ambulate with no significant difficulty.  He also has a small abrasion to the left knee with no significant discomfort.    REVIEW OF SYSTEMS  See HPI for further details. All other systems are negative.     PAST MEDICAL HISTORY  Past Medical History:   Diagnosis Date   • BPH (benign prostatic hyperplasia)    • GERD (gastroesophageal reflux disease)    • HTN (hypertension)    • Hypertension    • Insomnia    • SBO (small bowel obstruction) ([facility])        FAMILY HISTORY  [unfilled]    SOCIAL HISTORY  Social History     Socioeconomic History   • Marital status:      Spouse name: Not on file   • Number of children: Not on file   • Years of education: Not on file   • Highest education level: Not on file   Occupational History   • Not on file   Social Needs   • Financial resource strain: Not on file   • Food insecurity:     Worry: Not on file     Inability: Not on file   • Transportation needs:     Medical: Not on file     Non-medical: Not on file    Tobacco Use   • Smoking status: Never Smoker   • Smokeless tobacco: Never Used   Substance and Sexual Activity   • Alcohol use: No     Alcohol/week: 0.0 oz   • Drug use: No   • Sexual activity: Never   Lifestyle   • Physical activity:     Days per week: Not on file     Minutes per session: Not on file   • Stress: Not on file   Relationships   • Social connections:     Talks on phone: Not on file     Gets together: Not on file     Attends Muslim service: Not on file     Active member of club or organization: Not on file     Attends meetings of clubs or organizations: Not on file     Relationship status: Not on file   • Intimate partner violence:     Fear of current or ex partner: Not on file     Emotionally abused: Not on file     Physically abused: Not on file     Forced sexual activity: Not on file   Other Topics Concern   • Not on file   Social History Narrative   • Not on file       SURGICAL HISTORY  Past Surgical History:   Procedure Laterality Date   • TEMPORAL ARTERY BIOPSY Right 2/9/2018    Procedure: TEMPORAL ARTERY BIOPSY;  Surgeon: Bryant Corey M.D.;  Location: SURGERY [facility];  Service: Vascular   • HERNIA REPAIR      umbilical, groin        CURRENT MEDICATIONS  Home Medications     Reviewed by Rachael Sandoval R.N. (Registered Nurse) on 11/03/19 at 1204  Med List Status: Unable to Obtain   Medication Last Dose Status   amLODIPine (NORVASC) 10 MG Tab  Active   esomeprazole (NEXIUM) 40 MG delayed-release capsule  Active   finasteride (PROSCAR) 5 MG Tab  Active   lisinopril (PRINIVIL) 10 MG Tab  Active   Potassium 99 MG Tab  Active   sertraline (ZOLOFT) 25 MG tablet  Active   tamsulosin (FLOMAX) 0.4 MG capsule  Active   zolpidem (AMBIEN) 5 MG Tab  Active                ALLERGIES  Allergies   Allergen Reactions   • Celecoxib      Rash       PHYSICAL EXAM  VITAL SIGNS: /92   Pulse 100   Temp 36.6 °C (97.9 °F) (Temporal)   Resp 18   Ht 1.829 m (6')   Wt 105.2 kg (232 lb)   SpO2  98%   BMI 31.46 kg/m²       Constitutional: Mild distress with c-collar in place  HENT: Normocephalic, Atraumatic, Bilateral external ears normal, Oropharynx moist, No oral exudates, Nose normal.   Eyes: PERRLA, EOMI, Conjunctiva normal, No discharge.   Neck: Diffuse posterior cervical discomfort without step-offs.   Lymphatic: No lymphadenopathy noted.   Cardiovascular: Normal heart rate, Normal rhythm, No murmurs, No rubs, No gallops.   Thorax & Lungs: Normal breath sounds, No respiratory distress, No wheezing, right chest tenderness.   Abdomen: Bowel sounds normal, Soft, right upper and lower quadrant tenderness, No masses, No pulsatile masses.   Skin: Abrasion to the bilateral anterior knees as well as the left elbow.  The patient also has evidence of a contusion and abrasion to the distal aspect of the right forearm  Back: No tenderness, No CVA tenderness.   Extremities: Intact distal pulses, No edema, right distal forearm, left elbow, right knee, and left knee tenderness extremities otherwise with no tenderness, No cyanosis, No clubbing.   Neurologic: GCS of 15  Psychiatric: Affect normal, Judgment normal, Mood normal.     RADIOLOGY/PROCEDURES  DX-FOREARM RIGHT   Final Result      No acute fracture identified.      CT-TSPINE W/O PLUS RECONS   Final Result      No CT evidence of acute traumatic abnormality.      CT-LSPINE W/O PLUS RECONS   Final Result      No CT evidence of acute traumatic injury.      CT-CHEST,ABDOMEN,PELVIS W/O   Final Result         1.  Limited exam secondary to lack of IV contrast.      2.  No gross solid organ injury is appreciated.      3.  No significant change in bilateral hydroureteronephrosis.      4.  Atherosclerosis      5.  Hepatic steatosis.      6.  Diverticulosis.      CT-HEAD W/O   Final Result      1.  No acute intracranial findings.      2.  Diffuse atrophy and periventricular white matter changes, consistent with chronic small vessel disease.      3.  Chronic maxillary and  ethmoid sinus disease.         CT-CSPINE WITHOUT PLUS RECONS   Final Result      No CT evidence of acute cervical spine abnormality.      Moderate cervical spondylosis with trace degenerative C4/5 and C7/T1 anterolisthesis            COURSE & MEDICAL DECISION MAKING  Pertinent Labs & Imaging studies reviewed. (See chart for details)  This is a 74-year-old gentleman who presents after mechanical fall.  Due to his known history of renal insufficiency we did not do a contrasted study.  CT scan of the head, cervical spine, chest, abdomen, pelvis, thoracic spine, and lumbar spine does not show any evidence of acute traumatic injury.  Therefore suspect the patient does have a concussion, cervical, and lumbar strain.  He also has clinical evidence of contusion to the right chest as well as to the right forearm and bilateral anterior knees.  The patient does not have any significant knee tenderness to support imaging.  He will be discharged home with instructions to take Tylenol Motrin as needed.  At the time of discharge the patient continues be neurologically intact and hemodynamically stable..    FINAL IMPRESSION  1.  Mechanical fall  2.  Concussion  3.  Cervical and lumbar strain  4.  Right chest contusion  5.  Bilateral knee contusions and abrasions  6.  Left elbow contusion and abrasion  7.  Right forearm contusion    Disposition  The patient will be discharged in stable condition         Electronically signed by: Jairo Celestin, 11/3/2019 1:07 PM

## 2019-11-05 ENCOUNTER — TELEPHONE (OUTPATIENT)
Dept: MEDICAL GROUP | Facility: CLINIC | Age: 74
End: 2019-11-05

## 2019-11-05 ENCOUNTER — OFFICE VISIT (OUTPATIENT)
Dept: MEDICAL GROUP | Facility: CLINIC | Age: 74
End: 2019-11-05
Payer: MEDICARE

## 2019-11-05 VITALS
BODY MASS INDEX: 32.68 KG/M2 | DIASTOLIC BLOOD PRESSURE: 82 MMHG | HEART RATE: 84 BPM | SYSTOLIC BLOOD PRESSURE: 138 MMHG | TEMPERATURE: 99.6 F | OXYGEN SATURATION: 97 % | HEIGHT: 71 IN | WEIGHT: 233.4 LBS

## 2019-11-05 DIAGNOSIS — G89.29 CHRONIC LEFT-SIDED LOW BACK PAIN WITHOUT SCIATICA: ICD-10-CM

## 2019-11-05 DIAGNOSIS — Z78.9 FREQUENT HOSPITAL ADMISSIONS: ICD-10-CM

## 2019-11-05 DIAGNOSIS — F33.1 MODERATE EPISODE OF RECURRENT MAJOR DEPRESSIVE DISORDER (HCC): ICD-10-CM

## 2019-11-05 DIAGNOSIS — M54.50 CHRONIC LEFT-SIDED LOW BACK PAIN WITHOUT SCIATICA: ICD-10-CM
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
## 2019-11-05 PROBLEM — A41.9 SEPSIS (HCC): Status: RESOLVED | Noted: 2019-10-11 | Resolved: 2019-11-05

## 2019-11-05 PROCEDURE — 99213 OFFICE O/P EST LOW 20 MIN: CPT | Performed by: NURSE PRACTITIONER

## 2019-11-05 ASSESSMENT — PAIN SCALES - GENERAL: PAINLEVEL: 8=MODERATE-SEVERE PAIN

## 2019-11-05 NOTE — PATIENT INSTRUCTIONS
Your medical care was provided today by: REBECCA Pike    Thank You for the opportunity to serve you.    You may receive a brief survey in the mail shortly regarding your visit today. Please take a few moments to complete the survey and return it; no postage is necessary. We are working to serve our patient population better, improve customer service and our patients overall experience and your input can help us to accomplish this. We thank you for your help and for the opportunity to serve you today and in the future.     Labs and Diagnostic Testing   Please note that if we have ordered labs or diagnostic testing, those results may be released to you on [employer] prior to my review. While we do our best to review your results as soon as possible, there are times when you may see your results prior to provider review. Of course, if you ever have any questions or concerns about your results, please contact our clinic.     Special Instructions:  Always call 9-1-1 immediately if you develop a life threatening emergency.    Unless told otherwise please take all medications as directed and complete prescription therapies.     Watch for the following signs that require additional evaluation: progressive lethargy or unresponsiveness, localized pain (chest, abdomen), shortness of breath, painful breathing, progressive vomiting with weakness, bloody stools, or new rash.     If you are prescribed pain medication or any other medication that is sedating, do not take medication before or while operating a vehicle or heavy machinery or equipment due to potential side effects such as drowsiness and/or dizziness.     Statement Selected

## 2019-11-05 NOTE — ASSESSMENT & PLAN NOTE
This is a chronic problem for which patient reports using Norco as needed up to 3 times per month.  He has never had back surgery.  Reports he did had some imaging in the past, this is not on file.  Reports he occasionally has increased pain.  He did trial tramadol which seemed to work at one time but made him more drowsy.  Denies any recent falls or new injury.  He was referred by Dr. Walls to physical therapy but was unable to follow through due to transporation issues. He reports that he really tries not to take pain medication but admits that this is the only thing that will control his pain with severely exacerbated. He has had several falls in the recent months, [facility], lac to head and otherwise without concern.

## 2019-11-05 NOTE — PROGRESS NOTES
Subjective:     Chidi Tatum is a 74 y.o. male here today for evaluation and management the following:    Chronic left-sided low back pain without sciatica  This is a chronic problem for which patient reports using Norco as needed up to 3 times per month.  He has never had back surgery.  Reports he did had some imaging in the past, this is not on file.  Reports he occasionally has increased pain.  He did trial tramadol which seemed to work at one time but made him more drowsy.  Denies any recent falls or new injury.  He was referred by Dr. Walls to physical therapy but was unable to follow through due to transporation issues. He reports that he really tries not to take pain medication but admits that this is the only thing that will control his pain with severely exacerbated. He has had several falls in the recent months, most recently [facility], lac to head and otherwise without concern.     Fall  Patient had a recent fall, was carrying a garbage bag and tripped and fell.  Denies any dizziness.  Several CT scans in the emergency room yielded no concern although may be has a concussion.    Frequent hospital admissions  Patient has had several hospital admissions in the last several months.      Moderate episode of recurrent major depressive disorder (HCC)  This is a chronic problem which is not well controlled.  Patient does utilize Ambien for sleep, he does take low-dose sertraline 25 mg daily.  His health started to decline after the death of his wife.  His daughter does help to take care of him.  Denies any suicidal ideation.       Current medicines (including changes today)  Current Outpatient Medications   Medication Sig Dispense Refill   • zolpidem (AMBIEN) 5 MG Tab Take 5 mg by mouth at bedtime as needed.  2   • sertraline (ZOLOFT) 25 MG tablet TAKE 1 TABLET BY MOUTH ONCE DAILY 90 Tab 2   • amLODIPine (NORVASC) 10 MG Tab Take 10 mg by mouth every day.  0   • esomeprazole (NEXIUM) 40 MG  delayed-release capsule Take 40 mg by mouth every morning before breakfast.     • Potassium 99 MG Tab Take 99 mg by mouth every day.     • finasteride (PROSCAR) 5 MG Tab Take 5 mg by mouth every day.     • lisinopril (PRINIVIL) 10 MG Tab Take 10 mg by mouth every day.     • tamsulosin (FLOMAX) 0.4 MG capsule Take 0.4 mg by mouth every day.       No current facility-administered medications for this visit.        He  has a past medical history of BPH (benign prostatic hyperplasia), GERD (gastroesophageal reflux disease), HTN (hypertension), Hypertension, Insomnia, and SBO (small bowel obstruction) ([facility]).    He  has a past surgical history that includes hernia repair and temporal artery biopsy (Right, 2/9/2018).     Social History     Socioeconomic History   • Marital status:      Spouse name: Not on file   • Number of children: Not on file   • Years of education: Not on file   • Highest education level: Not on file   Occupational History   • Not on file   Social Needs   • Financial resource strain: Not on file   • Food insecurity:     Worry: Not on file     Inability: Not on file   • Transportation needs:     Medical: Not on file     Non-medical: Not on file   Tobacco Use   • Smoking status: Never Smoker   • Smokeless tobacco: Never Used   Substance and Sexual Activity   • Alcohol use: No     Alcohol/week: 0.0 oz   • Drug use: No   • Sexual activity: Never   Lifestyle   • Physical activity:     Days per week: Not on file     Minutes per session: Not on file   • Stress: Not on file   Relationships   • Social connections:     Talks on phone: Not on file     Gets together: Not on file     Attends Orthodoxy service: Not on file     Active member of club or organization: Not on file     Attends meetings of clubs or organizations: Not on file     Relationship status: Not on file   • Intimate partner violence:     Fear of current or ex partner: Not on file     Emotionally abused: Not on file     Physically abused:  "Not on file     Forced sexual activity: Not on file   Other Topics Concern   • Not on file   Social History Narrative   • Not on file       Family History   Problem Relation Age of Onset   • Lung Disease Father    • Alcohol/Drug Brother    • Lung Disease Brother          ROS  Positive for neck pain, back pain.  Positive for depression, no suicidal ideation.  No fever, no chest pain, no shortness of breath, no abdominal pain, no rashes    All other systems reviewed and are negative.        Objective:     /82 (BP Location: Left arm, Patient Position: Sitting)   Pulse 84   Temp 37.6 °C (99.6 °F) (Temporal)   Ht 1.803 m (5' 11\")   Wt 105.9 kg (233 lb 6.4 oz)   SpO2 97%  Body mass index is 32.55 kg/m².    Physical Exam:   Constitutional: Alert, no distress.  Eye: Equal, round and reactive, conjunctiva clear, lids normal.   ENMT: Lips without lesions, oropharynx clear.   Neck: Trachea midline, no masses, no thyromegaly. No cervical or supraclavicular lymphadenopathy  Respiratory: Unlabored respiratory effort, lungs clear to auscultation, no wheezes, no ronchi.  Diminished bases.  Cardiovascular: Normal S1, S2, no murmur, no edema.   Skin: Warm, dry, good turgor, no rashes in visible areas.  He does have a well-healing laceration on his head, no sutures or staples present.  He does also have an abrasion on his right and left elbow, no signs or symptoms of active infection.  Psych: Alert and oriented x3, minimally flat.  He is calm and cooperative.        Assessment and Plan:   The following treatment plan was discussed    1. Chronic left-sided low back pain without sciatica  Lengthy discussion with patient and his daughter regarding his plan of care.  Patient continues to request pain medication and as such I have discussed again the importance likely of establishing care with [facility].  I do note that there treatment is often comprehensive with mental health and as such I do think patient would benefit " from seeing the mental health psychologist in that office.  Patient and his daughter are open to the referral.  I have placed the referral, discussed signs or symptoms to seek emergent care.  - REFERRAL TO PAIN CLINIC    2. Frequent hospital admissions  - REFERRAL TO PAIN CLINIC    3. Fall, initial encounter  - REFERRAL TO PAIN CLINIC    4. Moderate episode of recurrent major depressive disorder (HCC)  Continue Zoloft, we did discuss increase, although will await until patient can see mental health integrated into pain clinic.       Reviewed indication, dosage, usage and potential adverse effects of prescribed medications. Patient appears to understand, verbalizes understanding and is willing to try medications as prescribed.      Reviewed risks and benefits of treatment plan. Patient verbally agrees to plan of care.       Followup: No follow-ups on file.    Bernardo Dasilva, A.P.R.N.     PLEASE NOTE: This dictation was created using voice recognition software. I have made every reasonable attempt to correct obvious errors, but I expect that there may be errors of grammar and possibly content that I did not discover prior finalizing this note.

## 2019-11-05 NOTE — ASSESSMENT & PLAN NOTE
Patient had a recent fall, was carrying a garbage bag and tripped and fell.  Denies any dizziness.  Several CT scans in the emergency room yielded no concern although may be has a concussion.

## 2019-11-17 ENCOUNTER — OFFICE VISIT (OUTPATIENT)
Dept: URGENT CARE | Facility: PHYSICIAN GROUP | Age: 74
End: 2019-11-17
Payer: MEDICARE

## 2019-11-17 VITALS
OXYGEN SATURATION: 96 % | WEIGHT: 233 LBS | DIASTOLIC BLOOD PRESSURE: 76 MMHG | RESPIRATION RATE: 14 BRPM | HEART RATE: 98 BPM | SYSTOLIC BLOOD PRESSURE: 134 MMHG | BODY MASS INDEX: 32.5 KG/M2 | TEMPERATURE: 97.7 F

## 2019-11-17 DIAGNOSIS — S66.911A STRAIN OF RIGHT WRIST, INITIAL ENCOUNTER: ICD-10-CM

## 2019-11-17 PROCEDURE — 99214 OFFICE O/P EST MOD 30 MIN: CPT | Performed by: PHYSICIAN ASSISTANT

## 2019-11-17 RX ORDER — METHYLPREDNISOLONE 4 MG/1
TABLET ORAL
Qty: 21 TAB | Refills: 0 | Status: SHIPPED | OUTPATIENT
Start: 2019-11-17 | End: 2019-12-20

## 2019-11-17 NOTE — PROGRESS NOTES
Chief Complaint   Patient presents with   • Wrist Injury     R wirst swollen and painful        HISTORY OF PRESENT ILLNESS: Patient is a 74 y.o. male who presents today for the following:    Patient comes in for evaluation of right wrist pain that started 3 days ago.  He took a fall a couple weeks ago but he had negative x-rays and states his pain did resolve.  He denies any new injury.  He has worsening pain with any range of motion and has a difficult time making a fist with the right hand.  He has not taken any over-the-counter medication.    Patient Active Problem List    Diagnosis Date Noted   • History of sepsis 12/07/2018     Priority: High   • Failure to thrive in adult 10/10/2019     Priority: Medium     Class: Acute   • Nausea, vomiting, and diarrhea 01/04/2019     Priority: Medium   • UTI (urinary tract infection) 07/07/2018     Priority: Medium   • Metabolic acidosis 10/10/2019     Priority: Low     Class: Acute   • Hypokalemia 12/07/2018     Priority: Low   • Iron deficiency anemia 07/07/2018     Priority: Low   • Pre-diabetes 05/18/2018     Priority: Low   • Stage 3 chronic kidney disease (HCC) 09/01/2017     Priority: Low   • Moderate episode of recurrent major depressive disorder ([facility]) 05/19/2017     Priority: Low   • HTN (hypertension)      Priority: Low   • Chronic diarrhea 10/11/2019   • Chronic maxillary sinusitis 10/03/2019   • Frequent hospital admissions 10/03/2019   • Normocytic anemia 08/26/2019   • Obesity (BMI 30-39.9) 08/13/2019   • Neck mass 08/13/2019   • Unsteady gait 01/18/2019   • Hydronephrosis 01/18/2019   • Persistent proteinuria 01/06/2019   • Fall 12/11/2018   • Neck pain 12/11/2018   • Hyponatremia 12/07/2018   • Anxiety 12/07/2018   • Small bowel obstruction (HCC) 07/07/2018   • Amputation finger, 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/18/2018   • Gross hematuria 05/18/2018   • Oral thrush 03/16/2018   • Bilateral lower extremity edema 03/16/2018   • Psoriasis 05/19/2017   • Grief 03/31/2017   •  Primary insomnia 2016   • BPH (benign prostatic hyperplasia) 2015   • Chronic left-sided low back pain without sciatica 2015   • GERD (gastroesophageal reflux disease)        Allergies:Celecoxib    Current Outpatient Medications Ordered in Epic   Medication Sig Dispense Refill   • methylPREDNISolone (MEDROL DOSEPAK) 4 MG Tablet Therapy Pack Use as package directs 21 Tab 0   • zolpidem (AMBIEN) 5 MG Tab Take 5 mg by mouth at bedtime as needed.  2   • sertraline (ZOLOFT) 25 MG tablet TAKE 1 TABLET BY MOUTH ONCE DAILY 90 Tab 2   • amLODIPine (NORVASC) 10 MG Tab Take 10 mg by mouth every day.  0   • esomeprazole (NEXIUM) 40 MG delayed-release capsule Take 40 mg by mouth every morning before breakfast.     • Potassium 99 MG Tab Take 99 mg by mouth every day.     • finasteride (PROSCAR) 5 MG Tab Take 5 mg by mouth every day.     • lisinopril (PRINIVIL) 10 MG Tab Take 10 mg by mouth every day.     • tamsulosin (FLOMAX) 0.4 MG capsule Take 0.4 mg by mouth every day.       No current [facility]-ordered facility-administered medications on file.        Past Medical History:   Diagnosis Date   • BPH (benign prostatic hyperplasia)    • GERD (gastroesophageal reflux disease)    • HTN (hypertension)    • Hypertension    • Insomnia    • SBO (small bowel obstruction) ([facility])        Social History     Tobacco Use   • Smoking status: Never Smoker   • Smokeless tobacco: Never Used   Substance Use Topics   • Alcohol use: No     Alcohol/week: 0.0 oz   • Drug use: No       Family Status   Relation Name Status   • Mo     • Fa     • Sis  Alive   • Bro     • Sis  Alive   • Bro     • Bro     • Bro       Family History   Problem Relation Age of Onset   • Lung Disease Father    • Alcohol/Drug Brother    • Lung Disease Brother        Review of Systems:   Constitutional ROS: No unexpected change in weight, No weakness, No fatigue  Gastrointestinal ROS: No change in bowel habits, No  significant change in appetite, No nausea, vomiting, diarrhea, or constipation  Musculoskeletal/Extremities ROS: Positive for right wrist pain.  Hematologic/Lymphatic ROS: No chills, No night sweats, No weight loss  Skin/Integumentary ROS: No edema, No evidence of rash, No itching      Exam:  /76   Pulse 98   Temp 36.5 °C (97.7 °F) (Temporal)   Resp 14   Wt 105.7 kg (233 lb)   SpO2 96%   General: Well developed, well nourished. No distress.  Pulmonary: Unlabored respiratory effort.   Extremities: No bony tenderness noted in the right wrist.  Tenderness noted in the soft tissue of the flexor surface of the right wrist.  No soft tissue swelling, ecchymosis, erythema, or abrasions noted.   strength is decreased in the right hand due to wrist pain.  No localized pain noted in the hand.  Brisk capillary refill noted in the right hand.  Neurologic: Grossly nonfocal. No facial asymmetry noted.  Skin: Warm, dry, good turgor. No rashes in visible areas.   Psych: Normal mood. Alert and oriented x3. Judgment and insight is normal.    Assessment/Plan:  Low suspicion for fracture given recent negative x-ray, no new injury, and pain in the soft tissue only, without bony tenderness.  Wrist guard provided for comfort.  Use all medication as directed.  Follow-up for worsening or persistent symptoms.  1. Strain of right wrist, initial encounter  methylPREDNISolone (MEDROL DOSEPAK) 4 MG Tablet Therapy Pack

## 2019-11-21 ENCOUNTER — TELEPHONE (OUTPATIENT)
Dept: MEDICAL GROUP | Facility: CLINIC | Age: 74
End: 2019-11-21

## 2019-11-22 NOTE — TELEPHONE ENCOUNTER
Patient was in today with his daughter.  He has brought with him a blank POLST form.  He and his daughter have spoken with a  extensively about his wishes.  Patient wishes to be a DNR.  We did complete the paperwork here in clinic together.  He is of sound mind and capacity today.  Signed paperwork and also scanned into chart.  They are also in the process of completing a advanced directive, it is currently just awaiting notary.  Answered all patient questions and concerns.    As another update he did see an eye doctor recently and likely will have cataract surgery upcoming.  REBECCA Pike

## 2019-12-20 ENCOUNTER — OFFICE VISIT (OUTPATIENT)
Dept: MEDICAL GROUP | Facility: CLINIC | Age: 74
End: 2019-12-20
Payer: MEDICARE

## 2019-12-20 VITALS
WEIGHT: 240 LBS | OXYGEN SATURATION: 96 % | HEART RATE: 111 BPM | SYSTOLIC BLOOD PRESSURE: 120 MMHG | BODY MASS INDEX: 32.51 KG/M2 | TEMPERATURE: 97 F | HEIGHT: 72 IN | DIASTOLIC BLOOD PRESSURE: 72 MMHG

## 2019-12-20 DIAGNOSIS — G89.29 CHRONIC LEFT-SIDED LOW BACK PAIN WITHOUT SCIATICA: ICD-10-CM

## 2019-12-20 DIAGNOSIS — R22.0 MASS OF SCALP: ICD-10-CM

## 2019-12-20 DIAGNOSIS — M54.50 CHRONIC LEFT-SIDED LOW BACK PAIN WITHOUT SCIATICA: ICD-10-CM

## 2019-12-20 DIAGNOSIS — L98.9 SKIN ABNORMALITY: ICD-10-CM

## 2019-12-20 PROBLEM — R11.2 NAUSEA, VOMITING, AND DIARRHEA: Status: RESOLVED | Noted: 2019-01-04 | Resolved: 2019-12-20

## 2019-12-20 PROBLEM — R62.7 FAILURE TO THRIVE IN ADULT: Status: RESOLVED | Noted: 2019-10-10 | Resolved: 2019-12-20

## 2019-12-20 PROBLEM — B37.0 ORAL THRUSH: Status: RESOLVED | Noted: 2018-03-16 | Resolved: 2019-12-20

## 2019-12-20 PROBLEM — N39.0 UTI (URINARY TRACT INFECTION): Status: RESOLVED | Noted: 2018-07-07 | Resolved: 2019-12-20

## 2019-12-20 PROBLEM — R19.7 NAUSEA, VOMITING, AND DIARRHEA: Status: RESOLVED | Noted: 2019-01-04 | Resolved: 2019-12-20

## 2019-12-20 PROCEDURE — 99213 OFFICE O/P EST LOW 20 MIN: CPT | Performed by: NURSE PRACTITIONER

## 2019-12-20 RX ORDER — MUPIROCIN CALCIUM 20 MG/G
1 CREAM TOPICAL 2 TIMES DAILY
Qty: 1 TUBE | Refills: 1 | Status: SHIPPED | OUTPATIENT
Start: 2019-12-20

## 2019-12-20 RX ORDER — HYDROCODONE BITARTRATE AND ACETAMINOPHEN 10; 325 MG/1; MG/1
1 TABLET ORAL EVERY 6 HOURS PRN
Qty: 20 TAB | Refills: 0 | Status: SHIPPED | OUTPATIENT
Start: 2019-12-20 | End: 2019-12-27

## 2019-12-20 NOTE — PATIENT INSTRUCTIONS
Your medical care was provided today by: REBECCA Pike    Thank You for the opportunity to serve you.    You may receive a brief survey in the mail shortly regarding your visit today. Please take a few moments to complete the survey and return it; no postage is necessary. We are working to serve our patient population better, improve customer service and our patients overall experience and your input can help us to accomplish this. We thank you for your help and for the opportunity to serve you today and in the future.     Labs and Diagnostic Testing   Please note that if we have ordered labs or diagnostic testing, those results may be released to you on naayat prior to my review. While we do our best to review your results as soon as possible, there are times when you may see your results prior to provider review. Of course, if you ever have any questions or concerns about your results, please contact our clinic.     Special Instructions:  Always call 9-1-1 immediately if you develop a life threatening emergency.    Unless told otherwise please take all medications as directed and complete prescription therapies.     Watch for the following signs that require additional evaluation: progressive lethargy or unresponsiveness, localized pain (chest, abdomen), shortness of breath, painful breathing, progressive vomiting with weakness, bloody stools, or new rash.     If you are prescribed pain medication or any other medication that is sedating, do not take medication before or while operating a vehicle or heavy machinery or equipment due to potential side effects such as drowsiness and/or dizziness.
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
## 2019-12-20 NOTE — ASSESSMENT & PLAN NOTE
Patient has an appointment to see dermatology next month for scalp mass.  Likely lipoma although it appears patient has been picking at the area.
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
## 2019-12-23 ENCOUNTER — TELEPHONE (OUTPATIENT)
Dept: MEDICAL GROUP | Facility: CLINIC | Age: 74
End: 2019-12-23

## 2019-12-23 NOTE — TELEPHONE ENCOUNTER
VOICEMAIL  1. Caller Name: [facility]                      Call Back Number: [phone]    2. Message: Stated that the prescription for the cream that got sent over is not covered by insurance but ointment is. Would like a call if we can switch it over    3. Patient approves office to leave a detailed voicemail/MyChart message: N\A

## 2020-02-26 RX ORDER — LISINOPRIL 10 MG/1
10 TABLET ORAL DAILY
Qty: 30 TAB | Refills: 2 | Status: SHIPPED | OUTPATIENT
Start: 2020-02-26

## 2021-04-23 NOTE — ASSESSMENT & PLAN NOTE
Post-Op Assessment Note    CV Status:  Stable  Pain Score: 2    Pain management: adequate     Mental Status:  Alert and awake   Hydration Status:  Euvolemic   PONV Controlled:  Controlled   Airway Patency:  Patent      Post Op Vitals Reviewed: Yes      Staff: Anesthesiologist         No complications documented      /67 (04/23/21 1553)    Temp      Pulse 100 (04/23/21 1553)   Resp 20 (04/23/21 1553)    SpO2 100 % (04/23/21 1553) This is a chronic problem for which patient reports has been well controlled in the past with tamsulosin 0.4 mg daily and finasteride.  He has been referred to urology by nephrology, reports he is still now having some dribbling.  He has not followed through with that referral yet.

## 2021-06-28 PROBLEM — A04.72 C. DIFFICILE COLITIS: Status: ACTIVE | Noted: 2021-06-28

## 2021-06-30 PROBLEM — R50.9 DEHYDRATION FEVER: Status: ACTIVE | Noted: 2021-06-30

## 2021-07-05 PROBLEM — M54.81 OCCIPITAL NEURALGIA OF RIGHT SIDE: Status: ACTIVE | Noted: 2021-07-05

## 2021-10-15 NOTE — DISCHARGE INSTRUCTIONS
Discharge Instructions    Discharged to home by car with relative. Discharged via walking, hospital escort: Yes.  Special equipment needed: Not Applicable    Be sure to schedule a follow-up appointment with your primary care doctor or any specialists as instructed.     Discharge Plan:   Diet Plan: Discussed  Activity Level: Discussed  Confirmed Follow up Appointment: Appointment Scheduled  Confirmed Symptoms Management: Discussed  Medication Reconciliation Updated: Yes  Influenza Vaccine Indication: Patient Refuses    I understand that a diet low in cholesterol, fat, and sodium is recommended for good health. Unless I have been given specific instructions below for another diet, I accept this instruction as my diet prescription.   Other diet: low fat    Special Instructions: None    · Is patient discharged on Warfarin / Coumadin?   No     Depression / Suicide Risk    As you are discharged from this [facility] facility, it is important to learn how to keep safe from harming yourself.    Recognize the warning signs:  · Abrupt changes in personality, positive or negative- including increase in energy   · Giving away possessions  · Change in eating patterns- significant weight changes-  positive or negative  · Change in sleeping patterns- unable to sleep or sleeping all the time   · Unwillingness or inability to communicate  · Depression  · Unusual sadness, discouragement and loneliness  · Talk of wanting to die  · Neglect of personal appearance   · Rebelliousness- reckless behavior  · Withdrawal from people/activities they love  · Confusion- inability to concentrate     If you or a loved one observes any of these behaviors or has concerns about self-harm, here's what you can do:  · Talk about it- your feelings and reasons for harming yourself  · Remove any means that you might use to hurt yourself (examples: pills, rope, extension cords, firearm)  · Get professional help from the community (Mental Health, Substance  Abuse, psychological counseling)  · Do not be alone:Call your Safe Contact- someone whom you trust who will be there for you.  · Call your local CRISIS HOTLINE 195-5568 or 632-104-7609  · Call your local Children's Mobile Crisis Response Team [place] (600) 021-5608 or www.[employer]  · Call the toll free National Suicide Prevention Hotlines   · National Suicide Prevention Lifeline 361-956-RJKJ (8891)  · National Hope Line Network 800-SUICIDE (772-0196)       Graft Donor Site Bandage (Optional-Leave Blank If You Don't Want In Note): Steri-strips and a pressure bandage were applied to the donor site.

## 2023-03-28 ENCOUNTER — APPOINTMENT (OUTPATIENT)
Dept: URGENT CARE | Facility: PHYSICIAN GROUP | Age: 78
End: 2023-03-28
Payer: MEDICARE

## 2024-03-19 NOTE — ASSESSMENT & PLAN NOTE
This is a chronic problem which is not well controlled.  Patient does utilize Ambien for sleep, he does take low-dose sertraline 25 mg daily.  His health started to decline after the death of his wife.  His daughter does help to take care of him.  Denies any suicidal ideation.   Cell Phone/PDA (specify)

## (undated) DEVICE — GLOVE BIOGEL PI INDICATOR SZ 6.5 SURGICAL PF LF - (50/BX 4BX/CA)

## (undated) DEVICE — SUTURE 3-0 VICRYL PLUS RB-1 - 8 X 18 INCH (12/BX)

## (undated) DEVICE — DRAPE LARGE 3 QUARTER - (20/CA)

## (undated) DEVICE — SET LEADWIRE 5 LEAD BEDSIDE DISPOSABLE ECG (1SET OF 5/EA)

## (undated) DEVICE — SURGIFOAM (12X7) - (12EA/CA)

## (undated) DEVICE — GLOVE BIOGEL SZ 7 SURGICAL PF LTX - (50PR/BX 4BX/CA)

## (undated) DEVICE — STAPLER SKIN DISP - (6/BX 10BX/CA) VISISTAT

## (undated) DEVICE — GLOVE BIOGEL ECLIPSE PF LATEX SIZE 9.0

## (undated) DEVICE — CANISTER SUCTION 3000ML MECHANICAL FILTER AUTO SHUTOFF MEDI-VAC NONSTERILE LF DISP  (40EA/CA)

## (undated) DEVICE — ELECTRODE DUAL RETURN W/ CORD - (50/PK)

## (undated) DEVICE — GLOVE BIOGEL SZ 6 PF LATEX - (50EA/BX 4BX/CA)

## (undated) DEVICE — CLIP MED INTNL HRZN TI ESCP - (25/BX)

## (undated) DEVICE — GELAQUASONIC 100 ULTRASOUND - 48/BX 20GM STERILE FOIL POUCH

## (undated) DEVICE — GLOVE BIOGEL SZ 6.5 SURGICAL PF LTX (50PR/BX 4BX/CA)

## (undated) DEVICE — SOD. CHL. INJ. 0.9% 250 ML - (36/CA 50CA/PF)

## (undated) DEVICE — SUTURE 4-0 MONOCRYL PLUS PS-2 - 27 INCH (36/BX)

## (undated) DEVICE — DRAPE SURGICAL U 77X120 - (10/CA)

## (undated) DEVICE — DISH PETRI STERILE (50EA/CA)

## (undated) DEVICE — BLADE SURGICAL #15 - (50/BX 3BX/CA)

## (undated) DEVICE — GLOVE BIOGEL ECLIPSE  PF LATEX SIZE 6.5 (50PR/BX)

## (undated) DEVICE — CORDS BIPOLAR COAGULATION - 12FT STERILE DISP. (10EA/BX)

## (undated) DEVICE — MASK ANESTHESIA ADULT  - (100/CA)

## (undated) DEVICE — GOWN WARMING STANDARD FLEX - (30/CA)

## (undated) DEVICE — DETERGENT RENUZYME PLUS 10 OZ PACKET (50/BX)

## (undated) DEVICE — LACTATED RINGERS INJ 1000 ML - (14EA/CA 60CA/PF)

## (undated) DEVICE — PAD PREP 24 X 48 CUFFED - (100/CA)

## (undated) DEVICE — SUTURE 4-0 VICRYL PLUS P-2 18 (12PK/BX)"

## (undated) DEVICE — SET EXTENSION WITH 2 PORTS (48EA/CA) ***PART #2C8610 IS A SUBSTITUTE*****

## (undated) DEVICE — HEAD HOLDER JUNIOR/ADULT

## (undated) DEVICE — CLIP SM INTNL HRZN TI ESCP LGT - (24EA/PK 25PK/BX)

## (undated) DEVICE — ADHESIVE DERMABOND HVD MINI (12EA/BX)

## (undated) DEVICE — SODIUM CHL IRRIGATION 0.9% 1000ML (12EA/CA)

## (undated) DEVICE — BAG DECANTER (50EA/CS)

## (undated) DEVICE — BLADE SURGICAL #11 - (50/BX)

## (undated) DEVICE — NEPTUNE 4 PORT MANIFOLD - (20/PK)

## (undated) DEVICE — ELECTRODE 850 FOAM ADHESIVE - HYDROGEL RADIOTRNSPRNT (50/PK)

## (undated) DEVICE — KIT ANESTHESIA W/CIRCUIT & 3/LT BAG W/FILTER (20EA/CA)

## (undated) DEVICE — SUTURE GENERAL

## (undated) DEVICE — KIT ROOM DECONTAMINATION

## (undated) DEVICE — TUBING CLEARLINK DUO-VENT - C-FLO (48EA/CA)

## (undated) DEVICE — VESSELOOP MINI BLUE STERILE - SURG-I-LOOP (10EA/BX)

## (undated) DEVICE — SUTURE CV

## (undated) DEVICE — CHLORAPREP 26 ML APPLICATOR - ORANGE TINT(25/CA)

## (undated) DEVICE — BOVIE BLADE COATED - (50/PK)

## (undated) DEVICE — SENSOR SPO2 NEO LNCS ADHESIVE (20/BX) SEE USER NOTES

## (undated) DEVICE — PROTECTOR ULNA NERVE - (36PR/CA)

## (undated) DEVICE — SUCTION INSTRUMENT YANKAUER BULBOUS TIP W/O VENT (50EA/CA)

## (undated) DEVICE — PACK MINOR BASIN - (2EA/CA)

## (undated) DEVICE — MASK, LARYNGEAL AIRWAY #5

## (undated) DEVICE — GLOVE BIOGEL INDICATOR SZ 7SURGICAL PF LTX - (50/BX 4BX/CA)